# Patient Record
Sex: MALE | Race: BLACK OR AFRICAN AMERICAN | Employment: OTHER | URBAN - METROPOLITAN AREA
[De-identification: names, ages, dates, MRNs, and addresses within clinical notes are randomized per-mention and may not be internally consistent; named-entity substitution may affect disease eponyms.]

---

## 2017-01-03 ENCOUNTER — HOSPITAL ENCOUNTER (EMERGENCY)
Facility: CLINIC | Age: 67
Discharge: HOME | End: 2017-01-04
Attending: EMERGENCY MEDICINE | Admitting: EMERGENCY MEDICINE
Payer: COMMERCIAL

## 2017-01-03 ENCOUNTER — TELEPHONE (OUTPATIENT)
Dept: FAMILY MEDICINE | Facility: CLINIC | Age: 67
End: 2017-01-03

## 2017-01-03 ENCOUNTER — APPOINTMENT (OUTPATIENT)
Dept: CT IMAGING | Facility: CLINIC | Age: 67
End: 2017-01-03
Attending: EMERGENCY MEDICINE
Payer: COMMERCIAL

## 2017-01-03 ENCOUNTER — APPOINTMENT (OUTPATIENT)
Dept: ULTRASOUND IMAGING | Facility: CLINIC | Age: 67
End: 2017-01-03
Attending: EMERGENCY MEDICINE
Payer: COMMERCIAL

## 2017-01-03 DIAGNOSIS — R11.2 NON-INTRACTABLE VOMITING WITH NAUSEA, UNSPECIFIED VOMITING TYPE: ICD-10-CM

## 2017-01-03 DIAGNOSIS — K80.20 CALCULUS OF GALLBLADDER WITHOUT CHOLECYSTITIS WITHOUT OBSTRUCTION: ICD-10-CM

## 2017-01-03 LAB
ALBUMIN SERPL-MCNC: 4.2 G/DL (ref 3.4–5)
ALBUMIN UR-MCNC: 100 MG/DL
ALP SERPL-CCNC: 71 U/L (ref 40–150)
ALT SERPL W P-5'-P-CCNC: 30 U/L (ref 0–70)
ANION GAP SERPL CALCULATED.3IONS-SCNC: 13 MMOL/L (ref 3–14)
APPEARANCE UR: CLEAR
AST SERPL W P-5'-P-CCNC: 22 U/L (ref 0–45)
BASOPHILS # BLD AUTO: 0.1 10E9/L (ref 0–0.2)
BASOPHILS NFR BLD AUTO: 0.7 %
BILIRUB DIRECT SERPL-MCNC: 0.2 MG/DL (ref 0–0.2)
BILIRUB SERPL-MCNC: 0.7 MG/DL (ref 0.2–1.3)
BILIRUB UR QL STRIP: NEGATIVE
BUN SERPL-MCNC: 16 MG/DL (ref 7–30)
CALCIUM SERPL-MCNC: 9.7 MG/DL (ref 8.5–10.1)
CHLORIDE SERPL-SCNC: 101 MMOL/L (ref 94–109)
CO2 SERPL-SCNC: 23 MMOL/L (ref 20–32)
COLOR UR AUTO: YELLOW
CREAT SERPL-MCNC: 0.78 MG/DL (ref 0.66–1.25)
DIFFERENTIAL METHOD BLD: ABNORMAL
EOSINOPHIL # BLD AUTO: 0 10E9/L (ref 0–0.7)
EOSINOPHIL NFR BLD AUTO: 0.3 %
ERYTHROCYTE [DISTWIDTH] IN BLOOD BY AUTOMATED COUNT: 13.4 % (ref 10–15)
GFR SERPL CREATININE-BSD FRML MDRD: ABNORMAL ML/MIN/1.7M2
GLUCOSE BLDC GLUCOMTR-MCNC: 245 MG/DL (ref 70–99)
GLUCOSE SERPL-MCNC: 239 MG/DL (ref 70–99)
GLUCOSE UR STRIP-MCNC: 300 MG/DL
HCT VFR BLD AUTO: 39.9 % (ref 40–53)
HGB BLD-MCNC: 13.8 G/DL (ref 13.3–17.7)
HGB UR QL STRIP: ABNORMAL
HYALINE CASTS #/AREA URNS LPF: 1 /LPF (ref 0–2)
IMM GRANULOCYTES # BLD: 0 10E9/L (ref 0–0.4)
IMM GRANULOCYTES NFR BLD: 0.4 %
KETONES UR STRIP-MCNC: 40 MG/DL
LEUKOCYTE ESTERASE UR QL STRIP: NEGATIVE
LIPASE SERPL-CCNC: 179 U/L (ref 73–393)
LYMPHOCYTES # BLD AUTO: 1.8 10E9/L (ref 0.8–5.3)
LYMPHOCYTES NFR BLD AUTO: 24.8 %
MCH RBC QN AUTO: 30.3 PG (ref 26.5–33)
MCHC RBC AUTO-ENTMCNC: 34.6 G/DL (ref 31.5–36.5)
MCV RBC AUTO: 88 FL (ref 78–100)
MONOCYTES # BLD AUTO: 0.3 10E9/L (ref 0–1.3)
MONOCYTES NFR BLD AUTO: 4.7 %
MUCOUS THREADS #/AREA URNS LPF: PRESENT /LPF
NEUTROPHILS # BLD AUTO: 5 10E9/L (ref 1.6–8.3)
NEUTROPHILS NFR BLD AUTO: 69.1 %
NITRATE UR QL: NEGATIVE
NRBC # BLD AUTO: 0 10*3/UL
NRBC BLD AUTO-RTO: 0 /100
PH UR STRIP: 6.5 PH (ref 5–7)
PLATELET # BLD AUTO: 211 10E9/L (ref 150–450)
POTASSIUM SERPL-SCNC: 4.4 MMOL/L (ref 3.4–5.3)
PROT SERPL-MCNC: 8.3 G/DL (ref 6.8–8.8)
RBC # BLD AUTO: 4.56 10E12/L (ref 4.4–5.9)
RBC #/AREA URNS AUTO: 1 /HPF (ref 0–2)
SODIUM SERPL-SCNC: 137 MMOL/L (ref 133–144)
SP GR UR STRIP: 1.01 (ref 1–1.03)
TROPONIN I SERPL-MCNC: NORMAL UG/L (ref 0–0.04)
URN SPEC COLLECT METH UR: ABNORMAL
UROBILINOGEN UR STRIP-MCNC: NORMAL MG/DL (ref 0–2)
WBC # BLD AUTO: 7.2 10E9/L (ref 4–11)
WBC #/AREA URNS AUTO: 1 /HPF (ref 0–2)

## 2017-01-03 PROCEDURE — 25000128 H RX IP 250 OP 636: Performed by: EMERGENCY MEDICINE

## 2017-01-03 PROCEDURE — 25000125 ZZHC RX 250: Performed by: EMERGENCY MEDICINE

## 2017-01-03 PROCEDURE — 00000146 ZZHCL STATISTIC GLUCOSE BY METER IP

## 2017-01-03 PROCEDURE — 76705 ECHO EXAM OF ABDOMEN: CPT

## 2017-01-03 PROCEDURE — 83690 ASSAY OF LIPASE: CPT | Performed by: EMERGENCY MEDICINE

## 2017-01-03 PROCEDURE — 74177 CT ABD & PELVIS W/CONTRAST: CPT

## 2017-01-03 PROCEDURE — 80048 BASIC METABOLIC PNL TOTAL CA: CPT | Performed by: EMERGENCY MEDICINE

## 2017-01-03 PROCEDURE — 96375 TX/PRO/DX INJ NEW DRUG ADDON: CPT

## 2017-01-03 PROCEDURE — 99285 EMERGENCY DEPT VISIT HI MDM: CPT | Mod: 25

## 2017-01-03 PROCEDURE — 81001 URINALYSIS AUTO W/SCOPE: CPT | Performed by: EMERGENCY MEDICINE

## 2017-01-03 PROCEDURE — 96361 HYDRATE IV INFUSION ADD-ON: CPT

## 2017-01-03 PROCEDURE — 93005 ELECTROCARDIOGRAM TRACING: CPT

## 2017-01-03 PROCEDURE — 80076 HEPATIC FUNCTION PANEL: CPT | Performed by: EMERGENCY MEDICINE

## 2017-01-03 PROCEDURE — 96374 THER/PROPH/DIAG INJ IV PUSH: CPT

## 2017-01-03 PROCEDURE — 85025 COMPLETE CBC W/AUTO DIFF WBC: CPT | Performed by: EMERGENCY MEDICINE

## 2017-01-03 PROCEDURE — 84484 ASSAY OF TROPONIN QUANT: CPT | Performed by: EMERGENCY MEDICINE

## 2017-01-03 PROCEDURE — 25500064 ZZH RX 255 OP 636: Performed by: EMERGENCY MEDICINE

## 2017-01-03 RX ORDER — ONDANSETRON 2 MG/ML
4 INJECTION INTRAMUSCULAR; INTRAVENOUS EVERY 30 MIN PRN
Status: DISCONTINUED | OUTPATIENT
Start: 2017-01-03 | End: 2017-01-04 | Stop reason: HOSPADM

## 2017-01-03 RX ORDER — HYDROMORPHONE HYDROCHLORIDE 1 MG/ML
0.5 INJECTION, SOLUTION INTRAMUSCULAR; INTRAVENOUS; SUBCUTANEOUS ONCE
Status: COMPLETED | OUTPATIENT
Start: 2017-01-03 | End: 2017-01-04

## 2017-01-03 RX ORDER — SODIUM CHLORIDE 9 MG/ML
1000 INJECTION, SOLUTION INTRAVENOUS CONTINUOUS
Status: DISCONTINUED | OUTPATIENT
Start: 2017-01-03 | End: 2017-01-04 | Stop reason: HOSPADM

## 2017-01-03 RX ORDER — IOPAMIDOL 755 MG/ML
500 INJECTION, SOLUTION INTRAVASCULAR ONCE
Status: COMPLETED | OUTPATIENT
Start: 2017-01-03 | End: 2017-01-03

## 2017-01-03 RX ORDER — KETOROLAC TROMETHAMINE 15 MG/ML
15 INJECTION, SOLUTION INTRAMUSCULAR; INTRAVENOUS ONCE
Status: COMPLETED | OUTPATIENT
Start: 2017-01-03 | End: 2017-01-03

## 2017-01-03 RX ORDER — ONDANSETRON 4 MG/1
4 TABLET, ORALLY DISINTEGRATING ORAL ONCE
Status: COMPLETED | OUTPATIENT
Start: 2017-01-03 | End: 2017-01-03

## 2017-01-03 RX ORDER — LIDOCAINE 40 MG/G
CREAM TOPICAL
Status: DISCONTINUED | OUTPATIENT
Start: 2017-01-03 | End: 2017-01-04 | Stop reason: HOSPADM

## 2017-01-03 RX ADMIN — KETOROLAC TROMETHAMINE 15 MG: 15 INJECTION, SOLUTION INTRAMUSCULAR; INTRAVENOUS at 20:26

## 2017-01-03 RX ADMIN — IOPAMIDOL 70 ML: 755 INJECTION, SOLUTION INTRAVENOUS at 22:31

## 2017-01-03 RX ADMIN — ONDANSETRON 4 MG: 4 TABLET, ORALLY DISINTEGRATING ORAL at 17:41

## 2017-01-03 RX ADMIN — SODIUM CHLORIDE 57 ML: 9 INJECTION, SOLUTION INTRAVENOUS at 22:31

## 2017-01-03 RX ADMIN — SODIUM CHLORIDE 1000 ML: 9 INJECTION, SOLUTION INTRAVENOUS at 20:00

## 2017-01-03 RX ADMIN — ONDANSETRON 4 MG: 2 INJECTION INTRAMUSCULAR; INTRAVENOUS at 20:26

## 2017-01-03 ASSESSMENT — ENCOUNTER SYMPTOMS
DIARRHEA: 0
ABDOMINAL PAIN: 1
FEVER: 0
NAUSEA: 1
VOMITING: 1
CHILLS: 0

## 2017-01-03 NOTE — ED AVS SNAPSHOT
[facility]    [address]    [facility] [address]    Phone:  [phone]    Fax:  [phone]                                       Shell Leon   MRN: [id number]    Department:  [facility]   Date of Visit:  1/3/2017           Patient Information     Date Of Birth          1950        Your diagnoses for this visit were:     Non-intractable vomiting with nausea, unspecified vomiting type     Calculus of gallbladder without cholecystitis without obstruction        You were seen by Baljeet Rodriguez MD and Kathy Mark MD.      Follow-up Information     Follow up with Dajuan Rebolledo MD In 2 days.    Specialty:  General Surgery    Contact information:    [facility]  [address]  [facility] [address]  [phone]          Discharge Instructions         Discharge Instructions  Biliary Colic    You have been seen today for biliary colic. Biliary colic is the pain that happens when gallstones block the normal flow of bile from the gallbladder.  It usually is a steady or crampy pain in the upper abdomen, most often under the right side of the rib cage where the gallbladder is. Sometimes you get pain from the gallbladder in your back or shoulder. It is common to have nausea and vomiting with biliary colic.    Bile is a liquid the body makes to help with digesting fat.  It is made by the liver and stored in the gallbladder and released from the gall bladder when you eat fatty foods. Gallstones can form for a variety of reasons. Risk factors for gallstones include being female, having a family history of gallstones, being older, being pregnant or having been pregnant, having diabetes, having rapid weight loss, and others.      Once gallstones form, surgeons usually tell you to have your gallbladder removed. There is medicine that can dissolve gallstones, but it can be unpleasant to take, and  gallstones tend to come back when you quit taking the medicine. Your regular physician can help decide on the right treatment for you, and may refer you to a surgeon to discuss whether surgery is right in your case.     Complications of gallstones include infection, jaundice, inflammation of the pancreas, and rupture of the gallbladder. One of these complications will happen to about one out of every four patients with gallstones over the next 10-20 years if they are not treated.       Return to the Emergency Department if you develop:    Fever greater than 100.5 degrees Fahrenheit, or 38.1 degrees Celsius.    Persistent nausea and vomiting.    Pain that won t go away with the medicines you were given here.    Yellow skin or eye color (jaundice).    Other new concerning symptoms.    What can I do to help myself?    Eat regular meals at least three times a day, to make the gallbladder empty before it gets too full.    Avoid fatty foods.    Drink plenty of clear fluids.    Take over the counter or prescribed pain medications.    See your doctor within 1 week (or as directed by your doctor today) for follow up.            If you were given a prescription for medicine here today, be sure to read all of the information (including the package insert) that comes with your prescription.  This will include important information about the medicine, its side effects, and any warnings that you need to know about.  The pharmacist who fills the prescription can provide more information and answer questions you may have about the medicine.  If you have questions or concerns that the pharmacist cannot address, please call or return to the Emergency Department.   Opioid Medication Information    Pain medications are among the most commonly prescribed medicines, so we are including this information for all our patients. If you did not receive pain medication or get a prescription for pain medicine, you can ignore it.     You may have  been given a prescription for an opioid (narcotic) pain medicine and/or have received a pain medicine while here in the Emergency Department. These medicines can make you drowsy or impaired. You must not drive, operate dangerous equipment, or engage in any other dangerous activities while taking these medications. If you drive while taking these medications, you could be arrested for DUI, or driving under the influence. Do not drink any alcohol while you are taking these medications.     Opioid pain medications can cause addiction. If you have a history of chemical dependency of any type, you are at a higher risk of becoming addicted to pain medications.  Only take these prescribed medications to treat your pain when all other options have been tried. Take it for as short a time and as few doses as possible. Store your pain pills in a secure place, as they are frequently stolen and provide a dangerous opportunity for children or visitors in your house to start abusing these powerful medications. We will not replace any lost or stolen medicine.  As soon as your pain is better, you should flush all your remaining medication.     Many prescription pain medications contain Tylenol  (acetaminophen), including Vicodin , Tylenol #3 , Norco , Lortab , and Percocet .  You should not take any extra pills of Tylenol  if you are using these prescription medications or you can get very sick.  Do not ever take more than 3000 mg of acetaminophen in any 24 hour period.    All opioids tend to cause constipation. Drink plenty of water and eat foods that have a lot of fiber, such as fruits, vegetables, prune juice, apple juice and high fiber cereal.  Take a laxative if you don t move your bowels at least every other day. Miralax , Milk of Magnesia, Colace , or Senna  can be used to keep you regular.      Remember that you can always come back to the Emergency Department if you are not able to see your regular doctor in the amount of  time listed above, if you get any new symptoms, or if there is anything that worries you.        24 Hour Appointment Hotline       To make an appointment at any [facility] clinic, call [phone] ([phone]). If you don't have a family doctor or clinic, we will help you find one. [facility] clinics are conveniently located to serve the needs of you and your family.             Review of your medicines      START taking        Dose / Directions Last dose taken    ondansetron 4 MG ODT tab   Commonly known as:  ZOFRAN ODT   Dose:  4 mg   Quantity:  10 tablet        Take 1 tablet (4 mg) by mouth every 8 hours as needed   Refills:  0          Our records show that you are taking the medicines listed below. If these are incorrect, please call your family doctor or clinic.        Dose / Directions Last dose taken    amLODIPine 5 MG tablet   Commonly known as:  NORVASC   Dose:  5 mg   Quantity:  30 tablet        Take 1 tablet (5 mg) by mouth daily   Refills:  11        aspirin 81 MG chewable tablet   Dose:  81 mg   Quantity:  108 tablet        Take 1 tablet (81 mg) by mouth daily   Refills:  3        atorvastatin 80 MG tablet   Commonly known as:  LIPITOR   Dose:  80 mg   Quantity:  30 tablet        Take 1 tablet (80 mg) by mouth daily   Refills:  3        hydrochlorothiazide 25 MG tablet   Commonly known as:  HYDRODIURIL   Dose:  25 mg   Quantity:  30 tablet        Take 1 tablet (25 mg) by mouth daily   Refills:  11        lisinopril 40 MG tablet   Commonly known as:  PRINIVIL/ZESTRIL   Dose:  40 mg   Quantity:  30 tablet        Take 1 tablet (40 mg) by mouth daily   Refills:  11        metFORMIN 1000 MG tablet   Commonly known as:  GLUCOPHAGE   Dose:  1000 mg   Quantity:  60 tablet        Take 1 tablet (1,000 mg) by mouth 2 times daily (with meals)   Refills:  1        * order for DME   Quantity:  1 each        Equipment being ordered: blood pressure cuff   Refills:  0        * order for DME   Quantity:  1 Device         Equipment being ordered: BP monitor and cuff   Refills:  0        * order for DME   Quantity:  1 Units        Equipment being ordered: compression stocking   Refills:  0        * Notice:  This list has 3 medication(s) that are the same as other medications prescribed for you. Read the directions carefully, and ask your doctor or other care provider to review them with you.            Prescriptions were sent or printed at these locations (1 Prescription)                   Other Prescriptions                Printed at Department/Unit printer (1 of 1)         ondansetron (ZOFRAN ODT) 4 MG ODT tab                Procedures and tests performed during your visit     Abdomen US, limited (RUQ only)    Basic metabolic panel    CBC with platelets differential    CT Abdomen Pelvis w Contrast    EKG 12-lead, tracing only    Glucose by meter    Hepatic panel    Lipase    Troponin I    UA with Microscopic      Orders Needing Specimen Collection     None      Pending Results     Date and Time Order Name Status Description    1/3/2017 2304 Abdomen US, limited (RUQ only) Preliminary     1/3/2017 1956 EKG 12-lead, tracing only Preliminary             Pending Culture Results     No orders found for last 2 day(s).       Test Results from your hospital stay           1/3/2017  5:46 PM - Interface, Flexilab Results      Component Results     Component Value Ref Range & Units Status    Glucose 245 (H) 70 - 99 mg/dL Final    Dr/RN Notified         1/3/2017  8:21 PM - Interface, Flexilab Results      Component Results     Component Value Ref Range & Units Status    WBC 7.2 4.0 - 11.0 10e9/L Final    RBC Count 4.56 4.4 - 5.9 10e12/L Final    Hemoglobin 13.8 13.3 - 17.7 g/dL Final    Hematocrit 39.9 (L) 40.0 - 53.0 % Final    MCV 88 78 - 100 fl Final    MCH 30.3 26.5 - 33.0 pg Final    MCHC 34.6 31.5 - 36.5 g/dL Final    RDW 13.4 10.0 - 15.0 % Final    Platelet Count 211 150 - 450 10e9/L Final    Diff Method Automated Method  Final    %  Neutrophils 69.1 % Final    % Lymphocytes 24.8 % Final    % Monocytes 4.7 % Final    % Eosinophils 0.3 % Final    % Basophils 0.7 % Final    % Immature Granulocytes 0.4 % Final    Nucleated RBCs 0 0 /100 Final    Absolute Neutrophil 5.0 1.6 - 8.3 10e9/L Final    Absolute Lymphocytes 1.8 0.8 - 5.3 10e9/L Final    Absolute Monocytes 0.3 0.0 - 1.3 10e9/L Final    Absolute Eosinophils 0.0 0.0 - 0.7 10e9/L Final    Absolute Basophils 0.1 0.0 - 0.2 10e9/L Final    Abs Immature Granulocytes 0.0 0 - 0.4 10e9/L Final    Absolute Nucleated RBC 0.0  Final         1/3/2017  8:40 PM - Interface, Flexilab Results      Component Results     Component Value Ref Range & Units Status    Sodium 137 133 - 144 mmol/L Final    Potassium 4.4 3.4 - 5.3 mmol/L Final    Chloride 101 94 - 109 mmol/L Final    Carbon Dioxide 23 20 - 32 mmol/L Final    Anion Gap 13 3 - 14 mmol/L Final    Glucose 239 (H) 70 - 99 mg/dL Final    Urea Nitrogen 16 7 - 30 mg/dL Final    Creatinine 0.78 0.66 - 1.25 mg/dL Final    GFR Estimate >90  Non  GFR Calc   >60 mL/min/1.7m2 Final    GFR Estimate If Black >90   GFR Calc   >60 mL/min/1.7m2 Final    Calcium 9.7 8.5 - 10.1 mg/dL Final         1/3/2017  8:40 PM - Interface, Flexilab Results      Component Results     Component Value Ref Range & Units Status    Bilirubin Direct 0.2 0.0 - 0.2 mg/dL Final    Bilirubin Total 0.7 0.2 - 1.3 mg/dL Final    Albumin 4.2 3.4 - 5.0 g/dL Final    Protein Total 8.3 6.8 - 8.8 g/dL Final    Alkaline Phosphatase 71 40 - 150 U/L Final    ALT 30 0 - 70 U/L Final    AST 22 0 - 45 U/L Final         1/3/2017  8:40 PM - Interface, Flexilab Results      Component Results     Component Value Ref Range & Units Status    Lipase 179 73 - 393 U/L Final         1/3/2017  8:40 PM - Interface, Flexilab Results      Component Results     Component Value Ref Range & Units Status    Troponin I ES  0.000 - 0.045 ug/L Final    <0.015  The 99th percentile for upper  reference range is 0.045 ug/L.  Troponin values in   the range of 0.045 - 0.120 ug/L may be associated with risks of adverse   clinical events.           1/3/2017 10:14 PM - Interface, Flexilab Results      Component Results     Component Value Ref Range & Units Status    Color Urine Yellow  Final    Appearance Urine Clear  Final    Glucose Urine 300 (A) NEG mg/dL Final    Bilirubin Urine Negative NEG Final    Ketones Urine 40 (A) NEG mg/dL Final    Specific Gravity Urine 1.013 1.003 - 1.035 Final    Blood Urine Trace (A) NEG Final    pH Urine 6.5 5.0 - 7.0 pH Final    Protein Albumin Urine 100 (A) NEG mg/dL Final    Urobilinogen mg/dL Normal 0.0 - 2.0 mg/dL Final    Nitrite Urine Negative NEG Final    Leukocyte Esterase Urine Negative NEG Final    Source Midstream Urine  Final    WBC Urine 1 0 - 2 /HPF Final    RBC Urine 1 0 - 2 /HPF Final    Mucous Urine Present (A) NEG /LPF Final    Hyaline Casts 1 0 - 2 /LPF Final         1/3/2017 11:06 PM - Interface, Radiant Ib      Narrative     CT ABDOMEN AND PELVIS WITH CONTRAST   1/3/2017 10:41 PM     HISTORY: Epigastric pain. Vomiting.    TECHNIQUE: 70mL Isovue-370 IV were administered. After contrast  administration, volumetric helical sections were acquired from the  lung bases to the ischial tuberosities. Coronal images were also  reconstructed. Radiation dose for this scan was reduced using  automated exposure control, adjustment of the mA and/or kV according  to patient size, or iterative reconstruction technique.    COMPARISON: None.     FINDINGS: No bowel obstruction. Moderate amount of gas and stool  throughout the colon. Unremarkable appendix. Mild bowel wall  thickening in the ascending colon could be within normal limits,  although a mild colitis could also have this appearance. No free fluid  in the pelvis. Moderate prostatic enlargement with mild central  prostatic calcification. Mild atherosclerotic aortoiliac  calcification. The gallbladder wall appears  mildly thickened. The  liver, spleen, adrenal glands, pancreas, and kidneys are unremarkable.  No hydronephrosis. Small hiatal hernia. Coronary artery calcification.  The lung bases are clear.        Impression     IMPRESSION:   1. The gallbladder wall appears mildly thickened. If there is clinical  suspicion for cholecystitis, gallbladder ultrasound would be  recommended for further evaluation.  2. Mild diffuse bowel wall thickening in the ascending colon could be  within normal limits, although a mild colitis could also have this  appearance. Clinical correlation is requested.  3. Small hiatal hernia.    GAIL SHULTZ MD         1/4/2017 12:18 AM - Interface, Radiant Ib      Narrative     US ABDOMEN LIMITED  1/4/2017 12:04 AM      HISTORY: Abdominal pain.     COMPARISON: None.    FINDINGS: The liver is normal in size and texture without focal mass.  There is no intra or extrahepatic biliary dilatation. The common  hepatic duct measures 0.3 cm. There is sludge and a 1.1 cm stone in  the gallbladder. The gallbladder wall is mildly thickened at 0.4 cm.  No sonographic Tabares's sign. The pancreas head and body appear  normal. The tail is obscured by bowel gas. The right kidney measures  11.4 cm and is normal in appearance. The proximal abdominal aorta and  IVC appear normal.         Impression     IMPRESSION:  1. Sludge and stone in the gallbladder.  2. The gallbladder wall is mildly thickened, a nonspecific finding. No  other evidence of cholecystitis.  3. No biliary dilatation.                Clinical Quality Measure: Blood Pressure Screening     Your blood pressure was checked while you were in the emergency department today. The last reading we obtained was  BP: 165/64 mmHg . Please read the guidelines below about what these numbers mean and what you should do about them.  If your systolic blood pressure (the top number) is less than 120 and your diastolic blood pressure (the bottom number) is less than 80,  then your blood pressure is normal. There is nothing more that you need to do about it.  If your systolic blood pressure (the top number) is 120-139 or your diastolic blood pressure (the bottom number) is 80-89, your blood pressure may be higher than it should be. You should have your blood pressure rechecked within a year by a primary care provider.  If your systolic blood pressure (the top number) is 140 or greater or your diastolic blood pressure (the bottom number) is 90 or greater, you may have high blood pressure. High blood pressure is treatable, but if left untreated over time it can put you at risk for heart attack, stroke, or kidney failure. You should have your blood pressure rechecked by a primary care provider within the next 4 weeks.  If your provider in the emergency department today gave you specific instructions to follow-up with your doctor or provider even sooner than that, you should follow that instruction and not wait for up to 4 weeks for your follow-up visit.        Thank you for choosing [facility]       Thank you for choosing [facility] for your care. Our goal is always to provide you with excellent care. Hearing back from our patients is one way we can continue to improve our services. Please take a few minutes to complete the written survey that you may receive in the mail after you visit with us. Thank you!        Care EveryWhere ID     This is your Care EveryWhere ID. This could be used by other organizations to access your [facility] medical records  [id number]        After Visit Summary       This is your record. Keep this with you and show to your community pharmacist(s) and doctor(s) at your next visit.

## 2017-01-03 NOTE — ED AVS SNAPSHOT
[facility] Department    [address]    [facility] [address]    Phone:  [phone]    Fax:  [phone]                                       Shell Leon   MRN: [id number]    Department:  [facility] Emergency Department   Date of Visit:  1/3/2017           After Visit Summary Signature Page     I have received my discharge instructions, and my questions have been answered. I have discussed any challenges I see with this plan with the nurse or doctor.    ..........................................................................................................................................  Patient/Patient Representative Signature      ..........................................................................................................................................  Patient Representative Print Name and Relationship to Patient    ..................................................               ................................................  Date                                            Time    ..........................................................................................................................................  Reviewed by Signature/Title    ...................................................              ..............................................  Date                                                            Time

## 2017-01-04 ENCOUNTER — TELEPHONE (OUTPATIENT)
Dept: CARDIOLOGY | Facility: CLINIC | Age: 67
End: 2017-01-04

## 2017-01-04 VITALS
SYSTOLIC BLOOD PRESSURE: 166 MMHG | HEART RATE: 60 BPM | RESPIRATION RATE: 20 BRPM | DIASTOLIC BLOOD PRESSURE: 66 MMHG | TEMPERATURE: 98.1 F | OXYGEN SATURATION: 97 %

## 2017-01-04 LAB — INTERPRETATION ECG - MUSE: NORMAL

## 2017-01-04 PROCEDURE — 25000125 ZZHC RX 250: Performed by: EMERGENCY MEDICINE

## 2017-01-04 PROCEDURE — 96374 THER/PROPH/DIAG INJ IV PUSH: CPT

## 2017-01-04 RX ORDER — ONDANSETRON 4 MG/1
4 TABLET, ORALLY DISINTEGRATING ORAL EVERY 8 HOURS PRN
Qty: 10 TABLET | Refills: 0 | Status: SHIPPED | OUTPATIENT
Start: 2017-01-04 | End: 2017-01-07

## 2017-01-04 RX ADMIN — HYDROMORPHONE HYDROCHLORIDE 0.5 MG: 1 INJECTION, SOLUTION INTRAMUSCULAR; INTRAVENOUS; SUBCUTANEOUS at 00:02

## 2017-01-04 RX ADMIN — ONDANSETRON 4 MG: 2 INJECTION INTRAMUSCULAR; INTRAVENOUS at 00:02

## 2017-01-04 NOTE — ED NOTES
Shell Leon is a 66 year old male who was seen by Dr. Rodriguez. The laboratory evaluation was unremarkable. CT was done which showed questionable wall thickening of the gall bladder so ultrasound was done. This showed chololithiasis with mild wall thickening but no definitive evidence of cholecystis. I reexamined the patient and he minimal pain in the right upper quadrant. He does have cholelithiasis and likely symptoms are related to his gall bladder and potentially early cholecystis. I did recommend admission to the hospital for cholecystectomy and further evaluation/treatment, however, since the patient felt well, he did not want to be admitted at this time. Since he was improved with no further vomiting, the patient will be discharged with close outpatient follow up with surgery to schedule cholecystectomy. In the meantime, he was told to return if increasing pain, vomiting or fever. Patient does understand that his symptoms may worsen and may have complications including sepsis.     Kathy Mark MD  01/04/17 0498

## 2017-01-04 NOTE — DISCHARGE INSTRUCTIONS
Discharge Instructions  Biliary Colic    You have been seen today for biliary colic. Biliary colic is the pain that happens when gallstones block the normal flow of bile from the gallbladder.  It usually is a steady or crampy pain in the upper abdomen, most often under the right side of the rib cage where the gallbladder is. Sometimes you get pain from the gallbladder in your back or shoulder. It is common to have nausea and vomiting with biliary colic.    Bile is a liquid the body makes to help with digesting fat.  It is made by the liver and stored in the gallbladder and released from the gall bladder when you eat fatty foods. Gallstones can form for a variety of reasons. Risk factors for gallstones include being female, having a family history of gallstones, being older, being pregnant or having been pregnant, having diabetes, having rapid weight loss, and others.      Once gallstones form, surgeons usually tell you to have your gallbladder removed. There is medicine that can dissolve gallstones, but it can be unpleasant to take, and gallstones tend to come back when you quit taking the medicine. Your regular physician can help decide on the right treatment for you, and may refer you to a surgeon to discuss whether surgery is right in your case.     Complications of gallstones include infection, jaundice, inflammation of the pancreas, and rupture of the gallbladder. One of these complications will happen to about one out of every four patients with gallstones over the next 10-20 years if they are not treated.       Return to the Emergency Department if you develop:    Fever greater than 100.5 degrees Fahrenheit, or 38.1 degrees Celsius.    Persistent nausea and vomiting.    Pain that won t go away with the medicines you were given here.    Yellow skin or eye color (jaundice).    Other new concerning symptoms.    What can I do to help myself?    Eat regular meals at least three times a day, to make the  gallbladder empty before it gets too full.    Avoid fatty foods.    Drink plenty of clear fluids.    Take over the counter or prescribed pain medications.    See your doctor within 1 week (or as directed by your doctor today) for follow up.            If you were given a prescription for medicine here today, be sure to read all of the information (including the package insert) that comes with your prescription.  This will include important information about the medicine, its side effects, and any warnings that you need to know about.  The pharmacist who fills the prescription can provide more information and answer questions you may have about the medicine.  If you have questions or concerns that the pharmacist cannot address, please call or return to the Emergency Department.   Opioid Medication Information    Pain medications are among the most commonly prescribed medicines, so we are including this information for all our patients. If you did not receive pain medication or get a prescription for pain medicine, you can ignore it.     You may have been given a prescription for an opioid (narcotic) pain medicine and/or have received a pain medicine while here in the Emergency Department. These medicines can make you drowsy or impaired. You must not drive, operate dangerous equipment, or engage in any other dangerous activities while taking these medications. If you drive while taking these medications, you could be arrested for DUI, or driving under the influence. Do not drink any alcohol while you are taking these medications.     Opioid pain medications can cause addiction. If you have a history of chemical dependency of any type, you are at a higher risk of becoming addicted to pain medications.  Only take these prescribed medications to treat your pain when all other options have been tried. Take it for as short a time and as few doses as possible. Store your pain pills in a secure place, as they are frequently  stolen and provide a dangerous opportunity for children or visitors in your house to start abusing these powerful medications. We will not replace any lost or stolen medicine.  As soon as your pain is better, you should flush all your remaining medication.     Many prescription pain medications contain Tylenol  (acetaminophen), including Vicodin , Tylenol #3 , Norco , Lortab , and Percocet .  You should not take any extra pills of Tylenol  if you are using these prescription medications or you can get very sick.  Do not ever take more than 3000 mg of acetaminophen in any 24 hour period.    All opioids tend to cause constipation. Drink plenty of water and eat foods that have a lot of fiber, such as fruits, vegetables, prune juice, apple juice and high fiber cereal.  Take a laxative if you don t move your bowels at least every other day. Miralax , Milk of Magnesia, Colace , or Senna  can be used to keep you regular.      Remember that you can always come back to the Emergency Department if you are not able to see your regular doctor in the amount of time listed above, if you get any new symptoms, or if there is anything that worries you.

## 2017-01-04 NOTE — TELEPHONE ENCOUNTER
Received a call from patient's son, Merlin. He wanted to give us an update on patient. Patient was seen in the ED on 01-03-17 for nausea and vomiting. Merlin had questions about patient's labs. Glucose was high at 239 and 245. Glucose 300, Ketone 40, Trace blood, and Protein albumin 100 was found in patient's urine. I recommended patient to follow up with his PMD. Patient's BP was also high at 178/66. However, patient did not take his meds for 3 days due to the nausea and vomiting. Admission to the hospital was recommended for cholecystectomy and for further evaluation and treatment. However, patient felt well and declined admission.  Patient is seeing a surgeon next week to schedule his surgery as an outpatient. Merlin wanted to know how urgently the patient should have his surgery. I recommended patient be seen sooner if his vomiting and nausea return or if his abdominal pain worsens or if he has a fever. Complications could include sepsis/infection.  If patient can't be seen sooner, patient should return to the ED. Demond has no other questions.       Earnest ELLISON - [facility]

## 2017-01-04 NOTE — ED PROVIDER NOTES
History     Chief Complaint:  Nausea & Vomiting    HPI   History is provided by patient with supplementation by daughter who expanded questions and interpreted on behalf of patient. They declined a hospital .    Shell Leon is a 66 year old male with a history of diabetes, on Metformin, who presents with nausea and vomiting. The patient states that yesterday he developed nausea and vomiting with associated upper abdominal pain. He has had continued symptoms into today, totaling approximately 8 bouts of vomiting, along with worsening abdominal pain. He has a history of diabetes and has been unable to keep much flood or fluids down, prompting him to come here with his daughter for evaluation. He denies any recent sick contacts. He denies any fevers, diarrhea.     Allergies:  No known drug allergies      Medications:    Lisinopril  Norvasc  Hydrodiuril  Metformin  Lipitor  Aspirin       Past Medical History:    Type 2 diabetes  Hypertension  Internal carotid stenosis    Past Surgical History:    History reviewed. No pertinent surgical history.      Family History:    History reviewed. No pertinent family history.       Social History:  Smoking status: Never smoker  Alcohol use: no   Marital Status:       Review of Systems   Constitutional: Negative for fever and chills.   Gastrointestinal: Positive for nausea, vomiting and abdominal pain. Negative for diarrhea.   All other systems reviewed and are negative.      Physical Exam   First Vitals:  BP: 178/66 mmHg  Pulse: 60  Temp: 98.1  F (36.7  C)  Resp: 20  SpO2: 98 %      Physical Exam   Constitutional: He is oriented to person, place, and time. He appears well-developed.   HENT:   Head: Normocephalic and atraumatic.   Right Ear: External ear normal.   Mouth/Throat: Oropharynx is clear and moist.   Eyes: Conjunctivae and EOM are normal. Pupils are equal, round, and reactive to light.   Neck: Normal range of motion. Neck supple. No JVD present.    Cardiovascular: Normal rate, regular rhythm and normal heart sounds.    Pulmonary/Chest: Effort normal and breath sounds normal.   Abdominal: Soft. Bowel sounds are normal. He exhibits no distension. There is tenderness in the right upper quadrant and epigastric area. There is no rebound.   Musculoskeletal: Normal range of motion.   Lymphadenopathy:     He has no cervical adenopathy.   Neurological: He is alert and oriented to person, place, and time. He displays normal reflexes. No cranial nerve deficit. He exhibits normal muscle tone. Coordination normal.   Skin: Skin is warm and dry.   Psychiatric: He has a normal mood and affect. His behavior is normal. Judgment normal.   Nursing note and vitals reviewed.        Emergency Department Course     ECG (20:24:37):  Rate 62 bpm. IA interval 124. QRS duration 78. QT/QTc 410/416. P-R-T axes 63 31 58. Normal sinus rhythm. Nonspecific T wave abnormality. Abnormal ECG. Interpreted at 2031 by Baljeet Rodriguez MD.     Imaging:  Radiographic findings were communicated with the patient who voiced understanding of the findings.    CT-scan Abdomen/Pelvis w/ contrast:  1. The gallbladder wall appears mildly thickened. If there is clinical  suspicion for cholecystitis, gallbladder ultrasound would be  recommended for further evaluation.  2. Mild diffuse bowel wall thickening in the ascending colon could be  within normal limits, although a mild colitis could also have this  appearance. Clinical correlation is requested.  3. Small hiatal hernia.  Preliminary result per radiology.      US Abdomen limited:  Signed out to Dr. Mark pending results.    Laboratory:  2011 - Troponin: <0.015   CBC: WNL (WBC 7.2, HGB 13.8, )    BMP: Glucose 239 (H), o/w WNL (Creatinine 0.78)   Hepatic Panel: WNL  1742 - Glucose: 245 (H)  Lipase: 179  UA: Glucose 300, Ketone 40, Trace blood, Protein albumin 100, Mucous present, o/w negative    Interventions:  1741 - Zofran-ODT 4 mg PO  2000  - NS 1L IV Bolus   2026 - Zofran 4 mg IV    Emergency Department Course:  Past medical records, nursing notes, and vitals reviewed.  1954: I performed an exam of the patient and obtained history, as documented above.   IV inserted and blood drawn.   2121: I rechecked the patient. Explained findings to patient and family.   The patient was sent for a CT-scan while in the emergency department, findings above.     2307: I rechecked the patient. Explained findings to patient. Repeat exam shows some RUQ tenderness.  The patient was sent for a ultrasound while in the emergency department, findings above.     Pending ultrasound results, the patient will be signed out to my partner Dr. Mark who will assign final disposition based on pending results.     Impression & Plan      Medical Decision Making:  The patient presents with vomiting and upper abdominal pain. The patient is a diabetic, but blood sugar is not high. Lab tests are normal. Due to history of vasculopathy and mid abdominal pain, CT was performed to rule out aneurysm and/or mesenteric infarction; this was negative. There is some concern for cholecystitis by CT and therefore ultrasound is pending. Ultrasound will be signed out to my partner. Dr. Mark, at 11:30 PM for evaluation; if negative the patient will be discharged. If positive, the patient will likely require admission due to his age and known diabetes.     Provisional Diagnosis:  Epigastric pain  Vomiting  Diabetes      Thiernooctavio Garcia  1/3/2017   [facility] EMERGENCY DEPARTMENT    I, Thierno Garcia, am serving as a scribe at 7:53 PM on 1/3/2017 to document services personally performed by Baljeet Rodriguez MD based on my observations and the provider's statements to me.      Baljeet Rodriguez MD  01/08/17 [id number]

## 2017-01-06 ENCOUNTER — HOSPITAL ENCOUNTER (OUTPATIENT)
Facility: CLINIC | Age: 67
Discharge: HOME | End: 2017-01-06
Attending: SURGERY | Admitting: SURGERY
Payer: COMMERCIAL

## 2017-01-06 ENCOUNTER — ANESTHESIA (OUTPATIENT)
Dept: SURGERY | Facility: CLINIC | Age: 67
End: 2017-01-06
Payer: COMMERCIAL

## 2017-01-06 ENCOUNTER — SURGERY (OUTPATIENT)
Age: 67
End: 2017-01-06

## 2017-01-06 ENCOUNTER — ANESTHESIA EVENT (OUTPATIENT)
Dept: SURGERY | Facility: CLINIC | Age: 67
End: 2017-01-06
Payer: COMMERCIAL

## 2017-01-06 ENCOUNTER — OFFICE VISIT (OUTPATIENT)
Dept: SURGERY | Facility: CLINIC | Age: 67
End: 2017-01-06
Payer: COMMERCIAL

## 2017-01-06 ENCOUNTER — APPOINTMENT (OUTPATIENT)
Dept: SURGERY | Facility: PHYSICIAN GROUP | Age: 67
End: 2017-01-06
Payer: COMMERCIAL

## 2017-01-06 VITALS
OXYGEN SATURATION: 98 % | RESPIRATION RATE: 16 BRPM | DIASTOLIC BLOOD PRESSURE: 60 MMHG | WEIGHT: 126.32 LBS | HEIGHT: 65 IN | BODY MASS INDEX: 21.05 KG/M2 | SYSTOLIC BLOOD PRESSURE: 147 MMHG | TEMPERATURE: 98.2 F

## 2017-01-06 VITALS
HEART RATE: 68 BPM | HEIGHT: 66 IN | BODY MASS INDEX: 22.34 KG/M2 | WEIGHT: 139 LBS | DIASTOLIC BLOOD PRESSURE: 58 MMHG | OXYGEN SATURATION: 100 % | SYSTOLIC BLOOD PRESSURE: 138 MMHG

## 2017-01-06 DIAGNOSIS — K81.9 CHOLECYSTITIS: Primary | ICD-10-CM

## 2017-01-06 DIAGNOSIS — R10.11 ABDOMINAL PAIN, RIGHT UPPER QUADRANT: Primary | ICD-10-CM

## 2017-01-06 LAB
GLUCOSE BLDC GLUCOMTR-MCNC: 190 MG/DL (ref 70–99)
GLUCOSE BLDC GLUCOMTR-MCNC: 218 MG/DL (ref 70–99)
GLUCOSE BLDC GLUCOMTR-MCNC: 316 MG/DL (ref 70–99)
GLUCOSE BLDC GLUCOMTR-MCNC: 327 MG/DL (ref 70–99)

## 2017-01-06 PROCEDURE — 88304 TISSUE EXAM BY PATHOLOGIST: CPT | Performed by: SURGERY

## 2017-01-06 PROCEDURE — 25000125 ZZHC RX 250: Performed by: NURSE ANESTHETIST, CERTIFIED REGISTERED

## 2017-01-06 PROCEDURE — 25000132 ZZH RX MED GY IP 250 OP 250 PS 637: Performed by: SURGERY

## 2017-01-06 PROCEDURE — 25000132 ZZH RX MED GY IP 250 OP 250 PS 637: Performed by: ANESTHESIOLOGY

## 2017-01-06 PROCEDURE — 88304 TISSUE EXAM BY PATHOLOGIST: CPT | Mod: 26 | Performed by: SURGERY

## 2017-01-06 PROCEDURE — 71000012 ZZH RECOVERY PHASE 1 LEVEL 1 FIRST HR: Performed by: SURGERY

## 2017-01-06 PROCEDURE — 82962 GLUCOSE BLOOD TEST: CPT

## 2017-01-06 PROCEDURE — 25000125 ZZHC RX 250: Performed by: ANESTHESIOLOGY

## 2017-01-06 PROCEDURE — 27210794 ZZH OR GENERAL SUPPLY STERILE: Performed by: SURGERY

## 2017-01-06 PROCEDURE — 40000306 ZZH STATISTIC PRE PROC ASSESS II: Performed by: SURGERY

## 2017-01-06 PROCEDURE — 71000027 ZZH RECOVERY PHASE 2 EACH 15 MINS: Performed by: SURGERY

## 2017-01-06 PROCEDURE — 25800025 ZZH RX 258: Performed by: ANESTHESIOLOGY

## 2017-01-06 PROCEDURE — 36000060 ZZH SURGERY LEVEL 3 W FLUORO 1ST 30 MIN: Performed by: SURGERY

## 2017-01-06 PROCEDURE — 37000008 ZZH ANESTHESIA TECHNICAL FEE, 1ST 30 MIN: Performed by: SURGERY

## 2017-01-06 PROCEDURE — 25800025 ZZH RX 258: Performed by: SURGERY

## 2017-01-06 PROCEDURE — 25000566 ZZH SEVOFLURANE, EA 15 MIN: Performed by: SURGERY

## 2017-01-06 PROCEDURE — 25000125 ZZHC RX 250: Performed by: SURGERY

## 2017-01-06 PROCEDURE — 99244 OFF/OP CNSLTJ NEW/EST MOD 40: CPT | Mod: 57 | Performed by: SURGERY

## 2017-01-06 PROCEDURE — 36000058 ZZH SURGERY LEVEL 3 EA 15 ADDTL MIN: Performed by: SURGERY

## 2017-01-06 PROCEDURE — 47562 LAPAROSCOPIC CHOLECYSTECTOMY: CPT | Performed by: SURGERY

## 2017-01-06 PROCEDURE — 37000009 ZZH ANESTHESIA TECHNICAL FEE, EACH ADDTL 15 MIN: Performed by: SURGERY

## 2017-01-06 RX ORDER — SODIUM CHLORIDE, SODIUM LACTATE, POTASSIUM CHLORIDE, CALCIUM CHLORIDE 600; 310; 30; 20 MG/100ML; MG/100ML; MG/100ML; MG/100ML
INJECTION, SOLUTION INTRAVENOUS CONTINUOUS
Status: DISCONTINUED | OUTPATIENT
Start: 2017-01-06 | End: 2017-01-06 | Stop reason: HOSPADM

## 2017-01-06 RX ORDER — GLYCOPYRROLATE 0.2 MG/ML
INJECTION, SOLUTION INTRAMUSCULAR; INTRAVENOUS PRN
Status: DISCONTINUED | OUTPATIENT
Start: 2017-01-06 | End: 2017-01-06

## 2017-01-06 RX ORDER — AMOXICILLIN 250 MG
1-2 CAPSULE ORAL 2 TIMES DAILY
Qty: 15 TABLET | Refills: 1 | Status: SHIPPED | OUTPATIENT
Start: 2017-01-06 | End: 2017-08-01

## 2017-01-06 RX ORDER — MEPERIDINE HYDROCHLORIDE 25 MG/ML
12.5 INJECTION INTRAMUSCULAR; INTRAVENOUS; SUBCUTANEOUS
Status: DISCONTINUED | OUTPATIENT
Start: 2017-01-06 | End: 2017-01-07 | Stop reason: HOSPADM

## 2017-01-06 RX ORDER — LIDOCAINE 40 MG/G
CREAM TOPICAL
Status: DISCONTINUED | OUTPATIENT
Start: 2017-01-06 | End: 2017-01-06 | Stop reason: HOSPADM

## 2017-01-06 RX ORDER — SODIUM CHLORIDE, SODIUM LACTATE, POTASSIUM CHLORIDE, CALCIUM CHLORIDE 600; 310; 30; 20 MG/100ML; MG/100ML; MG/100ML; MG/100ML
INJECTION, SOLUTION INTRAVENOUS CONTINUOUS
Status: DISCONTINUED | OUTPATIENT
Start: 2017-01-06 | End: 2017-01-07 | Stop reason: HOSPADM

## 2017-01-06 RX ORDER — HYDROMORPHONE HYDROCHLORIDE 1 MG/ML
.3-.5 INJECTION, SOLUTION INTRAMUSCULAR; INTRAVENOUS; SUBCUTANEOUS EVERY 10 MIN PRN
Status: DISCONTINUED | OUTPATIENT
Start: 2017-01-06 | End: 2017-01-07 | Stop reason: HOSPADM

## 2017-01-06 RX ORDER — NEOSTIGMINE METHYLSULFATE 1 MG/ML
VIAL (ML) INJECTION PRN
Status: DISCONTINUED | OUTPATIENT
Start: 2017-01-06 | End: 2017-01-06

## 2017-01-06 RX ORDER — ONDANSETRON 2 MG/ML
4 INJECTION INTRAMUSCULAR; INTRAVENOUS EVERY 30 MIN PRN
Status: DISCONTINUED | OUTPATIENT
Start: 2017-01-06 | End: 2017-01-07 | Stop reason: HOSPADM

## 2017-01-06 RX ORDER — CEFAZOLIN SODIUM 1 G/3ML
1 INJECTION, POWDER, FOR SOLUTION INTRAMUSCULAR; INTRAVENOUS SEE ADMIN INSTRUCTIONS
Status: DISCONTINUED | OUTPATIENT
Start: 2017-01-06 | End: 2017-01-06 | Stop reason: HOSPADM

## 2017-01-06 RX ORDER — PROPOFOL 10 MG/ML
INJECTION, EMULSION INTRAVENOUS PRN
Status: DISCONTINUED | OUTPATIENT
Start: 2017-01-06 | End: 2017-01-06

## 2017-01-06 RX ORDER — DEXAMETHASONE SODIUM PHOSPHATE 4 MG/ML
INJECTION, SOLUTION INTRA-ARTICULAR; INTRALESIONAL; INTRAMUSCULAR; INTRAVENOUS; SOFT TISSUE PRN
Status: DISCONTINUED | OUTPATIENT
Start: 2017-01-06 | End: 2017-01-06

## 2017-01-06 RX ORDER — OXYCODONE AND ACETAMINOPHEN 5; 325 MG/1; MG/1
1-2 TABLET ORAL EVERY 4 HOURS PRN
Qty: 20 TABLET | Refills: 0 | Status: SHIPPED | OUTPATIENT
Start: 2017-01-06 | End: 2017-08-01

## 2017-01-06 RX ORDER — OXYCODONE AND ACETAMINOPHEN 5; 325 MG/1; MG/1
1-2 TABLET ORAL
Status: COMPLETED | OUTPATIENT
Start: 2017-01-06 | End: 2017-01-06

## 2017-01-06 RX ORDER — FENTANYL CITRATE 50 UG/ML
25-50 INJECTION, SOLUTION INTRAMUSCULAR; INTRAVENOUS
Status: DISCONTINUED | OUTPATIENT
Start: 2017-01-06 | End: 2017-01-06 | Stop reason: HOSPADM

## 2017-01-06 RX ORDER — ONDANSETRON 4 MG/1
4 TABLET, ORALLY DISINTEGRATING ORAL EVERY 30 MIN PRN
Status: DISCONTINUED | OUTPATIENT
Start: 2017-01-06 | End: 2017-01-07 | Stop reason: HOSPADM

## 2017-01-06 RX ORDER — BUPIVACAINE HYDROCHLORIDE AND EPINEPHRINE 5; 5 MG/ML; UG/ML
INJECTION, SOLUTION PERINEURAL PRN
Status: DISCONTINUED | OUTPATIENT
Start: 2017-01-06 | End: 2017-01-06 | Stop reason: HOSPADM

## 2017-01-06 RX ORDER — NALOXONE HYDROCHLORIDE 0.4 MG/ML
.1-.4 INJECTION, SOLUTION INTRAMUSCULAR; INTRAVENOUS; SUBCUTANEOUS
Status: DISCONTINUED | OUTPATIENT
Start: 2017-01-06 | End: 2017-01-07 | Stop reason: HOSPADM

## 2017-01-06 RX ORDER — FENTANYL CITRATE 50 UG/ML
25-50 INJECTION, SOLUTION INTRAMUSCULAR; INTRAVENOUS
Status: DISCONTINUED | OUTPATIENT
Start: 2017-01-06 | End: 2017-01-07 | Stop reason: HOSPADM

## 2017-01-06 RX ORDER — NICOTINE POLACRILEX 4 MG
15-30 LOZENGE BUCCAL
Status: DISCONTINUED | OUTPATIENT
Start: 2017-01-06 | End: 2017-01-07 | Stop reason: HOSPADM

## 2017-01-06 RX ORDER — FENTANYL CITRATE 50 UG/ML
INJECTION, SOLUTION INTRAMUSCULAR; INTRAVENOUS PRN
Status: DISCONTINUED | OUTPATIENT
Start: 2017-01-06 | End: 2017-01-06

## 2017-01-06 RX ORDER — ONDANSETRON 2 MG/ML
INJECTION INTRAMUSCULAR; INTRAVENOUS PRN
Status: DISCONTINUED | OUTPATIENT
Start: 2017-01-06 | End: 2017-01-06

## 2017-01-06 RX ORDER — CEFAZOLIN SODIUM 2 G/100ML
2 INJECTION, SOLUTION INTRAVENOUS
Status: COMPLETED | OUTPATIENT
Start: 2017-01-06 | End: 2017-01-06

## 2017-01-06 RX ORDER — DEXTROSE MONOHYDRATE 25 G/50ML
25-50 INJECTION, SOLUTION INTRAVENOUS
Status: DISCONTINUED | OUTPATIENT
Start: 2017-01-06 | End: 2017-01-07 | Stop reason: HOSPADM

## 2017-01-06 RX ORDER — LABETALOL HYDROCHLORIDE 5 MG/ML
INJECTION, SOLUTION INTRAVENOUS PRN
Status: DISCONTINUED | OUTPATIENT
Start: 2017-01-06 | End: 2017-01-06

## 2017-01-06 RX ADMIN — OXYCODONE HYDROCHLORIDE AND ACETAMINOPHEN 1 TABLET: 5; 325 TABLET ORAL at 19:48

## 2017-01-06 RX ADMIN — Medication 2.5 MG: at 19:00

## 2017-01-06 RX ADMIN — PROPOFOL 200 MG: 10 INJECTION, EMULSION INTRAVENOUS at 17:58

## 2017-01-06 RX ADMIN — FENTANYL CITRATE 50 MCG: 50 INJECTION, SOLUTION INTRAMUSCULAR; INTRAVENOUS at 18:18

## 2017-01-06 RX ADMIN — GLYCOPYRROLATE 0.2 MG: 0.2 INJECTION, SOLUTION INTRAMUSCULAR; INTRAVENOUS at 17:58

## 2017-01-06 RX ADMIN — ONDANSETRON 4 MG: 2 INJECTION INTRAMUSCULAR; INTRAVENOUS at 17:58

## 2017-01-06 RX ADMIN — Medication 5 MG: at 18:22

## 2017-01-06 RX ADMIN — DEXAMETHASONE SODIUM PHOSPHATE 8 MG: 4 INJECTION, SOLUTION INTRAMUSCULAR; INTRAVENOUS at 17:58

## 2017-01-06 RX ADMIN — SODIUM CHLORIDE 1500 ML: 900 IRRIGANT IRRIGATION at 18:55

## 2017-01-06 RX ADMIN — ROCURONIUM BROMIDE 40 MG: 10 INJECTION INTRAVENOUS at 17:58

## 2017-01-06 RX ADMIN — Medication 5 UNITS: at 21:49

## 2017-01-06 RX ADMIN — FENTANYL CITRATE 150 MCG: 50 INJECTION, SOLUTION INTRAMUSCULAR; INTRAVENOUS at 17:58

## 2017-01-06 RX ADMIN — CEFAZOLIN SODIUM 2 G: 2 INJECTION, SOLUTION INTRAVENOUS at 17:51

## 2017-01-06 RX ADMIN — GLYCOPYRROLATE 0.4 MG: 0.2 INJECTION, SOLUTION INTRAMUSCULAR; INTRAVENOUS at 19:00

## 2017-01-06 RX ADMIN — BUPIVACAINE HYDROCHLORIDE AND EPINEPHRINE BITARTRATE 30 ML: 5; .005 INJECTION, SOLUTION EPIDURAL; INTRACAUDAL; PERINEURAL at 18:53

## 2017-01-06 RX ADMIN — SODIUM CHLORIDE, POTASSIUM CHLORIDE, SODIUM LACTATE AND CALCIUM CHLORIDE: 600; 310; 30; 20 INJECTION, SOLUTION INTRAVENOUS at 18:33

## 2017-01-06 RX ADMIN — SODIUM CHLORIDE, POTASSIUM CHLORIDE, SODIUM LACTATE AND CALCIUM CHLORIDE: 600; 310; 30; 20 INJECTION, SOLUTION INTRAVENOUS at 17:51

## 2017-01-06 RX ADMIN — Medication 30 MG: at 17:58

## 2017-01-06 ASSESSMENT — ENCOUNTER SYMPTOMS
WEIGHT LOSS: 1
COUGH: 1
DYSRHYTHMIAS: 0
NAUSEA: 1
DIARRHEA: 1
VOMITING: 1

## 2017-01-06 NOTE — PROGRESS NOTES
[facility]     Outpatient Consultation     Shell Leon MRN# [id number]   YOB: 1950 Age: 66 year old     Primary care provider: Kathi Mondragon    ASSESSMENT:   Shell Leon is an 66 year old year old male who I was asked to see by Dr. Sharma for gallbladder.    Right upper quadrant pain with abnormal gallbladder on imaging. Biliary colic versus resolving cholecystitis.    RECOMMENDATIONS:   We discussed this. The patient and his family had many questions including what alternatives there are to surgery. I discussed that stone removal or lithotripsy or stone dissolution are not felt to be good options. It is possible that an ongoing low fat diet may avoid further problems. However I think this is really the only feasible option versus surgery. I described the surgery in detail including its risks, benefits, and alternatives. I feel that this can be done at the patient's convenience and possibly as soon as this afternoon considering he is still having symptoms if he would like. After some thought they decided to go ahead with this and we will schedule for this afternoon as an add-on procedure.    Michael Caba MD  [phone] (p)    This note was created using voice recognition software. Undetected word substitutions or other errors may have occurred.      HPI:    Shell Leon is a 66 year old male who I was asked to see for gallbladder. The patient states that 5 months or so ago he had an episode of nausea and vomiting but there was no pain. However, several days ago he developed ongoing nausea with vomiting as well as diaphoresis which was associated with right upper quadrant pain. The pain is moderate. The pain was constant. Pushing made it worse. There was radiation into the back. He had a CT scan which showed an abnormal gallbladder followed by an ultrasound which was also abnormal.  He remembers that the ultrasound probe being pushed over the  gallbladder was painful. He states that now he is feeling better but there is still lingering right upper quadrant pain. There is no diarrhea. There are no fevers or chills. His bowels are moving. He is eating and last had cereal this morning. There is never been any jaundice.  He does not know about any pancreatitis. His son interprets for him and they declined an .          PAST MEDICAL HISTORY:     Past Medical History   Diagnosis Date     Diabetes (H)      Hypertension         PAST SURGICAL HISTORY:   History reviewed. No pertinent past surgical history.    SOCIAL HISTORY:     Social History     Social History     Marital Status:      Spouse Name: N/A     Number of Children: N/A     Years of Education: N/A     Social History Main Topics     Smoking status: Never Smoker      Smokeless tobacco: Never Used     Alcohol Use: No     Drug Use: No     Sexual Activity: Not Asked     Other Topics Concern     None     Social History Narrative        FAMILY HISTORY:     Family History   Problem Relation Age of Onset     Family history unknown: Yes       MEDICATIONS:     Current Outpatient Prescriptions   Medication Sig Dispense Refill     lisinopril (PRINIVIL/ZESTRIL) 40 MG tablet Take 1 tablet (40 mg) by mouth daily 30 tablet 11     amLODIPine (NORVASC) 5 MG tablet Take 1 tablet (5 mg) by mouth daily 30 tablet 11     hydrochlorothiazide (HYDRODIURIL) 25 MG tablet Take 1 tablet (25 mg) by mouth daily 30 tablet 11     atorvastatin (LIPITOR) 80 MG tablet Take 1 tablet (80 mg) by mouth daily 30 tablet 3     metFORMIN (GLUCOPHAGE) 1000 MG tablet Take 1 tablet (1,000 mg) by mouth 2 times daily (with meals) 60 tablet 1     order for DME Equipment being ordered: compression stocking 1 Units 0     order for DME Equipment being ordered: BP monitor and cuff 1 Device 0     order for DME Equipment being ordered: blood pressure cuff 1 each 0     aspirin 81 MG chewable tablet Take 1 tablet (81 mg) by mouth daily 108  "tablet 3     ondansetron (ZOFRAN ODT) 4 MG ODT tab Take 1 tablet (4 mg) by mouth every 8 hours as needed 10 tablet 0        ALLERGIES:   No Known Allergies     REVIEW OF SYSTEMS:   10 point ROS neg other than the symptoms noted above in the HPI or here.      PHYSICAL EXAM:   /58 mmHg  Pulse 68  Ht 5' 6\" (1.676 m)  Wt 139 lb (63.05 kg)  BMI 22.45 kg/m2  SpO2 100% Estimated body mass index is 22.45 kg/(m^2) as calculated from the following:    Height as of this encounter: 5' 6\" (1.676 m).    Weight as of this encounter: 139 lb (63.05 kg).   Constitutional: No acute distress  Eyes: Sclerae anicteric, moist conjunctivae; no lid-lag; PERRLA  ENT: Atraumatic; oropharynx clear with moist mucous membranes and no mucosal ulcerations; normal hard and soft palate  Neck: Supple neck without lymphadenopathy, no thyromegaly  Respiratory: Clear to auscultation bilaterally with normal respiratory effort  Cardiovascular: Regular rate and rhythm, no MRGs  GI: Soft, nondistended; no masses or HSM. There is mild to moderate right upper quadrant tenderness.  Lymph/Hematologic: No pretibial edema  Genitourinary: Deferred  Skin: Normal temperature, turgor and texture; no rash, ulcers or subcutaneous nodules  Musculoskeletal: Grossly symmetric and normal muscle bulk for age in all extremities; gait and station normal; no clubbing or cyanosis  Neurologic: CN II-XII grossly intact without deficits; sensation intact to light touch over all extremities  Neuropsychiatric: Appropriate affect, alert and oriented to person, place and time    LABORATORY AND IMAGING:      Results for orders placed or performed during the hospital encounter of 01/03/17   CT Abdomen Pelvis w Contrast    Narrative    CT ABDOMEN AND PELVIS WITH CONTRAST   1/3/2017 10:41 PM     HISTORY: Epigastric pain. Vomiting.    TECHNIQUE: 70mL Isovue-370 IV were administered. After contrast  administration, volumetric helical sections were acquired from the  lung bases to " the ischial tuberosities. Coronal images were also  reconstructed. Radiation dose for this scan was reduced using  automated exposure control, adjustment of the mA and/or kV according  to patient size, or iterative reconstruction technique.    COMPARISON: None.     FINDINGS: No bowel obstruction. Moderate amount of gas and stool  throughout the colon. Unremarkable appendix. Mild bowel wall  thickening in the ascending colon could be within normal limits,  although a mild colitis could also have this appearance. No free fluid  in the pelvis. Moderate prostatic enlargement with mild central  prostatic calcification. Mild atherosclerotic aortoiliac  calcification. The gallbladder wall appears mildly thickened. The  liver, spleen, adrenal glands, pancreas, and kidneys are unremarkable.  No hydronephrosis. Small hiatal hernia. Coronary artery calcification.  The lung bases are clear.      Impression    IMPRESSION:   1. The gallbladder wall appears mildly thickened. If there is clinical  suspicion for cholecystitis, gallbladder ultrasound would be  recommended for further evaluation.  2. Mild diffuse bowel wall thickening in the ascending colon could be  within normal limits, although a mild colitis could also have this  appearance. Clinical correlation is requested.  3. Small hiatal hernia.    GAIL SHULTZ MD   Abdomen US, limited (RUQ only)    Narrative    US ABDOMEN LIMITED  1/4/2017 12:04 AM      HISTORY: Abdominal pain.     COMPARISON: None.    FINDINGS: The liver is normal in size and texture without focal mass.  There is no intra or extrahepatic biliary dilatation. The common  hepatic duct measures 0.3 cm. There is sludge and a 1.1 cm stone in  the gallbladder. The gallbladder wall is mildly thickened at 0.4 cm.  No sonographic Tabares's sign. The pancreas head and body appear  normal. The tail is obscured by bowel gas. The right kidney measures  11.4 cm and is normal in appearance. The proximal abdominal aorta  and  IVC appear normal.       Impression    IMPRESSION:  1. Sludge and stone in the gallbladder.  2. The gallbladder wall is mildly thickened, a nonspecific finding. No  other evidence of cholecystitis.  3. No biliary dilatation.    DEEPTHI CAMEJO MD   Glucose by meter   Result Value Ref Range    Glucose 245 (H) 70 - 99 mg/dL   CBC with platelets differential   Result Value Ref Range    WBC 7.2 4.0 - 11.0 10e9/L    RBC Count 4.56 4.4 - 5.9 10e12/L    Hemoglobin 13.8 13.3 - 17.7 g/dL    Hematocrit 39.9 (L) 40.0 - 53.0 %    MCV 88 78 - 100 fl    MCH 30.3 26.5 - 33.0 pg    MCHC 34.6 31.5 - 36.5 g/dL    RDW 13.4 10.0 - 15.0 %    Platelet Count 211 150 - 450 10e9/L    Diff Method Automated Method     % Neutrophils 69.1 %    % Lymphocytes 24.8 %    % Monocytes 4.7 %    % Eosinophils 0.3 %    % Basophils 0.7 %    % Immature Granulocytes 0.4 %    Nucleated RBCs 0 0 /100    Absolute Neutrophil 5.0 1.6 - 8.3 10e9/L    Absolute Lymphocytes 1.8 0.8 - 5.3 10e9/L    Absolute Monocytes 0.3 0.0 - 1.3 10e9/L    Absolute Eosinophils 0.0 0.0 - 0.7 10e9/L    Absolute Basophils 0.1 0.0 - 0.2 10e9/L    Abs Immature Granulocytes 0.0 0 - 0.4 10e9/L    Absolute Nucleated RBC 0.0    Basic metabolic panel   Result Value Ref Range    Sodium 137 133 - 144 mmol/L    Potassium 4.4 3.4 - 5.3 mmol/L    Chloride 101 94 - 109 mmol/L    Carbon Dioxide 23 20 - 32 mmol/L    Anion Gap 13 3 - 14 mmol/L    Glucose 239 (H) 70 - 99 mg/dL    Urea Nitrogen 16 7 - 30 mg/dL    Creatinine 0.78 0.66 - 1.25 mg/dL    GFR Estimate >90  Non  GFR Calc   >60 mL/min/1.7m2    GFR Estimate If Black >90   GFR Calc   >60 mL/min/1.7m2    Calcium 9.7 8.5 - 10.1 mg/dL   Hepatic panel   Result Value Ref Range    Bilirubin Direct 0.2 0.0 - 0.2 mg/dL    Bilirubin Total 0.7 0.2 - 1.3 mg/dL    Albumin 4.2 3.4 - 5.0 g/dL    Protein Total 8.3 6.8 - 8.8 g/dL    Alkaline Phosphatase 71 40 - 150 U/L    ALT 30 0 - 70 U/L    AST 22 0 - 45 U/L   Lipase    Result Value Ref Range    Lipase 179 73 - 393 U/L   Troponin I   Result Value Ref Range    Troponin I ES  0.000 - 0.045 ug/L     <0.015  The 99th percentile for upper reference range is 0.045 ug/L.  Troponin values in   the range of 0.045 - 0.120 ug/L may be associated with risks of adverse   clinical events.     UA with Microscopic   Result Value Ref Range    Color Urine Yellow     Appearance Urine Clear     Glucose Urine 300 (A) NEG mg/dL    Bilirubin Urine Negative NEG    Ketones Urine 40 (A) NEG mg/dL    Specific Gravity Urine 1.013 1.003 - 1.035    Blood Urine Trace (A) NEG    pH Urine 6.5 5.0 - 7.0 pH    Protein Albumin Urine 100 (A) NEG mg/dL    Urobilinogen mg/dL Normal 0.0 - 2.0 mg/dL    Nitrite Urine Negative NEG    Leukocyte Esterase Urine Negative NEG    Source Midstream Urine     WBC Urine 1 0 - 2 /HPF    RBC Urine 1 0 - 2 /HPF    Mucous Urine Present (A) NEG /LPF    Hyaline Casts 1 0 - 2 /LPF   EKG 12-lead, tracing only   Result Value Ref Range    Interpretation ECG Click View Image link to view waveform and result      I personally viewed and interpreted the CT scan. Slight thickness of the gallbladder wall.  I personally viewed and interpreted the ultrasound. Borderline wall thickness with stones.    30 minutes were spent in this encounter, over 50% in counseling and coordination of care.    Please route or send letter to:  Primary Care Provider (PCP)                      HPI      ROS      Physical Exam

## 2017-01-06 NOTE — Clinical Note
[facility]     Outpatient Consultation    January 6, 2017     Shell Leon MRN# [id number]   YOB: 1950 Age: 66 year old     Primary care provider: Kathi Mondragon    ASSESSMENT:   Shell Leon is an 66 year old year old male who I was asked to see by Dr. Sharma for gallbladder.    Right upper quadrant pain with abnormal gallbladder on imaging. Biliary colic versus resolving cholecystitis.    RECOMMENDATIONS:   We discussed this. The patient and his family had many questions including what alternatives there are to surgery. I discussed that stone removal or lithotripsy or stone dissolution are not felt to be good options. It is possible that an ongoing low fat diet may avoid further problems. However I think this is really the only feasible option versus surgery. I described the surgery in detail including its risks, benefits, and alternatives. I feel that this can be done at the patient's convenience and possibly as soon as this afternoon considering he is still having symptoms if he would like. After some thought they decided to go ahead with this and we will schedule for this afternoon as an add-on procedure.    Michael Caba MD  [phone] (h)    This note was created using voice recognition software. Undetected word substitutions or other errors may have occurred.      HPI:    Shell Leon is a 66 year old male who I was asked to see for gallbladder. The patient states that 5 months or so ago he had an episode of nausea and vomiting but there was no pain. However, several days ago he developed ongoing nausea with vomiting as well as diaphoresis which was associated with right upper quadrant pain. The pain is moderate. The pain was constant. Pushing made it worse. There was radiation into the back. He had a CT scan which showed an abnormal gallbladder followed by an ultrasound which was also abnormal.  He remembers that the ultrasound probe being  "pushed over the gallbladder was painful. He states that now he is feeling better but there is still lingering right upper quadrant pain. There is no diarrhea. There are no fevers or chills. His bowels are moving. He is eating and last had cereal this morning. There is never been any jaundice.  He does not know about any pancreatitis. His son interprets for him and they declined an .    ALLERGIES:   No Known Allergies     REVIEW OF SYSTEMS:   10 point ROS neg other than the symptoms noted above in the HPI or here.      PHYSICAL EXAM:   /58 mmHg  Pulse 68  Ht 5' 6\" (1.676 m)  Wt 139 lb (63.05 kg)  BMI 22.45 kg/m2  SpO2 100% Estimated body mass index is 22.45 kg/(m^2) as calculated from the following:    Height as of this encounter: 5' 6\" (1.676 m).    Weight as of this encounter: 139 lb (63.05 kg).   Constitutional: No acute distress  Eyes: Sclerae anicteric, moist conjunctivae; no lid-lag; PERRLA  ENT: Atraumatic; oropharynx clear with moist mucous membranes and no mucosal ulcerations; normal hard and soft palate  Neck: Supple neck without lymphadenopathy, no thyromegaly  Respiratory: Clear to auscultation bilaterally with normal respiratory effort  Cardiovascular: Regular rate and rhythm, no MRGs  GI: Soft, nondistended; no masses or HSM. There is mild to moderate right upper quadrant tenderness.  Lymph/Hematologic: No pretibial edema  Genitourinary: Deferred  Skin: Normal temperature, turgor and texture; no rash, ulcers or subcutaneous nodules  Musculoskeletal: Grossly symmetric and normal muscle bulk for age in all extremities; gait and station normal; no clubbing or cyanosis  Neurologic: CN II-XII grossly intact without deficits; sensation intact to light touch over all extremities  Neuropsychiatric: Appropriate affect, alert and oriented to person, place and time    I personally viewed and interpreted the CT scan. Slight thickness of the gallbladder wall.  I personally viewed and " interpreted the ultrasound. Borderline wall thickness with stones.    Michael Caba MD

## 2017-01-06 NOTE — PROGRESS NOTES
HPI      ROS (Review of Systems):      Positive for weight loss.   Respiratory: Positive for cough.    Cardiovascular: Positive for hypertension.   GASTROINTESTINAL: Positive for nausea, vomiting and diarrhea.          Physical Exam

## 2017-01-06 NOTE — ANESTHESIA PREPROCEDURE EVALUATION
Anesthesia Evaluation     .        ROS/MED HX    ENT/Pulmonary:      (-) asthma and sleep apnea   Neurologic:      (-) TIA, Other neuro hx and Dementia   Cardiovascular:     (+) Dyslipidemia, hypertension-Peripheral Vascular Disease-- Carotid Stenosis, --. : . CHF . . :. .      (-) CAD, arrhythmias, valvular problems/murmurs and pulmonary hypertension   METS/Exercise Tolerance:     Hematologic:        (-) anemia   Musculoskeletal:   (+) arthritis, , , -       GI/Hepatic:        (-) GERD, hiatal hernia and hepatitis   Renal/Genitourinary:      (-) renal disease   Endo:     (+) type I DM, .      Psychiatric:        (-) psychiatric history   Infectious Disease:  - neg infectious disease ROS       Malignancy:         Other:    - neg other ROS           Physical Exam      Airway   Mallampati: II  TM distance: >3 FB  Neck ROM: full    Dental     Cardiovascular   Rhythm and rate: regular and normal  (-) no murmur  PE comment: Interpretation Summary              [facility]  [facility]chocardi[facility]  [address]  [place] [address]  Phone [phone]  Fax [phone]        Name: NARESH GARCIA  MRN: [id number]  : 1950  Study Date: 10/20/2016 10:34 AM  Age: 66 yrs  Gender: Male  [place] [facility] [place] Study: Essential (primary) hypertension  Ordering Physician: LIA FISH  Referring Physician: Kathi Mondragon  Performed By: Kaity Colindres,  BSA: 1.7 m2  Height: 63 in  Weight: 140 lb  HR: 55  BP: 130/64 mmHg     ______________________________________________________________________________     Procedure  Limited Echo Adult. Complete Echo Adult.     ______________________________________________________________________________     Interpretation Summary     The left ventricle is normal in size.  There is normal left ventricular wall thickness.  Left ventricular systolic function is normal.  The visual  ejection fraction is estimated at 60-65%.  Normal left ventricular wall motion  There is trace tricuspid regurgitation.  Right ventricular systolic pressure is elevated, consistent with mild  pulmonary hypertension.     Compared to the previous study from 10/11/16, the LV wall motion is now seen  well and LV function is normal.  ______________________________________________________________________________    Pulmonary    breath sounds clear to auscultation    Other findings: Lab Test        01/03/17 08/28/16 2011          2137          WBC          7.2          10.3          HGB          13.8         15.3          MCV          88           90            PLT          211          260            Lab Test        01/03/17 11/11/16 08/28/16 2011          1536          2137          NA           137          138          139           POTASSIUM    4.4          4.9          4.3           CHLORIDE     101          105          106           CO2          23           26           27            BUN          16           24           21            CR           0.78         0.89         0.86          ANIONGAP     13           7            6             STEVE          9.7          9.2          9.4           GLC          239*         206*         65*       EKG  - Nonspecific ST changes consistent with prior EKG's                        Anesthesia Plan      History & Physical Review  History and physical reviewed and following examination; no interval change.    ASA Status:  3 .    NPO Status:  > 8 hours    Plan for General and ETT with Propofol induction.   PONV prophylaxis:  Ondansetron (or other 5HT-3) and Dexamethasone or Solumedrol       Postoperative Care  Postoperative pain management:  IV analgesics.      Consents  Anesthetic plan, risks, benefits and alternatives discussed with:  Patient.  Use of blood products discussed: Yes.   Use of blood products discussed with  Patient.  .                          .

## 2017-01-06 NOTE — IP AVS SNAPSHOT
[facility] PreOP/PostOP    [address]    [facility] [address]    Phone:  [phone]    Fax:  [phone]                                       After Visit Summary   1/6/2017    Shell Leon    MRN: [id number]           After Visit Summary Signature Page     I have received my discharge instructions, and my questions have been answered. I have discussed any challenges I see with this plan with the nurse or doctor.    ..........................................................................................................................................  Patient/Patient Representative Signature      ..........................................................................................................................................  Patient Representative Print Name and Relationship to Patient    ..................................................               ................................................  Date                                            Time    ..........................................................................................................................................  Reviewed by Signature/Title    ...................................................              ..............................................  Date                                                            Time

## 2017-01-06 NOTE — IP AVS SNAPSHOT
MRN:[id number]                      After Visit Summary   1/6/2017    Shell Leon    MRN: [id number]           Thank you!     Thank you for choosing [facility] for your care. Our goal is always to provide you with excellent care. Hearing back from our patients is one way we can continue to improve our services. Please take a few minutes to complete the written survey that you may receive in the mail after you visit. If you would like to speak to someone directly about your visit please contact Patient Relations at [phone]. Thank you!          Patient Information     Date Of Birth          1950        About your hospital stay     You were admitted on:  January 6, 2017 You last received care in the:  [facility] PreOP/PostOP    You were discharged on:  January 6, 2017       Who to Call     For medical emergencies, please call [phone].  For non-urgent questions about your medical care, please call your primary care provider or clinic, [phone]  For questions related to your surgery, please call your surgery clinic        Attending Provider     Provider    Michael Caba MD       Primary Care Provider Office Phone # Fax #    Kathi Mondragon PA-C [phone]       [facility] [address] [address] [facility] [address]        Further instructions from your care team       GENERAL ANESTHESIA OR SEDATION ADULT DISCHARGE INSTRUCTIONS   SPECIAL PRECAUTIONS FOR 24 HOURS AFTER SURGERY    IT IS NOT UNUSUAL TO FEEL LIGHT-HEADED OR FAINT, UP TO 24 HOURS AFTER SURGERY OR WHILE TAKING PAIN MEDICATION.  IF YOU HAVE THESE SYMPTOMS; SIT FOR A FEW MINUTES BEFORE STANDING AND HAVE SOMEONE ASSIST YOU WHEN YOU GET UP TO WALK OR USE THE BATHROOM.    YOU SHOULD REST AND RELAX FOR THE NEXT 24 HOURS AND YOU MUST MAKE ARRANGEMENTS TO HAVE SOMEONE STAY WITH YOU FOR AT LEAST 24 HOURS AFTER YOUR DISCHARGE.  AVOID HAZARDOUS AND STRENUOUS ACTIVITIES.  DO  NOT MAKE IMPORTANT DECISIONS FOR 24 HOURS.    DO NOT DRIVE ANY VEHICLE OR OPERATE MECHANICAL EQUIPMENT FOR 24 HOURS FOLLOWING THE END OF YOUR SURGERY.  EVEN THOUGH YOU MAY FEEL NORMAL, YOUR REACTIONS MAY BE AFFECTED BY THE MEDICATION YOU HAVE RECEIVED.    DO NOT DRINK ALCOHOLIC BEVERAGES FOR 24 HOURS FOLLOWING YOUR SURGERY.    DRINK CLEAR LIQUIDS (APPLE JUICE, GINGER ALE, 7-UP, BROTH, ETC.).  PROGRESS TO YOUR REGULAR DIET AS YOU FEEL ABLE.    YOU MAY HAVE A DRY MOUTH, A SORE THROAT, MUSCLES ACHES OR TROUBLE SLEEPING.  THESE SHOULD GO AWAY AFTER 24 HOURS.    CALL YOUR DOCTOR FOR ANY OF THE FOLLOWING:  SIGNS OF INFECTION (FEVER, GROWING TENDERNESS AT THE SURGERY SITE, A LARGE AMOUNT OF DRAINAGE OR BLEEDING, SEVERE PAIN, FOUL-SMELLING DRAINAGE, REDNESS OR SWELLING.    IT HAS BEEN OVER 8 TO 10 HOURS SINCE SURGERY AND YOU ARE STILL NOT ABLE TO URINATE (PASS WATER).       HOME CARE FOLLOWING LAPAROSCOPIC CHOLECYSTECTOMY  TODD Dawkins, DERIC Olea, TODD Keen. ZAIN Dominguez      INCISIONAL CARE:  Replace the bandage over your incisions until all drainage stops, or if more comfortable to have in place.  If present, leave the steri-strips (white paper tapes) in place until they fall off.  If Dermabond (a type of skin glue) is present, leave in place until it wears/flakes off.     BATHING:  Avoid baths for 1 week after surgery.  Showers are okay.  You may wash your hair at any time.  Gently pat your incisions dry after bathing.    ACTIVITY:  Light Activity -- you may immediately be up and about as tolerated.  Driving -- you may drive when comfortable and off narcotic pain medications.  Light Work -- resume when comfortable off pain medications.  (If you can drive, you probably can work.)  Strenuous Work/Activity -- limit lifting to 20 pounds for 1 week.  Progressively increase with time.  Active Sports (running, biking, etc.) -- cautiously resume after 2 weeks.    DISCOMFORT:  Use pain  medications as prescribed by your surgeon.  Take the pain medication with some food, when possible, to minimize side effects.  Intermittent use of ice packs at the incision sites may help during the first 48 hours.  Expect gradual improvement.  You may experience shoulder pain, which is due to the air placed within your abdomen during the procedure.  This is temporary and usually passes within 2 days.    DIET:  Drink plenty of fluids.  While taking pain medications, increase dietary fiber or add a fiber supplementation like Metamucil or Citrucel to help prevent constipation - a possible side effect of pain medications.  If taking Metamucil or Citrucel, take with plenty of fluids as instructed.  It is not uncommon to experience some bowel changes (loose stools or constipation) after surgery.  Your body has to adapt to you no longer having a gall bladder.  To help minimize this side effect, avoid fatty foods for the first week after surgery.  You may then slowly increase the amount of fatty foods in your diet.      NAUSEA:  If nauseated from the anesthetic/pain meds; rest in bed, get up cautiously with assistance, and drink clear liquids (juice, tea, broth).    RETURN APPOINTMENT:  Schedule a follow-up visit 1-3 weeks post-op (you may do this any time after surgery is scheduled).  Office Phone:  [phone]      CONTACT US IF THE FOLLOWING DEVELOPS:  1.  A fever that is above 101    2.  If there is a large amount of drainage, bleeding, or swelling.  3.  Severe pain that is not relieved by your prescription.  4.  Drainage that is thick, cloudy, yellow, green or white.  5.  Any other questions not answered by  Frequently Asked Questions  sheet.       FREQUENTLY ASKED QUESTIONS AFTER SURGERY  TODD Dawkins, DERIC Olea, TODD Keen, ZAIN De La Cruz  &  LILO Dominguez      Q:  How should my incision look?    A:  Normally your incision will appear slightly swollen with light redness directly along the  incision itself as it heals.  It may feel like a bump or ridge as the healing/scarring happens, and over time (3-4 months) this bump or ridge feeling should slowly go away.  In general, clear or pink watery drainage can be normal at first as your incision heals, but should decrease over time.    Q:  How do I know if my incision is infected?  A:  Look at your incision for signs of infection, like redness around the incision spreading to surrounding skin, or drainage of cloudy or foul-smelling drainage.  If you feel warm, check your temperature to see if you are running a fever.    **If any of these things occur, please notify the nurse at our office.  We may need you to come into the office for an incision check.      Q:  How do I take care of my incision?  A:  If you have a dressing in place - Starting the day after surgery, replace the dressing 1-2 times a day until there is no further drainage from the incision.  At that time, a dressing is no longer needed.  Try to minimize tape on the skin if irritation is occurring at the tape sites.  If you have significant irritation from tape on the skin, please call the office to discuss other method of dressing your incision.    Small pieces of tape called  steri-strips  may be present directly overlying your incision; these may be removed 10 days after surgery unless otherwise specified by your surgeon.  If these tapes start to loosen at the ends, you may trim them back until they fall off or are removed.    A:  If you had  Dermabond  tissue glue used as a dressing (this causes your incision to look shiny with a clear covering over it) - This type of dressing wears off with time and does not require more dressings over the top unless it is draining around the glue as it wears off.  Do not apply ointments or lotions over the incisions until the glue has completely worn off.    Q:  There is a piece of tape or a sticky  lead  still on my skin.  Can I remove this?  A:   Sometimes the sticky  leads  used for monitoring during surgery or for evaluation in the emergency department are not all removed while you are in the hospital.  These sometimes have a tab or metal dot on them.  You can easily remove these on your own, like taking off a band-aid.  If there is a gel substance under the  lead , simply wipe/clean it off with a washcloth or paper towel.      Q:  What can I do to minimize constipation (very hard stools, or lack of stools)?  A:  Stay well hydrated.  Increase your dietary fiber intake or take a fiber supplement -with plenty of water.  Walk around frequently.  You may consider an over-the-counter stool-softener.  Your Pharmacist can assist you with choosing one that is stocked at your pharmacy.  Constipation is also one of the most common side effects of pain medication.  If you are using pain medication, be pro-active and try to PREVENT problems with constipation by taking the steps above BEFORE constipation becomes a problem.    Q:  What do I do if I need more pain medications?  A:  Call the office to receive refills.  Be aware that certain pain meds cannot be called into a pharmacy and actually require a paper prescription.  A change may be made in your pain med as you progress thru your recovery period or if you have side effects to certain meds.    --Pain meds are NOT refilled after 5pm on weekdays, and NOT AT ALL on the weekends, so please look ahead to prevent problems.      Q:  Why am I having a hard time sleeping now that I am at home?  A:  Many medications you receive while you are in the hospital can impact your sleep for a number of days after your surgery/hospitalization.  Decreased level of activity and naps during the day may also make sleeping at night difficult.  Try to minimize day-time naps, and get up frequently during the day to walk around your home during your recovery time.  Sleep aides may be of some help, but are not recommended for long-term use.       Q:  I am having some back discomfort.  What should I do?  A:  This may be related to certain positioning that was required for your surgery, extended periods of time in bed, or other changes in your overall activity level.  You may try ice, heat, acetaminophen, or ibuprofen to treat this temporarily.  Note that many pain medications have acetaminophen in them and would state this on the prescription bottle.  Be sure not to exceed the maximum of 4000mg per day of acetaminophen.     **If the pain you are having does not resolve, is severe, or is a flare of back pain you have had on other occasions prior to surgery, please contact your primary physician for further recommendations or for an appointment to be examined at their office.    Q:  Why am I having headaches?  A:  Headaches can be caused by many things:  caffeine withdrawal, use of pain meds, dehydration, high blood pressure, lack of sleep, over-activity/exhaustion, flare-up of usual migraine headaches.  If you feel this is related to muscle tension (a band-like feeling around the head, or a pressure at the low-back of the head) you may try ice or heat to this area.  You may need to drink more fluids (try electrolyte drink like Gatorade), rest, or take your usual migraine medications.   **If your headaches do not resolve, worsen, are accompanied by other symptoms, or if your blood pressure is high, please call your primary physician for recommendation and/or examination.    Q:  I am unable to urinate.  What do I do?  A:  A small percentage of people can have difficulty urinating initially after surgery.  This includes being able to urinate only a very small amount at a time and feeling discomfort or pressure in the very low abdomen.  This is called  urinary retention , and is actually an urgent situation.  Proceed to your nearest Emergency department for evaluation (not an Urgent Care Center).  Sometimes the bladder does not work correctly after certain  "medications you receive during surgery, or related to certain procedures.  You may need to have a catheter placed until your bladder recovers.  When planning to go to an Emergency department, it may help to call the ER to let them know you are coming in for this problem after a surgery.  This may help you get in quicker to be evaluated.  **If you have symptoms of a urinary tract infection, please contact your primary physician for the proper evaluation and treatment.      If you have other questions, please call the office Monday thru Friday between 8am and 5pm to discuss with the nurse or physician assistant.  #[phone]    There is a surgeon ON CALL on weekday evenings and over the weekend in case of urgent need only, and may be contacted at the same number.    If you are having an emergency, call 911 or proceed to your nearest emergency department.      1 tablet of PERCOCET at 7:50pm    Pending Results     Date and Time Order Name Status Description    1/6/2017 1843 Surgical pathology exam In process             Admission Information        Provider Department Dept Phone    1/6/2017 Michael Caba MD  Preop/Postop [phone]      Your Vitals Were     Blood Pressure Temperature Respirations    156/63 mmHg 97.9  F (36.6  C) (Temporal) 16    Height Weight BMI (Body Mass Index)    1.65 m (5' 4.96\") 57.3 kg (126 lb 5.2 oz) 21.05 kg/m2    Pulse Oximetry          97%        Care EveryWhere ID     This is your Care EveryWhere ID. This could be used by other organizations to access your [facility] medical records  [id number]           Review of your medicines      START taking        Dose / Directions    oxyCODONE-acetaminophen 5-325 MG per tablet   Commonly known as:  PERCOCET   Used for:  Cholecystitis        Dose:  1-2 tablet   Take 1-2 tablets by mouth every 4 hours as needed for pain (moderate to severe)   Quantity:  20 tablet   Refills:  0       senna-docusate 8.6-50 MG per tablet   Commonly known " as:  SENOKOT-S;PERICOLACE   Used for:  Cholecystitis        Dose:  1-2 tablet   Take 1-2 tablets by mouth 2 times daily Take while on oral narcotics to prevent or treat constipation.   Quantity:  15 tablet   Refills:  1         CONTINUE these medicines which have NOT CHANGED        Dose / Directions    amLODIPine 5 MG tablet   Commonly known as:  NORVASC   Used for:  Essential hypertension, benign        Dose:  5 mg   Take 1 tablet (5 mg) by mouth daily   Quantity:  30 tablet   Refills:  11       aspirin 81 MG chewable tablet   Used for:  Type 2 diabetes mellitus without complication (H)        Dose:  81 mg   Take 1 tablet (81 mg) by mouth daily   Quantity:  108 tablet   Refills:  3       atorvastatin 80 MG tablet   Commonly known as:  LIPITOR   Used for:  Type 2 diabetes mellitus without complication, unspecified long term insulin use status (H)        Dose:  80 mg   Take 1 tablet (80 mg) by mouth daily   Quantity:  30 tablet   Refills:  3       hydrochlorothiazide 25 MG tablet   Commonly known as:  HYDRODIURIL   Used for:  Benign essential hypertension        Dose:  25 mg   Take 1 tablet (25 mg) by mouth daily   Quantity:  30 tablet   Refills:  11       lisinopril 40 MG tablet   Commonly known as:  PRINIVIL/ZESTRIL   Used for:  Benign essential hypertension        Dose:  40 mg   Take 1 tablet (40 mg) by mouth daily   Quantity:  30 tablet   Refills:  11       metFORMIN 1000 MG tablet   Commonly known as:  GLUCOPHAGE   Used for:  Type 2 diabetes mellitus without complication, without long-term current use of insulin (H)        Dose:  1000 mg   Take 1 tablet (1,000 mg) by mouth 2 times daily (with meals)   Quantity:  60 tablet   Refills:  1       ondansetron 4 MG ODT tab   Commonly known as:  ZOFRAN ODT        Dose:  4 mg   Take 1 tablet (4 mg) by mouth every 8 hours as needed   Quantity:  10 tablet   Refills:  0       * order for DME   Used for:  Benign essential hypertension        Equipment being ordered: blood  pressure cuff   Quantity:  1 each   Refills:  0       * order for DME   Used for:  Essential hypertension with goal blood pressure less than 140/90        Equipment being ordered: BP monitor and cuff   Quantity:  1 Device   Refills:  0       * order for DME   Used for:  Edema, unspecified type        Equipment being ordered: compression stocking   Quantity:  1 Units   Refills:  0       * Notice:  This list has 3 medication(s) that are the same as other medications prescribed for you. Read the directions carefully, and ask your doctor or other care provider to review them with you.         Where to get your medicines      Some of these will need a paper prescription and others can be bought over the counter. Ask your nurse if you have questions.     Bring a paper prescription for each of these medications    - oxyCODONE-acetaminophen 5-325 MG per tablet  - senna-docusate 8.6-50 MG per tablet             Protect others around you: Learn how to safely use, store and throw away your medicines at www.disposemymeds.org.             Medication List: This is a list of all your medications and when to take them. Check marks below indicate your daily home schedule. Keep this list as a reference.      Medications           Morning Afternoon Evening Bedtime As Needed    amLODIPine 5 MG tablet   Commonly known as:  NORVASC   Take 1 tablet (5 mg) by mouth daily                                aspirin 81 MG chewable tablet   Take 1 tablet (81 mg) by mouth daily                                atorvastatin 80 MG tablet   Commonly known as:  LIPITOR   Take 1 tablet (80 mg) by mouth daily                                hydrochlorothiazide 25 MG tablet   Commonly known as:  HYDRODIURIL   Take 1 tablet (25 mg) by mouth daily                                lisinopril 40 MG tablet   Commonly known as:  PRINIVIL/ZESTRIL   Take 1 tablet (40 mg) by mouth daily                                metFORMIN 1000 MG tablet   Commonly known as:   GLUCOPHAGE   Take 1 tablet (1,000 mg) by mouth 2 times daily (with meals)                                ondansetron 4 MG ODT tab   Commonly known as:  ZOFRAN ODT   Take 1 tablet (4 mg) by mouth every 8 hours as needed                                * order for DME   Equipment being ordered: blood pressure cuff                                * order for DME   Equipment being ordered: BP monitor and cuff                                * order for DME   Equipment being ordered: compression stocking                                oxyCODONE-acetaminophen 5-325 MG per tablet   Commonly known as:  PERCOCET   Take 1-2 tablets by mouth every 4 hours as needed for pain (moderate to severe)   Last time this was given:  1 tablet on 1/6/2017  7:48 PM                                senna-docusate 8.6-50 MG per tablet   Commonly known as:  SENOKOT-S;PERICOLACE   Take 1-2 tablets by mouth 2 times daily Take while on oral narcotics to prevent or treat constipation.                                * Notice:  This list has 3 medication(s) that are the same as other medications prescribed for you. Read the directions carefully, and ask your doctor or other care provider to review them with you.

## 2017-01-07 NOTE — ANESTHESIA POSTPROCEDURE EVALUATION
Patient: Shell Leon    LAPAROSCOPIC CHOLECYSTECTOMY (N/A Abdomen)  Additional InformationProcedure(s):  LAPAROSCOPIC CHOLECYSTECTOMY - Wound Class: II-Clean Contaminated    Diagnosis:cholecystitis  Diagnosis Additional Information: Acute on chronic cholecystitis.     Anesthesia Type:  General, ETT    Note:  Anesthesia Post Evaluation    Patient location during evaluation: PACU  Patient participation: Able to fully participate in evaluation  Level of consciousness: awake  Pain management: adequate  Airway patency: patent  Cardiovascular status: acceptable  Respiratory status: acceptable  Hydration status: acceptable  PONV: controlled     Anesthetic complications: None          Last vitals:  Filed Vitals:    01/06/17 1930 01/06/17 1945 01/06/17 2013   BP: 160/62 156/63 163/63   Temp:  97.9  F (36.6  C) 97.7  F (36.5  C)   Resp: 14 16 16   SpO2: 98% 97% 97%       Electronically Signed By: Andrea Downing DO  January 6, 2017  9:49 PM

## 2017-01-07 NOTE — OR NURSING
Pt blood sugar 327 at 2045. [facility] notified and ordered 5 units reg insulin IV.

## 2017-01-07 NOTE — ANESTHESIA CARE TRANSFER NOTE
Patient: Shell Leon    LAPAROSCOPIC CHOLECYSTECTOMY (N/A Abdomen)  Additional InformationProcedure(s):  LAPAROSCOPIC CHOLECYSTECTOMY - Wound Class: II-Clean Contaminated    Diagnosis: cholecystitis  Diagnosis Additional Information: No value filed.    Anesthesia Type:   General, ETT     Note:  Airway :Face Mask  Patient transferred to:PACU  Comments: To PACU, adequate gas exchange, report to RN.      Vitals: (Last set prior to Anesthesia Care Transfer)              Electronically Signed By: DELPHINE Guerrero CRNA  January 6, 2017  7:14 PM

## 2017-01-07 NOTE — DISCHARGE INSTRUCTIONS
GENERAL ANESTHESIA OR SEDATION ADULT DISCHARGE INSTRUCTIONS   SPECIAL PRECAUTIONS FOR 24 HOURS AFTER SURGERY    IT IS NOT UNUSUAL TO FEEL LIGHT-HEADED OR FAINT, UP TO 24 HOURS AFTER SURGERY OR WHILE TAKING PAIN MEDICATION.  IF YOU HAVE THESE SYMPTOMS; SIT FOR A FEW MINUTES BEFORE STANDING AND HAVE SOMEONE ASSIST YOU WHEN YOU GET UP TO WALK OR USE THE BATHROOM.    YOU SHOULD REST AND RELAX FOR THE NEXT 24 HOURS AND YOU MUST MAKE ARRANGEMENTS TO HAVE SOMEONE STAY WITH YOU FOR AT LEAST 24 HOURS AFTER YOUR DISCHARGE.  AVOID HAZARDOUS AND STRENUOUS ACTIVITIES.  DO NOT MAKE IMPORTANT DECISIONS FOR 24 HOURS.    DO NOT DRIVE ANY VEHICLE OR OPERATE MECHANICAL EQUIPMENT FOR 24 HOURS FOLLOWING THE END OF YOUR SURGERY.  EVEN THOUGH YOU MAY FEEL NORMAL, YOUR REACTIONS MAY BE AFFECTED BY THE MEDICATION YOU HAVE RECEIVED.    DO NOT DRINK ALCOHOLIC BEVERAGES FOR 24 HOURS FOLLOWING YOUR SURGERY.    DRINK CLEAR LIQUIDS (APPLE JUICE, GINGER ALE, 7-UP, BROTH, ETC.).  PROGRESS TO YOUR REGULAR DIET AS YOU FEEL ABLE.    YOU MAY HAVE A DRY MOUTH, A SORE THROAT, MUSCLES ACHES OR TROUBLE SLEEPING.  THESE SHOULD GO AWAY AFTER 24 HOURS.    CALL YOUR DOCTOR FOR ANY OF THE FOLLOWING:  SIGNS OF INFECTION (FEVER, GROWING TENDERNESS AT THE SURGERY SITE, A LARGE AMOUNT OF DRAINAGE OR BLEEDING, SEVERE PAIN, FOUL-SMELLING DRAINAGE, REDNESS OR SWELLING.    IT HAS BEEN OVER 8 TO 10 HOURS SINCE SURGERY AND YOU ARE STILL NOT ABLE TO URINATE (PASS WATER).       HOME CARE FOLLOWING LAPAROSCOPIC CHOLECYSTECTOMY  TODD Dawknis, DERIC Olea, TODD Keen. ZAIN Dominguez      INCISIONAL CARE:  Replace the bandage over your incisions until all drainage stops, or if more comfortable to have in place.  If present, leave the steri-strips (white paper tapes) in place until they fall off.  If Dermabond (a type of skin glue) is present, leave in place until it wears/flakes off.     BATHING:  Avoid baths for 1 week after surgery.   Showers are okay.  You may wash your hair at any time.  Gently pat your incisions dry after bathing.    ACTIVITY:  Light Activity -- you may immediately be up and about as tolerated.  Driving -- you may drive when comfortable and off narcotic pain medications.  Light Work -- resume when comfortable off pain medications.  (If you can drive, you probably can work.)  Strenuous Work/Activity -- limit lifting to 20 pounds for 1 week.  Progressively increase with time.  Active Sports (running, biking, etc.) -- cautiously resume after 2 weeks.    DISCOMFORT:  Use pain medications as prescribed by your surgeon.  Take the pain medication with some food, when possible, to minimize side effects.  Intermittent use of ice packs at the incision sites may help during the first 48 hours.  Expect gradual improvement.  You may experience shoulder pain, which is due to the air placed within your abdomen during the procedure.  This is temporary and usually passes within 2 days.    DIET:  Drink plenty of fluids.  While taking pain medications, increase dietary fiber or add a fiber supplementation like Metamucil or Citrucel to help prevent constipation - a possible side effect of pain medications.  If taking Metamucil or Citrucel, take with plenty of fluids as instructed.  It is not uncommon to experience some bowel changes (loose stools or constipation) after surgery.  Your body has to adapt to you no longer having a gall bladder.  To help minimize this side effect, avoid fatty foods for the first week after surgery.  You may then slowly increase the amount of fatty foods in your diet.      NAUSEA:  If nauseated from the anesthetic/pain meds; rest in bed, get up cautiously with assistance, and drink clear liquids (juice, tea, broth).    RETURN APPOINTMENT:  Schedule a follow-up visit 1-3 weeks post-op (you may do this any time after surgery is scheduled).  Office Phone:  [phone]      CONTACT US IF THE FOLLOWING DEVELOPS:  1.  A  fever that is above 101    2.  If there is a large amount of drainage, bleeding, or swelling.  3.  Severe pain that is not relieved by your prescription.  4.  Drainage that is thick, cloudy, yellow, green or white.  5.  Any other questions not answered by  Frequently Asked Questions  sheet.       FREQUENTLY ASKED QUESTIONS AFTER SURGERY  Lorin Caba, TODD Slater, DERIC Olea, LINDA Stone, TODD Parish, ZAIN De La Cruz  &  LILO Dominguez      Q:  How should my incision look?    A:  Normally your incision will appear slightly swollen with light redness directly along the incision itself as it heals.  It may feel like a bump or ridge as the healing/scarring happens, and over time (3-4 months) this bump or ridge feeling should slowly go away.  In general, clear or pink watery drainage can be normal at first as your incision heals, but should decrease over time.    Q:  How do I know if my incision is infected?  A:  Look at your incision for signs of infection, like redness around the incision spreading to surrounding skin, or drainage of cloudy or foul-smelling drainage.  If you feel warm, check your temperature to see if you are running a fever.    **If any of these things occur, please notify the nurse at our office.  We may need you to come into the office for an incision check.      Q:  How do I take care of my incision?  A:  If you have a dressing in place - Starting the day after surgery, replace the dressing 1-2 times a day until there is no further drainage from the incision.  At that time, a dressing is no longer needed.  Try to minimize tape on the skin if irritation is occurring at the tape sites.  If you have significant irritation from tape on the skin, please call the office to discuss other method of dressing your incision.    Small pieces of tape called  steri-strips  may be present directly overlying your incision; these may be removed 10 days after surgery unless otherwise specified by your surgeon.  If these  tapes start to loosen at the ends, you may trim them back until they fall off or are removed.    A:  If you had  Dermabond  tissue glue used as a dressing (this causes your incision to look shiny with a clear covering over it) - This type of dressing wears off with time and does not require more dressings over the top unless it is draining around the glue as it wears off.  Do not apply ointments or lotions over the incisions until the glue has completely worn off.    Q:  There is a piece of tape or a sticky  lead  still on my skin.  Can I remove this?  A:  Sometimes the sticky  leads  used for monitoring during surgery or for evaluation in the emergency department are not all removed while you are in the hospital.  These sometimes have a tab or metal dot on them.  You can easily remove these on your own, like taking off a band-aid.  If there is a gel substance under the  lead , simply wipe/clean it off with a washcloth or paper towel.      Q:  What can I do to minimize constipation (very hard stools, or lack of stools)?  A:  Stay well hydrated.  Increase your dietary fiber intake or take a fiber supplement -with plenty of water.  Walk around frequently.  You may consider an over-the-counter stool-softener.  Your Pharmacist can assist you with choosing one that is stocked at your pharmacy.  Constipation is also one of the most common side effects of pain medication.  If you are using pain medication, be pro-active and try to PREVENT problems with constipation by taking the steps above BEFORE constipation becomes a problem.    Q:  What do I do if I need more pain medications?  A:  Call the office to receive refills.  Be aware that certain pain meds cannot be called into a pharmacy and actually require a paper prescription.  A change may be made in your pain med as you progress thru your recovery period or if you have side effects to certain meds.    --Pain meds are NOT refilled after 5pm on weekdays, and NOT AT ALL on  the weekends, so please look ahead to prevent problems.      Q:  Why am I having a hard time sleeping now that I am at home?  A:  Many medications you receive while you are in the hospital can impact your sleep for a number of days after your surgery/hospitalization.  Decreased level of activity and naps during the day may also make sleeping at night difficult.  Try to minimize day-time naps, and get up frequently during the day to walk around your home during your recovery time.  Sleep aides may be of some help, but are not recommended for long-term use.      Q:  I am having some back discomfort.  What should I do?  A:  This may be related to certain positioning that was required for your surgery, extended periods of time in bed, or other changes in your overall activity level.  You may try ice, heat, acetaminophen, or ibuprofen to treat this temporarily.  Note that many pain medications have acetaminophen in them and would state this on the prescription bottle.  Be sure not to exceed the maximum of 4000mg per day of acetaminophen.     **If the pain you are having does not resolve, is severe, or is a flare of back pain you have had on other occasions prior to surgery, please contact your primary physician for further recommendations or for an appointment to be examined at their office.    Q:  Why am I having headaches?  A:  Headaches can be caused by many things:  caffeine withdrawal, use of pain meds, dehydration, high blood pressure, lack of sleep, over-activity/exhaustion, flare-up of usual migraine headaches.  If you feel this is related to muscle tension (a band-like feeling around the head, or a pressure at the low-back of the head) you may try ice or heat to this area.  You may need to drink more fluids (try electrolyte drink like Gatorade), rest, or take your usual migraine medications.   **If your headaches do not resolve, worsen, are accompanied by other symptoms, or if your blood pressure is high, please  call your primary physician for recommendation and/or examination.    Q:  I am unable to urinate.  What do I do?  A:  A small percentage of people can have difficulty urinating initially after surgery.  This includes being able to urinate only a very small amount at a time and feeling discomfort or pressure in the very low abdomen.  This is called  urinary retention , and is actually an urgent situation.  Proceed to your nearest Emergency department for evaluation (not an Urgent Care Center).  Sometimes the bladder does not work correctly after certain medications you receive during surgery, or related to certain procedures.  You may need to have a catheter placed until your bladder recovers.  When planning to go to an Emergency department, it may help to call the ER to let them know you are coming in for this problem after a surgery.  This may help you get in quicker to be evaluated.  **If you have symptoms of a urinary tract infection, please contact your primary physician for the proper evaluation and treatment.      If you have other questions, please call the office Monday thru Friday between 8am and 5pm to discuss with the nurse or physician assistant.  #[phone]    There is a surgeon ON CALL on weekday evenings and over the weekend in case of urgent need only, and may be contacted at the same number.    If you are having an emergency, call 911 or proceed to your nearest emergency department.      1 tablet of PERCOCET at 7:50pm

## 2017-01-07 NOTE — BRIEF OP NOTE
[facility] Brief Operative Note    Pre-operative diagnosis: cholecystitis   Post-operative diagnosis Acute on chronic cholecystitis     Procedure: Procedure(s):  LAPAROSCOPIC CHOLECYSTECTOMY - Wound Class: II-Clean Contaminated   Surgeon(s): Surgeon(s) and Role:     * Mihcael Caba MD - Primary     * Kalina Pacheco PA-C - Assisting   Estimated blood loss: 20 mL    Specimens:   ID Type Source Tests Collected by Time Destination   A : Gallbladder and Contents Tissue Gallbladder and Contents SURGICAL PATHOLOGY EXAM Michael Caba MD 1/6/2017  6:43 PM       Findings: Very dense scarring around the gallbladder as well as on all peritoneal surfaces. Distended hydropic gallbladder.

## 2017-01-07 NOTE — OP NOTE
-   DATE OF SERVICE: 1/6/2017     SURGEON   MELANY MAHONEY MD     ASSISTANT   Kalina Pacheco PA-C. A physician assistant was necessary to assist with retraction and suturing and thereby reduce operative time and time under anesthesia.    PREOPERATIVE DIAGNOSIS   Cholecystitis.     POSTOPERATIVE DIAGNOSIS   Acute on chronic cholecystitis.     PROCEDURE   Laparoscopic cholecystectomy.     SPECIMEN   Gallbladder.     ESTIMATED BLOOD LOSS   20.     COMPLICATIONS   None.     FINDINGS  Very dense scarring around the gallbladder as well as on all peritoneal surfaces. Distended hydropic gallbladder.    INDICATIONS AND DESCRIPTION OF THE PROCEDURE   This is a 66 year old male with several days of right upper quadrant pain. Evaluation was consistent with an inflamed thickened gallbladder on imaging. The patient was therefore offered a laparoscopic cholecystectomy after a full discussion of risks, benefits, and alternatives. Informed consent was obtained. The patient was taken to the operating room and placed on the operating room table in the supine position. General anesthesia was induced. The patient's abdomen was prepped and draped in the usual sterile fashion. A supraumbilical approximately 2 cm incision was created in the midline sharply after anesthetizing the skin, and was carried down to the fascia bluntly as well as with cautery. The fascia was then grasped with Kocher clamps and elevated. The fascia was divided vertically in the midline. 2 stay sutures of 0 Vicryl were placed at either end across the incision. A finger sweep was then used to confirm entry into the peritoneal cavity. A 12 mm Deanna trocar was then placed into the abdomen and the abdomen was inspected. Evidence of very dense scarring around the gallbladder was found. The gallbladder itself was completely ensheathed by an omental adhesion that was folded up over the gallbladder and the liver that was tediously dissected down with cautery.  Three additional ports were placed--one in the epigastrium and two in the right costal margin, all 5 mm in size. The gallbladder was distended and hydropic as well as thick walled with firm chronic inflammation surrounding it. Next, using the lateral ports, the gallbladder was then grasped at the fundus and elevated, and Elier's pouch was then grasped and retracted laterally to expose the triangle of Calot. During the course of the manipulation through the assistant ports a rent was made in the gallbladder with escape of some bile that was suctioned away. No stones escaped. Using sharp dissection with hook cautery as well as blunt dissection, the cystic artery and cystic duct were skeletonized. This was a difficult dissection owing to dense scarring and fibrosis of all peritoneal surfaces as well as dense scarring between the cystic duct and cystic artery. The cystic duct appeared normal in size. Ultimately, both structures were circumferentially dissected and the gallbladder was elevated off the liver plate for approximately one-third of the distance which confirmed the critical view of safety. The cystic duct was triply clipped on the patient side and divided. It was further controlled with a PDS Endoloop. The cystic artery was also clipped and divided with scissors. Next, hook cautery was used to remove the gallbladder from the liver bed. The gallbladder was then removed from the umbilical port site using an EndoCatch bag. Small oozing points on the gallbladder bed surface were then controlled with cautery. The abdomen was then copiously irrigated and suctioned dry. Hemostasis appeared excellent. The fascia of the port site for the umbilical port was then closed using a figure-of-eight stitch of 0 Vicryl once the abdomen had been desufflated and the ports had been removed under direct vision and the stay sutures were also tied; the wound was then irrigated and suctioned dry. 4-0 Vicryl subcuticular stitches  were used to close the skin. Benzoin, Steri-Strips and sterile dressings were applied. This terminated the procedure. All sponge and instrument counts were correct x2 at the end of the procedure.     MELANY MAHONEY MD

## 2017-01-09 DIAGNOSIS — E11.9 TYPE 2 DIABETES MELLITUS WITHOUT COMPLICATION, WITHOUT LONG-TERM CURRENT USE OF INSULIN (H): Primary | ICD-10-CM

## 2017-01-09 NOTE — TELEPHONE ENCOUNTER
metformin         Last Written Prescription Date: 11/9/16  Last Fill Quantity: 60, # refills: 1  Last Office Visit with [facility] or [facility] prescribing provider:  10/14/16        BP Readings from Last 3 Encounters:   01/06/17 147/60   01/06/17 138/58   01/04/17 166/66     No results found for this basename: microl  No results found for this basename: microalbumin  CREATININE   Date Value Ref Range Status   01/03/2017 0.78 0.66 - 1.25 mg/dL Final   ]  GFR ESTIMATE   Date Value Ref Range Status   01/03/2017 >90  Non  GFR Calc   >60 mL/min/1.7m2 Final   11/11/2016 85 >60 mL/min/1.7m2 Final     Comment:     Non  GFR Calc   08/28/2016 89 >60 mL/min/1.7m2 Final     Comment:     Non  GFR Calc     GFR ESTIMATE IF BLACK   Date Value Ref Range Status   01/03/2017 >90   GFR Calc   >60 mL/min/1.7m2 Final   11/11/2016 >90   GFR Calc   >60 mL/min/1.7m2 Final   08/28/2016 >90   GFR Calc   >60 mL/min/1.7m2 Final     CHOL      158   9/7/2016  HDL       33   9/7/2016  LDL      105   9/7/2016  TRIG       99   9/7/2016  No results found for this basename: cholhdlratio  AST       22   1/3/2017  ALT       30   1/3/2017  A1C      8.6   9/7/2016  POTASSIUM   Date Value Ref Range Status   01/03/2017 4.4 3.4 - 5.3 mmol/L Final   Routing refill request to provider for review/approval because:  Labs not current:  A1C.  Patient is due for lab work and an office visit for recheck with PCP. (per last refill request)  Kaci Wadsworth, RN  Triage Nurse

## 2017-01-10 LAB — COPATH REPORT: NORMAL

## 2017-01-11 ENCOUNTER — OFFICE VISIT (OUTPATIENT)
Dept: FAMILY MEDICINE | Facility: CLINIC | Age: 67
End: 2017-01-11
Payer: COMMERCIAL

## 2017-01-11 ENCOUNTER — DOCUMENTATION ONLY (OUTPATIENT)
Dept: FAMILY MEDICINE | Facility: CLINIC | Age: 67
End: 2017-01-11

## 2017-01-11 VITALS
OXYGEN SATURATION: 100 % | WEIGHT: 130.19 LBS | TEMPERATURE: 98.2 F | HEART RATE: 55 BPM | BODY MASS INDEX: 22.23 KG/M2 | SYSTOLIC BLOOD PRESSURE: 126 MMHG | DIASTOLIC BLOOD PRESSURE: 54 MMHG | RESPIRATION RATE: 18 BRPM | HEIGHT: 64 IN

## 2017-01-11 DIAGNOSIS — R60.9 EDEMA, UNSPECIFIED TYPE: ICD-10-CM

## 2017-01-11 DIAGNOSIS — E11.65 TYPE 2 DIABETES MELLITUS WITH HYPERGLYCEMIA, WITHOUT LONG-TERM CURRENT USE OF INSULIN (H): Primary | ICD-10-CM

## 2017-01-11 DIAGNOSIS — G89.29 CHRONIC NONINTRACTABLE HEADACHE, UNSPECIFIED HEADACHE TYPE: ICD-10-CM

## 2017-01-11 DIAGNOSIS — Z12.11 SPECIAL SCREENING FOR MALIGNANT NEOPLASMS, COLON: ICD-10-CM

## 2017-01-11 DIAGNOSIS — I10 BENIGN ESSENTIAL HYPERTENSION: ICD-10-CM

## 2017-01-11 DIAGNOSIS — R51.9 CHRONIC NONINTRACTABLE HEADACHE, UNSPECIFIED HEADACHE TYPE: ICD-10-CM

## 2017-01-11 LAB — HBA1C MFR BLD: 8.6 % (ref 4.3–6)

## 2017-01-11 PROCEDURE — 36415 COLL VENOUS BLD VENIPUNCTURE: CPT | Performed by: PHYSICIAN ASSISTANT

## 2017-01-11 PROCEDURE — 99214 OFFICE O/P EST MOD 30 MIN: CPT | Performed by: PHYSICIAN ASSISTANT

## 2017-01-11 PROCEDURE — 83036 HEMOGLOBIN GLYCOSYLATED A1C: CPT | Performed by: PHYSICIAN ASSISTANT

## 2017-01-11 PROCEDURE — 99207 C FOOT EXAM  NO CHARGE: CPT | Performed by: PHYSICIAN ASSISTANT

## 2017-01-11 PROCEDURE — 82043 UR ALBUMIN QUANTITATIVE: CPT | Performed by: PHYSICIAN ASSISTANT

## 2017-01-11 NOTE — PROGRESS NOTES
Panel Management Review      Patient has the following on his problem list:     Hypertension   Last three blood pressure readings:  BP Readings from Last 3 Encounters:   01/11/17 126/54   01/06/17 147/60   01/06/17 138/58     Blood pressure: Passed    HTN Guidelines:  Age 18-59 BP range:  Less than 140/90  Age 60-85 with Diabetes:  Less than 140/90  Age 60-85 without Diabetes:  less than 150/90      Composite cancer screening  Chart review shows that this patient is due/due soon for the following Fecal Colorectal (FIT)  Summary:    Patient is due/failing the following:   Diabetic Eye Check Up and FIT    Action needed:   Patient needs office visit for Diabetic Eye Check.    Type of outreach:    Discussed need for diabetic eye check and Colon Cancer Screening. Patient will schedule diabetic eye check and FIT Test was ordered and given to patient for completion.    Questions for provider review:    None                                                                                                                                    Suad Ramesh CMA (AAMA) 1/11/2017 2:45 PM

## 2017-01-11 NOTE — PROGRESS NOTES
SUBJECTIVE:                                                    Shell Leon is a 66 year old male who presents to clinic today for the following health issues:      Diabetes Follow-up      Patient is checking blood sugars: every 3 days usually numbers around 190    Diabetic concerns: None     Symptoms of hypoglycemia (low blood sugar): none     Paresthesias (numbness or burning in feet) or sores: No     Date of last diabetic eye exam: None       Amount of exercise or physical activity: None    Problems taking medications regularly: No    Medication side effects: none    Diet: low salt and carbohydrate counting    Patient is here today for diabetic check.   He has been taking his Metformin twice daily, having no side effects from medication, no low blood sugars  He is checking blood sugar at home, first thing in am, every 3 days or so  Average AM blood sugar is 190, has been higher recently due to recent gallbladder surgery  No checking sugars throughout the day  Of note, needs refills on lancets and test strips- per son buys OTC, not sure what brand he uses  He denies chest pain, shortness of breath, numbness or tingling  He admits to some leg swelling, though this has improved since he has been following with cardiology  Did see diabetes educator, has cut back on carb intake  Has not had eye exam, is doing foot checks at home.    Patient is also concerned about persistent temple pain/headache  Occurs mostly at night, better during the day  Discussed with Cardiology, they recommended Neurology visit  Described as throbbing      Problem list and histories reviewed & adjusted, as indicated.  Additional history: as documented    Patient Active Problem List   Diagnosis     Essential hypertension with goal blood pressure less than 140/90     Internal carotid artery stenosis, bilateral     Benign essential hypertension     Edema, unspecified type     Type 2 diabetes mellitus without complication (H)     Past  Surgical History   Procedure Laterality Date     Laparoscopic cholecystectomy N/A 1/6/2017     Procedure: LAPAROSCOPIC CHOLECYSTECTOMY;  Surgeon: Michael Caba MD;  Location:  OR       Social History   Substance Use Topics     Smoking status: Never Smoker      Smokeless tobacco: Never Used     Alcohol Use: No     Family History   Problem Relation Age of Onset     Family history unknown: Yes         Current Outpatient Prescriptions   Medication Sig Dispense Refill     sitagliptin (JANUVIA) 100 MG tablet Take 1 tablet (100 mg) by mouth daily 90 tablet 3     metFORMIN (GLUCOPHAGE) 1000 MG tablet Take 1 tablet (1,000 mg) by mouth 2 times daily (with meals) 180 tablet 1     oxyCODONE-acetaminophen (PERCOCET) 5-325 MG per tablet Take 1-2 tablets by mouth every 4 hours as needed for pain (moderate to severe) 20 tablet 0     senna-docusate (SENOKOT-S;PERICOLACE) 8.6-50 MG per tablet Take 1-2 tablets by mouth 2 times daily Take while on oral narcotics to prevent or treat constipation. 15 tablet 1     lisinopril (PRINIVIL/ZESTRIL) 40 MG tablet Take 1 tablet (40 mg) by mouth daily 30 tablet 11     amLODIPine (NORVASC) 5 MG tablet Take 1 tablet (5 mg) by mouth daily 30 tablet 11     hydrochlorothiazide (HYDRODIURIL) 25 MG tablet Take 1 tablet (25 mg) by mouth daily 30 tablet 11     atorvastatin (LIPITOR) 80 MG tablet Take 1 tablet (80 mg) by mouth daily 30 tablet 3     aspirin 81 MG chewable tablet Take 1 tablet (81 mg) by mouth daily 108 tablet 3     [DISCONTINUED] metFORMIN (GLUCOPHAGE) 1000 MG tablet Take 1 tablet (1,000 mg) by mouth 2 times daily (with meals) 60 tablet 0     order for DME Equipment being ordered: compression stocking 1 Units 0     order for DME Equipment being ordered: BP monitor and cuff 1 Device 0     order for DME Equipment being ordered: blood pressure cuff 1 each 0     No Known Allergies    ROS:  Constitutional, HEENT, cardiovascular, pulmonary, gi and gu systems are negative, except as  "otherwise noted.    OBJECTIVE:                                                    /54 mmHg  Pulse 55  Temp(Src) 98.2  F (36.8  C) (Tympanic)  Resp 18  Ht 5' 4\" (1.626 m)  Wt 130 lb 3 oz (59.053 kg)  BMI 22.34 kg/m2  SpO2 100%  Body mass index is 22.34 kg/(m^2).  GENERAL: healthy, alert and no distress  NECK: no adenopathy, no asymmetry, masses, or scars and thyroid normal to palpation  RESP: lungs clear to auscultation - no rales, rhonchi or wheezes  CV: regular rate and rhythm, normal S1 S2, no S3 or S4, no murmur, click or rub, no peripheral edema and peripheral pulses strong  MS: no gross musculoskeletal defects noted, no edema  Diabetic foot exam: normal DP and PT pulses, no trophic changes or ulcerative lesions, normal sensory exam and normal monofilament exam    Diagnostic Test Results:  Results for orders placed or performed in visit on 01/11/17 (from the past 24 hour(s))   Hemoglobin A1c   Result Value Ref Range    Hemoglobin A1C 8.6 (H) 4.3 - 6.0 %        ASSESSMENT/PLAN:                                                            1. Type 2 diabetes mellitus with hyperglycemia, without long-term current use of insulin (H)  Chronic issue, uncontrolled. Recent A1C was stable at 8.6 from 9/2016.  Will check urine albumin today.  Foot exam done today, normal.  Will add on Januvia daily, plan for DM recheck in 3 months with A1C recheck.  Will have patient bring in sugar log to that visit. Did ask they bring in glucometer so we can send in correct test strips and lancets.  May consider MTM in future to help bring A1C down.  - Hemoglobin A1c; Future  - Hemoglobin A1c  - Albumin Random Urine Quantitative  - FOOT EXAM  - sitagliptin (JANUVIA) 100 MG tablet; Take 1 tablet (100 mg) by mouth daily  Dispense: 90 tablet; Refill: 3  - metFORMIN (GLUCOPHAGE) 1000 MG tablet; Take 1 tablet (1,000 mg) by mouth 2 times daily (with meals)  Dispense: 180 tablet; Refill: 1    2. Benign essential hypertension  Chronic " issue, stable today, managed by cardiology.    3. Edema, unspecified type  Chronic issue, managed by cardiology, no swelling noted on exam today.    4. Special screening for malignant neoplasms, colon  - Fecal colorectal cancer screen FIT; Future    5. Chronic nonintractable headache, unspecified headache type  Chronic concern, initially thought related to high blood pressure, but not improved with better controlled blood pressure. Will send to Neurology for further evaluation.  - NEUROLOGY ADULT REFERRAL    Risks, benefits and alternatives were discussed with patient. Agreeable to the plan of care.      Kathi Mondragon PA-C  [facility]

## 2017-01-11 NOTE — MR AVS SNAPSHOT
After Visit Summary   1/11/2017    Shell Leon    MRN: [id number]           Patient Information     Date Of Birth          1950        Visit Information        Provider Department      1/11/2017 2:30 PM Kathi Mondragon PA-C [facility]        Today's Diagnoses     Type 2 diabetes mellitus without complication, without long-term current use of insulin (H)    -  1     Benign essential hypertension         Edema, unspecified type         Special screening for malignant neoplasms, colon         Chronic nonintractable headache, unspecified headache type            Follow-ups after your visit        Additional Services     NEUROLOGY ADULT REFERRAL       Your provider has referred you to: [facility]: [facility] [phone]   [email or web address]    Reason for Referral: Consult    Please be aware that coverage of these services is subject to the terms and limitations of your health insurance plan.  Call member services at your health plan with any benefit or coverage questions.      Please bring the following with you to your appointment:    (1) Any X-Rays, CTs or MRIs which have been performed.  Contact the facility where they were done to arrange for  prior to your scheduled appointment.    (2) List of current medications  (3) This referral request   (4) Any documents/labs given to you for this referral                  Future tests that were ordered for you today     Open Future Orders        Priority Expected Expires Ordered    Fecal colorectal cancer screen FIT Routine 2/1/2017 4/5/2017 1/11/2017            Who to contact     If you have questions or need follow up information about today's clinic visit or your schedule please contact [facility] directly at [phone].  Normal or non-critical lab and imaging results will be communicated to you by [facility]  "letter or phone within 4 business days after the clinic has received the results. If you do not hear from us within 7 days, please contact the clinic through [employer] or phone. If you have a critical or abnormal lab result, we will notify you by phone as soon as possible.  Submit refill requests through [employer] or call your pharmacy and they will forward the refill request to us. Please allow 3 business days for your refill to be completed.          Additional Information About Your Visit        Care EveryWhere ID     This is your Care EveryWhere ID. This could be used by other organizations to access your [place] medical records  [id number]        Your Vitals Were     Pulse Temperature Respirations Height BMI (Body Mass Index) Pulse Oximetry    55 98.2  F (36.8  C) (Tympanic) 18 5' 4\" (1.626 m) 22.34 kg/m2 100%       Blood Pressure from Last 3 Encounters:   01/11/17 126/54   01/06/17 147/60   01/06/17 138/58    Weight from Last 3 Encounters:   01/11/17 130 lb 3 oz (59.053 kg)   01/06/17 126 lb 5.2 oz (57.3 kg)   01/06/17 139 lb (63.05 kg)              We Performed the Following     Albumin Random Urine Quantitative     FOOT EXAM     Hemoglobin A1c     NEUROLOGY ADULT REFERRAL          Today's Medication Changes          These changes are accurate as of: 1/11/17  2:59 PM.  If you have any questions, ask your nurse or doctor.               Start taking these medicines.        Dose/Directions    sitagliptin 100 MG tablet   Commonly known as:  JANUVIA   Used for:  Type 2 diabetes mellitus without complication, without long-term current use of insulin (H)   Started by:  Kathi Mondragon PA-C        Dose:  100 mg   Take 1 tablet (100 mg) by mouth daily   Quantity:  90 tablet   Refills:  3            Where to get your medicines      These medications were sent to [id number] - [place] - [address] [place] [address]     Phone:  [phone]    - metFORMIN 1000 MG " tablet  - sitagliptin 100 MG tablet             Primary Care Provider Office Phone # Fax #    Kathi Lin Mondragon PA-C [phone]       [facility] [address]  [facility] [address]        Thank you!     Thank you for choosing [facility]  for your care. Our goal is always to provide you with excellent care. Hearing back from our patients is one way we can continue to improve our services. Please take a few minutes to complete the written survey that you may receive in the mail after your visit with us. Thank you!             Your Updated Medication List - Protect others around you: Learn how to safely use, store and throw away your medicines at www.disposemymeds.org.          This list is accurate as of: 1/11/17  2:59 PM.  Always use your most recent med list.                   Brand Name Dispense Instructions for use    amLODIPine 5 MG tablet    NORVASC    30 tablet    Take 1 tablet (5 mg) by mouth daily       aspirin 81 MG chewable tablet     108 tablet    Take 1 tablet (81 mg) by mouth daily       atorvastatin 80 MG tablet    LIPITOR    30 tablet    Take 1 tablet (80 mg) by mouth daily       hydrochlorothiazide 25 MG tablet    HYDRODIURIL    30 tablet    Take 1 tablet (25 mg) by mouth daily       lisinopril 40 MG tablet    PRINIVIL/ZESTRIL    30 tablet    Take 1 tablet (40 mg) by mouth daily       metFORMIN 1000 MG tablet    GLUCOPHAGE    180 tablet    Take 1 tablet (1,000 mg) by mouth 2 times daily (with meals)       * order for DME     1 each    Equipment being ordered: blood pressure cuff       * order for DME     1 Device    Equipment being ordered: BP monitor and cuff       * order for DME     1 Units    Equipment being ordered: compression stocking       oxyCODONE-acetaminophen 5-325 MG per tablet    PERCOCET    20 tablet    Take 1-2 tablets by mouth every 4 hours as needed for pain (moderate to severe)       senna-docusate 8.6-50 MG per tablet     SENOKOT-S;PERICOLACE    15 tablet    Take 1-2 tablets by mouth 2 times daily Take while on oral narcotics to prevent or treat constipation.       sitagliptin 100 MG tablet    JANUVIA    90 tablet    Take 1 tablet (100 mg) by mouth daily       * Notice:  This list has 3 medication(s) that are the same as other medications prescribed for you. Read the directions carefully, and ask your doctor or other care provider to review them with you.

## 2017-01-12 ENCOUNTER — TELEPHONE (OUTPATIENT)
Dept: FAMILY MEDICINE | Facility: CLINIC | Age: 67
End: 2017-01-12

## 2017-01-12 DIAGNOSIS — E11.65 TYPE 2 DIABETES MELLITUS WITH HYPERGLYCEMIA, WITHOUT LONG-TERM CURRENT USE OF INSULIN (H): Primary | ICD-10-CM

## 2017-01-12 LAB
CREAT UR-MCNC: 205 MG/DL
MICROALBUMIN UR-MCNC: 251 MG/L
MICROALBUMIN/CREAT UR: 122.44 MG/G CR (ref 0–17)

## 2017-01-13 RX ORDER — GLIPIZIDE 5 MG/1
5 TABLET ORAL
Qty: 90 TABLET | Refills: 0 | Status: SHIPPED | OUTPATIENT
Start: 2017-01-13 | End: 2017-04-15

## 2017-01-13 NOTE — TELEPHONE ENCOUNTER
Received prior authorization for Januvia, per provider will change medication. No need to initiate PA. Left message for patient's daughter to return call to clinic to find out which lancets and test strip brands he is using.  Suad Ramesh CMA (AAMA) 1/13/2017 10:08 AM

## 2017-01-13 NOTE — TELEPHONE ENCOUNTER
Please find out what type of test strips and lancets are needed.   Then will sign for rx for both.    Kathi Mondragon PA-C

## 2017-01-24 ENCOUNTER — OFFICE VISIT (OUTPATIENT)
Dept: SURGERY | Facility: CLINIC | Age: 67
End: 2017-01-24
Payer: COMMERCIAL

## 2017-01-24 DIAGNOSIS — Z09 SURGICAL FOLLOWUP VISIT: Primary | ICD-10-CM

## 2017-01-24 PROCEDURE — 99024 POSTOP FOLLOW-UP VISIT: CPT | Performed by: SURGERY

## 2017-01-24 NOTE — Clinical Note
2017      RE:  Shell Leon-:  48    Shell Leon presents in follow-up. He had an urgent laparoscopic cholecystectomy approximately 3 weeks ago for unresolved acute cholecystitis. He tells me the surgery was well tolerated. He needed pain medicine for approximately 3 days. His bowels are moving normally. He is eating normally. His energy level has returned to normal. On exam, wounds are healing well. There is no evidence of infection or hernia. We discussed his lifting restrictions as well as the possible effects of cholecystectomy on bowel consistency with fattier foods however he has not been having trouble so far. He is cleared for unrestricted physical activity. He may follow-up as needed.    Michael Caba MD  [phone] (c)  [phone] (p)     This note was created using voice recognition software. Undetected word substitutions or other errors may have occurred.

## 2017-01-24 NOTE — PROGRESS NOTES
Shell Leon presents in follow-up. He had an urgent laparoscopic cholecystectomy approximately 3 weeks ago for unresolved acute cholecystitis. He tells me the surgery was well tolerated. He needed pain medicine for approximately 3 days. His bowels are moving normally. He is eating normally. His energy level has returned to normal. On exam, wounds are healing well. There is no evidence of infection or hernia. We discussed his lifting restrictions as well as the possible effects of cholecystectomy on bowel consistency with fattier foods however he has not been having trouble so far. He is cleared for unrestricted physical activity. He may follow-up as needed.    Michael Caba MD  [phone] (c)  [phone] (p)     This note was created using voice recognition software. Undetected word substitutions or other errors may have occurred.     HPI      ROS      Physical Exam

## 2017-04-15 DIAGNOSIS — E11.65 TYPE 2 DIABETES MELLITUS WITH HYPERGLYCEMIA, WITHOUT LONG-TERM CURRENT USE OF INSULIN (H): ICD-10-CM

## 2017-04-17 DIAGNOSIS — E11.9 TYPE 2 DIABETES MELLITUS WITHOUT COMPLICATION, UNSPECIFIED LONG TERM INSULIN USE STATUS: ICD-10-CM

## 2017-04-17 RX ORDER — ATORVASTATIN CALCIUM 80 MG/1
80 TABLET, FILM COATED ORAL DAILY
Qty: 90 TABLET | Refills: 1 | Status: SHIPPED | OUTPATIENT
Start: 2017-04-17 | End: 2017-10-06

## 2017-04-17 RX ORDER — GLIPIZIDE 5 MG/1
TABLET ORAL
Qty: 30 TABLET | Refills: 0 | Status: SHIPPED | OUTPATIENT
Start: 2017-04-17 | End: 2017-04-27

## 2017-04-17 NOTE — TELEPHONE ENCOUNTER
Glipizide:  Due for repeat A1C test, lab is ordered.   Please call patient to schedule a lab appointment.  Medication is being filled for 1 time refill only due to:  Future labs ordered A1C..  Kaci Wadsworth, TERRA  Triage Nurse

## 2017-04-17 NOTE — TELEPHONE ENCOUNTER
Refill for atorvastatin sent to [facility] in [place]. Left message for patient requesting that he call back to schedule follow up visit with Dr. Landry as he is due for follow up in 5/2017. Provided number for scheduling departing in message. CAIT Jeffery RN

## 2017-04-26 ENCOUNTER — DOCUMENTATION ONLY (OUTPATIENT)
Dept: LAB | Facility: CLINIC | Age: 67
End: 2017-04-26

## 2017-04-26 DIAGNOSIS — E11.9 TYPE 2 DIABETES MELLITUS WITHOUT COMPLICATION (H): ICD-10-CM

## 2017-04-26 DIAGNOSIS — Z00.00 HEALTH CARE MAINTENANCE: Primary | ICD-10-CM

## 2017-04-26 DIAGNOSIS — E11.65 TYPE 2 DIABETES MELLITUS WITH HYPERGLYCEMIA, WITHOUT LONG-TERM CURRENT USE OF INSULIN (H): ICD-10-CM

## 2017-04-26 DIAGNOSIS — Z00.00 HEALTH CARE MAINTENANCE: ICD-10-CM

## 2017-04-26 LAB — HBA1C MFR BLD: 6 % (ref 4.3–6)

## 2017-04-26 PROCEDURE — 83036 HEMOGLOBIN GLYCOSYLATED A1C: CPT | Performed by: PHYSICIAN ASSISTANT

## 2017-04-26 PROCEDURE — 36415 COLL VENOUS BLD VENIPUNCTURE: CPT | Performed by: PHYSICIAN ASSISTANT

## 2017-04-26 PROCEDURE — 84443 ASSAY THYROID STIM HORMONE: CPT | Performed by: PHYSICIAN ASSISTANT

## 2017-04-26 NOTE — PROGRESS NOTES
Shell had a lab only appointment today, 4/26/17. There were tests in this patient's health maintenance that are due.  I have ordered them as future and pended them.  Please associate a diagnosis and sign these orders if you want them.  We have obtained the sample and are holding it in the lab.  If you do not want these tests then please cancel them.  Thanks, Lorraine

## 2017-04-26 NOTE — PROGRESS NOTES
Pt due for TSH according to health maintenance.  Lab order signed.    Kamini Kay, RN  Triage Nurse

## 2017-04-27 DIAGNOSIS — E11.65 TYPE 2 DIABETES MELLITUS WITH HYPERGLYCEMIA, WITHOUT LONG-TERM CURRENT USE OF INSULIN (H): ICD-10-CM

## 2017-04-27 LAB — TSH SERPL DL<=0.005 MIU/L-ACNC: 1.42 MU/L (ref 0.4–4)

## 2017-04-27 RX ORDER — GLIPIZIDE 5 MG/1
5 TABLET ORAL
Qty: 90 TABLET | Refills: 1 | Status: SHIPPED | OUTPATIENT
Start: 2017-04-27 | End: 2017-10-06

## 2017-07-11 ENCOUNTER — DOCUMENTATION ONLY (OUTPATIENT)
Dept: FAMILY MEDICINE | Facility: CLINIC | Age: 67
End: 2017-07-11

## 2017-07-11 NOTE — LETTER
July 11, 2017      Shell Leon  [address] [place] [address]        Dear Mr. Shell Leon,    Our records show that you are currently due for colon cancer screening either with a colonoscopy or a stool test (FIT Test).   Please call our clinic at [phone] to have these orders placed and we can assist you in scheduling this appointment.    If you have already had this completed please contact our clinic so we can update your chart.     If you have further questions or concerns please feel free to contact us via [employer] or by calling our clinic at [phone].    Sincerely,     Kathi Mondragon PA-C

## 2017-07-11 NOTE — PROGRESS NOTES
Panel Management Review      Patient has the following on his problem list: None      Composite cancer screening  Chart review shows that this patient is due/due soon for the following Colonoscopy  Summary:    Patient is due/failing the following:   COLONOSCOPY    Action needed:   Patient needs office visit for colon.    Type of outreach:    Sent letter.    Questions for provider review:    None                                                                                                                                    [facility]       Chart routed to Care Team .

## 2017-07-18 ENCOUNTER — OFFICE VISIT (OUTPATIENT)
Dept: FAMILY MEDICINE | Facility: CLINIC | Age: 67
End: 2017-07-18
Payer: COMMERCIAL

## 2017-07-18 VITALS
HEART RATE: 51 BPM | DIASTOLIC BLOOD PRESSURE: 60 MMHG | SYSTOLIC BLOOD PRESSURE: 170 MMHG | OXYGEN SATURATION: 100 % | HEIGHT: 64 IN | TEMPERATURE: 96 F | BODY MASS INDEX: 22.31 KG/M2 | WEIGHT: 130.7 LBS | RESPIRATION RATE: 16 BRPM

## 2017-07-18 DIAGNOSIS — I10 ESSENTIAL HYPERTENSION WITH GOAL BLOOD PRESSURE LESS THAN 140/90: Primary | ICD-10-CM

## 2017-07-18 DIAGNOSIS — R05.3 PERSISTENT COUGH: ICD-10-CM

## 2017-07-18 DIAGNOSIS — M79.661 PAIN IN BOTH LOWER LEGS: ICD-10-CM

## 2017-07-18 DIAGNOSIS — M79.662 PAIN IN BOTH LOWER LEGS: ICD-10-CM

## 2017-07-18 PROCEDURE — 99214 OFFICE O/P EST MOD 30 MIN: CPT | Performed by: PHYSICIAN ASSISTANT

## 2017-07-18 RX ORDER — AMLODIPINE BESYLATE 10 MG/1
10 TABLET ORAL DAILY
Qty: 30 TABLET | Refills: 1 | Status: SHIPPED | OUTPATIENT
Start: 2017-07-18 | End: 2017-08-10

## 2017-07-18 RX ORDER — LOSARTAN POTASSIUM 50 MG/1
50 TABLET ORAL DAILY
Qty: 30 TABLET | Refills: 1 | Status: SHIPPED | OUTPATIENT
Start: 2017-07-18 | End: 2017-08-01

## 2017-07-18 NOTE — PROGRESS NOTES
SUBJECTIVE:                                                    Shell Leon is a 67 year old male who presents to clinic today for the following health issues:    Patient is here today for multiple concerns:    Musculoskeletal problem/pain      Duration: 2 months     Description  Location: Bilateral leg pain    Intensity:  moderate    Accompanying signs and symptoms: radiation of pain all the way up to shoulders, and swelling    History  Previous similar problem: no   Previous evaluation:  none    Precipitating or alleviating factors:  Trauma or overuse: no   Aggravating factors include: sitting    Therapies tried and outcome: nothing    Patient is here today complaining of bilateral leg pain, foot pain  He is also having bilateral upper back pain  Ongoing for a few months  Worse with activity  No redness or heat  Notes that his lower ankles sometime swell  Not taking anything for this  Notes pain is similar in the morning as compared to evening  Of note, started working and is walking more and lifting boxes- works at [employer]     RESPIRATORY SYMPTOMS      Duration: 10 months     Description  cough    Severity: moderate    Accompanying signs and symptoms: None    History (predisposing factors):  none    Precipitating or alleviating factors: None    Therapies tried and outcome:  none    Patient is also concerned today about a persistent dry cough, feels a tickle in his throat  No fever, chills, coughing up sputum, runny nose, sore throat  Feels well otherwise  Taking nothing for this    Hypertension Follow-up      Outpatient blood pressures are being checked at home.  Results are 163/57.    Low Salt Diet: no added salt  He is also concerned about elevated BP for the last month at home  His BP was 160/50s at home  No chest pain, shortness of breath, headaches, or vision changes  Taking medications regularly    Problem list and histories reviewed & adjusted, as indicated.  Additional history: as  documented    Patient Active Problem List   Diagnosis     Essential hypertension with goal blood pressure less than 140/90     Internal carotid artery stenosis, bilateral     Benign essential hypertension     Edema, unspecified type     Type 2 diabetes mellitus without complication (H)     Past Surgical History:   Procedure Laterality Date     LAPAROSCOPIC CHOLECYSTECTOMY N/A 1/6/2017    Procedure: LAPAROSCOPIC CHOLECYSTECTOMY;  Surgeon: Michael Caba MD;  Location:  OR       Social History   Substance Use Topics     Smoking status: Never Smoker     Smokeless tobacco: Never Used     Alcohol use No     Family History   Problem Relation Age of Onset     Family history unknown: Yes         Current Outpatient Prescriptions   Medication Sig Dispense Refill     amLODIPine (NORVASC) 10 MG tablet Take 1 tablet (10 mg) by mouth daily 30 tablet 1     losartan (COZAAR) 50 MG tablet Take 1 tablet (50 mg) by mouth daily 30 tablet 1     glipiZIDE (GLUCOTROL) 5 MG tablet Take 1 tablet (5 mg) by mouth every morning (before breakfast) 90 tablet 1     atorvastatin (LIPITOR) 80 MG tablet Take 1 tablet (80 mg) by mouth daily 90 tablet 1     blood glucose monitoring (NO BRAND SPECIFIED) test strip Use to test blood sugars 3 times daily or as directed 100 strip 11     blood glucose (NO BRAND SPECIFIED) lancets standard Use to test blood sugar 3 times daily or as directed. 1 Box 11     metFORMIN (GLUCOPHAGE) 1000 MG tablet Take 1 tablet (1,000 mg) by mouth 2 times daily (with meals) 180 tablet 1     oxyCODONE-acetaminophen (PERCOCET) 5-325 MG per tablet Take 1-2 tablets by mouth every 4 hours as needed for pain (moderate to severe) 20 tablet 0     senna-docusate (SENOKOT-S;PERICOLACE) 8.6-50 MG per tablet Take 1-2 tablets by mouth 2 times daily Take while on oral narcotics to prevent or treat constipation. 15 tablet 1     hydrochlorothiazide (HYDRODIURIL) 25 MG tablet Take 1 tablet (25 mg) by mouth daily 30 tablet 11      "order for DME Equipment being ordered: compression stocking 1 Units 0     order for DME Equipment being ordered: BP monitor and cuff 1 Device 0     order for DME Equipment being ordered: blood pressure cuff 1 each 0     aspirin 81 MG chewable tablet Take 1 tablet (81 mg) by mouth daily 108 tablet 3     [DISCONTINUED] amLODIPine (NORVASC) 5 MG tablet Take 1 tablet (5 mg) by mouth daily 30 tablet 11     No Known Allergies    Reviewed and updated as needed this visit by clinical staff       Reviewed and updated as needed this visit by Provider         ROS:  Constitutional, HEENT, cardiovascular, pulmonary, gi and gu systems are negative, except as otherwise noted.    OBJECTIVE:     /60 (BP Location: Right arm, Patient Position: Chair, Cuff Size: Adult Regular)  Pulse 51  Temp 96  F (35.6  C) (Tympanic)  Resp 16  Ht 5' 4\" (1.626 m)  Wt 130 lb 11.2 oz (59.3 kg)  SpO2 100%  BMI 22.43 kg/m2  Body mass index is 22.43 kg/(m^2).  GENERAL: healthy, alert and no distress  EYES: Eyes grossly normal to inspection, PERRL and conjunctivae and sclerae normal  HENT: ear canals and TM's normal, nose and mouth without ulcers or lesions  NECK: no adenopathy, no asymmetry, masses, or scars and thyroid normal to palpation  RESP: lungs clear to auscultation - no rales, rhonchi or wheezes  CV: regular rate and rhythm, normal S1 S2, no S3 or S4, no murmur, click or rub, no peripheral edema and peripheral pulses strong  MS: no gross musculoskeletal defects noted, no edema  SKIN: no suspicious lesions or rashes    Diagnostic Test Results:  none     ASSESSMENT/PLAN:             1. Essential hypertension with goal blood pressure less than 140/90  Chronic issue, BP uncontrolled today. Will increase Amlodipine to 10mg daily.  Will also have patient stop Lisinopril due to cough, and replace with Losartan 50mg  Recheck in office in 2-4 weeks.   Advised if BP very high at home, or if chest pain, s/sxs of stroke to be seen in clinic.  - " amLODIPine (NORVASC) 10 MG tablet; Take 1 tablet (10 mg) by mouth daily  Dispense: 30 tablet; Refill: 1  - losartan (COZAAR) 50 MG tablet; Take 1 tablet (50 mg) by mouth daily  Dispense: 30 tablet; Refill: 1    2. Persistent cough  Chronic issue, suspect from Lisinopril, will stop to see if cough improves. Will continue to monitor.    3. Pain in both lower legs  New problem, suspect due to increased activity and age. Recommend Tylenol and Ibuprofen as needed for pain. F/U if symptoms worsen or do not improve.      Risks, benefits and alternatives were discussed with patient. Agreeable to the plan of care.      Kathi Mondragon PA-C  [facility]

## 2017-07-18 NOTE — MR AVS SNAPSHOT
"              After Visit Summary   7/18/2017    Shell Leon    MRN: [id number]           Patient Information     Date Of Birth          1950        Visit Information        Provider Department      7/18/2017 11:10 AM Kathi Mondragon PA-C [facility]        Today's Diagnoses     Essential hypertension with goal blood pressure less than 140/90    -  1    Persistent cough        Pain in both lower legs          Care Instructions    AMLODIPINE: you currently have 5mg tablets at home, may take 2 tabs (10mg) until runs out, then start new prescription.    LISINOPRIL: stop medication tomorrow this is likely causing your cough    LOSARTAN: start this prescription tomorrow    RECHECK BLOOD PRESSURE IN OFFICE VISIT IN 2 - 4 WEEKS          Follow-ups after your visit        Who to contact     If you have questions or need follow up information about today's clinic visit or your schedule please contact [facility] directly at [phone].  Normal or non-critical lab and imaging results will be communicated to you by Splurgyhart, letter or phone within 4 business days after the clinic has received the results. If you do not hear from us within 7 days, please contact the clinic through [employer] or phone. If you have a critical or abnormal lab result, we will notify you by phone as soon as possible.  Submit refill requests through Jiujiuweikang or call your pharmacy and they will forward the refill request to us. Please allow 3 business days for your refill to be completed.          Additional Information About Your Visit        Splurgyhart Information     Jiujiuweikang lets you send messages to your doctor, view your test results, renew your prescriptions, schedule appointments and more. To sign up, go to [email or web address] . Click on \"Log in\" on the left side of the screen, which will take you to the Welcome page. Then click on \"Sign up Now\" on the right side of the page.     You will be " "asked to enter the access code listed below, as well as some personal information. Please follow the directions to create your username and password.     Your access code is: [email or web address]  Expires: 10/16/2017 10:36 AM     Your access code will  in 90 days. If you need help or a new code, please call your [place] clinic or [phone].        Care EveryWhere ID     This is your Care EveryWhere ID. This could be used by other organizations to access your [place] medical records  [id number]        Your Vitals Were     Pulse Temperature Respirations Height Pulse Oximetry BMI (Body Mass Index)    51 96  F (35.6  C) (Tympanic) 16 5' 4\" (1.626 m) 100% 22.43 kg/m2       Blood Pressure from Last 3 Encounters:   17 170/60   17 126/54   17 147/60    Weight from Last 3 Encounters:   17 130 lb 11.2 oz (59.3 kg)   17 130 lb 3 oz (59.1 kg)   17 126 lb 5.2 oz (57.3 kg)              Today, you had the following     No orders found for display         Today's Medication Changes          These changes are accurate as of: 17 11:29 AM.  If you have any questions, ask your nurse or doctor.               Start taking these medicines.        Dose/Directions    losartan 50 MG tablet   Commonly known as:  COZAAR   Used for:  Essential hypertension with goal blood pressure less than 140/90   Started by:  Kathi Mondrgaon PA-C        Dose:  50 mg   Take 1 tablet (50 mg) by mouth daily   Quantity:  30 tablet   Refills:  1         These medicines have changed or have updated prescriptions.        Dose/Directions    amLODIPine 10 MG tablet   Commonly known as:  NORVASC   This may have changed:    - medication strength  - how much to take   Used for:  Essential hypertension with goal blood pressure less than 140/90   Changed by:  Kathi Mondragon PA-C        Dose:  10 mg   Take 1 tablet (10 mg) by mouth daily   Quantity:  30 tablet   Refills:  1         Stop taking these " medicines if you haven't already. Please contact your care team if you have questions.     lisinopril 40 MG tablet   Commonly known as:  PRINIVIL/ZESTRIL   Stopped by:  Kathi Mondragon PA-C                Where to get your medicines      These medications were sent to [id number] - [place], [place] - [facility] [address] [place], [facility] [address]     Phone:  [phone]     amLODIPine 10 MG tablet    losartan 50 MG tablet                Primary Care Provider Office Phone # Fax #    Kathi Mondragon PA-C [phone]       [facility] [address]  [facility] [place] [address]        Equal Access to Services     TERRA FROST : Hadii aad ku hadasho Soomaali, waaxda luqadaha, qaybta kaalmada adeegyada, waxay idiin hayaan didi power . So [facility] [phone].    ATENCIÓN: Si habla español, tiene a sullivan disposición servicios gratuitos de asistencia lingüística. Llame al [phone].    We comply with applicable federal civil rights laws and [place] laws. We do not discriminate on the basis of race, color, national origin, age, disability sex, sexual orientation or gender identity.            Thank you!     Thank you for choosing [facility]  for your care. Our goal is always to provide you with excellent care. Hearing back from our patients is one way we can continue to improve our services. Please take a few minutes to complete the written survey that you may receive in the mail after your visit with us. Thank you!             Your Updated Medication List - Protect others around you: Learn how to safely use, store and throw away your medicines at www.disposemymeds.org.          This list is accurate as of: 7/18/17 11:29 AM.  Always use your most recent med list.                   Brand Name Dispense Instructions for use Diagnosis    amLODIPine 10 MG tablet    NORVASC    30 tablet    Take 1 tablet (10 mg) by mouth daily    Essential  hypertension with goal blood pressure less than 140/90       aspirin 81 MG chewable tablet     108 tablet    Take 1 tablet (81 mg) by mouth daily    Type 2 diabetes mellitus without complication (H)       atorvastatin 80 MG tablet    LIPITOR    90 tablet    Take 1 tablet (80 mg) by mouth daily    Type 2 diabetes mellitus without complication, unspecified long term insulin use status (H)       blood glucose lancets standard    no brand specified    1 Box    Use to test blood sugar 3 times daily or as directed.    Type 2 diabetes mellitus with hyperglycemia, without long-term current use of insulin (H)       blood glucose monitoring test strip    no brand specified    100 strip    Use to test blood sugars 3 times daily or as directed    Type 2 diabetes mellitus with hyperglycemia, without long-term current use of insulin (H)       glipiZIDE 5 MG tablet    GLUCOTROL    90 tablet    Take 1 tablet (5 mg) by mouth every morning (before breakfast)    Type 2 diabetes mellitus with hyperglycemia, without long-term current use of insulin (H)       hydrochlorothiazide 25 MG tablet    HYDRODIURIL    30 tablet    Take 1 tablet (25 mg) by mouth daily    Benign essential hypertension       losartan 50 MG tablet    COZAAR    30 tablet    Take 1 tablet (50 mg) by mouth daily    Essential hypertension with goal blood pressure less than 140/90       metFORMIN 1000 MG tablet    GLUCOPHAGE    180 tablet    Take 1 tablet (1,000 mg) by mouth 2 times daily (with meals)    Type 2 diabetes mellitus with hyperglycemia, without long-term current use of insulin (H)       * order for DME     1 each    Equipment being ordered: blood pressure cuff    Benign essential hypertension       * order for DME     1 Device    Equipment being ordered: BP monitor and cuff    Essential hypertension with goal blood pressure less than 140/90       * order for DME     1 Units    Equipment being ordered: compression stocking    Edema, unspecified type        oxyCODONE-acetaminophen 5-325 MG per tablet    PERCOCET    20 tablet    Take 1-2 tablets by mouth every 4 hours as needed for pain (moderate to severe)    Cholecystitis       senna-docusate 8.6-50 MG per tablet    SENOKOT-S;PERICOLACE    15 tablet    Take 1-2 tablets by mouth 2 times daily Take while on oral narcotics to prevent or treat constipation.    Cholecystitis       * Notice:  This list has 3 medication(s) that are the same as other medications prescribed for you. Read the directions carefully, and ask your doctor or other care provider to review them with you.

## 2017-07-18 NOTE — NURSING NOTE
"Chief Complaint   Patient presents with     Musculoskeletal Problem       Initial /60 (BP Location: Right arm, Patient Position: Chair, Cuff Size: Adult Regular)  Pulse 51  Temp 96  F (35.6  C) (Tympanic)  Resp 16  Ht 5' 4\" (1.626 m)  Wt 130 lb 11.2 oz (59.3 kg)  SpO2 100%  BMI 22.43 kg/m2 Estimated body mass index is 22.43 kg/(m^2) as calculated from the following:    Height as of this encounter: 5' 4\" (1.626 m).    Weight as of this encounter: 130 lb 11.2 oz (59.3 kg).  Medication Reconciliation: complete   Jessi Dewitt MA       "

## 2017-07-18 NOTE — PATIENT INSTRUCTIONS
AMLODIPINE: you currently have 5mg tablets at home, may take 2 tabs (10mg) until runs out, then start new prescription.    LISINOPRIL: stop medication tomorrow this is likely causing your cough    LOSARTAN: start this prescription tomorrow    RECHECK BLOOD PRESSURE IN OFFICE VISIT IN 2 - 4 WEEKS

## 2017-08-01 ENCOUNTER — OFFICE VISIT (OUTPATIENT)
Dept: FAMILY MEDICINE | Facility: CLINIC | Age: 67
End: 2017-08-01
Payer: COMMERCIAL

## 2017-08-01 VITALS
RESPIRATION RATE: 16 BRPM | OXYGEN SATURATION: 100 % | DIASTOLIC BLOOD PRESSURE: 50 MMHG | HEART RATE: 57 BPM | HEIGHT: 64 IN | BODY MASS INDEX: 23.03 KG/M2 | WEIGHT: 134.9 LBS | SYSTOLIC BLOOD PRESSURE: 148 MMHG | TEMPERATURE: 97.7 F

## 2017-08-01 DIAGNOSIS — Z12.11 SPECIAL SCREENING FOR MALIGNANT NEOPLASMS, COLON: ICD-10-CM

## 2017-08-01 DIAGNOSIS — I10 BENIGN ESSENTIAL HYPERTENSION: Primary | ICD-10-CM

## 2017-08-01 DIAGNOSIS — R05.3 PERSISTENT COUGH: ICD-10-CM

## 2017-08-01 PROCEDURE — 99213 OFFICE O/P EST LOW 20 MIN: CPT | Performed by: PHYSICIAN ASSISTANT

## 2017-08-01 RX ORDER — AZITHROMYCIN 250 MG/1
TABLET, FILM COATED ORAL
Qty: 6 TABLET | Refills: 0 | Status: SHIPPED | OUTPATIENT
Start: 2017-08-01 | End: 2017-08-10

## 2017-08-01 RX ORDER — LOSARTAN POTASSIUM 100 MG/1
100 TABLET ORAL DAILY
Qty: 30 TABLET | Refills: 1 | Status: SHIPPED | OUTPATIENT
Start: 2017-08-01 | End: 2017-09-08

## 2017-08-01 NOTE — PROGRESS NOTES
SUBJECTIVE:                                                    Shell Leon is a 67 year old male who presents to clinic today for the following health issues:    Hypertension Follow-up      Outpatient blood pressures are being checked at home.  Results are 157/57.    Low Salt Diet: no added salt    Amount of exercise or physical activity: None    Problems taking medications regularly: No    Medication side effects: none  Diet: regular (no restrictions)    Patient is here today to follow up on high blood pressure  Taking Losartan without side effects, BP still high at home  No chest pain, shortness of breath    RESPIRATORY SYMPTOMS      Duration: 10 months     Description  cough    Severity: moderate    Accompanying signs and symptoms: None    History (predisposing factors):  none    Precipitating or alleviating factors: None    Therapies tried and outcome:  none    Patient continues to complain of persistent cough  Is wet  Notes no fever, chills, shortness of breath, chest pain  Some drainage is noted  Tried stopping Lisinopril and no change in cough        Problem list and histories reviewed & adjusted, as indicated.  Additional history: as documented    Patient Active Problem List   Diagnosis     Essential hypertension with goal blood pressure less than 140/90     Internal carotid artery stenosis, bilateral     Benign essential hypertension     Edema, unspecified type     Type 2 diabetes mellitus without complication (H)     Past Surgical History:   Procedure Laterality Date     LAPAROSCOPIC CHOLECYSTECTOMY N/A 1/6/2017    Procedure: LAPAROSCOPIC CHOLECYSTECTOMY;  Surgeon: Michael Caba MD;  Location:  OR       Social History   Substance Use Topics     Smoking status: Never Smoker     Smokeless tobacco: Never Used     Alcohol use No     Family History   Problem Relation Age of Onset     Family history unknown: Yes         Current Outpatient Prescriptions   Medication Sig Dispense Refill      "azithromycin (ZITHROMAX) 250 MG tablet Two tablets first day, then one tablet daily for four days. 6 tablet 0     losartan (COZAAR) 100 MG tablet Take 1 tablet (100 mg) by mouth daily 30 tablet 1     amLODIPine (NORVASC) 10 MG tablet Take 1 tablet (10 mg) by mouth daily 30 tablet 1     glipiZIDE (GLUCOTROL) 5 MG tablet Take 1 tablet (5 mg) by mouth every morning (before breakfast) 90 tablet 1     atorvastatin (LIPITOR) 80 MG tablet Take 1 tablet (80 mg) by mouth daily 90 tablet 1     blood glucose monitoring (NO BRAND SPECIFIED) test strip Use to test blood sugars 3 times daily or as directed 100 strip 11     blood glucose (NO BRAND SPECIFIED) lancets standard Use to test blood sugar 3 times daily or as directed. 1 Box 11     metFORMIN (GLUCOPHAGE) 1000 MG tablet Take 1 tablet (1,000 mg) by mouth 2 times daily (with meals) 180 tablet 1     hydrochlorothiazide (HYDRODIURIL) 25 MG tablet Take 1 tablet (25 mg) by mouth daily 30 tablet 11     order for DME Equipment being ordered: compression stocking 1 Units 0     order for DME Equipment being ordered: BP monitor and cuff 1 Device 0     order for DME Equipment being ordered: blood pressure cuff 1 each 0     aspirin 81 MG chewable tablet Take 1 tablet (81 mg) by mouth daily 108 tablet 3     [DISCONTINUED] losartan (COZAAR) 50 MG tablet Take 1 tablet (50 mg) by mouth daily 30 tablet 1     No Known Allergies      Reviewed and updated as needed this visit by clinical staff     Reviewed and updated as needed this visit by Provider         ROS:  Constitutional, HEENT, cardiovascular, pulmonary, gi and gu systems are negative, except as otherwise noted.      OBJECTIVE:   /50 (BP Location: Right arm, Patient Position: Chair, Cuff Size: Adult Regular)  Pulse 57  Temp 97.7  F (36.5  C) (Tympanic)  Resp 16  Ht 5' 4\" (1.626 m)  Wt 134 lb 14.4 oz (61.2 kg)  SpO2 100%  BMI 23.16 kg/m2  Body mass index is 23.16 kg/(m^2).  GENERAL: healthy, alert and no distress  EYES: " Eyes grossly normal to inspection, PERRL and conjunctivae and sclerae normal  HENT: ear canals and TM's normal, nose and mouth without ulcers or lesions  NECK: no adenopathy, no asymmetry, masses, or scars and thyroid normal to palpation  RESP: lungs clear to auscultation - no rales, rhonchi or wheezes  CV: regular rate and rhythm, normal S1 S2, no S3 or S4, no murmur, click or rub, no peripheral edema and peripheral pulses strong  MS: no gross musculoskeletal defects noted, no edema    Diagnostic Test Results:  none     ASSESSMENT/PLAN:             1. Benign essential hypertension  Chronic issue, BP still high today, will increase Cozaar.  Plan for BP nurse visit check in 1 month, if high needs to see provider for medication adjustment.  - losartan (COZAAR) 100 MG tablet; Take 1 tablet (100 mg) by mouth daily  Dispense: 30 tablet; Refill: 1    2. Persistent cough  Chronic issue, unclear cause, may be due to sinus issue.  Will cover with abx, and if symptoms persist or do not improve, will send onto Pulmonology.  - azithromycin (ZITHROMAX) 250 MG tablet; Two tablets first day, then one tablet daily for four days.  Dispense: 6 tablet; Refill: 0    3. Special screening for malignant neoplasms, colon  - Fecal colorectal cancer screen FIT; Future    Risks, benefits and alternatives were discussed with patient. Agreeable to the plan of care.      Kathi Mondragon PA-C  [facility]

## 2017-08-01 NOTE — NURSING NOTE
"Chief Complaint   Patient presents with     URI     Hypertension       Initial /50 (BP Location: Right arm, Patient Position: Chair, Cuff Size: Adult Regular)  Pulse 57  Temp 97.7  F (36.5  C) (Tympanic)  Resp 16  Ht 5' 4\" (1.626 m)  Wt 134 lb 14.4 oz (61.2 kg)  SpO2 100%  BMI 23.16 kg/m2 Estimated body mass index is 23.16 kg/(m^2) as calculated from the following:    Height as of this encounter: 5' 4\" (1.626 m).    Weight as of this encounter: 134 lb 14.4 oz (61.2 kg).  Medication Reconciliation: complete   Jessi Dewitt MA       "

## 2017-08-01 NOTE — MR AVS SNAPSHOT
"              After Visit Summary   8/1/2017    Shell Leon    MRN: [id number]           Patient Information     Date Of Birth          1950        Visit Information        Provider Department      8/1/2017 3:50 PM Kathi Mondragon PA-C [facility]        Today's Diagnoses     Benign essential hypertension    -  1    Persistent cough        Special screening for malignant neoplasms, colon          Care Instructions    You can have him take 2 50mg Cozaar until completed, then start 100mg daily.          Follow-ups after your visit        Future tests that were ordered for you today     Open Future Orders        Priority Expected Expires Ordered    Fecal colorectal cancer screen FIT Routine 8/22/2017 10/24/2017 8/1/2017            Who to contact     If you have questions or need follow up information about today's clinic visit or your schedule please contact [facility] directly at [phone].  Normal or non-critical lab and imaging results will be communicated to you by MyChart, letter or phone within 4 business days after the clinic has received the results. If you do not hear from us within 7 days, please contact the clinic through My Fashion Databasehart or phone. If you have a critical or abnormal lab result, we will notify you by phone as soon as possible.  Submit refill requests through [employer] or call your pharmacy and they will forward the refill request to us. Please allow 3 business days for your refill to be completed.          Additional Information About Your Visit        MyChart Information     [employer] lets you send messages to your doctor, view your test results, renew your prescriptions, schedule appointments and more. To sign up, go to [email or web address] . Click on \"Log in\" on the left side of the screen, which will take you to the Welcome page. Then click on \"Sign up Now\" on the right side of the page.     You will be asked to enter the access code listed " "below, as well as some personal information. Please follow the directions to create your username and password.     Your access code is: [email or web address]  Expires: 10/16/2017 10:36 AM     Your access code will  in 90 days. If you need help or a new code, please call your [facility] or [phone].        Care EveryWhere ID     This is your Care EveryWhere ID. This could be used by other organizations to access your [facility] medical records  [id number]        Your Vitals Were     Pulse Temperature Respirations Height Pulse Oximetry BMI (Body Mass Index)    57 97.7  F (36.5  C) (Tympanic) 16 5' 4\" (1.626 m) 100% 23.16 kg/m2       Blood Pressure from Last 3 Encounters:   17 148/50   17 170/60   17 126/54    Weight from Last 3 Encounters:   17 134 lb 14.4 oz (61.2 kg)   17 130 lb 11.2 oz (59.3 kg)   17 130 lb 3 oz (59.1 kg)                 Today's Medication Changes          These changes are accurate as of: 17  4:04 PM.  If you have any questions, ask your nurse or doctor.               Start taking these medicines.        Dose/Directions    azithromycin 250 MG tablet   Commonly known as:  ZITHROMAX   Used for:  Persistent cough   Started by:  Kathi Mondragon PA-C        Two tablets first day, then one tablet daily for four days.   Quantity:  6 tablet   Refills:  0         These medicines have changed or have updated prescriptions.        Dose/Directions    losartan 100 MG tablet   Commonly known as:  COZAAR   This may have changed:    - medication strength  - how much to take   Used for:  Benign essential hypertension   Changed by:  Kathi Mondragon PA-C        Dose:  100 mg   Take 1 tablet (100 mg) by mouth daily   Quantity:  30 tablet   Refills:  1            Where to get your medicines      These medications were sent to [id number] [place] [address] [address]     Phone:  [phone]     " azithromycin 250 MG tablet    losartan 100 MG tablet                Primary Care Provider Office Phone # Fax #    Kathi Lin Mondragon PA-C [phone]       [facility] [address]  [facility] [address]        Equal Access to Services     TERRA FROST AH: Hadii aad ku hadasho Soomaali, waaxda luqadaha, qaybta kaalmada adeegyada, waxay idiin hayaan adeeg khtucker lasadia vela. So [facility] [phone].    ATENCIÓN: Si habla español, tiene a sullivan disposición servicios gratuitos de asistencia lingüística. Llame al [phone].    We comply with applicable federal civil rights laws and [place] laws. We do not discriminate on the basis of race, color, national origin, age, disability sex, sexual orientation or gender identity.            Thank you!     Thank you for choosing [facility]  for your care. Our goal is always to provide you with excellent care. Hearing back from our patients is one way we can continue to improve our services. Please take a few minutes to complete the written survey that you may receive in the mail after your visit with us. Thank you!             Your Updated Medication List - Protect others around you: Learn how to safely use, store and throw away your medicines at www.disposemymeds.org.          This list is accurate as of: 8/1/17  4:04 PM.  Always use your most recent med list.                   Brand Name Dispense Instructions for use Diagnosis    amLODIPine 10 MG tablet    NORVASC    30 tablet    Take 1 tablet (10 mg) by mouth daily    Essential hypertension with goal blood pressure less than 140/90       aspirin 81 MG chewable tablet     108 tablet    Take 1 tablet (81 mg) by mouth daily    Type 2 diabetes mellitus without complication (H)       atorvastatin 80 MG tablet    LIPITOR    90 tablet    Take 1 tablet (80 mg) by mouth daily    Type 2 diabetes mellitus without complication, unspecified long term insulin use status (H)        azithromycin 250 MG tablet    ZITHROMAX    6 tablet    Two tablets first day, then one tablet daily for four days.    Persistent cough       blood glucose lancets standard    no brand specified    1 Box    Use to test blood sugar 3 times daily or as directed.    Type 2 diabetes mellitus with hyperglycemia, without long-term current use of insulin (H)       blood glucose monitoring test strip    no brand specified    100 strip    Use to test blood sugars 3 times daily or as directed    Type 2 diabetes mellitus with hyperglycemia, without long-term current use of insulin (H)       glipiZIDE 5 MG tablet    GLUCOTROL    90 tablet    Take 1 tablet (5 mg) by mouth every morning (before breakfast)    Type 2 diabetes mellitus with hyperglycemia, without long-term current use of insulin (H)       hydrochlorothiazide 25 MG tablet    HYDRODIURIL    30 tablet    Take 1 tablet (25 mg) by mouth daily    Benign essential hypertension       losartan 100 MG tablet    COZAAR    30 tablet    Take 1 tablet (100 mg) by mouth daily    Benign essential hypertension       metFORMIN 1000 MG tablet    GLUCOPHAGE    180 tablet    Take 1 tablet (1,000 mg) by mouth 2 times daily (with meals)    Type 2 diabetes mellitus with hyperglycemia, without long-term current use of insulin (H)       * order for DME     1 each    Equipment being ordered: blood pressure cuff    Benign essential hypertension       * order for DME     1 Device    Equipment being ordered: BP monitor and cuff    Essential hypertension with goal blood pressure less than 140/90       * order for DME     1 Units    Equipment being ordered: compression stocking    Edema, unspecified type       * Notice:  This list has 3 medication(s) that are the same as other medications prescribed for you. Read the directions carefully, and ask your doctor or other care provider to review them with you.

## 2017-08-05 DIAGNOSIS — E11.65 TYPE 2 DIABETES MELLITUS WITH HYPERGLYCEMIA, WITHOUT LONG-TERM CURRENT USE OF INSULIN (H): ICD-10-CM

## 2017-08-07 NOTE — TELEPHONE ENCOUNTER
metFORMIN (GLUCOPHAGE) 1000 MG tablet         Last Written Prescription Date: 1/11/17  Last Fill Quantity: 180, # refills: 1  Last Office Visit with G, P or [facility] prescribing provider:  8/1/17        BP Readings from Last 3 Encounters:   08/01/17 148/50   07/18/17 170/60   01/11/17 126/54     Lab Results   Component Value Date    MICROL 251 01/11/2017     Lab Results   Component Value Date    UMALCR 122.44 01/11/2017     Creatinine   Date Value Ref Range Status   01/03/2017 0.78 0.66 - 1.25 mg/dL Final   ]  GFR Estimate   Date Value Ref Range Status   01/03/2017 >90  Non  GFR Calc   >60 mL/min/1.7m2 Final   11/11/2016 85 >60 mL/min/1.7m2 Final     Comment:     Non  GFR Calc   08/28/2016 89 >60 mL/min/1.7m2 Final     Comment:     Non  GFR Calc     GFR Estimate If Black   Date Value Ref Range Status   01/03/2017 >90   GFR Calc   >60 mL/min/1.7m2 Final   11/11/2016 >90   GFR Calc   >60 mL/min/1.7m2 Final   08/28/2016 >90   GFR Calc   >60 mL/min/1.7m2 Final     Lab Results   Component Value Date    CHOL 158 09/07/2016     Lab Results   Component Value Date    HDL 33 09/07/2016     Lab Results   Component Value Date     09/07/2016     Lab Results   Component Value Date    TRIG 99 09/07/2016     No results found for: CHOLHDLRATIO  Lab Results   Component Value Date    AST 22 01/03/2017     Lab Results   Component Value Date    ALT 30 01/03/2017     Lab Results   Component Value Date    A1C 6.0 04/26/2017    A1C 8.6 01/11/2017    A1C 8.6 09/07/2016     Potassium   Date Value Ref Range Status   01/03/2017 4.4 3.4 - 5.3 mmol/L Final

## 2017-08-10 ENCOUNTER — RADIANT APPOINTMENT (OUTPATIENT)
Dept: GENERAL RADIOLOGY | Facility: CLINIC | Age: 67
End: 2017-08-10
Attending: PHYSICIAN ASSISTANT
Payer: COMMERCIAL

## 2017-08-10 ENCOUNTER — OFFICE VISIT (OUTPATIENT)
Dept: FAMILY MEDICINE | Facility: CLINIC | Age: 67
End: 2017-08-10
Payer: COMMERCIAL

## 2017-08-10 VITALS
OXYGEN SATURATION: 95 % | DIASTOLIC BLOOD PRESSURE: 50 MMHG | WEIGHT: 143 LBS | BODY MASS INDEX: 24.41 KG/M2 | SYSTOLIC BLOOD PRESSURE: 136 MMHG | HEIGHT: 64 IN | TEMPERATURE: 97.9 F | HEART RATE: 64 BPM

## 2017-08-10 DIAGNOSIS — R60.9 EDEMA, UNSPECIFIED TYPE: ICD-10-CM

## 2017-08-10 DIAGNOSIS — R19.00 ABDOMINAL SWELLING: ICD-10-CM

## 2017-08-10 DIAGNOSIS — I10 ESSENTIAL HYPERTENSION WITH GOAL BLOOD PRESSURE LESS THAN 140/90: Primary | ICD-10-CM

## 2017-08-10 PROCEDURE — 80053 COMPREHEN METABOLIC PANEL: CPT | Performed by: PHYSICIAN ASSISTANT

## 2017-08-10 PROCEDURE — 71020 XR CHEST 2 VW: CPT

## 2017-08-10 PROCEDURE — 82043 UR ALBUMIN QUANTITATIVE: CPT | Performed by: PHYSICIAN ASSISTANT

## 2017-08-10 PROCEDURE — 36415 COLL VENOUS BLD VENIPUNCTURE: CPT | Performed by: PHYSICIAN ASSISTANT

## 2017-08-10 PROCEDURE — 83880 ASSAY OF NATRIURETIC PEPTIDE: CPT | Performed by: PHYSICIAN ASSISTANT

## 2017-08-10 PROCEDURE — 99214 OFFICE O/P EST MOD 30 MIN: CPT | Performed by: PHYSICIAN ASSISTANT

## 2017-08-10 RX ORDER — FUROSEMIDE 20 MG
20 TABLET ORAL 2 TIMES DAILY
Qty: 10 TABLET | Refills: 0 | Status: SHIPPED | OUTPATIENT
Start: 2017-08-10 | End: 2017-09-05

## 2017-08-10 NOTE — MR AVS SNAPSHOT
After Visit Summary   8/10/2017    Shell Leon    MRN: [id number]           Patient Information     Date Of Birth          1950        Visit Information        Provider Department      8/10/2017 3:20 PM Jose Miguel Marion PA-C [facility]        Today's Diagnoses     Essential hypertension with goal blood pressure less than 140/90    -  1    Edema, unspecified type        Abdominal swelling          Care Instructions    Please move back to only 5mg of amlodipine. You can break the 10mg tablet in half.    We are also starting 5 days of Lasix - it will help him urinate away some of the fluid that is causing swelling    You can call [phone] to schedule your imaging at [facility].     [facility]: [facility] [phone]   [email or web address]          Follow-ups after your visit        Future tests that were ordered for you today     Open Future Orders        Priority Expected Expires Ordered    US ABDOMEN COMPLETE Routine 11/8/2017 2/6/2018 8/10/2017            Who to contact     If you have questions or need follow up information about today's clinic visit or your schedule please contact [facility] directly at [phone].  Normal or non-critical lab and imaging results will be communicated to you by nWayhart, letter or phone within 4 business days after the clinic has received the results. If you do not hear from us within 7 days, please contact the clinic through nWayhart or phone. If you have a critical or abnormal lab result, we will notify you by phone as soon as possible.  Submit refill requests through [employer] or call your pharmacy and they will forward the refill request to us. Please allow 3 business days for your refill to be completed.          Additional Information About Your Visit        nWayharNiwa Information     [employer] lets you send messages to your  "doctor, view your test results, renew your prescriptions, schedule appointments and more. To sign up, go to [email or web address] . Click on \"Log in\" on the left side of the screen, which will take you to the Welcome page. Then click on \"Sign up Now\" on the right side of the page.     You will be asked to enter the access code listed below, as well as some personal information. Please follow the directions to create your username and password.     Your access code is: [email or web address]  Expires: 10/16/2017 10:36 AM     Your access code will  in 90 days. If you need help or a new code, please call your [place] clinic or [phone].        Care EveryWhere ID     This is your Care EveryWhere ID. This could be used by other organizations to access your [place] medical records  [id number]        Your Vitals Were     Pulse Temperature Height Pulse Oximetry BMI (Body Mass Index)       64 97.9  F (36.6  C) (Oral) 5' 4\" (1.626 m) 95% 24.55 kg/m2        Blood Pressure from Last 3 Encounters:   08/10/17 136/50   17 148/50   17 170/60    Weight from Last 3 Encounters:   08/10/17 143 lb (64.9 kg)   17 134 lb 14.4 oz (61.2 kg)   17 130 lb 11.2 oz (59.3 kg)              We Performed the Following     Albumin Random Urine Quantitative     BNP-N terminal pro     Comprehensive metabolic panel          Today's Medication Changes          These changes are accurate as of: 8/10/17  5:17 PM.  If you have any questions, ask your nurse or doctor.               Start taking these medicines.        Dose/Directions    furosemide 20 MG tablet   Commonly known as:  LASIX   Used for:  Edema, unspecified type   Started by:  Jose Miguel Marion PA-C        Dose:  20 mg   Take 1 tablet (20 mg) by mouth 2 times daily   Quantity:  10 tablet   Refills:  0         Stop taking these medicines if you haven't already. Please contact your care team if you have questions.     amLODIPine 10 MG tablet   Commonly known as:  " NORVASC   Stopped by:  Jose Miguel Marion PA-C                Where to get your medicines      These medications were sent to [id number] [place] - [address] [facility] [address]     Phone:  [phone]     furosemide 20 MG tablet                Primary Care Provider Office Phone # Fax #    Kathi Lin Mondragon PA-C [phone] [address]  [facility] [address]        Equal Access to Services     [facility]: Hadii aad ku hadasho Soomaali, waaxda luqadaha, qaybta kaalmada adeegyada, waxay idiin hayaan adeeg kharash la'aan . So [facility] [phone].    ATENCIÓN: Si habla español, tiene a sullivan disposición servicios gratuitos de asistencia lingüística. Llame al [phone].    We comply with applicable federal civil rights laws and [place] laws. We do not discriminate on the basis of race, color, national origin, age, disability sex, sexual orientation or gender identity.            Thank you!     Thank you for choosing [facility]  for your care. Our goal is always to provide you with excellent care. Hearing back from our patients is one way we can continue to improve our services. Please take a few minutes to complete the written survey that you may receive in the mail after your visit with us. Thank you!             Your Updated Medication List - Protect others around you: Learn how to safely use, store and throw away your medicines at www.disposemymeds.org.          This list is accurate as of: 8/10/17  5:17 PM.  Always use your most recent med list.                   Brand Name Dispense Instructions for use Diagnosis    aspirin 81 MG chewable tablet     108 tablet    Take 1 tablet (81 mg) by mouth daily    Type 2 diabetes mellitus without complication (H)       atorvastatin 80 MG tablet    LIPITOR    90 tablet    Take 1 tablet (80 mg) by mouth daily    Type 2 diabetes mellitus without complication, unspecified long term  insulin use status (H)       blood glucose lancets standard    no brand specified    1 Box    Use to test blood sugar 3 times daily or as directed.    Type 2 diabetes mellitus with hyperglycemia, without long-term current use of insulin (H)       blood glucose monitoring test strip    no brand specified    100 strip    Use to test blood sugars 3 times daily or as directed    Type 2 diabetes mellitus with hyperglycemia, without long-term current use of insulin (H)       furosemide 20 MG tablet    LASIX    10 tablet    Take 1 tablet (20 mg) by mouth 2 times daily    Edema, unspecified type       glipiZIDE 5 MG tablet    GLUCOTROL    90 tablet    Take 1 tablet (5 mg) by mouth every morning (before breakfast)    Type 2 diabetes mellitus with hyperglycemia, without long-term current use of insulin (H)       hydrochlorothiazide 25 MG tablet    HYDRODIURIL    30 tablet    Take 1 tablet (25 mg) by mouth daily    Benign essential hypertension       losartan 100 MG tablet    COZAAR    30 tablet    Take 1 tablet (100 mg) by mouth daily    Benign essential hypertension       metFORMIN 1000 MG tablet    GLUCOPHAGE    60 tablet    TAKE ONE TABLET BY MOUTH TWICE DAILY WITH MEALS    Type 2 diabetes mellitus with hyperglycemia, without long-term current use of insulin (H)       * order for DME     1 each    Equipment being ordered: blood pressure cuff    Benign essential hypertension       * order for DME     1 Device    Equipment being ordered: BP monitor and cuff    Essential hypertension with goal blood pressure less than 140/90       * order for DME     1 Units    Equipment being ordered: compression stocking    Edema, unspecified type       * Notice:  This list has 3 medication(s) that are the same as other medications prescribed for you. Read the directions carefully, and ask your doctor or other care provider to review them with you.

## 2017-08-10 NOTE — NURSING NOTE
"Chief Complaint   Patient presents with     Leg Swelling     Abdominal Swelling       Initial /50 (BP Location: Right arm, Cuff Size: Adult Regular)  Pulse 64  Temp 97.9  F (36.6  C) (Oral)  Ht 5' 4\" (1.626 m)  Wt 143 lb (64.9 kg)  SpO2 95%  BMI 24.55 kg/m2 Estimated body mass index is 24.55 kg/(m^2) as calculated from the following:    Height as of this encounter: 5' 4\" (1.626 m).    Weight as of this encounter: 143 lb (64.9 kg).  Medication Reconciliation: complete   Juan M Carrillo CMA      "

## 2017-08-10 NOTE — PROGRESS NOTES
SUBJECTIVE:                                                    Shell Leon is a 67 year old male who presents to clinic today for the following health issues:    Musculoskeletal problem/pain      Duration: 2 days ago    Description  Location: bilateral legs, abdominal and facial    Intensity:  moderate    Accompanying signs and symptoms: hardness    History  Previous similar problem: only leg  Previous evaluation:  none    Precipitating or alleviating factors:  Trauma or overuse: no   Aggravating factors include: none    Therapies tried and outcome: support socks    -Patient presents with daughter who does some of his translation noting that since last changing his BP medications he has noted increased leg swelling  -Additionally he feels like he has noted some abdominal swelling   -this is improved today, but still feels more full; earlier satiety  -denies increased urination  -he denies any shortness of breath or cough; no increase with recumbent positioning  -there is no chest pain  -he has gained weight over the last week and a half  -he started wearing compression socks yesterday, is helping      Problem list and histories reviewed & adjusted, as indicated.  Additional history: as documented    Patient Active Problem List   Diagnosis     Essential hypertension with goal blood pressure less than 140/90     Internal carotid artery stenosis, bilateral     Benign essential hypertension     Edema, unspecified type     Type 2 diabetes mellitus without complication (H)     Past Surgical History:   Procedure Laterality Date     LAPAROSCOPIC CHOLECYSTECTOMY N/A 1/6/2017    Procedure: LAPAROSCOPIC CHOLECYSTECTOMY;  Surgeon: Michael Caba MD;  Location:  OR       Social History   Substance Use Topics     Smoking status: Never Smoker     Smokeless tobacco: Never Used     Alcohol use No     Family History   Problem Relation Age of Onset     Family history unknown: Yes             Reviewed and updated  "as needed this visit by clinical staff     Reviewed and updated as needed this visit by Provider         ROS:  Constitutional, HEENT, cardiovascular, pulmonary, gi and gu systems are negative, except as otherwise noted.      OBJECTIVE:   /50 (BP Location: Right arm, Cuff Size: Adult Regular)  Pulse 64  Temp 97.9  F (36.6  C) (Oral)  Ht 5' 4\" (1.626 m)  Wt 143 lb (64.9 kg)  SpO2 95%  BMI 24.55 kg/m2  Body mass index is 24.55 kg/(m^2).  GENERAL: healthy, alert and no distress  NECK: no evident increased jvd  RESP: good insp/exp, sats 95%, mild rales bilaterally at the low bases; no wheeze or rhonchi  CV: regular rates and rhythm, no murmur, click or rub and no ectopy; there is 1+ pitting edema in the bilateral low extremities (just after removing compression stockings)  ABDOMEN: soft, nontender, bowel sounds normal; no evident fluid wave. There is some tympany to the belly but not grossly distended   SKIN: no suspicious lesions or rashes  PSYCH: mentation appears normal, affect normal/bright    Diagnostic Test Results:  See A/P  Xray: early CHF/atelectisis; waiting on formal readings    ASSESSMENT/PLAN:   1. Essential hypertension with goal blood pressure less than 140/90  2. Edema, unspecified type  3. Abdominal swelling  -Patient with hx of HTN, DM2 presents with increased peripheral edema and weight gain. His xray does show mild pulmonary congestion but he is not decompensated in clinic. He denies shortness of breath, cough. Sats are at 95%. He has had echo within the past year showing excellent LV eval and right elevated RV pressures. I do not think he needs urgent IV diuresis so we will get labs today, start lasix and have him set up with his cardiologist in the next few days. Certainly if his symptoms change he and his daughter will be seen more urgently. Additionally, I am unsure if the recent increase in amlodipine can be blamed on some of the peripheral edema but we will cut it back regardless " today. Abdominal u/s and labs to r/o any etiology for the abdominal distension if this is not related to cardiac complication. Both patient and daughter are in agreement with the plan. He will continue with compression stockings and elevation of legs until he can be seen.  - Comprehensive metabolic panel  - Albumin Random Urine Quantitative  - XR Chest 2 Views; Future  - BNP-N terminal pro  - furosemide (LASIX) 20 MG tablet; Take 1 tablet (20 mg) by mouth 2 times daily  Dispense: 10 tablet; Refill: 0  - US ABDOMEN COMPLETE; Future    Jose Miguel Marion PA-C  [facility]

## 2017-08-10 NOTE — PATIENT INSTRUCTIONS
Please move back to only 5mg of amlodipine. You can break the 10mg tablet in half.    We are also starting 5 days of Lasix - it will help him urinate away some of the fluid that is causing swelling    You can call [phone] to schedule your imaging at [facility].     [facility]: [facility] [phone]   [email or web address]

## 2017-08-11 LAB
ALBUMIN SERPL-MCNC: 3.6 G/DL (ref 3.4–5)
ALP SERPL-CCNC: 88 U/L (ref 40–150)
ALT SERPL W P-5'-P-CCNC: 42 U/L (ref 0–70)
ANION GAP SERPL CALCULATED.3IONS-SCNC: 10 MMOL/L (ref 3–14)
AST SERPL W P-5'-P-CCNC: 40 U/L (ref 0–45)
BILIRUB SERPL-MCNC: 0.3 MG/DL (ref 0.2–1.3)
BUN SERPL-MCNC: 26 MG/DL (ref 7–30)
CALCIUM SERPL-MCNC: 9.1 MG/DL (ref 8.5–10.1)
CHLORIDE SERPL-SCNC: 98 MMOL/L (ref 94–109)
CO2 SERPL-SCNC: 23 MMOL/L (ref 20–32)
CREAT SERPL-MCNC: 0.82 MG/DL (ref 0.66–1.25)
CREAT UR-MCNC: 42 MG/DL
GFR SERPL CREATININE-BSD FRML MDRD: ABNORMAL ML/MIN/1.7M2
GLUCOSE SERPL-MCNC: 133 MG/DL (ref 70–99)
MICROALBUMIN UR-MCNC: 190 MG/L
MICROALBUMIN/CREAT UR: 450.24 MG/G CR (ref 0–17)
NT-PROBNP SERPL-MCNC: 861 PG/ML (ref 0–125)
POTASSIUM SERPL-SCNC: 5 MMOL/L (ref 3.4–5.3)
PROT SERPL-MCNC: 6.9 G/DL (ref 6.8–8.8)
SODIUM SERPL-SCNC: 131 MMOL/L (ref 133–144)

## 2017-08-17 ENCOUNTER — OFFICE VISIT (OUTPATIENT)
Dept: CARDIOLOGY | Facility: CLINIC | Age: 67
End: 2017-08-17
Payer: COMMERCIAL

## 2017-08-17 VITALS
SYSTOLIC BLOOD PRESSURE: 142 MMHG | HEART RATE: 63 BPM | HEIGHT: 64 IN | DIASTOLIC BLOOD PRESSURE: 58 MMHG | WEIGHT: 140 LBS | BODY MASS INDEX: 23.9 KG/M2 | OXYGEN SATURATION: 99 %

## 2017-08-17 DIAGNOSIS — I73.9 PAD (PERIPHERAL ARTERY DISEASE) (H): ICD-10-CM

## 2017-08-17 DIAGNOSIS — I10 BENIGN ESSENTIAL HYPERTENSION: ICD-10-CM

## 2017-08-17 DIAGNOSIS — E11.9 TYPE 2 DIABETES MELLITUS WITHOUT COMPLICATION, UNSPECIFIED LONG TERM INSULIN USE STATUS: Primary | ICD-10-CM

## 2017-08-17 PROCEDURE — 99214 OFFICE O/P EST MOD 30 MIN: CPT | Performed by: INTERNAL MEDICINE

## 2017-08-17 NOTE — LETTER
8/17/2017    Kathi Mondragon PA-C  [address] [place] [address]    RE: Shell Leon       Dear Colleague,    I had the pleasure of seeing Shell Leon in the [facility].    Mr. Leon was initially seen by my colleague, Dr. Vasquez, in 10/2016 for hypertension, headache and peripheral vascular disease.  An echocardiogram showed a normal ejection fraction with no significant valvular stenosis or insufficiency.  There were no regional wall motion abnormalities.  An MRA showed mild atherosclerosis with no significant focal narrowing.  Aortic duplex ultrasound showed atherosclerotic change without significant aneurysm.  Exercise ABIs were markedly abnormal.  Consultation was then arranged with Dr. Landry who recommended continued medical therapy for the patient's asymptomatic peripheral vascular disease.      Recently, the patient's amlodipine was increased from 5 to 10 mg a day.  Shortly thereafter, the patient developed leg and abdominal swelling and weight gain of about 9 pounds.  The patient was placed on furosemide and cardiology consultation was requested.  The amlodipine was cut back to 5 mg a day.  The patient has now been off furosemide for several days.  His weight has been stable and he has no new complaints.  Close questioning reveals no history of chest pain, focal neurologic deficit, worsening claudication or orthopnea.      PAST MEDICAL HISTORY:   1.  Atherosclerotic peripheral vascular disease:   a.  Carotid and aortic ultrasound showing atherosclerotic change without focal narrowing.   b.  Abnormal ABIs with exercise.  Seen in consultation by Dr. Landry and medical therapy recommended given no claudication.   2.  Hypertension.   3.  Diabetes mellitus.      PHYSICAL EXAMINATION:   GENERAL:  Exam today demonstrates a very pleasant, cooperative 67-year-old man.   VITAL SIGNS:  His blood pressure is 140/58, heart rate is 63.  His height is  1.6 meters, his weight 63 kg.  His BMI is 24.0.  His weight is 140 pounds.   EXTREMITIES:  He has no pedal edema.   ABDOMEN:  Negative.      LABORATORY STUDIES:  A recent BMP showed a creatinine of 0.82.     Outpatient Encounter Prescriptions as of 8/17/2017   Medication Sig Dispense Refill     furosemide (LASIX) 20 MG tablet Take 1 tablet (20 mg) by mouth 2 times daily 10 tablet 0     metFORMIN (GLUCOPHAGE) 1000 MG tablet TAKE ONE TABLET BY MOUTH TWICE DAILY WITH MEALS 60 tablet 0     losartan (COZAAR) 100 MG tablet Take 1 tablet (100 mg) by mouth daily 30 tablet 1     glipiZIDE (GLUCOTROL) 5 MG tablet Take 1 tablet (5 mg) by mouth every morning (before breakfast) 90 tablet 1     atorvastatin (LIPITOR) 80 MG tablet Take 1 tablet (80 mg) by mouth daily 90 tablet 1     blood glucose monitoring (NO BRAND SPECIFIED) test strip Use to test blood sugars 3 times daily or as directed 100 strip 11     blood glucose (NO BRAND SPECIFIED) lancets standard Use to test blood sugar 3 times daily or as directed. 1 Box 11     hydrochlorothiazide (HYDRODIURIL) 25 MG tablet Take 1 tablet (25 mg) by mouth daily 30 tablet 11     aspirin 81 MG chewable tablet Take 1 tablet (81 mg) by mouth daily 108 tablet 3     [DISCONTINUED] order for DME Equipment being ordered: compression stocking 1 Units 0     [DISCONTINUED] order for DME Equipment being ordered: BP monitor and cuff 1 Device 0     [DISCONTINUED] order for DME Equipment being ordered: blood pressure cuff 1 each 0     No facility-administered encounter medications on file as of 8/17/2017.       ASSESSMENT:  Mr. Leon had transient leg swelling and abdominal swelling clearly related to amlodipine.  The patient's swelling is resolved entirely after furosemide.  His blood pressure is adequately controlled on the current dose of medications.  Given the fact he had a negative echocardiogram only about 9 months ago,  I do not feel any further cardiac testing is needed.  I suspect the  swelling was entirely related to amlodipine.      RECOMMENDATIONS:   1.  Continue present medical therapy.   2.  The patient will contact Dr. Vasquez' nurse should his weight increase or should he develop swelling or dyspnea.      We have appreciated the opportunity to see your patient, Mr. Leon.     Sincerely,    Osbaldo Lock MD

## 2017-08-17 NOTE — MR AVS SNAPSHOT
"              After Visit Summary   8/17/2017    Shell Leon    MRN: [id number]           Patient Information     Date Of Birth          1950        Visit Information        Provider Department      8/17/2017 4:15 PM Osbaldo Lock MD [facility] [place]        Today's Diagnoses     Type 2 diabetes mellitus without complication, unspecified long term insulin use status (H)    -  1    Benign essential hypertension        PAD (peripheral artery disease) (H)           Follow-ups after your visit        Who to contact     If you have questions or need follow up information about today's clinic visit or your schedule please contact [facility] directly at [phone].  Normal or non-critical lab and imaging results will be communicated to you by MyChart, letter or phone within 4 business days after the clinic has received the results. If you do not hear from us within 7 days, please contact the clinic through MyChart or phone. If you have a critical or abnormal lab result, we will notify you by phone as soon as possible.  Submit refill requests through [employer] or call your pharmacy and they will forward the refill request to us. Please allow 3 business days for your refill to be completed.          Additional Information About Your Visit        MyChart Information     [employer] lets you send messages to your doctor, view your test results, renew your prescriptions, schedule appointments and more. To sign up, go to [email or web address] . Click on \"Log in\" on the left side of the screen, which will take you to the Welcome page. Then click on \"Sign up Now\" on the right side of the page.     You will be asked to enter the access code listed below, as well as some personal information. Please follow the directions to create your username and password.     Your access code is: [email or web address]  Expires: 10/16/2017 10:36 AM     Your " "access code will  in 90 days. If you need help or a new code, please call your [facility] clinic or [phone].        Care EveryWhere ID     This is your Care EveryWhere ID. This could be used by other organizations to access your [facility] medical records  [id number]        Your Vitals Were     Pulse Height Pulse Oximetry BMI (Body Mass Index)          63 1.626 m (5' 4\") 99% 24.03 kg/m2         Blood Pressure from Last 3 Encounters:   17 142/58   08/10/17 136/50   17 148/50    Weight from Last 3 Encounters:   17 63.5 kg (140 lb)   08/10/17 64.9 kg (143 lb)   17 61.2 kg (134 lb 14.4 oz)              Today, you had the following     No orders found for display       Primary Care Provider Office Phone # Fax #    Kathi Mondragon PA-C [phone]       [address] [place] [address] [address]        Equal Access to Services     [facility]: Hadii aad ku hadasho Soomaali, waaxda luqadaha, qaybta kaalmada adeegyada, waxay idiin haybell power . So [facility] [phone].    ATENCIÓN: Si habla español, tiene a sullivan disposición servicios gratuitos de asistencia lingüística. Llame al [phone].    We comply with applicable federal civil rights laws and [place] laws. We do not discriminate on the basis of race, color, national origin, age, disability sex, sexual orientation or gender identity.            Thank you!     Thank you for choosing [facility] [place]  for your care. Our goal is always to provide you with excellent care. Hearing back from our patients is one way we can continue to improve our services. Please take a few minutes to complete the written survey that you may receive in the mail after your visit with us. Thank you!             Your Updated Medication List - Protect others around you: Learn how to safely use, store and throw away your medicines at www.disposemymeds.org.          This list is accurate as of: " 8/17/17  4:47 PM.  Always use your most recent med list.                   Brand Name Dispense Instructions for use Diagnosis    aspirin 81 MG chewable tablet     108 tablet    Take 1 tablet (81 mg) by mouth daily    Type 2 diabetes mellitus without complication (H)       atorvastatin 80 MG tablet    LIPITOR    90 tablet    Take 1 tablet (80 mg) by mouth daily    Type 2 diabetes mellitus without complication, unspecified long term insulin use status (H)       blood glucose lancets standard    no brand specified    1 Box    Use to test blood sugar 3 times daily or as directed.    Type 2 diabetes mellitus with hyperglycemia, without long-term current use of insulin (H)       blood glucose monitoring test strip    no brand specified    100 strip    Use to test blood sugars 3 times daily or as directed    Type 2 diabetes mellitus with hyperglycemia, without long-term current use of insulin (H)       furosemide 20 MG tablet    LASIX    10 tablet    Take 1 tablet (20 mg) by mouth 2 times daily    Edema, unspecified type       glipiZIDE 5 MG tablet    GLUCOTROL    90 tablet    Take 1 tablet (5 mg) by mouth every morning (before breakfast)    Type 2 diabetes mellitus with hyperglycemia, without long-term current use of insulin (H)       hydrochlorothiazide 25 MG tablet    HYDRODIURIL    30 tablet    Take 1 tablet (25 mg) by mouth daily    Benign essential hypertension       losartan 100 MG tablet    COZAAR    30 tablet    Take 1 tablet (100 mg) by mouth daily    Benign essential hypertension       metFORMIN 1000 MG tablet    GLUCOPHAGE    60 tablet    TAKE ONE TABLET BY MOUTH TWICE DAILY WITH MEALS    Type 2 diabetes mellitus with hyperglycemia, without long-term current use of insulin (H)

## 2017-08-17 NOTE — PROGRESS NOTES
HISTORY OF PRESENT ILLNESS:  Developed fluid retention on amlodipine. Dose cut. Furosemide given. Swelling resolved no recurrence on lower dose amlodipine. TTE normal   EF 2016.    Orders this Visit:  No orders of the defined types were placed in this encounter.    No orders of the defined types were placed in this encounter.    Medications Discontinued During This Encounter   Medication Reason     order for DME Medication Reconciliation Clean Up     order for DME Medication Reconciliation Clean Up     order for DME Medication Reconciliation Clean Up       Encounter Diagnoses   Name Primary?     Type 2 diabetes mellitus without complication, unspecified long term insulin use status (H) Yes     Benign essential hypertension      PAD (peripheral artery disease) (H)        CURRENT MEDICATIONS:  Current Outpatient Prescriptions   Medication Sig Dispense Refill     furosemide (LASIX) 20 MG tablet Take 1 tablet (20 mg) by mouth 2 times daily 10 tablet 0     metFORMIN (GLUCOPHAGE) 1000 MG tablet TAKE ONE TABLET BY MOUTH TWICE DAILY WITH MEALS 60 tablet 0     losartan (COZAAR) 100 MG tablet Take 1 tablet (100 mg) by mouth daily 30 tablet 1     glipiZIDE (GLUCOTROL) 5 MG tablet Take 1 tablet (5 mg) by mouth every morning (before breakfast) 90 tablet 1     atorvastatin (LIPITOR) 80 MG tablet Take 1 tablet (80 mg) by mouth daily 90 tablet 1     blood glucose monitoring (NO BRAND SPECIFIED) test strip Use to test blood sugars 3 times daily or as directed 100 strip 11     blood glucose (NO BRAND SPECIFIED) lancets standard Use to test blood sugar 3 times daily or as directed. 1 Box 11     hydrochlorothiazide (HYDRODIURIL) 25 MG tablet Take 1 tablet (25 mg) by mouth daily 30 tablet 11     aspirin 81 MG chewable tablet Take 1 tablet (81 mg) by mouth daily 108 tablet 3       ALLERGIES   No Known Allergies    PAST MEDICAL, SURGICAL, FAMILY, SOCIAL HISTORY:  History was reviewed and updated as needed, see medical record.    Review of  "Systems:  A 12-point review of systems was completed, see medical record for detailed review of systems information.    Physical Exam:  Vitals: /58 (BP Location: Right arm, Patient Position: Sitting, Cuff Size: Adult Regular)  Pulse 63  Ht 1.626 m (5' 4\")  Wt 63.5 kg (140 lb)  SpO2 99%  BMI 24.03 kg/m2    Constitutional:  cooperative, alert and oriented, well developed, well nourished, in no acute distress thin      Skin:  warm and dry to the touch, no apparent skin lesions or masses noted        Head:  normocephalic, no masses or lesions        Eyes:  pupils equal and round, conjunctivae and lids unremarkable, sclera white, no xanthalasma, EOMS intact, no nystagmus        ENT:  no pallor or cyanosis, dentition good        Neck:  carotid pulses are full and equal bilaterally, JVP normal, no carotid bruit, no thyromegaly        Chest:  normal breath sounds, clear to auscultation, normal A-P diameter, normal symmetry, normal respiratory excursion, no use of accessory muscles        Cardiac: regular rhythm, normal S1/S2, no S3 or S4, apical impulse not displaced, no murmurs, gallops or rubs                  Abdomen:  abdomen soft, non-tender, BS normoactive, no mass, no HSM, no bruits        Vascular: pulses full and equal, no bruits auscultated                                      Extremities and Back:  no deformities, clubbing, cyanosis, erythema observed        Neurological:  affect appropriate, oriented to time, person and place        ASSESSMENT: fluid retention due to amlodipine now resolved.       RECOMMENDATIONS: stay on present meds. Notify if recurrent weight gain, swelling dyspnea. Fu prn      Recent Lab Results:  LIPID RESULTS:  Lab Results   Component Value Date    CHOL 158 09/07/2016    HDL 33 (L) 09/07/2016     (H) 09/07/2016    TRIG 99 09/07/2016       LIVER ENZYME RESULTS:  Lab Results   Component Value Date    AST 40 08/10/2017    ALT 42 08/10/2017       CBC RESULTS:  Lab Results "   Component Value Date    WBC 7.2 01/03/2017    RBC 4.56 01/03/2017    HGB 13.8 01/03/2017    HCT 39.9 (L) 01/03/2017    MCV 88 01/03/2017    MCH 30.3 01/03/2017    MCHC 34.6 01/03/2017    RDW 13.4 01/03/2017     01/03/2017       BMP RESULTS:  Lab Results   Component Value Date     (L) 08/10/2017    POTASSIUM 5.0 08/10/2017    CHLORIDE 98 08/10/2017    CO2 23 08/10/2017    ANIONGAP 10 08/10/2017     (H) 08/10/2017    BUN 26 08/10/2017    CR 0.82 08/10/2017    GFRESTIMATED >90  Non  GFR Calc   08/10/2017    GFRESTBLACK >90   GFR Calc   08/10/2017    STEVE 9.1 08/10/2017        A1C RESULTS:  Lab Results   Component Value Date    A1C 6.0 04/26/2017       INR RESULTS:  No results found for: INR    We greatly appreciate the opportunity to be involved in the care of your patient, Shell Leon.    Sincerely,  Osbaldo Lock MD      CC  Kathi Mondragon, PA-C  [address] [address] [id number]

## 2017-08-18 NOTE — PROGRESS NOTES
HISTORY OF PRESENT ILLNESS:  Mr. Leon was initially seen by my colleague, Dr. Vasquez, in 10/2016 for hypertension, headache and peripheral vascular disease.  An echocardiogram showed a normal ejection fraction with no significant valvular stenosis or insufficiency.  There were no regional wall motion abnormalities.  An MRA showed mild atherosclerosis with no significant focal narrowing.  Aortic duplex ultrasound showed atherosclerotic change without significant aneurysm.  Exercise ABIs were markedly abnormal.  Consultation was then arranged with Dr. Landry who recommended continued medical therapy for the patient's asymptomatic peripheral vascular disease.      Recently, the patient's amlodipine was increased from 5 to 10 mg a day.  Shortly thereafter, the patient developed leg and abdominal swelling and weight gain of about 9 pounds.  The patient was placed on furosemide and cardiology consultation was requested.  The amlodipine was cut back to 5 mg a day.  The patient has now been off furosemide for several days.  His weight has been stable and he has no new complaints.  Close questioning reveals no history of chest pain, focal neurologic deficit, worsening claudication or orthopnea.      PAST MEDICAL HISTORY:   1.  Atherosclerotic peripheral vascular disease:   a.  Carotid and aortic ultrasound showing atherosclerotic change without focal narrowing.   b.  Abnormal ABIs with exercise.  Seen in consultation by Dr. Landry and medical therapy recommended given no claudication.   2.  Hypertension.   3.  Diabetes mellitus.      PHYSICAL EXAMINATION:   GENERAL:  Exam today demonstrates a very pleasant, cooperative 67-year-old man.   VITAL SIGNS:  His blood pressure is 140/58, heart rate is 63.  His height is 1.6 meters, his weight 63 kg.  His BMI is 24.0.  His weight is 140 pounds.   EXTREMITIES:  He has no pedal edema.   ABDOMEN:  Negative.      LABORATORY STUDIES:  A recent BMP showed a creatinine of 0.82.       ASSESSMENT:  Mr. Leon had transient leg swelling and abdominal swelling clearly related to amlodipine.  The patient's swelling is resolved entirely after furosemide.  His blood pressure is adequately controlled on the current dose of medications.  Given the fact he had a negative echocardiogram only about 9 months ago,  I do not feel any further cardiac testing is needed.  I suspect the swelling was entirely related to amlodipine.      RECOMMENDATIONS:   1.  Continue present medical therapy.   2.  The patient will contact Dr. Vasquez' nurse should his weight increase or should he develop swelling or dyspnea.      We have appreciated the opportunity to see your patient, Mr. Leon.      cc:   Kathi Mondragon PA-C   [facility]   [address] [place]  [address]         GAIL SEXTON MD             D: 2017 16:54   T: 2017 08:45   MT: chantel      Name:     NARESH LEON   MRN:      [id number]        Account:      [id number]   :      1950           Service Date: 2017      Document: [id number]

## 2017-08-29 NOTE — PROGRESS NOTES
Panel Management Review      Patient has the following on his problem list:     Hypertension   Last three blood pressure readings:  BP Readings from Last 3 Encounters:   08/17/17 142/58   08/10/17 136/50   08/01/17 148/50     Blood pressure: MONITOR    HTN Guidelines:  Age 18-59 BP range:  Less than 140/90  Age 60-85 with Diabetes:  Less than 140/90  Age 60-85 without Diabetes:  less than 150/90        Composite cancer screening  Chart review shows that this patient is due/due soon for the following Colonoscopy  Summary:    Patient is due/failing the following:   BP CHECK    Action needed:   Patient needs nurse only appointment.    Type of outreach:    Phone, spoke to patient.  transferred to scheduling    Questions for provider review:    None                                                                                                                                    Gayatri Whitehead MA

## 2017-08-30 ENCOUNTER — ALLIED HEALTH/NURSE VISIT (OUTPATIENT)
Dept: NURSING | Facility: CLINIC | Age: 67
End: 2017-08-30
Payer: COMMERCIAL

## 2017-08-30 VITALS — SYSTOLIC BLOOD PRESSURE: 148 MMHG | HEART RATE: 53 BPM | DIASTOLIC BLOOD PRESSURE: 47 MMHG | OXYGEN SATURATION: 100 %

## 2017-08-30 DIAGNOSIS — I10 BENIGN ESSENTIAL HYPERTENSION: Primary | ICD-10-CM

## 2017-08-30 PROCEDURE — 99207 ZZC NO CHARGE NURSE ONLY: CPT

## 2017-08-30 NOTE — MR AVS SNAPSHOT
"              After Visit Summary   8/30/2017    Shell Leon    MRN: [id number]           Patient Information     Date Of Birth          1950        Visit Information        Provider Department      8/30/2017 8:30 AM  NURSE [facility]        Today's Diagnoses     Benign essential hypertension    -  1       Follow-ups after your visit        Your next 10 appointments already scheduled     Sep 01, 2017  8:30 AM CDT   Office Visit with Kathi Mondragon PA-C   [facility] ([facility]    [address] [facility] [address] [phone]           Bring a current list of meds and any records pertaining to this visit. For Physicals, please bring immunization records and any forms needing to be filled out. Please arrive 10 minutes early to complete paperwork.              Who to contact     If you have questions or need follow up information about today's clinic visit or your schedule please contact [facility] directly at [phone].  Normal or non-critical lab and imaging results will be communicated to you by Plasticellhart, letter or phone within 4 business days after the clinic has received the results. If you do not hear from us within 7 days, please contact the clinic through appssavvyt or phone. If you have a critical or abnormal lab result, we will notify you by phone as soon as possible.  Submit refill requests through iDoc24 or call your pharmacy and they will forward the refill request to us. Please allow 3 business days for your refill to be completed.          Additional Information About Your Visit        [place] Information     [email or web address] lets you send messages to your doctor, view your test results, renew your prescriptions, schedule appointments and more. To sign up, go to [email or web address] . Click on \"Log in\" on the left side of the screen, which will take you to the Welcome page. Then click on \"Sign up Now\" on " the right side of the page.     You will be asked to enter the access code listed below, as well as some personal information. Please follow the directions to create your username and password.     Your access code is: [email or web address]  Expires: 10/16/2017 10:36 AM     Your access code will  in 90 days. If you need help or a new code, please call your [facility] clinic or [phone].        Care EveryWhere ID     This is your Care EveryWhere ID. This could be used by other organizations to access your [facility] medical records  [id number]        Your Vitals Were     Pulse Pulse Oximetry                53 100%           Blood Pressure from Last 3 Encounters:   17 148/47   17 142/58   08/10/17 136/50    Weight from Last 3 Encounters:   17 140 lb (63.5 kg)   08/10/17 143 lb (64.9 kg)   17 134 lb 14.4 oz (61.2 kg)              Today, you had the following     No orders found for display       Primary Care Provider Office Phone # Fax #    Kathi Mondragon PA-C [phone]       [address] [facility] [address]        Equal Access to Services     TERRA FROST : Hadii aad ku hadasho Soomaali, waaxda luqadaha, qaybta kaalmada adeegyada, waxay idiin haychristellen adeeg khtucker lasadia . So [facility] [phone].    ATENCIÓN: Si habla español, tiene a sullivan disposición servicios gratuitos de asistencia lingüística. Llame al [phone].    We comply with applicable federal civil rights laws and [place] laws. We do not discriminate on the basis of race, color, national origin, age, disability sex, sexual orientation or gender identity.            Thank you!     Thank you for choosing [facility]  for your care. Our goal is always to provide you with excellent care. Hearing back from our patients is one way we can continue to improve our services. Please take a few minutes to complete the written survey that you may receive in the mail after your visit with us. Thank  you!             Your Updated Medication List - Protect others around you: Learn how to safely use, store and throw away your medicines at www.disposemymeds.org.          This list is accurate as of: 8/30/17 11:59 PM.  Always use your most recent med list.                   Brand Name Dispense Instructions for use Diagnosis    aspirin 81 MG chewable tablet     108 tablet    Take 1 tablet (81 mg) by mouth daily    Type 2 diabetes mellitus without complication (H)       atorvastatin 80 MG tablet    LIPITOR    90 tablet    Take 1 tablet (80 mg) by mouth daily    Type 2 diabetes mellitus without complication, unspecified long term insulin use status (H)       blood glucose lancets standard    no brand specified    1 Box    Use to test blood sugar 3 times daily or as directed.    Type 2 diabetes mellitus with hyperglycemia, without long-term current use of insulin (H)       blood glucose monitoring test strip    no brand specified    100 strip    Use to test blood sugars 3 times daily or as directed    Type 2 diabetes mellitus with hyperglycemia, without long-term current use of insulin (H)       furosemide 20 MG tablet    LASIX    10 tablet    Take 1 tablet (20 mg) by mouth 2 times daily    Edema, unspecified type       glipiZIDE 5 MG tablet    GLUCOTROL    90 tablet    Take 1 tablet (5 mg) by mouth every morning (before breakfast)    Type 2 diabetes mellitus with hyperglycemia, without long-term current use of insulin (H)       hydrochlorothiazide 25 MG tablet    HYDRODIURIL    30 tablet    Take 1 tablet (25 mg) by mouth daily    Benign essential hypertension       losartan 100 MG tablet    COZAAR    30 tablet    Take 1 tablet (100 mg) by mouth daily    Benign essential hypertension       metFORMIN 1000 MG tablet    GLUCOPHAGE    60 tablet    TAKE ONE TABLET BY MOUTH TWICE DAILY WITH MEALS    Type 2 diabetes mellitus with hyperglycemia, without long-term current use of insulin (H)

## 2017-09-05 ENCOUNTER — OFFICE VISIT (OUTPATIENT)
Dept: FAMILY MEDICINE | Facility: CLINIC | Age: 67
End: 2017-09-05
Payer: COMMERCIAL

## 2017-09-05 VITALS
HEART RATE: 60 BPM | DIASTOLIC BLOOD PRESSURE: 50 MMHG | BODY MASS INDEX: 22.42 KG/M2 | SYSTOLIC BLOOD PRESSURE: 168 MMHG | TEMPERATURE: 97.8 F | WEIGHT: 130.6 LBS

## 2017-09-05 DIAGNOSIS — I10 BENIGN ESSENTIAL HYPERTENSION: Primary | ICD-10-CM

## 2017-09-05 DIAGNOSIS — Z23 NEED FOR PROPHYLACTIC VACCINATION AND INOCULATION AGAINST INFLUENZA: ICD-10-CM

## 2017-09-05 PROCEDURE — 99213 OFFICE O/P EST LOW 20 MIN: CPT | Performed by: PHYSICIAN ASSISTANT

## 2017-09-05 RX ORDER — AMLODIPINE BESYLATE 5 MG/1
5 TABLET ORAL
Refills: 11 | COMMUNITY
Start: 2017-07-07 | End: 2017-10-04

## 2017-09-05 RX ORDER — AMLODIPINE BESYLATE 2.5 MG/1
2.5 TABLET ORAL
Refills: 3 | COMMUNITY
Start: 2016-11-17 | End: 2017-09-05

## 2017-09-05 RX ORDER — LISINOPRIL 40 MG/1
40 TABLET ORAL
Refills: 11 | COMMUNITY
Start: 2017-06-28 | End: 2017-09-05

## 2017-09-05 RX ORDER — ATORVASTATIN CALCIUM 40 MG/1
80 TABLET, FILM COATED ORAL
Refills: 3 | COMMUNITY
Start: 2016-09-07 | End: 2017-09-08

## 2017-09-05 RX ORDER — AMLODIPINE BESYLATE 10 MG/1
10 TABLET ORAL
Refills: 1 | COMMUNITY
Start: 2017-07-20 | End: 2017-09-05

## 2017-09-05 RX ORDER — SPIRONOLACTONE 25 MG/1
12.5 TABLET ORAL 2 TIMES DAILY
Qty: 30 TABLET | Refills: 0 | Status: SHIPPED | OUTPATIENT
Start: 2017-09-05 | End: 2017-09-05

## 2017-09-05 NOTE — MR AVS SNAPSHOT
After Visit Summary   9/5/2017    Shell Leon    MRN: [id number]           Patient Information     Date Of Birth          1950        Visit Information        Provider Department      9/5/2017 3:10 PM Kathi Mondragon PA-C [facility]        Today's Diagnoses     Benign essential hypertension    -  1    Need for prophylactic vaccination and inoculation against influenza           Follow-ups after your visit        Additional Services     CARDIOLOGY EVAL ADULT REFERRAL       Your provider has referred you to:  [facility]: [facility] [phone]   [email or web address]    Please be aware that coverage of these services is subject to the terms and limitations of your health insurance plan.  Call member services at your health plan with any benefit or coverage questions.      Type of Referral:  Cardiology Follow Up    Timeframe requested:  Less than 1 week    Please bring the following to your appointment:  >>   Any x-rays, CTs or MRIs which have been performed.  Contact the facility where they were done to arrange for  prior to your scheduled appointment.    >>   List of current medications  >>   This referral request   >>   Any documents/labs given to you for this referral                  Follow-up notes from your care team     Return in about 2 weeks (around 9/19/2017).      Who to contact     If you have questions or need follow up information about today's clinic visit or your schedule please contact [facility] directly at [phone].  Normal or non-critical lab and imaging results will be communicated to you by MyChart, letter or phone within 4 business days after the clinic has received the results. If you do not hear from us within 7 days, please contact the clinic through MyChart or phone. If you have a critical or abnormal lab result, we will notify  "you by phone as soon as possible.  Submit refill requests through Axiom or call your pharmacy and they will forward the refill request to us. Please allow 3 business days for your refill to be completed.          Additional Information About Your Visit        Fin QuiverharClinical Insight Information     Axiom lets you send messages to your doctor, view your test results, renew your prescriptions, schedule appointments and more. To sign up, go to [email or web address] . Click on \"Log in\" on the left side of the screen, which will take you to the Welcome page. Then click on \"Sign up Now\" on the right side of the page.     You will be asked to enter the access code listed below, as well as some personal information. Please follow the directions to create your username and password.     Your access code is: [email or web address]  Expires: 10/16/2017 10:36 AM     Your access code will  in 90 days. If you need help or a new code, please call your [facility] clinic or [phone].        Care EveryWhere ID     This is your Care EveryWhere ID. This could be used by other organizations to access your [facility] medical records  [id number]        Your Vitals Were     Pulse Temperature BMI (Body Mass Index)             60 97.8  F (36.6  C) (Oral) 22.42 kg/m2          Blood Pressure from Last 3 Encounters:   17 168/50   17 148/47   17 142/58    Weight from Last 3 Encounters:   17 130 lb 9.6 oz (59.2 kg)   17 140 lb (63.5 kg)   08/10/17 143 lb (64.9 kg)              We Performed the Following     ADMIN INFLUENZA (For MEDICARE Patients ONLY) []     CARDIOLOGY EVAL ADULT REFERRAL     FLU VACCINE, INCREASED ANTIGEN, PRESV FREE, AGE 65+ [18662]          Today's Medication Changes          These changes are accurate as of: 17  3:50 PM.  If you have any questions, ask your nurse or doctor.               These medicines have changed or have updated prescriptions.        Dose/Directions    amLODIPine 5 MG tablet "   Commonly known as:  NORVASC   This may have changed:  Another medication with the same name was removed. Continue taking this medication, and follow the directions you see here.   Changed by:  Kathi Mondragon PA-C        Dose:  5 mg   5 mg   Refills:  11         Stop taking these medicines if you haven't already. Please contact your care team if you have questions.     furosemide 20 MG tablet   Commonly known as:  LASIX   Stopped by:  Kathi Mondragon PA-C           lisinopril 40 MG tablet   Commonly known as:  PRINIVIL/ZESTRIL   Stopped by:  Kathi Mondragon PA-C                    Primary Care Provider Office Phone # Fax #    Kathi Mondragon PA-C [phone] [address] [facility] [address]        Equal Access to Services     [facility] : Hadii lianet shaw hadasho Soomaali, waaxda luqadaha, qaybta kaalmada adeegyada, zachary power . So [facility] [phone].    ATENCIÓN: Si habla español, tiene a sullivan disposición servicios gratuitos de asistencia lingüística. July al [phone].    We comply with applicable federal civil rights laws and [place] laws. We do not discriminate on the basis of race, color, national origin, age, disability sex, sexual orientation or gender identity.            Thank you!     Thank you for choosing [facility]  for your care. Our goal is always to provide you with excellent care. Hearing back from our patients is one way we can continue to improve our services. Please take a few minutes to complete the written survey that you may receive in the mail after your visit with us. Thank you!             Your Updated Medication List - Protect others around you: Learn how to safely use, store and throw away your medicines at www.disposemymeds.org.          This list is accurate as of: 9/5/17  3:50 PM.  Always use your most recent med list.                   Brand Name Dispense Instructions for  use Diagnosis    amLODIPine 5 MG tablet    NORVASC     5 mg        aspirin 81 MG chewable tablet     108 tablet    Take 1 tablet (81 mg) by mouth daily    Type 2 diabetes mellitus without complication (H)       * atorvastatin 40 MG tablet    LIPITOR     40 mg        * atorvastatin 80 MG tablet    LIPITOR    90 tablet    Take 1 tablet (80 mg) by mouth daily    Type 2 diabetes mellitus without complication, unspecified long term insulin use status (H)       blood glucose lancets standard    no brand specified    1 Box    Use to test blood sugar 3 times daily or as directed.    Type 2 diabetes mellitus with hyperglycemia, without long-term current use of insulin (H)       blood glucose monitoring test strip    no brand specified    100 strip    Use to test blood sugars 3 times daily or as directed    Type 2 diabetes mellitus with hyperglycemia, without long-term current use of insulin (H)       glipiZIDE 5 MG tablet    GLUCOTROL    90 tablet    Take 1 tablet (5 mg) by mouth every morning (before breakfast)    Type 2 diabetes mellitus with hyperglycemia, without long-term current use of insulin (H)       hydrochlorothiazide 25 MG tablet    HYDRODIURIL    30 tablet    Take 1 tablet (25 mg) by mouth daily    Benign essential hypertension       losartan 100 MG tablet    COZAAR    30 tablet    Take 1 tablet (100 mg) by mouth daily    Benign essential hypertension       metFORMIN 1000 MG tablet    GLUCOPHAGE    60 tablet    TAKE ONE TABLET BY MOUTH TWICE DAILY WITH MEALS    Type 2 diabetes mellitus with hyperglycemia, without long-term current use of insulin (H)       * Notice:  This list has 2 medication(s) that are the same as other medications prescribed for you. Read the directions carefully, and ask your doctor or other care provider to review them with you.

## 2017-09-05 NOTE — PROGRESS NOTES
SUBJECTIVE:   Shell Leon is a 67 year old male who presents to clinic today for the following health issues:      Hypertension Follow-up      Outpatient blood pressures are being checked at home.  Results are 167/54.    Low Salt Diet: no added salt    Amount of exercise or physical activity: None    Problems taking medications regularly: No    Medication side effects: none  Diet: low salt      Patient is here today to follow up on blood pressure  Taking medication daily  No side effects  Denies chest pain, racing heart, leg swelling, headache  He is trying to follow low salt diet  Check BP at home: 160/50s    Problem list and histories reviewed & adjusted, as indicated.  Additional history: as documented    Patient Active Problem List   Diagnosis     Essential hypertension with goal blood pressure less than 140/90     Internal carotid artery stenosis, bilateral     Benign essential hypertension     Edema, unspecified type     Type 2 diabetes mellitus without complication (H)     Past Surgical History:   Procedure Laterality Date     LAPAROSCOPIC CHOLECYSTECTOMY N/A 1/6/2017    Procedure: LAPAROSCOPIC CHOLECYSTECTOMY;  Surgeon: Michael Caba MD;  Location:  OR       Social History   Substance Use Topics     Smoking status: Never Smoker     Smokeless tobacco: Never Used     Alcohol use No     Family History   Problem Relation Age of Onset     Unknown/Adopted No family hx of          Current Outpatient Prescriptions   Medication Sig Dispense Refill     amLODIPine (NORVASC) 5 MG tablet 5 mg  11     metFORMIN (GLUCOPHAGE) 1000 MG tablet TAKE ONE TABLET BY MOUTH TWICE DAILY WITH MEALS 60 tablet 0     losartan (COZAAR) 100 MG tablet Take 1 tablet (100 mg) by mouth daily 30 tablet 1     glipiZIDE (GLUCOTROL) 5 MG tablet Take 1 tablet (5 mg) by mouth every morning (before breakfast) 90 tablet 1     atorvastatin (LIPITOR) 80 MG tablet Take 1 tablet (80 mg) by mouth daily 90 tablet 1     blood glucose  monitoring (NO BRAND SPECIFIED) test strip Use to test blood sugars 3 times daily or as directed 100 strip 11     blood glucose (NO BRAND SPECIFIED) lancets standard Use to test blood sugar 3 times daily or as directed. 1 Box 11     hydrochlorothiazide (HYDRODIURIL) 25 MG tablet Take 1 tablet (25 mg) by mouth daily 30 tablet 11     aspirin 81 MG chewable tablet Take 1 tablet (81 mg) by mouth daily 108 tablet 3     atorvastatin (LIPITOR) 40 MG tablet 40 mg  3     No Known Allergies      Reviewed and updated as needed this visit by clinical staffTobacco  Allergies  Med Hx  Surg Hx  Fam Hx  Soc Hx      Reviewed and updated as needed this visit by Provider         ROS:  Constitutional, HEENT, cardiovascular, pulmonary, gi and gu systems are negative, except as otherwise noted.      OBJECTIVE:   /50 (BP Location: Right arm, Patient Position: Chair, Cuff Size: Adult Regular)  Pulse 60  Temp 97.8  F (36.6  C) (Oral)  Wt 130 lb 9.6 oz (59.2 kg)  BMI 22.42 kg/m2  Body mass index is 22.42 kg/(m^2).  GENERAL: healthy, alert and no distress  NECK: no adenopathy, no asymmetry, masses, or scars and thyroid normal to palpation  RESP: lungs clear to auscultation - no rales, rhonchi or wheezes  CV: regular rate and rhythm, normal S1 S2, no S3 or S4, no murmur, click or rub, no peripheral edema and peripheral pulses strong  MS: no gross musculoskeletal defects noted, no edema    Diagnostic Test Results:  none     ASSESSMENT/PLAN:             1. Benign essential hypertension  Chronic issue, uncontrolled.  Offered options to check labs (including renin) and start Spironolactone vs P Gen Study for uncontrolled hypertension vs follow up with cardiologist to see about medication adjustment as he is currently on 3 medications without BP control. Patient would like to see cardiologist, referral placed. F/U as needed.  - CARDIOLOGY EVAL ADULT REFERRAL      Risks, benefits and alternatives were discussed with patient. Agreeable  to the plan of care.      Kathi Mondragon PA-C  [facility]

## 2017-09-05 NOTE — NURSING NOTE
"Chief Complaint   Patient presents with     Hypertension       Initial /50 (BP Location: Right arm, Patient Position: Chair, Cuff Size: Adult Regular)  Pulse 60  Temp 97.8  F (36.6  C) (Oral)  Wt 130 lb 9.6 oz (59.2 kg)  BMI 22.42 kg/m2 Estimated body mass index is 22.42 kg/(m^2) as calculated from the following:    Height as of 8/17/17: 5' 4\" (1.626 m).    Weight as of this encounter: 130 lb 9.6 oz (59.2 kg).  Medication Reconciliation: complete   Gayatri Whitehead MA      "

## 2017-09-06 DIAGNOSIS — E11.65 TYPE 2 DIABETES MELLITUS WITH HYPERGLYCEMIA, WITHOUT LONG-TERM CURRENT USE OF INSULIN (H): ICD-10-CM

## 2017-09-06 DIAGNOSIS — E11.9 TYPE 2 DIABETES MELLITUS WITHOUT COMPLICATION (H): ICD-10-CM

## 2017-09-07 RX ORDER — ASPIRIN 81 MG/1
TABLET, CHEWABLE ORAL
Qty: 90 TABLET | Refills: 3 | Status: ON HOLD | OUTPATIENT
Start: 2017-09-07 | End: 2018-05-22

## 2017-09-07 NOTE — TELEPHONE ENCOUNTER
aspirin 81 MG chewable tablet      Last Written Prescription Date: 9/7/16  Last Fill Quantity: 108,  # refills: 3   Last Office Visit with [facility], [facility] or  Health prescribing provider: 9/5/17                                         Next 5 appointments (look out 90 days)     Sep 08, 2017  1:15 PM CDT   Return Visit with Cresencio Landry MD   [facility] ([facility])    [address]  [facility] [address]   [phone]                    metFORMIN (GLUCOPHAGE) 1000 MG         Last Written Prescription Date: 8/7/17  Last Fill Quantity: 60, # refills: 0  Last Office Visit with [facility], [facility] or  Health prescribing provider:  9/5/17   Next 5 appointments (look out 90 days)     Sep 08, 2017  1:15 PM CDT   Return Visit with Cresencio Landry MD   [facility]    [address]  [facility] [address] [phone]                   BP Readings from Last 3 Encounters:   09/05/17 168/50   08/30/17 148/47   08/17/17 142/58     Lab Results   Component Value Date    MICROL 190 08/10/2017     Lab Results   Component Value Date    UMALCR 450.24 08/10/2017     Creatinine   Date Value Ref Range Status   08/10/2017 0.82 0.66 - 1.25 mg/dL Final   ]  GFR Estimate   Date Value Ref Range Status   08/10/2017 >90  Non  GFR Calc   >60 mL/min/1.7m2 Final   01/03/2017 >90  Non  GFR Calc   >60 mL/min/1.7m2 Final   11/11/2016 85 >60 mL/min/1.7m2 Final     Comment:     Non  GFR Calc     GFR Estimate If Black   Date Value Ref Range Status   08/10/2017 >90   GFR Calc   >60 mL/min/1.7m2 Final   01/03/2017 >90   GFR Calc   >60 mL/min/1.7m2 Final   11/11/2016 >90   GFR Calc   >60 mL/min/1.7m2 Final     Lab Results   Component Value Date    CHOL 158 09/07/2016     Lab Results   Component Value Date    HDL 33 09/07/2016      Lab Results   Component Value Date     09/07/2016     Lab Results   Component Value Date    TRIG 99 09/07/2016     No results found for: CHOLHDLRATIO  Lab Results   Component Value Date    AST 40 08/10/2017     Lab Results   Component Value Date    ALT 42 08/10/2017     Lab Results   Component Value Date    A1C 6.0 04/26/2017    A1C 8.6 01/11/2017    A1C 8.6 09/07/2016     Potassium   Date Value Ref Range Status   08/10/2017 5.0 3.4 - 5.3 mmol/L Final

## 2017-09-08 ENCOUNTER — OFFICE VISIT (OUTPATIENT)
Dept: CARDIOLOGY | Facility: CLINIC | Age: 67
End: 2017-09-08
Attending: INTERNAL MEDICINE
Payer: COMMERCIAL

## 2017-09-08 VITALS
SYSTOLIC BLOOD PRESSURE: 160 MMHG | DIASTOLIC BLOOD PRESSURE: 60 MMHG | HEART RATE: 59 BPM | HEIGHT: 64 IN | WEIGHT: 130 LBS | BODY MASS INDEX: 22.2 KG/M2

## 2017-09-08 DIAGNOSIS — I1A.0 RESISTANT HYPERTENSION: Primary | ICD-10-CM

## 2017-09-08 DIAGNOSIS — I73.9 PAD (PERIPHERAL ARTERY DISEASE) (H): ICD-10-CM

## 2017-09-08 DIAGNOSIS — I10 ESSENTIAL HYPERTENSION: ICD-10-CM

## 2017-09-08 PROCEDURE — 99214 OFFICE O/P EST MOD 30 MIN: CPT | Performed by: INTERNAL MEDICINE

## 2017-09-08 RX ORDER — VALSARTAN 160 MG/1
160 TABLET ORAL DAILY
Qty: 30 TABLET | Refills: 0 | Status: SHIPPED | OUTPATIENT
Start: 2017-09-08 | End: 2017-09-25

## 2017-09-08 RX ORDER — VALSARTAN 160 MG/1
320 TABLET ORAL DAILY
Qty: 30 TABLET | Refills: 0 | Status: SHIPPED | OUTPATIENT
Start: 2017-09-08 | End: 2017-09-08

## 2017-09-08 NOTE — PROGRESS NOTES
HPI and Plan:   REASON FOR CONSULTATION:  Peripheral vascular disease.        PRIMARY CARE PROVIDER:  Kathi Mondragon PA-C      HISTORY OF PRESENT ILLNESS:  I had the pleasure of seeing Shell Leon at the [facility] in [place] this morning.  He is a very pleasant 66-year-old gentleman with peripheral vascular disease, hypertension and diabetes who is here for follow up regarding hypertension. He was recently switched off Lisinopril due to cough and since had difficult to control HTN. He is currently on Losartan, Norvasc and Coreg. His BP is running 160s sytolic at home. He denies any neurological symptoms.      He remains stable from PAD standpoint.      IMPRESSION AND PLAN:   1.  Peripheral arterial disease.   2.  Diabetes.   3.  Hypertension.   4.  Hyperlipidemia.        He developed worsening HTN over the past month. We will stop his Losartan and try Valsartan instead. We will start 160 mg daily and up-titrate to 320 mg if BP is not well controlled. He will continue other antihypertensive program. Renal artery ultrasound to rule out significant NOE. Follow up with me 4 weeks.        ALE SPICER MD         Orders Placed This Encounter   Procedures     US Renal Complete w Doppler Complete     Follow-Up with Cardiologist       Orders Placed This Encounter   Medications     DISCONTD: valsartan (DIOVAN) 160 MG tablet     Sig: Take 2 tablets (320 mg) by mouth daily     Dispense:  30 tablet     Refill:  0     valsartan (DIOVAN) 160 MG tablet     Sig: Take 1 tablet (160 mg) by mouth daily     Dispense:  30 tablet     Refill:  0       Medications Discontinued During This Encounter   Medication Reason     atorvastatin (LIPITOR) 40 MG tablet Discontinued by another Health Care Provider     losartan (COZAAR) 100 MG tablet      valsartan (DIOVAN) 160 MG tablet Reorder         Encounter Diagnoses   Name Primary?     PAD (peripheral artery disease) (H)      Essential hypertension      Resistant  hypertension Yes       CURRENT MEDICATIONS:  Current Outpatient Prescriptions   Medication Sig Dispense Refill     valsartan (DIOVAN) 160 MG tablet Take 1 tablet (160 mg) by mouth daily 30 tablet 0     SM CHILDRENS ASPIRIN 81 MG chewable tablet CHEW AND SWALLOW ONE TABLET BY MOUTH ONE TIME DAILY  90 tablet 3     metFORMIN (GLUCOPHAGE) 1000 MG tablet TAKE 1 TABLET BY MOUTH TWICE DAILY WITH MEALS 60 tablet 5     amLODIPine (NORVASC) 5 MG tablet 5 mg  11     glipiZIDE (GLUCOTROL) 5 MG tablet Take 1 tablet (5 mg) by mouth every morning (before breakfast) 90 tablet 1     atorvastatin (LIPITOR) 80 MG tablet Take 1 tablet (80 mg) by mouth daily 90 tablet 1     hydrochlorothiazide (HYDRODIURIL) 25 MG tablet Take 1 tablet (25 mg) by mouth daily 30 tablet 11     [DISCONTINUED] valsartan (DIOVAN) 160 MG tablet Take 2 tablets (320 mg) by mouth daily 30 tablet 0     [DISCONTINUED] atorvastatin (LIPITOR) 40 MG tablet 80 mg   3     blood glucose monitoring (NO BRAND SPECIFIED) test strip Use to test blood sugars 3 times daily or as directed 100 strip 11     blood glucose (NO BRAND SPECIFIED) lancets standard Use to test blood sugar 3 times daily or as directed. 1 Box 11       ALLERGIES   No Known Allergies    PAST MEDICAL HISTORY:  Past Medical History:   Diagnosis Date     Diabetes (H)      Hypertension        PAST SURGICAL HISTORY:  Past Surgical History:   Procedure Laterality Date     LAPAROSCOPIC CHOLECYSTECTOMY N/A 1/6/2017    Procedure: LAPAROSCOPIC CHOLECYSTECTOMY;  Surgeon: Michael Caba MD;  Location:  OR       FAMILY HISTORY:  Family History   Problem Relation Age of Onset     Unknown/Adopted No family hx of        SOCIAL HISTORY:  Social History     Social History     Marital status:      Spouse name: N/A     Number of children: N/A     Years of education: N/A     Social History Main Topics     Smoking status: Never Smoker     Smokeless tobacco: Never Used     Alcohol use No     Drug use: No      "Sexual activity: No     Other Topics Concern     Parent/Sibling W/ Cabg, Mi Or Angioplasty Before 65f 55m? No     Social History Narrative       Review of Systems:  Skin:  not assessed       Eyes:  not assessed glasses    ENT:  not assessed      Respiratory:  Positive for cough     Cardiovascular:    edema;Positive for swelling in abdomin   Gastroenterology: Negative      Genitourinary:  Negative      Musculoskeletal:  Negative      Neurologic:  Negative      Psychiatric:  Negative      Heme/Lymph/Imm:  Negative      Endocrine:  Positive for diabetes      Physical Exam:  Vitals: /60  Pulse 59  Ht 1.626 m (5' 4.02\")  Wt 59 kg (130 lb)  BMI 22.3 kg/m2    Constitutional:  cooperative;in no acute distress        Skin:  warm and dry to the touch        Head:  normocephalic        Eyes:           ENT:  no pallor or cyanosis        Neck:  carotid pulses are full and equal bilaterally;JVP normal right carotid bruit      Chest:  clear to auscultation          Cardiac: regular rhythm;normal S1 and S2;no murmurs, gallops or rubs detected                  Abdomen:  abdomen soft;no masses;non-tender        Vascular:     2+         0   2+         0            Extremities and Back:  no edema              Neurological:  no gross motor deficits              CC  Cresencio Landry MD  [address]  [place] [address]              "

## 2017-09-08 NOTE — MR AVS SNAPSHOT
"              After Visit Summary   9/8/2017    Shell Leon    MRN: [id number]           Patient Information     Date Of Birth          1950        Visit Information        Provider Department      9/8/2017 1:15 PM Cresencio Landry MD [facility] [place]        Today's Diagnoses     Essential hypertension    -  1    PAD (peripheral artery disease) (H)        Resistant hypertension           Follow-ups after your visit        Additional Services     Follow-Up with Cardiologist                 Future tests that were ordered for you today     Open Future Orders        Priority Expected Expires Ordered    Follow-Up with Cardiologist Routine 10/11/2017 9/8/2018 9/8/2017    US Renal Complete w Doppler Complete Routine 9/15/2017 9/8/2018 9/8/2017            Who to contact     If you have questions or need follow up information about today's clinic visit or your schedule please contact [facility] [place] directly at [phone].  Normal or non-critical lab and imaging results will be communicated to you by [employer], letter or phone within 4 business days after the clinic has received the results. If you do not hear from us within 7 days, please contact the clinic through [employer] or phone. If you have a critical or abnormal lab result, we will notify you by phone as soon as possible.  Submit refill requests through [employer] or call your pharmacy and they will forward the refill request to us. Please allow 3 business days for your refill to be completed.          Additional Information About Your Visit        [employer] Information     [employer] lets you send messages to your doctor, view your test results, renew your prescriptions, schedule appointments and more. To sign up, go to [email or web address] . Click on \"Log in\" on the left side of the screen, which will take you to the Welcome page. Then click on \"Sign up Now\" on the right side of the " "page.     You will be asked to enter the access code listed below, as well as some personal information. Please follow the directions to create your username and password.     Your access code is: [email or web address]  Expires: 10/16/2017 10:36 AM     Your access code will  in 90 days. If you need help or a new code, please call your [place] clinic or [phone].        Care EveryWhere ID     This is your Care EveryWhere ID. This could be used by other organizations to access your [place] medical records  [id number]        Your Vitals Were     Pulse Height BMI (Body Mass Index)             59 1.626 m (5' 4.02\") 22.3 kg/m2          Blood Pressure from Last 3 Encounters:   17 160/60   17 168/50   17 148/47    Weight from Last 3 Encounters:   17 59 kg (130 lb)   17 59.2 kg (130 lb 9.6 oz)   17 63.5 kg (140 lb)              We Performed the Following     Follow-Up with Cardiologist          Today's Medication Changes          These changes are accurate as of: 17  1:57 PM.  If you have any questions, ask your nurse or doctor.               Start taking these medicines.        Dose/Directions    valsartan 160 MG tablet   Commonly known as:  DIOVAN   Used for:  Essential hypertension   Started by:  Cresencio Landry MD        Dose:  160 mg   Take 1 tablet (160 mg) by mouth daily   Quantity:  30 tablet   Refills:  0         Stop taking these medicines if you haven't already. Please contact your care team if you have questions.     losartan 100 MG tablet   Commonly known as:  COZAAR   Stopped by:  Cresencio Landry MD                Where to get your medicines      These medications were sent to [id number] [address] [address]     Phone:  [phone]     valsartan 160 MG tablet                Primary Care Provider Office Phone # Fax #    Kathi Mondragon PA-C [phone]       [address]  [facility] [address]        Equal Access to Services     TERRA FROST : Hadii lianet shaw atif Sosaida, waaxda luqadaha, qaybta kaalmazachary faulknerseanmagalie vela. So [facility] [phone].    ATENCIÓN: Si habla español, tiene a sullivan disposición servicios gratuitos de asistencia lingüística. Llame al [phone].    We comply with applicable federal civil rights laws and [place] laws. We do not discriminate on the basis of race, color, national origin, age, disability sex, sexual orientation or gender identity.            Thank you!     Thank you for choosing [facility] [place]  for your care. Our goal is always to provide you with excellent care. Hearing back from our patients is one way we can continue to improve our services. Please take a few minutes to complete the written survey that you may receive in the mail after your visit with us. Thank you!             Your Updated Medication List - Protect others around you: Learn how to safely use, store and throw away your medicines at www.disposemymeds.org.          This list is accurate as of: 9/8/17  1:57 PM.  Always use your most recent med list.                   Brand Name Dispense Instructions for use Diagnosis    amLODIPine 5 MG tablet    NORVASC     5 mg        atorvastatin 80 MG tablet    LIPITOR    90 tablet    Take 1 tablet (80 mg) by mouth daily    Type 2 diabetes mellitus without complication, unspecified long term insulin use status (H)       blood glucose lancets standard    no brand specified    1 Box    Use to test blood sugar 3 times daily or as directed.    Type 2 diabetes mellitus with hyperglycemia, without long-term current use of insulin (H)       blood glucose monitoring test strip    no brand specified    100 strip    Use to test blood sugars 3 times daily or as directed    Type 2 diabetes mellitus with hyperglycemia, without long-term current use of insulin (H)       glipiZIDE 5 MG  tablet    GLUCOTROL    90 tablet    Take 1 tablet (5 mg) by mouth every morning (before breakfast)    Type 2 diabetes mellitus with hyperglycemia, without long-term current use of insulin (H)       hydrochlorothiazide 25 MG tablet    HYDRODIURIL    30 tablet    Take 1 tablet (25 mg) by mouth daily    Benign essential hypertension       metFORMIN 1000 MG tablet    GLUCOPHAGE    60 tablet    TAKE 1 TABLET BY MOUTH TWICE DAILY WITH MEALS    Type 2 diabetes mellitus with hyperglycemia, without long-term current use of insulin (H)        CHILDRENS ASPIRIN 81 MG chewable tablet   Generic drug:  aspirin     90 tablet    CHEW AND SWALLOW ONE TABLET BY MOUTH ONE TIME DAILY    Type 2 diabetes mellitus without complication (H)       valsartan 160 MG tablet    DIOVAN    30 tablet    Take 1 tablet (160 mg) by mouth daily    Essential hypertension

## 2017-09-08 NOTE — LETTER
9/8/2017    Kathi Mondragon PA-C  [address]  [facility] [address]    RE: Shell Leon       Dear Colleague,    I had the pleasure of seeing Shell Leon in the [facility].    HPI and Plan:   REASON FOR CONSULTATION:  Peripheral vascular disease.        PRIMARY CARE PROVIDER:  Kathi Mondragon PA-C      HISTORY OF PRESENT ILLNESS:  I had the pleasure of seeing Shell Leon at the [facility] in [place] this morning.  He is a very pleasant 66-year-old gentleman with peripheral vascular disease, hypertension and diabetes who is here for follow up regarding hypertension. He was recently switched off Lisinopril due to cough and since had difficult to control HTN. He is currently on Losartan, Norvasc and Coreg. His BP is running 160s sytolic at home. He denies any neurological symptoms.      He remains stable from PAD standpoint.      IMPRESSION AND PLAN:   1.  Peripheral arterial disease.   2.  Diabetes.   3.  Hypertension.   4.  Hyperlipidemia.        He developed worsening HTN over the past month. We will stop his Losartan and try Valsartan instead. We will start 160 mg daily and up-titrate to 320 mg if BP is not well controlled. He will continue other antihypertensive program. Renal artery ultrasound to rule out significant NOE. Follow up with me 4 weeks.        ALE SPICER MD         Orders Placed This Encounter   Procedures     US Renal Complete w Doppler Complete     Follow-Up with Cardiologist       Orders Placed This Encounter   Medications     DISCONTD: valsartan (DIOVAN) 160 MG tablet     Sig: Take 2 tablets (320 mg) by mouth daily     Dispense:  30 tablet     Refill:  0     valsartan (DIOVAN) 160 MG tablet     Sig: Take 1 tablet (160 mg) by mouth daily     Dispense:  30 tablet     Refill:  0       Medications Discontinued During This Encounter   Medication Reason     atorvastatin (LIPITOR) 40 MG tablet Discontinued by  another Health Care Provider     losartan (COZAAR) 100 MG tablet      valsartan (DIOVAN) 160 MG tablet Reorder         Encounter Diagnoses   Name Primary?     PAD (peripheral artery disease) (H)      Essential hypertension      Resistant hypertension Yes       CURRENT MEDICATIONS:  Current Outpatient Prescriptions   Medication Sig Dispense Refill     valsartan (DIOVAN) 160 MG tablet Take 1 tablet (160 mg) by mouth daily 30 tablet 0     SM CHILDRENS ASPIRIN 81 MG chewable tablet CHEW AND SWALLOW ONE TABLET BY MOUTH ONE TIME DAILY  90 tablet 3     metFORMIN (GLUCOPHAGE) 1000 MG tablet TAKE 1 TABLET BY MOUTH TWICE DAILY WITH MEALS 60 tablet 5     amLODIPine (NORVASC) 5 MG tablet 5 mg  11     glipiZIDE (GLUCOTROL) 5 MG tablet Take 1 tablet (5 mg) by mouth every morning (before breakfast) 90 tablet 1     atorvastatin (LIPITOR) 80 MG tablet Take 1 tablet (80 mg) by mouth daily 90 tablet 1     hydrochlorothiazide (HYDRODIURIL) 25 MG tablet Take 1 tablet (25 mg) by mouth daily 30 tablet 11     [DISCONTINUED] valsartan (DIOVAN) 160 MG tablet Take 2 tablets (320 mg) by mouth daily 30 tablet 0     [DISCONTINUED] atorvastatin (LIPITOR) 40 MG tablet 80 mg   3     blood glucose monitoring (NO BRAND SPECIFIED) test strip Use to test blood sugars 3 times daily or as directed 100 strip 11     blood glucose (NO BRAND SPECIFIED) lancets standard Use to test blood sugar 3 times daily or as directed. 1 Box 11       ALLERGIES   No Known Allergies    PAST MEDICAL HISTORY:  Past Medical History:   Diagnosis Date     Diabetes (H)      Hypertension        PAST SURGICAL HISTORY:  Past Surgical History:   Procedure Laterality Date     LAPAROSCOPIC CHOLECYSTECTOMY N/A 1/6/2017    Procedure: LAPAROSCOPIC CHOLECYSTECTOMY;  Surgeon: Michael Caba MD;  Location:  OR       FAMILY HISTORY:  Family History   Problem Relation Age of Onset     Unknown/Adopted No family hx of        SOCIAL HISTORY:  Social History     Social History      "Marital status:      Spouse name: N/A     Number of children: N/A     Years of education: N/A     Social History Main Topics     Smoking status: Never Smoker     Smokeless tobacco: Never Used     Alcohol use No     Drug use: No     Sexual activity: No     Other Topics Concern     Parent/Sibling W/ Cabg, Mi Or Angioplasty Before 65f 55m? No     Social History Narrative       Review of Systems:  Skin:  not assessed       Eyes:  not assessed glasses    ENT:  not assessed      Respiratory:  Positive for cough     Cardiovascular:    edema;Positive for swelling in abdomin   Gastroenterology: Negative      Genitourinary:  Negative      Musculoskeletal:  Negative      Neurologic:  Negative      Psychiatric:  Negative      Heme/Lymph/Imm:  Negative      Endocrine:  Positive for diabetes      Physical Exam:  Vitals: /60  Pulse 59  Ht 1.626 m (5' 4.02\")  Wt 59 kg (130 lb)  BMI 22.3 kg/m2    Constitutional:  cooperative;in no acute distress        Skin:  warm and dry to the touch        Head:  normocephalic        Eyes:           ENT:  no pallor or cyanosis        Neck:  carotid pulses are full and equal bilaterally;JVP normal right carotid bruit      Chest:  clear to auscultation          Cardiac: regular rhythm;normal S1 and S2;no murmurs, gallops or rubs detected                  Abdomen:  abdomen soft;no masses;non-tender        Vascular:     2+         0   2+         0            Extremities and Back:  no edema              Neurological:  no gross motor deficits        Thank you for allowing me to participate in the care of your patient.    Sincerely,     Cresencio Landry MD    [facility]  "

## 2017-09-15 ENCOUNTER — HOSPITAL ENCOUNTER (OUTPATIENT)
Dept: ULTRASOUND IMAGING | Facility: CLINIC | Age: 67
Discharge: HOME | End: 2017-09-15
Attending: INTERNAL MEDICINE | Admitting: INTERNAL MEDICINE
Payer: COMMERCIAL

## 2017-09-15 DIAGNOSIS — I1A.0 RESISTANT HYPERTENSION: ICD-10-CM

## 2017-09-15 PROCEDURE — 76770 US EXAM ABDO BACK WALL COMP: CPT | Mod: 26 | Performed by: INTERNAL MEDICINE

## 2017-09-15 PROCEDURE — 93975 VASCULAR STUDY: CPT | Mod: TC

## 2017-09-15 PROCEDURE — 93975 VASCULAR STUDY: CPT | Mod: 26 | Performed by: INTERNAL MEDICINE

## 2017-09-20 ENCOUNTER — TELEPHONE (OUTPATIENT)
Dept: CARDIOLOGY | Facility: CLINIC | Age: 67
End: 2017-09-20

## 2017-09-20 NOTE — TELEPHONE ENCOUNTER
Notes Recorded by Cresencio Landry MD on 9/19/2017 at 8:05 PM  No evidence of renal artery stenosis    Called patient however he did not answer. Per the chart, called his son and he did not answer. However per consent to communicate, left a detailed message with results and team 4 number if he has any questions or concerns.

## 2017-09-25 ENCOUNTER — TELEPHONE (OUTPATIENT)
Dept: CARDIOLOGY | Facility: CLINIC | Age: 67
End: 2017-09-25

## 2017-09-25 DIAGNOSIS — I10 ESSENTIAL HYPERTENSION: ICD-10-CM

## 2017-09-25 RX ORDER — VALSARTAN 160 MG/1
320 TABLET ORAL DAILY
Qty: 1 TABLET | Refills: 0 | COMMUNITY
Start: 2017-09-25 | End: 2017-10-04

## 2017-09-25 NOTE — TELEPHONE ENCOUNTER
Pts daughter called stating that pt has been taking the Valsartan 320 mg daily and his BP have not improved. Between 160-154. Never lower. Pt is scheduled to see Dr. Landry 9/28/17. Pts daughter tried to refill the Valsartan and they received 160 mg tablets with instructions to take 1 tablet daily. Writer recommended that patient take 2 tablets daily to make 320 mg until they see Dr. Landry on 9/28/17. Pts daughter confirmed.      9/8/17 OV with Dr. Landry   He developed worsening HTN over the past month. We will stop his Losartan and try Valsartan instead. We will start 160 mg daily and up-titrate to 320 mg if BP is not well controlled. He will continue other antihypertensive program. Renal artery ultrasound to rule out significant NOE. Follow up with me 4 weeks.

## 2017-09-27 ENCOUNTER — TELEPHONE (OUTPATIENT)
Dept: FAMILY MEDICINE | Facility: CLINIC | Age: 67
End: 2017-09-27

## 2017-09-27 NOTE — TELEPHONE ENCOUNTER
Panel Management Review      Patient has the following on his problem list:     Diabetes      Last A1C  Lab Results   Component Value Date    A1C 6.0 04/26/2017    A1C 8.6 01/11/2017    A1C 8.6 09/07/2016     A1C tested: MONITOR    Last LDL:    Lab Results   Component Value Date    CHOL 158 09/07/2016     Lab Results   Component Value Date    HDL 33 09/07/2016     Lab Results   Component Value Date     09/07/2016     Lab Results   Component Value Date    TRIG 99 09/07/2016     No results found for: CHOLHDLRATIO  Lab Results   Component Value Date    NHDL 125 09/07/2016       Is the patient on a Statin? YES             Is the patient on Aspirin? YES    Medications     HMG CoA Reductase Inhibitors    atorvastatin (LIPITOR) 80 MG tablet    Salicylates    SM CHILDRENS ASPIRIN 81 MG chewable tablet          Last three blood pressure readings:  BP Readings from Last 3 Encounters:   09/08/17 160/60   09/05/17 168/50   08/30/17 148/47       Date of last diabetes office visit: 4/27/17     Tobacco History:     History   Smoking Status     Never Smoker   Smokeless Tobacco     Never Used         Hypertension   Last three blood pressure readings:  BP Readings from Last 3 Encounters:   09/08/17 160/60   09/05/17 168/50   08/30/17 148/47     Blood pressure: MONITOR    HTN Guidelines:  Age 18-59 BP range:  Less than 140/90  Age 60-85 with Diabetes:  Less than 140/90  Age 60-85 without Diabetes:  less than 150/90          Composite cancer screening  Chart review shows that this patient is due/due soon for the following Colonoscopy  Summary:    Patient is due/failing the following:   A1C, BP CHECK and COLONOSCOPY    Action needed:   Patient needs office visit for hypertension and diabetes.    Type of outreach:    Phone, left message for patient to call back.     Questions for provider review:    None                                                                                                                                   Gayatri Whitehead MA     Chart routed to Care Team.

## 2017-09-28 ENCOUNTER — OFFICE VISIT (OUTPATIENT)
Dept: CARDIOLOGY | Facility: CLINIC | Age: 67
End: 2017-09-28
Attending: INTERNAL MEDICINE
Payer: COMMERCIAL

## 2017-09-28 ENCOUNTER — HOSPITAL ENCOUNTER (OUTPATIENT)
Dept: LAB | Facility: CLINIC | Age: 67
Discharge: HOME | End: 2017-09-28
Attending: INTERNAL MEDICINE | Admitting: INTERNAL MEDICINE
Payer: COMMERCIAL

## 2017-09-28 VITALS
DIASTOLIC BLOOD PRESSURE: 52 MMHG | WEIGHT: 139 LBS | HEIGHT: 64 IN | SYSTOLIC BLOOD PRESSURE: 168 MMHG | HEART RATE: 56 BPM | BODY MASS INDEX: 23.73 KG/M2

## 2017-09-28 DIAGNOSIS — I73.9 PAD (PERIPHERAL ARTERY DISEASE) (H): ICD-10-CM

## 2017-09-28 DIAGNOSIS — I1A.0 RESISTANT HYPERTENSION: Primary | ICD-10-CM

## 2017-09-28 PROCEDURE — 83835 ASSAY OF METANEPHRINES: CPT | Performed by: INTERNAL MEDICINE

## 2017-09-28 PROCEDURE — 99213 OFFICE O/P EST LOW 20 MIN: CPT | Performed by: INTERNAL MEDICINE

## 2017-09-28 PROCEDURE — 36415 COLL VENOUS BLD VENIPUNCTURE: CPT | Performed by: INTERNAL MEDICINE

## 2017-09-28 PROCEDURE — 99000 SPECIMEN HANDLING OFFICE-LAB: CPT | Performed by: INTERNAL MEDICINE

## 2017-09-28 PROCEDURE — 84244 ASSAY OF RENIN: CPT | Mod: 90 | Performed by: INTERNAL MEDICINE

## 2017-09-28 PROCEDURE — 82088 ASSAY OF ALDOSTERONE: CPT | Mod: 90 | Performed by: INTERNAL MEDICINE

## 2017-09-28 RX ORDER — SPIRONOLACTONE 25 MG/1
12.5 TABLET ORAL DAILY
Qty: 30 TABLET | Refills: 1 | Status: SHIPPED | OUTPATIENT
Start: 2017-09-28 | End: 2017-10-04

## 2017-09-28 RX ORDER — SPIRONOLACTONE 25 MG/1
25 TABLET ORAL DAILY
Qty: 30 TABLET | Refills: 1 | Status: SHIPPED | OUTPATIENT
Start: 2017-09-28 | End: 2017-09-28

## 2017-09-28 NOTE — PROGRESS NOTES
HPI and Plan:   See dictation    Orders Placed This Encounter   Procedures     Aldosterone     Renin Plasma     Metanephrines Fract Free Plasma ([facility])     Basic metabolic panel     Follow-Up with Cardiologist       Orders Placed This Encounter   Medications     DISCONTD: spironolactone (ALDACTONE) 25 MG tablet     Sig: Take 1 tablet (25 mg) by mouth daily     Dispense:  30 tablet     Refill:  1     spironolactone (ALDACTONE) 25 MG tablet     Sig: Take 0.5 tablets (12.5 mg) by mouth daily     Dispense:  30 tablet     Refill:  1       Medications Discontinued During This Encounter   Medication Reason     spironolactone (ALDACTONE) 25 MG tablet          Encounter Diagnoses   Name Primary?     PAD (peripheral artery disease) (H)      Resistant hypertension Yes       CURRENT MEDICATIONS:  Current Outpatient Prescriptions   Medication Sig Dispense Refill     spironolactone (ALDACTONE) 25 MG tablet Take 0.5 tablets (12.5 mg) by mouth daily 30 tablet 1     valsartan (DIOVAN) 160 MG tablet Take 2 tablets (320 mg) by mouth daily 1 tablet 0     [employer] ASPIRIN 81 MG chewable tablet CHEW AND SWALLOW ONE TABLET BY MOUTH ONE TIME DAILY  90 tablet 3     metFORMIN (GLUCOPHAGE) 1000 MG tablet TAKE 1 TABLET BY MOUTH TWICE DAILY WITH MEALS 60 tablet 5     amLODIPine (NORVASC) 5 MG tablet 5 mg  11     glipiZIDE (GLUCOTROL) 5 MG tablet Take 1 tablet (5 mg) by mouth every morning (before breakfast) 90 tablet 1     atorvastatin (LIPITOR) 80 MG tablet Take 1 tablet (80 mg) by mouth daily 90 tablet 1     blood glucose monitoring (NO BRAND SPECIFIED) test strip Use to test blood sugars 3 times daily or as directed 100 strip 11     blood glucose (NO BRAND SPECIFIED) lancets standard Use to test blood sugar 3 times daily or as directed. 1 Box 11     hydrochlorothiazide (HYDRODIURIL) 25 MG tablet Take 1 tablet (25 mg) by mouth daily 30 tablet 11     [DISCONTINUED] spironolactone (ALDACTONE) 25 MG tablet Take 1 tablet (25 mg) by mouth  "daily 30 tablet 1       ALLERGIES   No Known Allergies    PAST MEDICAL HISTORY:  Past Medical History:   Diagnosis Date     Diabetes (H)      Hypertension        PAST SURGICAL HISTORY:  Past Surgical History:   Procedure Laterality Date     LAPAROSCOPIC CHOLECYSTECTOMY N/A 1/6/2017    Procedure: LAPAROSCOPIC CHOLECYSTECTOMY;  Surgeon: Michael Caba MD;  Location: [facility] OR       FAMILY HISTORY:  Family History   Problem Relation Age of Onset     Unknown/Adopted No family hx of        SOCIAL HISTORY:  Social History     Social History     Marital status:      Spouse name: N/A     Number of children: N/A     Years of education: N/A     Social History Main Topics     Smoking status: Never Smoker     Smokeless tobacco: Never Used     Alcohol use No     Drug use: No     Sexual activity: No     Other Topics Concern     Parent/Sibling W/ Cabg, Mi Or Angioplasty Before 65f 55m? No     Social History Narrative       Review of Systems:  Skin:  Negative       Eyes:  Positive for glasses    ENT:  Negative      Respiratory:  Negative       Cardiovascular:    Positive for;edema swelling in both legs   Gastroenterology: Negative      Genitourinary:  Negative      Musculoskeletal:  Negative      Neurologic:  Negative      Psychiatric:  Negative      Heme/Lymph/Imm:  Negative      Endocrine:  Positive for diabetes      Physical Exam:  Vitals: /52 (BP Location: Right arm, Patient Position: Sitting, Cuff Size: Adult Regular)  Pulse 56  Ht 1.626 m (5' 4\")  Wt 63 kg (139 lb)  BMI 23.86 kg/m2    Constitutional:  cooperative;in no acute distress        Skin:  warm and dry to the touch        Head:  normocephalic        Eyes:           ENT:  no pallor or cyanosis        Neck:  carotid pulses are full and equal bilaterally;JVP normal right carotid bruit      Chest:  clear to auscultation          Cardiac: regular rhythm;normal S1 and S2;no murmurs, gallops or rubs detected                  Abdomen:  abdomen soft;no " masses;non-tender        Vascular:     2+         0   2+         0            Extremities and Back:  no edema              Neurological:  no gross motor deficits              CC  Cresencio Landry MD  [address]  [place] [id number]

## 2017-09-28 NOTE — MR AVS SNAPSHOT
After Visit Summary   9/28/2017    Shell Leon    MRN: [id number]           Patient Information     Date Of Birth          1950        Visit Information        Provider Department      9/28/2017 9:30 AM Cresencio Landry MD [facility] [place]        Today's Diagnoses     PAD (peripheral artery disease) (H)        Resistant hypertension           Follow-ups after your visit        Additional Services     Follow-Up with Cardiologist                 Your next 10 appointments already scheduled     Oct 04, 2017  8:00 AM CDT   Return Visit with Cresencio Landry MD   [facility] ([facility])    [address]  [facility] [id number] [phone]              Future tests that were ordered for you today     Open Future Orders        Priority Expected Expires Ordered    Follow-Up with Cardiologist Routine 10/4/2017 9/28/2018 9/28/2017    Basic metabolic panel Routine 10/2/2017 9/28/2018 9/28/2017            Who to contact     If you have questions or need follow up information about today's clinic visit or your schedule please contact [facility] directly at [phone].  Normal or non-critical lab and imaging results will be communicated to you by MyChart, letter or phone within 4 business days after the clinic has received the results. If you do not hear from us within 7 days, please contact the clinic through [employer] or phone. If you have a critical or abnormal lab result, we will notify you by phone as soon as possible.  Submit refill requests through Taplet or call your pharmacy and they will forward the refill request to us. Please allow 3 business days for your refill to be completed.          Additional Information About Your Visit        MyChart Information     Taplet lets you send messages to your doctor, view your test results, renew your  "prescriptions, schedule appointments and more. To sign up, go to [email or web address] . Click on \"Log in\" on the left side of the screen, which will take you to the Welcome page. Then click on \"Sign up Now\" on the right side of the page.     You will be asked to enter the access code listed below, as well as some personal information. Please follow the directions to create your username and password.     Your access code is: [email or web address]  Expires: 10/16/2017 10:36 AM     Your access code will  in 90 days. If you need help or a new code, please call your [place] clinic or [phone].        Care EveryWhere ID     This is your Care EveryWhere ID. This could be used by other organizations to access your [facility] medical records  [id number]        Your Vitals Were     Pulse Height BMI (Body Mass Index)             56 1.626 m (5' 4\") 23.86 kg/m2          Blood Pressure from Last 3 Encounters:   17 168/52   17 160/60   17 168/50    Weight from Last 3 Encounters:   17 63 kg (139 lb)   17 59 kg (130 lb)   17 59.2 kg (130 lb 9.6 oz)              We Performed the Following     Aldosterone     Follow-Up with Cardiologist     Metanephrines Fract Free Plasma ([facility])     Renin Plasma          Today's Medication Changes          These changes are accurate as of: 17 10:21 AM.  If you have any questions, ask your nurse or doctor.               Start taking these medicines.        Dose/Directions    spironolactone 25 MG tablet   Commonly known as:  ALDACTONE   Used for:  Resistant hypertension   Started by:  Cresencio Landry MD        Dose:  12.5 mg   Take 0.5 tablets (12.5 mg) by mouth daily   Quantity:  30 tablet   Refills:  1            Where to get your medicines      These medications were sent to [id number] [address] [address]     Phone:  [phone]     spironolactone 25 MG tablet                Primary " Care Provider Office Phone # Fax #    Kathi Mondragon PA-C [phone]       [address] [address] [facility] [address]        Equal Access to Services     TERRA FROST : Hadii aad ku hadrubéno Soomaali, waaxda luqadaha, qaybta kaalmada adeegyada, zachary shawn didi andujar laWallacebell vela. So [facility] [phone].    ATENCIÓN: Si habla español, tiene a sullivan disposición servicios gratuitos de asistencia lingüística. Llame al [phone].    We comply with applicable federal civil rights laws and [place] laws. We do not discriminate on the basis of race, color, national origin, age, disability sex, sexual orientation or gender identity.            Thank you!     Thank you for choosing [facility] [place]  for your care. Our goal is always to provide you with excellent care. Hearing back from our patients is one way we can continue to improve our services. Please take a few minutes to complete the written survey that you may receive in the mail after your visit with us. Thank you!             Your Updated Medication List - Protect others around you: Learn how to safely use, store and throw away your medicines at www.disposemymeds.org.          This list is accurate as of: 9/28/17 10:21 AM.  Always use your most recent med list.                   Brand Name Dispense Instructions for use Diagnosis    amLODIPine 5 MG tablet    NORVASC     5 mg        atorvastatin 80 MG tablet    LIPITOR    90 tablet    Take 1 tablet (80 mg) by mouth daily    Type 2 diabetes mellitus without complication, unspecified long term insulin use status (H)       blood glucose lancets standard    no brand specified    1 Box    Use to test blood sugar 3 times daily or as directed.    Type 2 diabetes mellitus with hyperglycemia, without long-term current use of insulin (H)       blood glucose monitoring test strip    no brand specified    100 strip    Use to test blood sugars 3 times daily or as  directed    Type 2 diabetes mellitus with hyperglycemia, without long-term current use of insulin (H)       glipiZIDE 5 MG tablet    GLUCOTROL    90 tablet    Take 1 tablet (5 mg) by mouth every morning (before breakfast)    Type 2 diabetes mellitus with hyperglycemia, without long-term current use of insulin (H)       hydrochlorothiazide 25 MG tablet    HYDRODIURIL    30 tablet    Take 1 tablet (25 mg) by mouth daily    Benign essential hypertension       metFORMIN 1000 MG tablet    GLUCOPHAGE    60 tablet    TAKE 1 TABLET BY MOUTH TWICE DAILY WITH MEALS    Type 2 diabetes mellitus with hyperglycemia, without long-term current use of insulin (H)       [facility]   Generic drug:  aspirin     90 tablet    CHEW AND SWALLOW ONE TABLET BY MOUTH ONE TIME DAILY    Type 2 diabetes mellitus without complication (H)       spironolactone 25 MG tablet    ALDACTONE    30 tablet    Take 0.5 tablets (12.5 mg) by mouth daily    Resistant hypertension       valsartan 160 MG tablet    DIOVAN    1 tablet    Take 2 tablets (320 mg) by mouth daily    Essential hypertension

## 2017-09-28 NOTE — LETTER
9/28/2017    Kathi Mondragon PA-C  [address]  [facility] [address]    RE: Shell Leon       Dear Colleague,    I had the pleasure of seeing Shell Leon in the [facility].    REASON FOR VISIT:  Resistant hypertension.      I had the pleasure of seeing Shell Leon at the [facility] in [place] this morning.  He is a very pleasant 67-year-old gentleman with peripheral arterial disease, hypertension and diabetes who is here for followup regarding persistent hypertension.  He is on multiple antihypertensives and despite this, his blood pressure has been difficult to control lately.      I saw him earlier this month and recommended a renal artery ultrasound to rule out significant renovascular hypertension.  The ultrasound demonstrated patent renal arteries bilaterally.  He is here to discuss additional investigations given the resistant hypertension.     Outpatient Encounter Prescriptions as of 9/28/2017   Medication Sig Dispense Refill     [DISCONTINUED] spironolactone (ALDACTONE) 25 MG tablet Take 0.5 tablets (12.5 mg) by mouth daily 30 tablet 1     [DISCONTINUED] valsartan (DIOVAN) 160 MG tablet Take 2 tablets (320 mg) by mouth daily 1 tablet 0     [employer]ILDR[employer] ASPIRIN 81 MG chewable tablet CHEW AND SWALLOW ONE TABLET BY MOUTH ONE TIME DAILY  90 tablet 3     metFORMIN (GLUCOPHAGE) 1000 MG tablet TAKE 1 TABLET BY MOUTH TWICE DAILY WITH MEALS 60 tablet 5     [DISCONTINUED] amLODIPine (NORVASC) 5 MG tablet 5 mg  11     [DISCONTINUED] glipiZIDE (GLUCOTROL) 5 MG tablet Take 1 tablet (5 mg) by mouth every morning (before breakfast) 90 tablet 1     [DISCONTINUED] atorvastatin (LIPITOR) 80 MG tablet Take 1 tablet (80 mg) by mouth daily 90 tablet 1     blood glucose monitoring (NO BRAND SPECIFIED) test strip Use to test blood sugars 3 times daily or as directed 100 strip 11     blood glucose (NO BRAND SPECIFIED) lancets standard  Use to test blood sugar 3 times daily or as directed. 1 Box 11     hydrochlorothiazide (HYDRODIURIL) 25 MG tablet Take 1 tablet (25 mg) by mouth daily 30 tablet 11     [DISCONTINUED] spironolactone (ALDACTONE) 25 MG tablet Take 1 tablet (25 mg) by mouth daily 30 tablet 1     No facility-administered encounter medications on file as of 9/28/2017.       IMPRESSION, REPORT AND PLAN:   1.  Persistent hypertension.   2.  Peripheral arterial disease.   3.  Diabetes.   4.  Hyperlipidemia.        The patient's blood pressure still remains high despite multiple antihypertensives.  Recent renal artery ultrasound was reviewed.  Fortunately, he does not have significant renal artery stenosis.  In reviewing his laboratory studies, the patient's sodium is on the lower side and his potassium is on the higher side.  This may indicate underlying primary hyperaldosteronism.  Of note, the patient had recent CT of the abdomen which showed normal-sized adrenal glands.      I would recommend that we perform laboratory studies to assess possible primary hyperaldosteronism.  We will obtain aldosterone and renin levels.  We will also obtain serum metanephrine levels, although my suspicion for pheochromocytoma is low.       Would like to refer him to Endocrinology if the aforementioned studies become abnormal.  Additionally, I will add spironolactone to his medical regimen. I have instructed him to not start this medication until we have the laboratory studies.      I will see him next week with repeat labs to make sure his potassium does not increase with the Aldactone. I appreciate the opportunity to participate in this patient's care.     Sincerely,    Cresencio Landry MD     [facility]

## 2017-09-30 LAB — ALDOST SERPL-MCNC: 7.3 NG/DL

## 2017-10-02 DIAGNOSIS — I1A.0 RESISTANT HYPERTENSION: ICD-10-CM

## 2017-10-02 LAB
ANION GAP SERPL CALCULATED.3IONS-SCNC: 5 MMOL/L (ref 3–14)
ANNOTATION COMMENT IMP: ABNORMAL
BUN SERPL-MCNC: 24 MG/DL (ref 7–30)
CALCIUM SERPL-MCNC: 9.4 MG/DL (ref 8.5–10.1)
CHLORIDE SERPL-SCNC: 107 MMOL/L (ref 94–109)
CO2 SERPL-SCNC: 25 MMOL/L (ref 20–32)
CREAT SERPL-MCNC: 0.86 MG/DL (ref 0.66–1.25)
GFR SERPL CREATININE-BSD FRML MDRD: 89 ML/MIN/1.7M2
GLUCOSE SERPL-MCNC: 97 MG/DL (ref 70–99)
METANEPHS SERPL-SCNC: 0.11 NMOL/L (ref 0–0.49)
NORMETANEPHRINE SERPL-SCNC: 1.22 NMOL/L (ref 0–0.89)
POTASSIUM SERPL-SCNC: 5.1 MMOL/L (ref 3.4–5.3)
RENIN PLAS-CCNC: 1.1 NG/ML/H
SODIUM SERPL-SCNC: 137 MMOL/L (ref 133–144)

## 2017-10-02 PROCEDURE — 80048 BASIC METABOLIC PNL TOTAL CA: CPT | Performed by: INTERNAL MEDICINE

## 2017-10-02 PROCEDURE — 36415 COLL VENOUS BLD VENIPUNCTURE: CPT | Performed by: INTERNAL MEDICINE

## 2017-10-04 ENCOUNTER — DOCUMENTATION ONLY (OUTPATIENT)
Dept: CARDIOLOGY | Facility: CLINIC | Age: 67
End: 2017-10-04

## 2017-10-04 ENCOUNTER — OFFICE VISIT (OUTPATIENT)
Dept: CARDIOLOGY | Facility: CLINIC | Age: 67
End: 2017-10-04
Payer: COMMERCIAL

## 2017-10-04 VITALS
BODY MASS INDEX: 23.73 KG/M2 | HEART RATE: 60 BPM | SYSTOLIC BLOOD PRESSURE: 168 MMHG | WEIGHT: 139 LBS | DIASTOLIC BLOOD PRESSURE: 60 MMHG | HEIGHT: 64 IN

## 2017-10-04 DIAGNOSIS — I73.9 PAD (PERIPHERAL ARTERY DISEASE) (H): ICD-10-CM

## 2017-10-04 DIAGNOSIS — I1A.0 RESISTANT HYPERTENSION: Primary | ICD-10-CM

## 2017-10-04 DIAGNOSIS — I10 ESSENTIAL HYPERTENSION: ICD-10-CM

## 2017-10-04 PROCEDURE — 99214 OFFICE O/P EST MOD 30 MIN: CPT | Performed by: INTERNAL MEDICINE

## 2017-10-04 RX ORDER — VALSARTAN 320 MG/1
320 TABLET ORAL DAILY
Qty: 18 TABLET | Refills: 3 | Status: SHIPPED | OUTPATIENT
Start: 2017-10-04 | End: 2018-03-13

## 2017-10-04 RX ORDER — HYDRALAZINE HYDROCHLORIDE 50 MG/1
50 TABLET, FILM COATED ORAL 3 TIMES DAILY
Qty: 180 TABLET | Refills: 3 | Status: SHIPPED | OUTPATIENT
Start: 2017-10-04 | End: 2018-07-24

## 2017-10-04 NOTE — PROGRESS NOTES
10/4@2:06 Pt's son called me asking me to cancel pt's 10/6 consult with Dr Crawford/Endocrinologist.  Pt stated he will not cxl his trip on 10/10 to [facility] no matter what! I called Dr Landry. He stated pt should be seen, but if he absolutely will not cxl his trip, there is no reason to keep the appt. Pt's son wants his father to cxl the trip, but father will not. I told Merlin (pts son) I would call Dr Crawford to see what they thought. I spoke with Heaven, who I scheduled the pt with.  She explain that some of the tests they would do take anywhere from 7-10 days to get back. I told her how grateful pt's son & I was for all her help & Dr Crawford willingness to work pt in.  I called Merlin back to explain what Heaven said.  He said he will call back after when his father returns in March. He understands his father is leaving without the consultation that Dr Landry feels he should have.  nkf

## 2017-10-04 NOTE — PROGRESS NOTES
HPI and Plan:   REASON FOR VISIT:  Resistant hypertension.      HISTORY OF PRESENT ILLNESS:  I had the pleasure of seeing Shell Leon at the [facility] in [place] this morning.  He is a very pleasant 67-year-old gentleman with peripheral arterial disease, hypertension and diabetes who is here for followup regarding persistent hypertension.  He is on multiple antihypertensives and despite this, his blood pressure has been difficult to control lately.      I saw him earlier this month and recommended a renal artery ultrasound to rule out significant renovascular hypertension.  The ultrasound demonstrated patent renal arteries bilaterally.  We subsequently performed labs to assess possible primary hypoaldo and pheochromocytoma. He is here to discuss results     IMPRESSION, REPORT AND PLAN:   1.  Persistent hypertension.   2.  Peripheral arterial disease.   3.  Diabetes.   4.  Hyperlipidemia.        Labs were reviewed. Serum aldosterone and renin are normal. His serum Normetenephrine is elevated concerning for possible pheochromocytoma. We will refer him to Endocrinology.     His BP remains elevated. He developed significant edema on the norvasc. We will stop the norvasc and start Hydralazine 50 mg TID. Continue Valsartan and HCTZ. Will consider uptitrating the Hydralazine if BP is still elevated.      Follow up in 6 months when he returns from \[facility]\"".         ALE SPICER MD           Orders Placed This Encounter   Procedures     ENDOCRINOLOGY ADULT REFERRAL       Orders Placed This Encounter   Medications     hydrALAZINE (APRESOLINE) 50 MG tablet     Sig: Take 1 tablet (50 mg) by mouth 3 times daily     Dispense:  180 tablet     Refill:  3     valsartan (DIOVAN) 320 MG tablet     Sig: Take 1 tablet (320 mg) by mouth daily     Dispense:  18 tablet     Refill:  3       Medications Discontinued During This Encounter   Medication Reason     spironolactone (ALDACTONE) 25 MG tablet       amLODIPine (NORVASC) 5 MG tablet      valsartan (DIOVAN) 160 MG tablet          Encounter Diagnoses   Name Primary?     Resistant hypertension Yes     Essential hypertension      PAD (peripheral artery disease) (H)        CURRENT MEDICATIONS:  Current Outpatient Prescriptions   Medication Sig Dispense Refill     hydrALAZINE (APRESOLINE) 50 MG tablet Take 1 tablet (50 mg) by mouth 3 times daily 180 tablet 3     valsartan (DIOVAN) 320 MG tablet Take 1 tablet (320 mg) by mouth daily 18 tablet 3     [employer] ASPIRIN 81 MG chewable tablet CHEW AND SWALLOW ONE TABLET BY MOUTH ONE TIME DAILY  90 tablet 3     metFORMIN (GLUCOPHAGE) 1000 MG tablet TAKE 1 TABLET BY MOUTH TWICE DAILY WITH MEALS 60 tablet 5     glipiZIDE (GLUCOTROL) 5 MG tablet Take 1 tablet (5 mg) by mouth every morning (before breakfast) 90 tablet 1     atorvastatin (LIPITOR) 80 MG tablet Take 1 tablet (80 mg) by mouth daily 90 tablet 1     blood glucose monitoring (NO BRAND SPECIFIED) test strip Use to test blood sugars 3 times daily or as directed 100 strip 11     blood glucose (NO BRAND SPECIFIED) lancets standard Use to test blood sugar 3 times daily or as directed. 1 Box 11     hydrochlorothiazide (HYDRODIURIL) 25 MG tablet Take 1 tablet (25 mg) by mouth daily 30 tablet 11     [DISCONTINUED] valsartan (DIOVAN) 160 MG tablet Take 2 tablets (320 mg) by mouth daily 1 tablet 0       ALLERGIES   No Known Allergies    PAST MEDICAL HISTORY:  Past Medical History:   Diagnosis Date     Diabetes (H)      Hypertension        PAST SURGICAL HISTORY:  Past Surgical History:   Procedure Laterality Date     LAPAROSCOPIC CHOLECYSTECTOMY N/A 1/6/2017    Procedure: LAPAROSCOPIC CHOLECYSTECTOMY;  Surgeon: Michael Caba MD;  Location:  OR       FAMILY HISTORY:  Family History   Problem Relation Age of Onset     Unknown/Adopted No family hx of        SOCIAL HISTORY:  Social History     Social History     Marital status:      Spouse name: N/A     Number  "of children: N/A     Years of education: N/A     Social History Main Topics     Smoking status: Never Smoker     Smokeless tobacco: Never Used     Alcohol use No     Drug use: No     Sexual activity: No     Other Topics Concern     Parent/Sibling W/ Cabg, Mi Or Angioplasty Before 65f 55m? No     Social History Narrative       Review of Systems:  Skin:  Negative       Eyes:  Positive for glasses    ENT:  Positive for sinus trouble;postnasal drainage runny nose   Respiratory:  Positive for cough occas cough    Cardiovascular:    Positive for;edema swelling in both legs   Gastroenterology: Negative      Genitourinary:  Positive for nocturia    Musculoskeletal:  Negative      Neurologic:  Positive for headaches sinus headaches   Psychiatric:  Positive for sleep disturbances wakes up a lot   Heme/Lymph/Imm:  Negative      Endocrine:  Positive for diabetes      Physical Exam:  Vitals: /60 (BP Location: Right arm, Patient Position: Sitting, Cuff Size: Adult Regular)  Pulse 60  Ht 1.626 m (5' 4\")  Wt 63 kg (139 lb)  BMI 23.86 kg/m2    Constitutional:  cooperative;in no acute distress        Skin:  warm and dry to the touch        Head:  normocephalic        Eyes:           ENT:  no pallor or cyanosis        Neck:  carotid pulses are full and equal bilaterally;JVP normal right carotid bruit      Chest:  clear to auscultation          Cardiac: regular rhythm;normal S1 and S2;no murmurs, gallops or rubs detected                  Abdomen:  abdomen soft;no masses;non-tender        Vascular:     2+         0   2+         0            Extremities and Back:  no edema              Neurological:  no gross motor deficits                Cresencio Landry MD  [address]  [place] [id number]              "

## 2017-10-04 NOTE — LETTER
10/4/2017    Kathi Mondragon PA-C  [address]  [facility] [address]    RE: Shell Leon       Dear Colleague,    I had the pleasure of seeing Shell Leon in the [facility].    HPI and Plan:   REASON FOR VISIT:  Resistant hypertension.      HISTORY OF PRESENT ILLNESS:  I had the pleasure of seeing Shell Leon at the [facility] in [place] this morning.  He is a very pleasant 67-year-old gentleman with peripheral arterial disease, hypertension and diabetes who is here for followup regarding persistent hypertension.  He is on multiple antihypertensives and despite this, his blood pressure has been difficult to control lately.      I saw him earlier this month and recommended a renal artery ultrasound to rule out significant renovascular hypertension.  The ultrasound demonstrated patent renal arteries bilaterally.  We subsequently performed labs to assess possible primary hypoaldo and pheochromocytoma. He is here to discuss results     IMPRESSION, REPORT AND PLAN:   1.  Persistent hypertension.   2.  Peripheral arterial disease.   3.  Diabetes.   4.  Hyperlipidemia.        Labs were reviewed. Serum aldosterone and renin are normal. His serum Normetenephrine is elevated concerning for possible pheochromocytoma. We will refer him to Endocrinology.     His BP remains elevated. He developed significant edema on the norvasc. We will stop the norvasc and start Hydralazine 50 mg TID. Continue Valsartan and HCTZ. Will consider uptitrating the Hydralazine if BP is still elevated.      Follow up in 6 months when he returns from [facility].         LAE SPICER MD           Orders Placed This Encounter   Procedures     ENDOCRINOLOGY ADULT REFERRAL       Orders Placed This Encounter   Medications     hydrALAZINE (APRESOLINE) 50 MG tablet     Sig: Take 1 tablet (50 mg) by mouth 3 times daily     Dispense:  180 tablet     Refill:  3      valsartan (DIOVAN) 320 MG tablet     Sig: Take 1 tablet (320 mg) by mouth daily     Dispense:  18 tablet     Refill:  3       Medications Discontinued During This Encounter   Medication Reason     spironolactone (ALDACTONE) 25 MG tablet      amLODIPine (NORVASC) 5 MG tablet      valsartan (DIOVAN) 160 MG tablet          Encounter Diagnoses   Name Primary?     Resistant hypertension Yes     Essential hypertension      PAD (peripheral artery disease) (H)        CURRENT MEDICATIONS:  Current Outpatient Prescriptions   Medication Sig Dispense Refill     hydrALAZINE (APRESOLINE) 50 MG tablet Take 1 tablet (50 mg) by mouth 3 times daily 180 tablet 3     valsartan (DIOVAN) 320 MG tablet Take 1 tablet (320 mg) by mouth daily 18 tablet 3     SM CHILDRENS ASPIRIN 81 MG chewable tablet CHEW AND SWALLOW ONE TABLET BY MOUTH ONE TIME DAILY  90 tablet 3     metFORMIN (GLUCOPHAGE) 1000 MG tablet TAKE 1 TABLET BY MOUTH TWICE DAILY WITH MEALS 60 tablet 5     glipiZIDE (GLUCOTROL) 5 MG tablet Take 1 tablet (5 mg) by mouth every morning (before breakfast) 90 tablet 1     atorvastatin (LIPITOR) 80 MG tablet Take 1 tablet (80 mg) by mouth daily 90 tablet 1     blood glucose monitoring (NO BRAND SPECIFIED) test strip Use to test blood sugars 3 times daily or as directed 100 strip 11     blood glucose (NO BRAND SPECIFIED) lancets standard Use to test blood sugar 3 times daily or as directed. 1 Box 11     hydrochlorothiazide (HYDRODIURIL) 25 MG tablet Take 1 tablet (25 mg) by mouth daily 30 tablet 11     [DISCONTINUED] valsartan (DIOVAN) 160 MG tablet Take 2 tablets (320 mg) by mouth daily 1 tablet 0       ALLERGIES   No Known Allergies    PAST MEDICAL HISTORY:  Past Medical History:   Diagnosis Date     Diabetes (H)      Hypertension        PAST SURGICAL HISTORY:  Past Surgical History:   Procedure Laterality Date     LAPAROSCOPIC CHOLECYSTECTOMY N/A 1/6/2017    Procedure: LAPAROSCOPIC CHOLECYSTECTOMY;  Surgeon: Michael Caba,  "MD;  Location:  OR       FAMILY HISTORY:  Family History   Problem Relation Age of Onset     Unknown/Adopted No family hx of        SOCIAL HISTORY:  Social History     Social History     Marital status:      Spouse name: N/A     Number of children: N/A     Years of education: N/A     Social History Main Topics     Smoking status: Never Smoker     Smokeless tobacco: Never Used     Alcohol use No     Drug use: No     Sexual activity: No     Other Topics Concern     Parent/Sibling W/ Cabg, Mi Or Angioplasty Before 65f 55m? No     Social History Narrative       Review of Systems:  Skin:  Negative       Eyes:  Positive for glasses    ENT:  Positive for sinus trouble;postnasal drainage runny nose   Respiratory:  Positive for cough occas cough    Cardiovascular:    Positive for;edema swelling in both legs   Gastroenterology: Negative      Genitourinary:  Positive for nocturia    Musculoskeletal:  Negative      Neurologic:  Positive for headaches sinus headaches   Psychiatric:  Positive for sleep disturbances wakes up a lot   Heme/Lymph/Imm:  Negative      Endocrine:  Positive for diabetes      Physical Exam:  Vitals: /60 (BP Location: Right arm, Patient Position: Sitting, Cuff Size: Adult Regular)  Pulse 60  Ht 1.626 m (5' 4\")  Wt 63 kg (139 lb)  BMI 23.86 kg/m2    Constitutional:  cooperative;in no acute distress        Skin:  warm and dry to the touch        Head:  normocephalic        Eyes:           ENT:  no pallor or cyanosis        Neck:  carotid pulses are full and equal bilaterally;JVP normal right carotid bruit      Chest:  clear to auscultation          Cardiac: regular rhythm;normal S1 and S2;no murmurs, gallops or rubs detected                  Abdomen:  abdomen soft;no masses;non-tender        Vascular:     2+         0   2+         0            Extremities and Back:  no edema              Neurological:  no gross motor deficits        Thank you for allowing me to participate in the care of " your patient.    Sincerely,     Cresencio Landry MD    [facility]

## 2017-10-04 NOTE — PROGRESS NOTES
10/4 I scheduled pt to see Dr Crawford at his [place] location with [facility] at his office. Dr Crawford worked pt in. [place] country [place]. I faxed Dr Landry's order, Pt's face sheet & recent labs. Pt has an appt on 10/6 at 10:45. Pt will arrive at 10am & fasting 6-8hrs. I gave pt's son directions, Dr Crawford ph: [phone]  Fax: [phone] [facility]

## 2017-10-06 ENCOUNTER — TELEPHONE (OUTPATIENT)
Dept: FAMILY MEDICINE | Facility: CLINIC | Age: 67
End: 2017-10-06

## 2017-10-06 DIAGNOSIS — E11.9 TYPE 2 DIABETES MELLITUS WITHOUT COMPLICATION, UNSPECIFIED LONG TERM INSULIN USE STATUS: ICD-10-CM

## 2017-10-06 DIAGNOSIS — E11.65 TYPE 2 DIABETES MELLITUS WITH HYPERGLYCEMIA, WITHOUT LONG-TERM CURRENT USE OF INSULIN (H): ICD-10-CM

## 2017-10-06 RX ORDER — GLIPIZIDE 5 MG/1
5 TABLET ORAL
Qty: 90 TABLET | Refills: 1 | Status: SHIPPED | OUTPATIENT
Start: 2017-10-06 | End: 2018-04-16

## 2017-10-06 RX ORDER — ATORVASTATIN CALCIUM 80 MG/1
80 TABLET, FILM COATED ORAL DAILY
Qty: 90 TABLET | Refills: 3 | Status: SHIPPED | OUTPATIENT
Start: 2017-10-06 | End: 2018-10-12

## 2017-10-06 NOTE — TELEPHONE ENCOUNTER
Patients daughter said patient is leaving for 5 months and would like to get his meds for 5 months. Please call with any questions.

## 2017-10-06 NOTE — TELEPHONE ENCOUNTER
Per chart, sent in refill of only medication that did not have enough medication for 5 months, otherwise should have enough at pharmacy.    Kathi Mondragon PA-C

## 2017-11-27 ENCOUNTER — TELEPHONE (OUTPATIENT)
Dept: FAMILY MEDICINE | Facility: CLINIC | Age: 67
End: 2017-11-27

## 2017-11-27 NOTE — TELEPHONE ENCOUNTER
Panel Management Review      Patient has the following on his problem list:     Hypertension   Last three blood pressure readings:  BP Readings from Last 3 Encounters:   10/04/17 168/60   09/28/17 168/52   09/08/17 160/60     Blood pressure: MONITOR    HTN Guidelines:  Age 18-59 BP range:  Less than 140/90  Age 60-85 with Diabetes:  Less than 140/90  Age 60-85 without Diabetes:  less than 150/90        Composite cancer screening  Chart review shows that this patient is due/due soon for the following Colonoscopy  Summary:    Patient is due/failing the following:   BP CHECK and COLONOSCOPY    Action needed:   Patient needs office visit for px and update all HM.    Type of outreach:    Called and talked to pt's son, pt is currently in [place] for the next 4 months    Questions for provider review:    None                                                                                                                                Gayatri Whitehead CMA (AAMA)     Chart routed to none.

## 2017-12-21 DIAGNOSIS — I10 BENIGN ESSENTIAL HYPERTENSION: ICD-10-CM

## 2017-12-21 RX ORDER — HYDROCHLOROTHIAZIDE 25 MG/1
25 TABLET ORAL DAILY
Qty: 90 TABLET | Refills: 1 | Status: SHIPPED | OUTPATIENT
Start: 2017-12-21 | End: 2018-05-30

## 2017-12-21 NOTE — TELEPHONE ENCOUNTER
Received refill request for:  HCTZ  Last OV was: 10/4/2017 with Dr. Landry  Labs/EKG: last BMP 10/2/2017  F/U scheduled: orders in Epic for 4/2018  New script sent to: [facility]

## 2018-03-13 DIAGNOSIS — I1A.0 RESISTANT HYPERTENSION: ICD-10-CM

## 2018-03-13 RX ORDER — VALSARTAN 320 MG/1
320 TABLET ORAL DAILY
Qty: 90 TABLET | Refills: 1 | Status: SHIPPED | OUTPATIENT
Start: 2018-03-13 | End: 2018-10-12

## 2018-04-11 ENCOUNTER — TELEPHONE (OUTPATIENT)
Dept: FAMILY MEDICINE | Facility: CLINIC | Age: 68
End: 2018-04-11

## 2018-04-11 NOTE — TELEPHONE ENCOUNTER
Panel Management Review      Patient has the following on his problem list:     Diabetes    ASA: Not Required     Last A1C  Lab Results   Component Value Date    A1C 6.0 04/26/2017    A1C 8.6 01/11/2017    A1C 8.6 09/07/2016     A1C tested: MONITOR    Last LDL:    Lab Results   Component Value Date    CHOL 158 09/07/2016     Lab Results   Component Value Date    HDL 33 09/07/2016     Lab Results   Component Value Date     09/07/2016     Lab Results   Component Value Date    TRIG 99 09/07/2016     No results found for: CHOLHDLRATIO  Lab Results   Component Value Date    NHDL 125 09/07/2016       Is the patient on a Statin? YES             Is the patient on Aspirin? YES    Medications     HMG CoA Reductase Inhibitors    atorvastatin (LIPITOR) 80 MG tablet    Salicylates    SM CHILDRENS ASPIRIN 81 MG chewable tablet          Last three blood pressure readings:  BP Readings from Last 3 Encounters:   10/04/17 168/60   09/28/17 168/52   09/08/17 160/60       Date of last diabetes office visit: 8/17/17     Tobacco History:     History   Smoking Status     Never Smoker   Smokeless Tobacco     Never Used           Composite cancer screening  Chart review shows that this patient is due/due soon for the following Colonoscopy  Summary:    Patient is due/failing the following:   Immunizations, A1C and COLONOSCOPY    Action needed:   Patient needs office visit for diabetes, colonoscopy.    Type of outreach:    Phone, left message for patient to call back.     Questions for provider review:    None                                                                                                                                 Gayatri Whitehead CMA (AAMA)     Chart routed to Care Team.

## 2018-04-13 DIAGNOSIS — E11.65 TYPE 2 DIABETES MELLITUS WITH HYPERGLYCEMIA, WITHOUT LONG-TERM CURRENT USE OF INSULIN (H): ICD-10-CM

## 2018-04-13 NOTE — TELEPHONE ENCOUNTER
Consent to communicate with daughter is on file.    Called and talked to daughter, apt scheduled for 4/16/18.  Gayatri Whitehead CMA ([facility])

## 2018-04-13 NOTE — TELEPHONE ENCOUNTER
Patient daughter called there is no consent on file to discuss info with daughter, I told her that MA would call back

## 2018-04-14 NOTE — TELEPHONE ENCOUNTER
"Requested Prescriptions   Pending Prescriptions Disp Refills     metFORMIN (GLUCOPHAGE) 1000 MG tablet [Pharmacy Med Name: MetFORMIN HCl Oral Tablet 1000 MG]  Last Written Prescription Date:  9/7/2017  Last Fill Quantity: 90 tablet,  # refills: 5   Last office visit: 9/5/2017 with prescribing provider:  Kathi Mondragon   Future Office Visit:   Next 5 appointments (look out 90 days)     Apr 16, 2018  1:50 PM CDT   [facility] Kathi Mondragon PA-C   [facility]    [address] [address] [phone]                  60 tablet 4     Sig: TAKE ONE TABLET BY MOUTH TWICE DAILY WITH MEALS    Biguanide Agents Failed    4/13/2018  4:06 PM       Failed - Blood pressure less than 140/90 in past 6 months    BP Readings from Last 3 Encounters:   10/04/17 168/60   09/28/17 168/52   09/08/17 160/60                Failed - Patient has documented LDL within the past 12 mos.    Recent Labs   Lab Test  09/07/16   1003   LDL  105*            Failed - Patient has documented A1c within the specified period of time.    Recent Labs   Lab Test  04/26/17   1450   A1C  6.0            Failed - Recent (6 mo) or future (30 days) visit within the authorizing provider's specialty    Patient had office visit in the last 6 months or has a visit in the next 30 days with authorizing provider or within the authorizing provider's specialty.  See \"Patient Info\" tab in inbasket, or \"Choose Columns\" in Meds & Orders section of the refill encounter.           Passed - Patient has had a Microalbumin in the past 12 mos.    Recent Labs   Lab Test  08/10/17   1636   MICROL  190   UMALCR  450.24*            Passed - Patient is age 10 or older       Passed - Patient's CR is NOT>1.4 OR Patient's EGFR is NOT<45 within past 12 mos.    Recent Labs   Lab Test  10/02/17   0828   GFRESTIMATED  89   GFRESTBLACK  >90       Recent Labs   Lab Test  10/02/17   0828   CR  0.86            " Passed - Patient does NOT have a diagnosis of CHF.

## 2018-04-16 ENCOUNTER — OFFICE VISIT (OUTPATIENT)
Dept: FAMILY MEDICINE | Facility: CLINIC | Age: 68
End: 2018-04-16
Payer: COMMERCIAL

## 2018-04-16 VITALS
DIASTOLIC BLOOD PRESSURE: 50 MMHG | SYSTOLIC BLOOD PRESSURE: 180 MMHG | BODY MASS INDEX: 23.1 KG/M2 | OXYGEN SATURATION: 100 % | HEIGHT: 64 IN | WEIGHT: 135.3 LBS | TEMPERATURE: 98 F | RESPIRATION RATE: 16 BRPM | HEART RATE: 59 BPM

## 2018-04-16 DIAGNOSIS — Z11.59 NEED FOR HEPATITIS C SCREENING TEST: ICD-10-CM

## 2018-04-16 DIAGNOSIS — I1A.0 RESISTANT HYPERTENSION: ICD-10-CM

## 2018-04-16 DIAGNOSIS — E11.65 TYPE 2 DIABETES MELLITUS WITH HYPERGLYCEMIA, WITHOUT LONG-TERM CURRENT USE OF INSULIN (H): Primary | ICD-10-CM

## 2018-04-16 DIAGNOSIS — Z13.89 SCREENING FOR DIABETIC PERIPHERAL NEUROPATHY: ICD-10-CM

## 2018-04-16 DIAGNOSIS — Z23 ENCOUNTER FOR IMMUNIZATION: ICD-10-CM

## 2018-04-16 DIAGNOSIS — Z12.11 SCREEN FOR COLON CANCER: ICD-10-CM

## 2018-04-16 LAB — HBA1C MFR BLD: 7.2 % (ref 0–5.6)

## 2018-04-16 PROCEDURE — 90471 IMMUNIZATION ADMIN: CPT | Performed by: PHYSICIAN ASSISTANT

## 2018-04-16 PROCEDURE — 86803 HEPATITIS C AB TEST: CPT | Performed by: PHYSICIAN ASSISTANT

## 2018-04-16 PROCEDURE — 36415 COLL VENOUS BLD VENIPUNCTURE: CPT | Performed by: PHYSICIAN ASSISTANT

## 2018-04-16 PROCEDURE — G0009 ADMIN PNEUMOCOCCAL VACCINE: HCPCS | Mod: 59 | Performed by: PHYSICIAN ASSISTANT

## 2018-04-16 PROCEDURE — 90732 PPSV23 VACC 2 YRS+ SUBQ/IM: CPT | Performed by: PHYSICIAN ASSISTANT

## 2018-04-16 PROCEDURE — 99207 C FOOT EXAM  NO CHARGE: CPT | Performed by: PHYSICIAN ASSISTANT

## 2018-04-16 PROCEDURE — 90714 TD VACC NO PRESV 7 YRS+ IM: CPT | Performed by: PHYSICIAN ASSISTANT

## 2018-04-16 PROCEDURE — 83036 HEMOGLOBIN GLYCOSYLATED A1C: CPT | Performed by: PHYSICIAN ASSISTANT

## 2018-04-16 PROCEDURE — 99214 OFFICE O/P EST MOD 30 MIN: CPT | Mod: 25 | Performed by: PHYSICIAN ASSISTANT

## 2018-04-16 PROCEDURE — 80061 LIPID PANEL: CPT | Performed by: PHYSICIAN ASSISTANT

## 2018-04-16 RX ORDER — SPIRONOLACTONE 25 MG/1
25 TABLET ORAL
Refills: 1 | COMMUNITY
Start: 2018-04-13 | End: 2018-05-18

## 2018-04-16 RX ORDER — GLIPIZIDE 5 MG/1
5 TABLET ORAL
Qty: 90 TABLET | Refills: 1 | Status: SHIPPED | OUTPATIENT
Start: 2018-04-16 | End: 2018-10-12

## 2018-04-16 RX ORDER — LOSARTAN POTASSIUM 100 MG/1
100 TABLET ORAL
Refills: 1 | COMMUNITY
Start: 2017-09-12 | End: 2018-05-18

## 2018-04-16 RX ORDER — FUROSEMIDE 20 MG
20 TABLET ORAL
Refills: 0 | COMMUNITY
Start: 2017-08-10 | End: 2018-05-18

## 2018-04-16 RX ORDER — VALSARTAN 160 MG/1
160 TABLET ORAL
Refills: 0 | COMMUNITY
Start: 2017-09-25 | End: 2018-04-16

## 2018-04-16 RX ORDER — AMLODIPINE BESYLATE 10 MG/1
10 TABLET ORAL
Refills: 1 | COMMUNITY
Start: 2017-09-12 | End: 2018-04-16

## 2018-04-16 NOTE — PROGRESS NOTES
SUBJECTIVE:   Shell Leon is a 68 year old male who presents to clinic today for the following health issues:      Diabetes Follow-up    Patient is checking blood sugars: once daily.  Results are as follows:         am - around 100    Diabetic concerns: None     Symptoms of hypoglycemia (low blood sugar): none     Paresthesias (numbness or burning in feet) or sores: No     Date of last diabetic eye exam: none on file    BP Readings from Last 2 Encounters:   04/16/18 180/50   10/04/17 168/60     Hemoglobin A1C (%)   Date Value   04/26/2017 6.0   01/11/2017 8.6 (H)     LDL Cholesterol Calculated (mg/dL)   Date Value   09/07/2016 105 (H)       Amount of exercise or physical activity: 4-5 days/week for an average of 15-30 minutes    Problems taking medications regularly: No    Medication side effects: none    Diet: low salt and low sugar    Patient is here today to follow up on blood pressure and glucose readings  He states he has been trying to follow a better diet, especially while over in [facility]  Has been walking a lot more  No chest pain, shakiness, dizziness, some leg puffiness  Has been taking medications without side effects  Has not been able to check BP regularly, BP cuff at home broke  Has yet to see Dr. Landry since coming back from [facility]    Problem list and histories reviewed & adjusted, as indicated.  Additional history: as documented    Patient Active Problem List   Diagnosis     Essential hypertension with goal blood pressure less than 140/90     Internal carotid artery stenosis, bilateral     Benign essential hypertension     Edema, unspecified type     Type 2 diabetes mellitus without complication (H)     Past Surgical History:   Procedure Laterality Date     LAPAROSCOPIC CHOLECYSTECTOMY N/A 1/6/2017    Procedure: LAPAROSCOPIC CHOLECYSTECTOMY;  Surgeon: Michael Caba MD;  Location:  OR       Social History   Substance Use Topics     Smoking status: Never Smoker     Smokeless  tobacco: Never Used     Alcohol use No     Family History   Problem Relation Age of Onset     Unknown/Adopted No family hx of          Current Outpatient Prescriptions   Medication Sig Dispense Refill     spironolactone (ALDACTONE) 25 MG tablet 25 mg  1     furosemide (LASIX) 20 MG tablet 20 mg  0     losartan (COZAAR) 100 MG tablet 100 mg  1     amLODIPine (NORVASC) 10 MG tablet 10 mg  1     valsartan (DIOVAN) 160 MG tablet 160 mg  0     order for DME Equipment being ordered: blood pressure cuff 1 Units 0     metFORMIN (GLUCOPHAGE) 1000 MG tablet TAKE 1 TABLET BY MOUTH TWICE DAILY WITH MEALS 60 tablet 5     glipiZIDE (GLUCOTROL) 5 MG tablet Take 1 tablet (5 mg) by mouth every morning (before breakfast) 90 tablet 1     valsartan (DIOVAN) 320 MG tablet Take 1 tablet (320 mg) by mouth daily 90 tablet 1     hydrochlorothiazide (HYDRODIURIL) 25 MG tablet Take 1 tablet (25 mg) by mouth daily 90 tablet 1     atorvastatin (LIPITOR) 80 MG tablet Take 1 tablet (80 mg) by mouth daily 90 tablet 3     hydrALAZINE (APRESOLINE) 50 MG tablet Take 1 tablet (50 mg) by mouth 3 times daily 180 tablet 3      CHILDRENS ASPIRIN 81 MG chewable tablet CHEW AND SWALLOW ONE TABLET BY MOUTH ONE TIME DAILY  90 tablet 3     blood glucose monitoring (NO BRAND SPECIFIED) test strip Use to test blood sugars 3 times daily or as directed 100 strip 11     blood glucose (NO BRAND SPECIFIED) lancets standard Use to test blood sugar 3 times daily or as directed. 1 Box 11     [DISCONTINUED] glipiZIDE (GLUCOTROL) 5 MG tablet Take 1 tablet (5 mg) by mouth every morning (before breakfast) 90 tablet 1     [DISCONTINUED] metFORMIN (GLUCOPHAGE) 1000 MG tablet TAKE 1 TABLET BY MOUTH TWICE DAILY WITH MEALS 60 tablet 5     No Known Allergies    Reviewed and updated as needed this visit by clinical staff  Tobacco  Allergies  Meds  Med Hx  Surg Hx  Fam Hx  Soc Hx      Reviewed and updated as needed this visit by Provider         ROS:  Constitutional, HEENT,  "cardiovascular, pulmonary, gi and gu systems are negative, except as otherwise noted.    OBJECTIVE:     /50 (BP Location: Left arm, Patient Position: Chair, Cuff Size: Adult Regular)  Pulse 59  Temp 98  F (36.7  C) (Oral)  Resp 16  Ht 5' 4\" (1.626 m)  Wt 135 lb 4.8 oz (61.4 kg)  SpO2 100%  BMI 23.22 kg/m2  Body mass index is 23.22 kg/(m^2).  GENERAL: healthy, alert and no distress  NECK: no adenopathy, no asymmetry, masses, or scars and thyroid normal to palpation  RESP: lungs clear to auscultation - no rales, rhonchi or wheezes  CV: regular rate and rhythm, normal S1 S2, no S3 or S4, no murmur, click or rub, no peripheral edema and peripheral pulses strong  MS: no gross musculoskeletal defects noted, no edema  SKIN: no suspicious lesions or rashes  Diabetic foot exam: normal DP and PT pulses, no trophic changes or ulcerative lesions, normal sensory exam and normal monofilament exam    Diagnostic Test Results:  Results for orders placed or performed in visit on 04/16/18 (from the past 24 hour(s))   HEMOGLOBIN A1C   Result Value Ref Range    Hemoglobin A1C 7.2 (H) 0 - 5.6 %       ASSESSMENT/PLAN:     1. 7. Type 2 diabetes mellitus with hyperglycemia, without long-term current use of insulin (H)  Chronic issue, A1C updated today.  Meds refilled. Vaccines updated. Eye exam up to date. Foot screening performed.  Recheck in 3 months.  - HEMOGLOBIN A1C  - metFORMIN (GLUCOPHAGE) 1000 MG tablet; TAKE 1 TABLET BY MOUTH TWICE DAILY WITH MEALS  Dispense: 60 tablet; Refill: 5  - glipiZIDE (GLUCOTROL) 5 MG tablet; Take 1 tablet (5 mg) by mouth every morning (before breakfast)  Dispense: 90 tablet; Refill: 1    2. Screen for colon cancer  - Fecal colorectal cancer screen FIT; Future    3. Need for hepatitis C screening test  - Hepatitis C Screen Reflex to HCV RNA Quant and Genotype    4. Screening for diabetic peripheral neuropathy  - FOOT EXAM  NO CHARGE [11858.114]    5. Encounter for immunization  - Pneumococcal " vaccine 23 valent PPSV23  (Pneumovax) [89236]  - ADMIN MEDICARE: Pneumococcal Vaccine ()  -      ADMIN VACCINE, FIRST  - TD (ADULT, 7+) PRESERVE FREE  - SCREENING QUESTIONS FOR ADULT IMMUNIZATIONS    6. Resistant hypertension  Chronic issue, BP uncontrolled today.  Needs to follow up with Dr. Landry for resistant hypertension, did order BP cuff for him to monitor at home.  - order for DME; Equipment being ordered: blood pressure cuff  Dispense: 1 Units; Refill: 0      Recheck in office in 3 months.      Kathi Mondragon PA-C  [facility]

## 2018-04-16 NOTE — LETTER
"[facility]  [address] [address] [phone]          April 20, 2018    Shell Leon                                                                                                                     [address]  [facility] [address]            Dear Shell,    Total cholesterol is great at 95, please continue exercise and watch diet.  Triglycerides are normal at 69, this is simple sugar and fat in blood. HDL which is the \"good\" cholesterol (heart protective)  is low at 26. Increase this with more exercise.  The LDL or \"bad\" cholesterol is at 55.  You tested negative for hepatitis C.      Sincerely,         Kathi Mondragon PA-C    "

## 2018-04-16 NOTE — MR AVS SNAPSHOT
After Visit Summary   4/16/2018    Shell Leon    MRN: [id number]           Patient Information     Date Of Birth          1950        Visit Information        Provider Department      4/16/2018 1:50 PM Kathi Mondragon PA-C [facility]        Today's Diagnoses     Type 2 diabetes mellitus with hyperglycemia, without long-term current use of insulin (H)    -  1    Screen for colon cancer        Need for hepatitis C screening test        Screening for diabetic peripheral neuropathy        Encounter for immunization        Resistant hypertension           Follow-ups after your visit        Follow-up notes from your care team     Return in about 3 months (around 7/16/2018) for Diabetic check.      Your next 10 appointments already scheduled     May 16, 2018  2:15 PM CDT   New Visit with Julio C Cárdenas MD   [facility] ([facility])    [address] [place] [address] [address]  [facility] [address]   [phone]            Jul 16, 2018  3:10 PM CDT   Office Visit with Kathi Mondragon PA-C   [facility] [place] ([facility])    [address] [facility] [address] [phone]           Bring a current list of meds and any records pertaining to this visit. For Physicals, please bring immunization records and any forms needing to be filled out. Please arrive 10 minutes early to complete paperwork.              Future tests that were ordered for you today     Open Future Orders        Priority Expected Expires Ordered    Lipid panel reflex to direct LDL Non-fasting Routine 4/16/2018 4/30/2018 4/16/2018    Fecal colorectal cancer screen FIT Routine 5/7/2018 7/9/2018 4/16/2018            Who to contact     If you have questions or need follow up information about today's clinic visit or your schedule please contact [facility] directly at [phone].  Normal or  "non-critical lab and imaging results will be communicated to you by MyChart, letter or phone within 4 business days after the clinic has received the results. If you do not hear from us within 7 days, please contact the clinic through [place] or phone. If you have a critical or abnormal lab result, we will notify you by phone as soon as possible.  Submit refill requests through [employer] or call your pharmacy and they will forward the refill request to us. Please allow 3 business days for your refill to be completed.          Additional Information About Your Visit        Proenza SchouerharMdundo Information     FilmMe lets you send messages to your doctor, view your test results, renew your prescriptions, schedule appointments and more. To sign up, go to [email or web address] . Click on \"Log in\" on the left side of the screen, which will take you to the Welcome page. Then click on \"Sign up Now\" on the right side of the page.     You will be asked to enter the access code listed below, as well as some personal information. Please follow the directions to create your username and password.     Your access code is: [email or web address]  Expires: 7/15/2018  2:33 PM     Your access code will  in 90 days. If you need help or a new code, please call your [place] clinic or [phone].        Care EveryWhere ID     This is your Care EveryWhere ID. This could be used by other organizations to access your [place] medical records  [id number]        Your Vitals Were     Pulse Temperature Respirations Height Pulse Oximetry BMI (Body Mass Index)    59 98  F (36.7  C) (Oral) 16 5' 4\" (1.626 m) 100% 23.22 kg/m2       Blood Pressure from Last 3 Encounters:   18 180/50   10/04/17 168/60   17 168/52    Weight from Last 3 Encounters:   18 135 lb 4.8 oz (61.4 kg)   10/04/17 139 lb (63 kg)   17 139 lb (63 kg)              We Performed the Following          ADMIN VACCINE, FIRST     ADMIN MEDICARE: Pneumococcal Vaccine () "     FOOT EXAM  NO CHARGE [35658.114]     HEMOGLOBIN A1C     Hepatitis C Screen Reflex to HCV RNA Quant and Genotype     Pneumococcal vaccine 23 valent PPSV23  (Pneumovax) [53072]     SCREENING QUESTIONS FOR ADULT IMMUNIZATIONS     TD (ADULT, 7+) PRESERVE FREE          Today's Medication Changes          These changes are accurate as of 4/16/18  2:33 PM.  If you have any questions, ask your nurse or doctor.               Start taking these medicines.        Dose/Directions    order for DME   Used for:  Resistant hypertension   Started by:  Kathi Mondragon PA-C        Equipment being ordered: blood pressure cuff   Quantity:  1 Units   Refills:  0         These medicines have changed or have updated prescriptions.        Dose/Directions    metFORMIN 1000 MG tablet   Commonly known as:  GLUCOPHAGE   This may have changed:  See the new instructions.   Used for:  Type 2 diabetes mellitus with hyperglycemia, without long-term current use of insulin (H)   Changed by:  Kathi Mondragon PA-C        TAKE 1 TABLET BY MOUTH TWICE DAILY WITH MEALS   Quantity:  60 tablet   Refills:  5       valsartan 320 MG tablet   Commonly known as:  DIOVAN   This may have changed:  Another medication with the same name was removed. Continue taking this medication, and follow the directions you see here.   Used for:  Resistant hypertension   Changed by:  Kathi Mondragon PA-C        Dose:  320 mg   Take 1 tablet (320 mg) by mouth daily   Quantity:  90 tablet   Refills:  1         Stop taking these medicines if you haven't already. Please contact your care team if you have questions.     amLODIPine 10 MG tablet   Commonly known as:  NORVASC   Stopped by:  Kathi Mondragon PA-C                Where to get your medicines      These medications were sent to [facility][id number] [place] [id number] [address] [address]     Phone:  [phone]     glipiZIDE 5 MG tablet     metFORMIN 1000 MG tablet         Some of these will need a paper prescription and others can be bought over the counter.  Ask your nurse if you have questions.     Bring a paper prescription for each of these medications     order for DME                Primary Care Provider Office Phone # Fax #    Kathi Mondragon PA-C [phone] [address]  [place] [address]        Equal Access to Services     TERRA FROST : Hadii aad ku hadasho Soomaali, waaxda luqadaha, qaybta kaalmada adeegyada, waxay idiin hayaan adeeg kharash la'aan ah. So [facility] [phone].    ATENCIÓN: Si habla espdavid, tiene a sullivan disposición servicios gratuitos de asistencia lingüística. Llame al [phone].    We comply with applicable federal civil rights laws and [place] laws. We do not discriminate on the basis of race, color, national origin, age, disability, sex, sexual orientation, or gender identity.            Thank you!     Thank you for choosing [facility]  for your care. Our goal is always to provide you with excellent care. Hearing back from our patients is one way we can continue to improve our services. Please take a few minutes to complete the written survey that you may receive in the mail after your visit with us. Thank you!             Your Updated Medication List - Protect others around you: Learn how to safely use, store and throw away your medicines at www.disposemymeds.org.          This list is accurate as of 4/16/18  2:33 PM.  Always use your most recent med list.                   Brand Name Dispense Instructions for use Diagnosis    atorvastatin 80 MG tablet    LIPITOR    90 tablet    Take 1 tablet (80 mg) by mouth daily    Type 2 diabetes mellitus without complication, unspecified long term insulin use status (H)       blood glucose lancets standard    no brand specified    1 Box    Use to test blood sugar 3 times daily or as directed.    Type 2 diabetes mellitus with  hyperglycemia, without long-term current use of insulin (H)       blood glucose monitoring test strip    no brand specified    100 strip    Use to test blood sugars 3 times daily or as directed    Type 2 diabetes mellitus with hyperglycemia, without long-term current use of insulin (H)       furosemide 20 MG tablet    LASIX     20 mg        glipiZIDE 5 MG tablet    GLUCOTROL    90 tablet    Take 1 tablet (5 mg) by mouth every morning (before breakfast)    Type 2 diabetes mellitus with hyperglycemia, without long-term current use of insulin (H)       hydrALAZINE 50 MG tablet    APRESOLINE    180 tablet    Take 1 tablet (50 mg) by mouth 3 times daily    Resistant hypertension       hydrochlorothiazide 25 MG tablet    HYDRODIURIL    90 tablet    Take 1 tablet (25 mg) by mouth daily    Benign essential hypertension       losartan 100 MG tablet    COZAAR     100 mg        metFORMIN 1000 MG tablet    GLUCOPHAGE    60 tablet    TAKE 1 TABLET BY MOUTH TWICE DAILY WITH MEALS    Type 2 diabetes mellitus with hyperglycemia, without long-term current use of insulin (H)       order for DME     1 Units    Equipment being ordered: blood pressure cuff    Resistant hypertension       [employer] ASPIRIN 81 MG chewable tablet   Generic drug:  aspirin     90 tablet    CHEW AND SWALLOW ONE TABLET BY MOUTH ONE TIME DAILY    Type 2 diabetes mellitus without complication (H)       spironolactone 25 MG tablet    ALDACTONE     25 mg        valsartan 320 MG tablet    DIOVAN    90 tablet    Take 1 tablet (320 mg) by mouth daily    Resistant hypertension

## 2018-04-17 LAB
CHOLEST SERPL-MCNC: 95 MG/DL
HCV AB SERPL QL IA: NONREACTIVE
HDLC SERPL-MCNC: 26 MG/DL
LDLC SERPL CALC-MCNC: 55 MG/DL
NONHDLC SERPL-MCNC: 69 MG/DL
TRIGL SERPL-MCNC: 69 MG/DL

## 2018-05-15 ENCOUNTER — TELEPHONE (OUTPATIENT)
Dept: CARDIOLOGY | Facility: CLINIC | Age: 68
End: 2018-05-15

## 2018-05-20 ENCOUNTER — HOSPITAL ENCOUNTER (INPATIENT)
Facility: CLINIC | Age: 68
LOS: 2 days | Discharge: HOME | DRG: 378 | End: 2018-05-22
Attending: EMERGENCY MEDICINE | Admitting: INTERNAL MEDICINE
Payer: COMMERCIAL

## 2018-05-20 ENCOUNTER — APPOINTMENT (OUTPATIENT)
Dept: ULTRASOUND IMAGING | Facility: CLINIC | Age: 68
DRG: 378 | End: 2018-05-20
Attending: EMERGENCY MEDICINE
Payer: COMMERCIAL

## 2018-05-20 ENCOUNTER — APPOINTMENT (OUTPATIENT)
Dept: MRI IMAGING | Facility: CLINIC | Age: 68
DRG: 378 | End: 2018-05-20
Attending: EMERGENCY MEDICINE
Payer: COMMERCIAL

## 2018-05-20 ENCOUNTER — APPOINTMENT (OUTPATIENT)
Dept: GENERAL RADIOLOGY | Facility: CLINIC | Age: 68
DRG: 378 | End: 2018-05-20
Attending: EMERGENCY MEDICINE
Payer: COMMERCIAL

## 2018-05-20 DIAGNOSIS — D64.9 ANEMIA: ICD-10-CM

## 2018-05-20 DIAGNOSIS — K92.2 GASTROINTESTINAL HEMORRHAGE, UNSPECIFIED GASTROINTESTINAL HEMORRHAGE TYPE: Primary | ICD-10-CM

## 2018-05-20 LAB
ABO + RH BLD: NORMAL
ABO + RH BLD: NORMAL
ALBUMIN SERPL-MCNC: 3.5 G/DL (ref 3.4–5)
ALBUMIN UR-MCNC: 30 MG/DL
ALP SERPL-CCNC: 98 U/L (ref 40–150)
ALT SERPL W P-5'-P-CCNC: 38 U/L (ref 0–70)
ANION GAP SERPL CALCULATED.3IONS-SCNC: 7 MMOL/L (ref 3–14)
APPEARANCE UR: CLEAR
AST SERPL W P-5'-P-CCNC: 40 U/L (ref 0–45)
BASOPHILS # BLD AUTO: 0.1 10E9/L (ref 0–0.2)
BASOPHILS NFR BLD AUTO: 0.6 %
BILIRUB DIRECT SERPL-MCNC: <0.1 MG/DL (ref 0–0.2)
BILIRUB SERPL-MCNC: 0.3 MG/DL (ref 0.2–1.3)
BILIRUB UR QL STRIP: NEGATIVE
BLD GP AB SCN SERPL QL: NORMAL
BLD PROD TYP BPU: NORMAL
BLD PROD TYP BPU: NORMAL
BLD UNIT ID BPU: 0
BLOOD BANK CMNT PATIENT-IMP: NORMAL
BLOOD PRODUCT CODE: NORMAL
BPU ID: NORMAL
BUN SERPL-MCNC: 37 MG/DL (ref 7–30)
CALCIUM SERPL-MCNC: 8.5 MG/DL (ref 8.5–10.1)
CHLORIDE SERPL-SCNC: 103 MMOL/L (ref 94–109)
CO2 SERPL-SCNC: 20 MMOL/L (ref 20–32)
COLOR UR AUTO: ABNORMAL
CREAT SERPL-MCNC: 1.14 MG/DL (ref 0.66–1.25)
DIFFERENTIAL METHOD BLD: ABNORMAL
EOSINOPHIL # BLD AUTO: 0.9 10E9/L (ref 0–0.7)
EOSINOPHIL NFR BLD AUTO: 10.7 %
ERYTHROCYTE [DISTWIDTH] IN BLOOD BY AUTOMATED COUNT: 16.7 % (ref 10–15)
FERRITIN SERPL-MCNC: 9 NG/ML (ref 26–388)
GFR SERPL CREATININE-BSD FRML MDRD: 64 ML/MIN/1.7M2
GLUCOSE BLDC GLUCOMTR-MCNC: 132 MG/DL (ref 70–99)
GLUCOSE SERPL-MCNC: 105 MG/DL (ref 70–99)
GLUCOSE UR STRIP-MCNC: NEGATIVE MG/DL
HBA1C MFR BLD: 6 % (ref 0–5.6)
HCT VFR BLD AUTO: 21.7 % (ref 40–53)
HGB BLD-MCNC: 6.4 G/DL (ref 13.3–17.7)
HGB UR QL STRIP: NEGATIVE
IMM GRANULOCYTES # BLD: 0 10E9/L (ref 0–0.4)
IMM GRANULOCYTES NFR BLD: 0.3 %
IRON SATN MFR SERPL: 3 % (ref 15–46)
IRON SERPL-MCNC: 10 UG/DL (ref 35–180)
KETONES UR STRIP-MCNC: NEGATIVE MG/DL
LDH SERPL L TO P-CCNC: 316 U/L (ref 85–227)
LEUKOCYTE ESTERASE UR QL STRIP: NEGATIVE
LYMPHOCYTES # BLD AUTO: 1.8 10E9/L (ref 0.8–5.3)
LYMPHOCYTES NFR BLD AUTO: 20.9 %
MCH RBC QN AUTO: 22.5 PG (ref 26.5–33)
MCHC RBC AUTO-ENTMCNC: 29.5 G/DL (ref 31.5–36.5)
MCV RBC AUTO: 76 FL (ref 78–100)
MONOCYTES # BLD AUTO: 0.6 10E9/L (ref 0–1.3)
MONOCYTES NFR BLD AUTO: 6.8 %
MUCOUS THREADS #/AREA URNS LPF: PRESENT /LPF
NEUTROPHILS # BLD AUTO: 5.3 10E9/L (ref 1.6–8.3)
NEUTROPHILS NFR BLD AUTO: 60.7 %
NITRATE UR QL: NEGATIVE
NRBC # BLD AUTO: 0 10*3/UL
NRBC BLD AUTO-RTO: 0 /100
NT-PROBNP SERPL-MCNC: 981 PG/ML (ref 0–900)
NUM BPU REQUESTED: 2
PH UR STRIP: 5 PH (ref 5–7)
PLATELET # BLD AUTO: 273 10E9/L (ref 150–450)
POTASSIUM SERPL-SCNC: 5.2 MMOL/L (ref 3.4–5.3)
PROT SERPL-MCNC: 7.1 G/DL (ref 6.8–8.8)
RBC # BLD AUTO: 2.84 10E12/L (ref 4.4–5.9)
RBC #/AREA URNS AUTO: 1 /HPF (ref 0–2)
RETICS # AUTO: 44.3 10E9/L (ref 25–95)
RETICS/RBC NFR AUTO: 1.7 % (ref 0.5–2)
SODIUM SERPL-SCNC: 130 MMOL/L (ref 133–144)
SOURCE: ABNORMAL
SP GR UR STRIP: 1 (ref 1–1.03)
SPECIMEN EXP DATE BLD: NORMAL
SQUAMOUS #/AREA URNS AUTO: <1 /HPF (ref 0–1)
TIBC SERPL-MCNC: 354 UG/DL (ref 240–430)
TRANSFUSION STATUS PATIENT QL: NORMAL
TRANSFUSION STATUS PATIENT QL: NORMAL
TROPONIN I SERPL-MCNC: 0.03 UG/L (ref 0–0.04)
UROBILINOGEN UR STRIP-MCNC: 0 MG/DL (ref 0–2)
WBC # BLD AUTO: 8.7 10E9/L (ref 4–11)
WBC #/AREA URNS AUTO: 1 /HPF (ref 0–5)

## 2018-05-20 PROCEDURE — 40000847 ZZHCL STATISTIC MORPHOLOGY W/INTERP HISTOLOGY TC 85060: Performed by: INTERNAL MEDICINE

## 2018-05-20 PROCEDURE — P9016 RBC LEUKOCYTES REDUCED: HCPCS | Performed by: EMERGENCY MEDICINE

## 2018-05-20 PROCEDURE — 25000132 ZZH RX MED GY IP 250 OP 250 PS 637: Performed by: INTERNAL MEDICINE

## 2018-05-20 PROCEDURE — 86900 BLOOD TYPING SEROLOGIC ABO: CPT | Performed by: EMERGENCY MEDICINE

## 2018-05-20 PROCEDURE — 82728 ASSAY OF FERRITIN: CPT | Performed by: INTERNAL MEDICINE

## 2018-05-20 PROCEDURE — 36430 TRANSFUSION BLD/BLD COMPNT: CPT

## 2018-05-20 PROCEDURE — 12000000 ZZH R&B MED SURG/OB

## 2018-05-20 PROCEDURE — 86850 RBC ANTIBODY SCREEN: CPT | Performed by: EMERGENCY MEDICINE

## 2018-05-20 PROCEDURE — 70553 MRI BRAIN STEM W/O & W/DYE: CPT

## 2018-05-20 PROCEDURE — 86901 BLOOD TYPING SEROLOGIC RH(D): CPT | Performed by: EMERGENCY MEDICINE

## 2018-05-20 PROCEDURE — 81001 URINALYSIS AUTO W/SCOPE: CPT | Performed by: EMERGENCY MEDICINE

## 2018-05-20 PROCEDURE — 82746 ASSAY OF FOLIC ACID SERUM: CPT | Performed by: INTERNAL MEDICINE

## 2018-05-20 PROCEDURE — 82607 VITAMIN B-12: CPT | Performed by: INTERNAL MEDICINE

## 2018-05-20 PROCEDURE — 83010 ASSAY OF HAPTOGLOBIN QUANT: CPT | Performed by: EMERGENCY MEDICINE

## 2018-05-20 PROCEDURE — 80076 HEPATIC FUNCTION PANEL: CPT | Performed by: EMERGENCY MEDICINE

## 2018-05-20 PROCEDURE — 25000128 H RX IP 250 OP 636: Performed by: EMERGENCY MEDICINE

## 2018-05-20 PROCEDURE — 00000146 ZZHCL STATISTIC GLUCOSE BY METER IP

## 2018-05-20 PROCEDURE — 99223 1ST HOSP IP/OBS HIGH 75: CPT | Mod: AI | Performed by: INTERNAL MEDICINE

## 2018-05-20 PROCEDURE — 99285 EMERGENCY DEPT VISIT HI MDM: CPT | Mod: 25

## 2018-05-20 PROCEDURE — 80048 BASIC METABOLIC PNL TOTAL CA: CPT | Performed by: EMERGENCY MEDICINE

## 2018-05-20 PROCEDURE — 93971 EXTREMITY STUDY: CPT | Mod: LT

## 2018-05-20 PROCEDURE — 83880 ASSAY OF NATRIURETIC PEPTIDE: CPT | Performed by: EMERGENCY MEDICINE

## 2018-05-20 PROCEDURE — 86923 COMPATIBILITY TEST ELECTRIC: CPT | Performed by: EMERGENCY MEDICINE

## 2018-05-20 PROCEDURE — 83036 HEMOGLOBIN GLYCOSYLATED A1C: CPT | Performed by: EMERGENCY MEDICINE

## 2018-05-20 PROCEDURE — 36415 COLL VENOUS BLD VENIPUNCTURE: CPT | Performed by: INTERNAL MEDICINE

## 2018-05-20 PROCEDURE — 83615 LACTATE (LD) (LDH) ENZYME: CPT | Performed by: INTERNAL MEDICINE

## 2018-05-20 PROCEDURE — 83550 IRON BINDING TEST: CPT | Performed by: INTERNAL MEDICINE

## 2018-05-20 PROCEDURE — 85025 COMPLETE CBC W/AUTO DIFF WBC: CPT | Performed by: EMERGENCY MEDICINE

## 2018-05-20 PROCEDURE — 83615 LACTATE (LD) (LDH) ENZYME: CPT | Performed by: EMERGENCY MEDICINE

## 2018-05-20 PROCEDURE — 71046 X-RAY EXAM CHEST 2 VIEWS: CPT

## 2018-05-20 PROCEDURE — 85060 BLOOD SMEAR INTERPRETATION: CPT | Performed by: INTERNAL MEDICINE

## 2018-05-20 PROCEDURE — 83540 ASSAY OF IRON: CPT | Performed by: INTERNAL MEDICINE

## 2018-05-20 PROCEDURE — 93005 ELECTROCARDIOGRAM TRACING: CPT

## 2018-05-20 PROCEDURE — 84484 ASSAY OF TROPONIN QUANT: CPT | Performed by: EMERGENCY MEDICINE

## 2018-05-20 PROCEDURE — A9585 GADOBUTROL INJECTION: HCPCS | Performed by: EMERGENCY MEDICINE

## 2018-05-20 PROCEDURE — 85045 AUTOMATED RETICULOCYTE COUNT: CPT | Performed by: INTERNAL MEDICINE

## 2018-05-20 RX ORDER — ONDANSETRON 2 MG/ML
4 INJECTION INTRAMUSCULAR; INTRAVENOUS EVERY 6 HOURS PRN
Status: DISCONTINUED | OUTPATIENT
Start: 2018-05-20 | End: 2018-05-21

## 2018-05-20 RX ORDER — HYDRALAZINE HYDROCHLORIDE 50 MG/1
50 TABLET, FILM COATED ORAL 3 TIMES DAILY
Status: DISCONTINUED | OUTPATIENT
Start: 2018-05-20 | End: 2018-05-21

## 2018-05-20 RX ORDER — AMOXICILLIN 250 MG
2 CAPSULE ORAL 2 TIMES DAILY PRN
Status: DISCONTINUED | OUTPATIENT
Start: 2018-05-20 | End: 2018-05-21

## 2018-05-20 RX ORDER — POLYETHYLENE GLYCOL 3350 17 G/17G
17 POWDER, FOR SOLUTION ORAL DAILY PRN
Status: DISCONTINUED | OUTPATIENT
Start: 2018-05-20 | End: 2018-05-21

## 2018-05-20 RX ORDER — PROCHLORPERAZINE MALEATE 5 MG
5 TABLET ORAL EVERY 6 HOURS PRN
Status: DISCONTINUED | OUTPATIENT
Start: 2018-05-20 | End: 2018-05-21

## 2018-05-20 RX ORDER — HYDROMORPHONE HYDROCHLORIDE 1 MG/ML
0.2 INJECTION, SOLUTION INTRAMUSCULAR; INTRAVENOUS; SUBCUTANEOUS
Status: DISCONTINUED | OUTPATIENT
Start: 2018-05-20 | End: 2018-05-21

## 2018-05-20 RX ORDER — LIDOCAINE 40 MG/G
CREAM TOPICAL
Status: DISCONTINUED | OUTPATIENT
Start: 2018-05-20 | End: 2018-05-21

## 2018-05-20 RX ORDER — NICOTINE POLACRILEX 4 MG
15-30 LOZENGE BUCCAL
Status: DISCONTINUED | OUTPATIENT
Start: 2018-05-20 | End: 2018-05-21

## 2018-05-20 RX ORDER — AMOXICILLIN 250 MG
1 CAPSULE ORAL 2 TIMES DAILY PRN
Status: DISCONTINUED | OUTPATIENT
Start: 2018-05-20 | End: 2018-05-21

## 2018-05-20 RX ORDER — SODIUM CHLORIDE 9 MG/ML
INJECTION, SOLUTION INTRAVENOUS CONTINUOUS
Status: DISCONTINUED | OUTPATIENT
Start: 2018-05-20 | End: 2018-05-21

## 2018-05-20 RX ORDER — ACETAMINOPHEN 650 MG/1
650 SUPPOSITORY RECTAL EVERY 4 HOURS PRN
Status: DISCONTINUED | OUTPATIENT
Start: 2018-05-20 | End: 2018-05-21

## 2018-05-20 RX ORDER — ONDANSETRON 4 MG/1
4 TABLET, ORALLY DISINTEGRATING ORAL EVERY 6 HOURS PRN
Status: DISCONTINUED | OUTPATIENT
Start: 2018-05-20 | End: 2018-05-21

## 2018-05-20 RX ORDER — BISACODYL 10 MG
10 SUPPOSITORY, RECTAL RECTAL DAILY PRN
Status: DISCONTINUED | OUTPATIENT
Start: 2018-05-20 | End: 2018-05-21

## 2018-05-20 RX ORDER — PROCHLORPERAZINE 25 MG
12.5 SUPPOSITORY, RECTAL RECTAL EVERY 12 HOURS PRN
Status: DISCONTINUED | OUTPATIENT
Start: 2018-05-20 | End: 2018-05-21

## 2018-05-20 RX ORDER — NITROGLYCERIN 0.4 MG/1
0.4 TABLET SUBLINGUAL EVERY 5 MIN PRN
Status: DISCONTINUED | OUTPATIENT
Start: 2018-05-20 | End: 2018-05-21

## 2018-05-20 RX ORDER — VALSARTAN 80 MG/1
320 TABLET ORAL DAILY
Status: DISCONTINUED | OUTPATIENT
Start: 2018-05-21 | End: 2018-05-21

## 2018-05-20 RX ORDER — GADOBUTROL 604.72 MG/ML
7.5 INJECTION INTRAVENOUS ONCE
Status: COMPLETED | OUTPATIENT
Start: 2018-05-20 | End: 2018-05-20

## 2018-05-20 RX ORDER — OXYCODONE HYDROCHLORIDE 5 MG/1
5 TABLET ORAL EVERY 4 HOURS PRN
Status: DISCONTINUED | OUTPATIENT
Start: 2018-05-20 | End: 2018-05-21

## 2018-05-20 RX ORDER — ATORVASTATIN CALCIUM 40 MG/1
80 TABLET, FILM COATED ORAL DAILY
Status: DISCONTINUED | OUTPATIENT
Start: 2018-05-20 | End: 2018-05-21

## 2018-05-20 RX ORDER — NALOXONE HYDROCHLORIDE 0.4 MG/ML
.1-.4 INJECTION, SOLUTION INTRAMUSCULAR; INTRAVENOUS; SUBCUTANEOUS
Status: DISCONTINUED | OUTPATIENT
Start: 2018-05-20 | End: 2018-05-21

## 2018-05-20 RX ORDER — ACETAMINOPHEN 325 MG/1
650 TABLET ORAL EVERY 4 HOURS PRN
Status: DISCONTINUED | OUTPATIENT
Start: 2018-05-20 | End: 2018-05-21

## 2018-05-20 RX ORDER — DEXTROSE MONOHYDRATE 25 G/50ML
25-50 INJECTION, SOLUTION INTRAVENOUS
Status: DISCONTINUED | OUTPATIENT
Start: 2018-05-20 | End: 2018-05-21

## 2018-05-20 RX ADMIN — HYDRALAZINE HYDROCHLORIDE 50 MG: 50 TABLET ORAL at 21:52

## 2018-05-20 RX ADMIN — ATORVASTATIN CALCIUM 80 MG: 40 TABLET, FILM COATED ORAL at 21:22

## 2018-05-20 RX ADMIN — GADOBUTROL 6 ML: 604.72 INJECTION INTRAVENOUS at 18:52

## 2018-05-20 ASSESSMENT — ENCOUNTER SYMPTOMS
SPEECH DIFFICULTY: 0
HEADACHES: 1
WEAKNESS: 0
NUMBNESS: 0
SHORTNESS OF BREATH: 0
DIZZINESS: 1

## 2018-05-20 NOTE — IP AVS SNAPSHOT
MRN:[id number]                      After Visit Summary   5/20/2018    Shell Leon    MRN: [id number]           Thank you!     Thank you for choosing [facility] for your care. Our goal is always to provide you with excellent care. Hearing back from our patients is one way we can continue to improve our services. Please take a few minutes to complete the written survey that you may receive in the mail after you visit. If you would like to speak to someone directly about your visit please contact Patient Relations at [phone]. Thank you!          Patient Information     Date Of Birth          1950        Designated Caregiver       Most Recent Value    Caregiver    Will someone help with your care after discharge? yes    Name of designated caregiver Isabel    Phone number of caregiver [phone]    Caregiver address Same as patient      About your hospital stay     You were admitted on:  May 20, 2018 You last received care in the:  [place] [address] [facility]    You were discharged on:  May 22, 2018        Reason for your hospital stay       Gastrointestinal bleeding                  Who to Call     For medical emergencies, please call 911.  For non-urgent questions about your medical care, please call your primary care provider or clinic, [phone]  For questions related to your surgery, please call your surgery clinic        Attending Provider     Provider Specialty    Jude Betancourt MD Emergency Medicine    Alex Vick,  Internal Medicine       Primary Care Provider Office Phone # Fax #    Kathi Mondragon PA-C [phone]      After Care Instructions     Diet       Follow this diet upon discharge: Regular                  Follow-up Appointments     Follow-up and recommended labs and tests        Follow-up with Dr. Rabago at [facility] for pill endoscopy.  Follow-up with primary care provider in 3-4 days and  "check basic metabolic panel and hemoglobin.  Stop aspirin for now, continue omeprazole for nonbleeding gastric ulcer and discuss in the future with primary care provider.                  Your next 10 appointments already scheduled     May 30, 2018  8:30 AM CDT   Return Visit with Cresencio Landry MD   [facility] ([facility])    [address] [facility] [address]  [facility] [id number] [phone]            Jul 16, 2018  3:10 PM CDT   Office Visit with Kathi Mondragon PA-C   [facility]    [address] [facility] [address] [phone]           Bring a current list of meds and any records pertaining to this visit. For Physicals, please bring immunization records and any forms needing to be filled out. Please arrive 10 minutes early to complete paperwork.              Pending Results     Date and Time Order Name Status Description    5/22/2018 1208 Surgical pathology exam In process     5/21/2018 1355 Surgical pathology exam In process             Statement of Approval     Ordered          05/22/18 1511  I have reviewed and agree with all the recommendations and orders detailed in this document.  EFFECTIVE NOW     Approved and electronically signed by:  Jose Miguel Hernández MD             Admission Information     Date & Time Provider Department Dept. Phone    5/20/2018 Alex Vick, DO [place] [address] [facility] [phone]      Your Vitals Were     Blood Pressure Pulse Temperature Respirations Weight Pulse Oximetry    146/39 50 96.8  F (36  C) (Oral) 16 62.1 kg (136 lb 14.4 oz) 100%    BMI (Body Mass Index)                   23.5 kg/m2           Buck Information     Buck lets you send messages to your doctor, view your test results, renew your prescriptions, schedule appointments and more. To sign up, go to [email or web address] . Click on \"Log in\" on the left side of " "the screen, which will take you to the Welcome page. Then click on \"Sign up Now\" on the right side of the page.     You will be asked to enter the access code listed below, as well as some personal information. Please follow the directions to create your username and password.     Your access code is: [email or web address]  Expires: 7/15/2018  2:33 PM     Your access code will  in 90 days. If you need help or a new code, please call your [place] clinic or [phone].        Care EveryWhere ID     This is your Care EveryWhere ID. This could be used by other organizations to access your [place] medical records  [id number]        Equal Access to Services     TERRA FROST : Barbie Dillon, yane dennis, darrel king, zachary vela. So [facility] [phone].    ATENCIÓN: Si habla español, tiene a sullivan disposición servicios gratuitos de asistencia lingüística. Llame al [phone].    We comply with applicable federal civil rights laws and [place] laws. We do not discriminate on the basis of race, color, national origin, age, disability, sex, sexual orientation, or gender identity.               Review of your medicines      START taking        Dose / Directions    omeprazole 20 MG CR capsule   Commonly known as:  priLOSEC   Used for:  Gastrointestinal hemorrhage, unspecified gastrointestinal hemorrhage type        Dose:  20 mg   Take 1 capsule (20 mg) by mouth every morning (before breakfast)   Quantity:  30 capsule   Refills:  0         CONTINUE these medicines which have NOT CHANGED        Dose / Directions    atorvastatin 80 MG tablet   Commonly known as:  LIPITOR   Used for:  Type 2 diabetes mellitus without complication, unspecified long term insulin use status (H)        Dose:  80 mg   Take 1 tablet (80 mg) by mouth daily   Quantity:  90 tablet   Refills:  3       blood glucose lancets standard   Commonly known as:  no brand specified   Used for:  Type 2 " diabetes mellitus with hyperglycemia, without long-term current use of insulin (H)        Use to test blood sugar 3 times daily or as directed.   Quantity:  1 Box   Refills:  11       blood glucose monitoring test strip   Commonly known as:  no brand specified   Used for:  Type 2 diabetes mellitus with hyperglycemia, without long-term current use of insulin (H)        Use to test blood sugars 3 times daily or as directed   Quantity:  100 strip   Refills:  11       glipiZIDE 5 MG tablet   Commonly known as:  GLUCOTROL   Used for:  Type 2 diabetes mellitus with hyperglycemia, without long-term current use of insulin (H)        Dose:  5 mg   Take 1 tablet (5 mg) by mouth every morning (before breakfast)   Quantity:  90 tablet   Refills:  1       hydrALAZINE 50 MG tablet   Commonly known as:  APRESOLINE   Used for:  Resistant hypertension        Dose:  50 mg   Take 1 tablet (50 mg) by mouth 3 times daily   Quantity:  180 tablet   Refills:  3       hydrochlorothiazide 25 MG tablet   Commonly known as:  HYDRODIURIL   Used for:  Benign essential hypertension        Dose:  25 mg   Take 1 tablet (25 mg) by mouth daily   Quantity:  90 tablet   Refills:  1       metFORMIN 1000 MG tablet   Commonly known as:  GLUCOPHAGE   Used for:  Type 2 diabetes mellitus with hyperglycemia, without long-term current use of insulin (H)        TAKE 1 TABLET BY MOUTH TWICE DAILY WITH MEALS   Quantity:  60 tablet   Refills:  5       order for DME   Used for:  Resistant hypertension        Equipment being ordered: blood pressure cuff   Quantity:  1 Units   Refills:  0       valsartan 320 MG tablet   Commonly known as:  DIOVAN   Used for:  Resistant hypertension        Dose:  320 mg   Take 1 tablet (320 mg) by mouth daily   Quantity:  90 tablet   Refills:  1         STOP taking     [employer] ASPIRIN 81 MG chewable tablet   Generic drug:  aspirin                Where to get your medicines      These medications were sent to [id number] -  [place] [address] [facility] [address]     Phone:  [phone]     omeprazole 20 MG CR capsule                Protect others around you: Learn how to safely use, store and throw away your medicines at www.disposemymeds.org.             Medication List: This is a list of all your medications and when to take them. Check marks below indicate your daily home schedule. Keep this list as a reference.      Medications           Morning Afternoon Evening Bedtime As Needed    atorvastatin 80 MG tablet   Commonly known as:  LIPITOR   Take 1 tablet (80 mg) by mouth daily   Last time this was given:  80 mg on 5/21/2018  5:02 PM                                blood glucose lancets standard   Commonly known as:  no brand specified   Use to test blood sugar 3 times daily or as directed.                                blood glucose monitoring test strip   Commonly known as:  no brand specified   Use to test blood sugars 3 times daily or as directed                                glipiZIDE 5 MG tablet   Commonly known as:  GLUCOTROL   Take 1 tablet (5 mg) by mouth every morning (before breakfast)                                hydrALAZINE 50 MG tablet   Commonly known as:  APRESOLINE   Take 1 tablet (50 mg) by mouth 3 times daily   Last time this was given:  50 mg on 5/22/2018  8:47 AM                                hydrochlorothiazide 25 MG tablet   Commonly known as:  HYDRODIURIL   Take 1 tablet (25 mg) by mouth daily                                metFORMIN 1000 MG tablet   Commonly known as:  GLUCOPHAGE   TAKE 1 TABLET BY MOUTH TWICE DAILY WITH MEALS                                omeprazole 20 MG CR capsule   Commonly known as:  priLOSEC   Take 1 capsule (20 mg) by mouth every morning (before breakfast)                                order for DME   Equipment being ordered: blood pressure cuff                                valsartan 320 MG tablet   Commonly known as:  DIOVAN   Take 1  tablet (320 mg) by mouth daily

## 2018-05-20 NOTE — ED NOTES
[facility]  ED Nurse Handoff Report    Shell Leon is a 68 year old male   ED Chief complaint: Dizziness and Headache  . ED Diagnosis:   Final diagnoses:   Anemia     Allergies: No Known Allergies    Code Status: Full Code  Activity level - Baseline/Home:  Independent. Activity Level - Current:   Independent. Lift room needed: No. Bariatric: No   Needed: No   Isolation: No. Infection: Not Applicable.     Vital Signs:   Vitals:    05/20/18 1704   BP: 164/52   Pulse: 66   Resp: 20   Temp: 97  F (36.1  C)   TempSrc: Temporal   SpO2: 100%       Cardiac Rhythm:  ,      Pain level: 0-10 Pain Scale: 8  Patient confused: No. Patient Falls Risk: Yes.   Elimination Status: Has voided   Patient Report - Initial Complaint: Dizziness, headache.     Focused Assessment: Cognitive/Perceptual/Neuro - Cognitive/Neuro/Behavioral WDL:  WDL except Best Language: 0 - No aphasia LUE Sensation: no tingling; no numbness RUE Sensation: no tingling; no numbness LLE Sensation: no tingling; no numbness RLE Sensation: no tingling; no numbness Neurological Comment: Pt complains of headache that has been present for the last week, but has gradually worsened over the past three days. Pt also notes moderate dizzines, but is ambulatory.  Pupils (CN II) - Pupil PERRLA: yes  Motor Strength - Left Upper: 5 - active movement against gravity and full resistance Right Upper: 5 - active movement against gravity and full resistance Left Lower: 5 - active movement against gravity and full resistance Right Lower: 5 - active movement against gravity and full resistance  Emili Coma Scale - Best Eye Response: 4-->(E4) spontaneous Best Motor Response: 6-->(M6) obeys commands Best Verbal Response: 5-->(V5) oriented [place] Coma Scale Score: 15    Abnormal Results:   Labs Ordered and Resulted from Time of ED Arrival Up to the Time of Departure from the ED   CBC WITH PLATELETS DIFFERENTIAL - Abnormal; Notable for the following:         Result Value    RBC Count 2.84 (*)     Hemoglobin 6.4 (*)     Hematocrit 21.7 (*)     MCV 76 (*)     MCH 22.5 (*)     MCHC 29.5 (*)     RDW 16.7 (*)     Absolute Eosinophils 0.9 (*)     All other components within normal limits   BASIC METABOLIC PANEL - Abnormal; Notable for the following:     Sodium 130 (*)     Glucose 105 (*)     Urea Nitrogen 37 (*)     All other components within normal limits   NT PROBNP INPATIENT - Abnormal; Notable for the following:     N-Terminal Pro BNP Inpatient 981 (*)     All other components within normal limits   ROUTINE UA WITH MICROSCOPIC - Abnormal; Notable for the following:     Protein Albumin Urine 30 (*)     Mucous Urine Present (*)     All other components within normal limits   TROPONIN I   RED BLOOD CELL PREPARE ORDER UNIT   ABO/RH TYPE AND SCREEN     XR Chest 2 Views   Preliminary Result   IMPRESSION: Current exam shows shallow inspiration with some minimal   thickening or fluid in the minor fissure of the right lung. There is   also some parenchymal changes in the lung bases which appear to be due   to scarring or atelectasis as there is no definite abnormalities on   the lateral. Heart size is normal. Pulmonary vasculature is minimally   prominent. No pleural effusions.      US Lower Extremity Venous Duplex Left   Preliminary Result   IMPRESSION: No evidence of deep venous thrombosis.      MR Brain w/o & w Contrast    (Results Pending)     .     Family Comments: family at bedside  OBS brochure/video discussed/provided to patient:  No  ED Medications:   Medications   gadobutrol (GADAVIST) injection 7.5 mL (not administered)     Drips infusing:  No  For the majority of the shift, the patient's color was green. No interventions needed.     Severe Sepsis OR Septic Shock Diagnosis Present: No      ED Nurse Name/Phone Number: Jude Aguirre,   6:17 PM    RECEIVING UNIT ED HANDOFF REVIEW    Above ED Nurse Handoff Report was reviewed: Yes  Reviewed by: Sara Jimenes on  May 20, 2018 at 6:50 PM

## 2018-05-20 NOTE — ED TRIAGE NOTES
Arrives with about one week of headache and dizziness, denies chest pain, parasthesias, or other complaints, alert and oriented, ABCs intact.

## 2018-05-20 NOTE — IP AVS SNAPSHOT
[place] [address] [facility]    [address]    [facility] [address]    Phone:  [phone]    Fax:  [phone]                                       After Visit Summary   5/20/2018    Shell Leon    MRN: [id number]           After Visit Summary Signature Page     I have received my discharge instructions, and my questions have been answered. I have discussed any challenges I see with this plan with the nurse or doctor.    ..........................................................................................................................................  Patient/Patient Representative Signature      ..........................................................................................................................................  Patient Representative Print Name and Relationship to Patient    ..................................................               ................................................  Date                                            Time    ..........................................................................................................................................  Reviewed by Signature/Title    ...................................................              ..............................................  Date                                                            Time

## 2018-05-20 NOTE — ED PROVIDER NOTES
History     Chief Complaint:  Dizziness and Headache    HPI   Shell Leon is a 68 year old male with a history of hypertension and diabetes who presents with dizziness and a headache. The patient reports he has had a headache that is accompanied by a sensation of room-spinning dizziness for the past week. He notes he symptoms have gradually been worsening, prompting him to come to the ED for evaluation. He also notes he has some leg swelling, worse in his left leg than in his right. He denies chest pain, shortness of breath, ear pain, tinnitus, numbness, weakness, speech difficulty, or gait problems.     Allergies:  No known drug allergies     Medications:    Lipitor  Glipizide  Hydralazine  Hydrochlorothiazide  Metformin  Aspirin  Diovan    Past Medical History:    Diabetes  Hypertension  Internal carotid stenosis  Leg edema    Past Surgical History:    Cholecystectomy    Family History:    History reviewed. No pertinent family history.      Social History:  Smoking status: Never smoker  Alcohol use: No   Marital Status:   [2]     Review of Systems   HENT: Negative for ear pain and tinnitus.    Respiratory: Negative for shortness of breath.    Cardiovascular: Negative for chest pain.   Musculoskeletal: Negative for gait problem.   Neurological: Positive for dizziness and headaches. Negative for speech difficulty, weakness and numbness.   All other systems reviewed and are negative.    Physical Exam     Patient Vitals for the past 24 hrs:   BP Temp Temp src Pulse Resp SpO2   05/20/18 1704 164/52 97  F (36.1  C) Temporal 66 20 100 %      Physical Exam  Constitutional: Patient is well appearing. No distress.  Head: Atraumatic.  Mouth/Throat: Oropharynx is clear and moist. No oropharyngeal exudate.  Eyes: Conjunctivae and EOM are normal. No scleral icterus.  Neck: Normal range of motion. Neck supple.   Cardiovascular: Normal rate, regular rhythm, normal heart sounds and intact distal pulses.    Pulmonary/Chest: Breath sounds normal. No respiratory distress.  Abdominal: Soft. Bowel sounds are normal. No distension. No tenderness. No rebound or guarding.   Musculoskeletal: Normal range of motion. No tenderness. Lower extremity edema, left greater than right.  Neurological: Alert and orientated to person, place, and time. No observable focal neuro deficit.  Neuro: Alert, oriented x3, PERRL, EOMI, CN 2-7 and 9-12 intact, 5/5 grasp BUE, 5/5 elbow flexion and extension BUE, 5/5 shoulder abduction BUE, 5/5 hip flexion, knee flexion, knee extension, plantar and dorsiflexion BLE, no pronator drift, normal gait, negative romberg, no dysdiadochokinesia, normal finger-nose-finger testing     Skin: Warm and dry. No rash noted. Not diaphoretic.     Emergency Department Course   ECG (17:28:44):  Rate 58 bpm. OR interval 128. QRS duration 78. QT/QTc 406/398. P-R-T axes 63 19 24. Sinus bradycardia. Otherwise normal ECG. Interpreted at 1730 by Jude Betancourt MD.     Imaging:  Radiographic findings were communicated with the patient who voiced understanding of the findings.    XR Chest 2 Views:  Current exam shows shallow inspiration with some minimal  thickening or fluid in the minor fissure of the right lung. There is  also some parenchymal changes in the lung bases which appear to be due  to scarring or atelectasis as there is no definite abnormalities on  the lateral. Heart size is normal. Pulmonary vasculature is minimally  prominent. No pleural effusions.  As read by Radiology.      MR Brain w/o & w Contrast:  1. Focal nonenhancing signal abnormalities in bilateral thalamic  nuclei and bilateral basal ganglia regions with some scattered  nonspecific white matter changes. These are nonspecific but probably  related to chronic small vessel ischemic disease.  2. No evidence for intracranial hemorrhage, acute infarct, or any  focal mass lesions.  As read by Radiology.    US Lower Extremity Venous Duplex, Left:  No  evidence of deep venous thrombosis.  As read by Radiology.     Laboratory:  CBC: HGB 6.4 (LL), o/w WNL (WBC 8.7, )   BMP:  (L), Glucose 105 (H), BUN 37 (H), o/w WNL (Creatinine 1.14)  UA: Protein albumin 30, Mucous present, o/w negative  BNP: 981 (H)  Troponin: 0.025  ABO/Rh type and screen: O positive    Interventions:  2026: 1 unit PRBCs    Emergency Department Course:  Past medical records, nursing notes, and vitals reviewed.  1712: I performed an exam of the patient and obtained history, as documented above.  IV inserted and blood drawn.  The patient was sent for a chest x-ray, brain MRI, and left lower extremity US while in the emergency department, findings above.   EKG obtained, results above.     1807: I spoke to Dr. Vick of the hospitalist service who accepts the patient for admission.      1813: I rechecked the patient. Explained findings to the patient.    Findings and plan explained to the patient who consents to admission.     Discussed the patient with Dr. Vick, who will admit the patient to a medical bed for further monitoring, evaluation, and treatment.      Impression & Plan    Medical Decision Making:  Dizziness type feeling found to be anemic without accountable losses from any source.  Appear slight elev bnp rather extensive workup otherwise normal.  Admit for further workup and mgmt.      Diagnosis:    ICD-10-CM   1. Anemia D64.9     Disposition:  Admitted to medicine in the care of Dr. Nicanor Campa  5/20/2018   [facility] EMERGENCY DEPARTMENT    EDMOND, Mitra Campa, am serving as a scribe at 5:12 PM on 5/20/2018 to document services personally performed by Jude Betancourt MD based on my observations and the provider's statements to me.       Jude Betancourt MD  05/20/18 4194

## 2018-05-21 LAB
ANION GAP SERPL CALCULATED.3IONS-SCNC: 5 MMOL/L (ref 3–14)
BASOPHILS # BLD AUTO: 0.1 10E9/L (ref 0–0.2)
BASOPHILS NFR BLD AUTO: 1.4 %
BLD PROD TYP BPU: NORMAL
BLD UNIT ID BPU: 0
BLOOD PRODUCT CODE: NORMAL
BPU ID: NORMAL
BUN SERPL-MCNC: 33 MG/DL (ref 7–30)
CALCIUM SERPL-MCNC: 8.4 MG/DL (ref 8.5–10.1)
CHLORIDE SERPL-SCNC: 110 MMOL/L (ref 94–109)
CO2 SERPL-SCNC: 21 MMOL/L (ref 20–32)
COPATH REPORT: NORMAL
CREAT SERPL-MCNC: 1 MG/DL (ref 0.66–1.25)
DIFFERENTIAL METHOD BLD: ABNORMAL
EOSINOPHIL # BLD AUTO: 1 10E9/L (ref 0–0.7)
EOSINOPHIL NFR BLD AUTO: 15.7 %
ERYTHROCYTE [DISTWIDTH] IN BLOOD BY AUTOMATED COUNT: 18 % (ref 10–15)
FOLATE SERPL-MCNC: 12.8 NG/ML
GFR SERPL CREATININE-BSD FRML MDRD: 74 ML/MIN/1.7M2
GLUCOSE BLDC GLUCOMTR-MCNC: 111 MG/DL (ref 70–99)
GLUCOSE BLDC GLUCOMTR-MCNC: 114 MG/DL (ref 70–99)
GLUCOSE BLDC GLUCOMTR-MCNC: 75 MG/DL (ref 70–99)
GLUCOSE SERPL-MCNC: 86 MG/DL (ref 70–99)
HAPTOGLOB SERPL-MCNC: 235 MG/DL (ref 35–175)
HCT VFR BLD AUTO: 26.9 % (ref 40–53)
HEMOCCULT STL QL: NEGATIVE
HGB BLD-MCNC: 8.3 G/DL (ref 13.3–17.7)
IMM GRANULOCYTES # BLD: 0 10E9/L (ref 0–0.4)
IMM GRANULOCYTES NFR BLD: 0.3 %
INTERPRETATION ECG - MUSE: NORMAL
LYMPHOCYTES # BLD AUTO: 1.8 10E9/L (ref 0.8–5.3)
LYMPHOCYTES NFR BLD AUTO: 27.5 %
MCH RBC QN AUTO: 24.4 PG (ref 26.5–33)
MCHC RBC AUTO-ENTMCNC: 30.9 G/DL (ref 31.5–36.5)
MCV RBC AUTO: 79 FL (ref 78–100)
MONOCYTES # BLD AUTO: 0.7 10E9/L (ref 0–1.3)
MONOCYTES NFR BLD AUTO: 10.9 %
NEUTROPHILS # BLD AUTO: 2.9 10E9/L (ref 1.6–8.3)
NEUTROPHILS NFR BLD AUTO: 44.2 %
NRBC # BLD AUTO: 0 10*3/UL
NRBC BLD AUTO-RTO: 0 /100
PLATELET # BLD AUTO: 228 10E9/L (ref 150–450)
POTASSIUM SERPL-SCNC: 5.2 MMOL/L (ref 3.4–5.3)
POTASSIUM SERPL-SCNC: 5.4 MMOL/L (ref 3.4–5.3)
POTASSIUM SERPL-SCNC: 5.4 MMOL/L (ref 3.4–5.3)
POTASSIUM SERPL-SCNC: 5.7 MMOL/L (ref 3.4–5.3)
RBC # BLD AUTO: 3.4 10E12/L (ref 4.4–5.9)
SODIUM SERPL-SCNC: 136 MMOL/L (ref 133–144)
TRANSFUSION STATUS PATIENT QL: NORMAL
TRANSFUSION STATUS PATIENT QL: NORMAL
UPPER GI ENDOSCOPY: NORMAL
VIT B12 SERPL-MCNC: 252 PG/ML (ref 193–986)
WBC # BLD AUTO: 6.4 10E9/L (ref 4–11)

## 2018-05-21 PROCEDURE — 88341 IMHCHEM/IMCYTCHM EA ADD ANTB: CPT | Performed by: INTERNAL MEDICINE

## 2018-05-21 PROCEDURE — 88341 IMHCHEM/IMCYTCHM EA ADD ANTB: CPT | Mod: 26 | Performed by: INTERNAL MEDICINE

## 2018-05-21 PROCEDURE — 99232 SBSQ HOSP IP/OBS MODERATE 35: CPT | Performed by: INTERNAL MEDICINE

## 2018-05-21 PROCEDURE — 25000128 H RX IP 250 OP 636: Performed by: INTERNAL MEDICINE

## 2018-05-21 PROCEDURE — 88305 TISSUE EXAM BY PATHOLOGIST: CPT | Performed by: INTERNAL MEDICINE

## 2018-05-21 PROCEDURE — 36415 COLL VENOUS BLD VENIPUNCTURE: CPT | Performed by: INTERNAL MEDICINE

## 2018-05-21 PROCEDURE — 40000104 ZZH STATISTIC MODERATE SEDATION < 10 MIN: Performed by: INTERNAL MEDICINE

## 2018-05-21 PROCEDURE — 0DB68ZX EXCISION OF STOMACH, VIA NATURAL OR ARTIFICIAL OPENING ENDOSCOPIC, DIAGNOSTIC: ICD-10-PCS | Performed by: INTERNAL MEDICINE

## 2018-05-21 PROCEDURE — 12000007 ZZH R&B INTERMEDIATE

## 2018-05-21 PROCEDURE — 88342 IMHCHEM/IMCYTCHM 1ST ANTB: CPT | Performed by: INTERNAL MEDICINE

## 2018-05-21 PROCEDURE — 00000146 ZZHCL STATISTIC GLUCOSE BY METER IP

## 2018-05-21 PROCEDURE — 00000159 ZZHCL STATISTIC H-SEND OUTS PREP: Performed by: INTERNAL MEDICINE

## 2018-05-21 PROCEDURE — 25000125 ZZHC RX 250: Performed by: INTERNAL MEDICINE

## 2018-05-21 PROCEDURE — 88342 IMHCHEM/IMCYTCHM 1ST ANTB: CPT | Mod: 26 | Performed by: INTERNAL MEDICINE

## 2018-05-21 PROCEDURE — 99207 ZZC CDG-MDM COMPONENT: MEETS LOW - DOWN CODED: CPT | Performed by: INTERNAL MEDICINE

## 2018-05-21 PROCEDURE — 25000132 ZZH RX MED GY IP 250 OP 250 PS 637: Performed by: INTERNAL MEDICINE

## 2018-05-21 PROCEDURE — P9016 RBC LEUKOCYTES REDUCED: HCPCS | Performed by: EMERGENCY MEDICINE

## 2018-05-21 PROCEDURE — 84132 ASSAY OF SERUM POTASSIUM: CPT | Performed by: INTERNAL MEDICINE

## 2018-05-21 PROCEDURE — 80048 BASIC METABOLIC PNL TOTAL CA: CPT | Performed by: INTERNAL MEDICINE

## 2018-05-21 PROCEDURE — 81261 IGH GENE REARRANGE AMP METH: CPT | Performed by: EMERGENCY MEDICINE

## 2018-05-21 PROCEDURE — 85025 COMPLETE CBC W/AUTO DIFF WBC: CPT | Performed by: INTERNAL MEDICINE

## 2018-05-21 PROCEDURE — 82272 OCCULT BLD FECES 1-3 TESTS: CPT | Performed by: INTERNAL MEDICINE

## 2018-05-21 PROCEDURE — 88305 TISSUE EXAM BY PATHOLOGIST: CPT | Mod: 26 | Performed by: INTERNAL MEDICINE

## 2018-05-21 PROCEDURE — 43239 EGD BIOPSY SINGLE/MULTIPLE: CPT | Performed by: INTERNAL MEDICINE

## 2018-05-21 RX ORDER — ATORVASTATIN CALCIUM 40 MG/1
80 TABLET, FILM COATED ORAL DAILY
Status: DISCONTINUED | OUTPATIENT
Start: 2018-05-21 | End: 2018-05-22 | Stop reason: HOSPADM

## 2018-05-21 RX ORDER — LIDOCAINE 40 MG/G
CREAM TOPICAL
Status: DISCONTINUED | OUTPATIENT
Start: 2018-05-21 | End: 2018-05-21 | Stop reason: HOSPADM

## 2018-05-21 RX ORDER — MAGNESIUM CARB/ALUMINUM HYDROX 105-160MG
296 TABLET,CHEWABLE ORAL ONCE
Status: COMPLETED | OUTPATIENT
Start: 2018-05-22 | End: 2018-05-22

## 2018-05-21 RX ORDER — HYDRALAZINE HYDROCHLORIDE 50 MG/1
50 TABLET, FILM COATED ORAL 3 TIMES DAILY
Status: DISCONTINUED | OUTPATIENT
Start: 2018-05-21 | End: 2018-05-22 | Stop reason: HOSPADM

## 2018-05-21 RX ORDER — LIDOCAINE 40 MG/G
CREAM TOPICAL
Status: DISCONTINUED | OUTPATIENT
Start: 2018-05-21 | End: 2018-05-22 | Stop reason: HOSPADM

## 2018-05-21 RX ORDER — VALSARTAN 80 MG/1
320 TABLET ORAL DAILY
Status: DISCONTINUED | OUTPATIENT
Start: 2018-05-21 | End: 2018-05-21

## 2018-05-21 RX ORDER — LIDOCAINE 40 MG/G
CREAM TOPICAL
Status: DISCONTINUED | OUTPATIENT
Start: 2018-05-21 | End: 2018-05-21

## 2018-05-21 RX ORDER — NALOXONE HYDROCHLORIDE 0.4 MG/ML
.1-.4 INJECTION, SOLUTION INTRAMUSCULAR; INTRAVENOUS; SUBCUTANEOUS
Status: DISCONTINUED | OUTPATIENT
Start: 2018-05-21 | End: 2018-05-22 | Stop reason: HOSPADM

## 2018-05-21 RX ORDER — FLUMAZENIL 0.1 MG/ML
0.2 INJECTION, SOLUTION INTRAVENOUS
Status: ACTIVE | OUTPATIENT
Start: 2018-05-21 | End: 2018-05-22

## 2018-05-21 RX ORDER — SODIUM CHLORIDE 9 MG/ML
INJECTION, SOLUTION INTRAVENOUS CONTINUOUS
Status: DISCONTINUED | OUTPATIENT
Start: 2018-05-21 | End: 2018-05-22 | Stop reason: HOSPADM

## 2018-05-21 RX ORDER — FENTANYL CITRATE 50 UG/ML
INJECTION, SOLUTION INTRAMUSCULAR; INTRAVENOUS PRN
Status: DISCONTINUED | OUTPATIENT
Start: 2018-05-21 | End: 2018-05-21 | Stop reason: HOSPADM

## 2018-05-21 RX ADMIN — SODIUM CHLORIDE: 9 INJECTION, SOLUTION INTRAVENOUS at 18:57

## 2018-05-21 RX ADMIN — HYDRALAZINE HYDROCHLORIDE 50 MG: 50 TABLET ORAL at 22:35

## 2018-05-21 RX ADMIN — POLYETHYLENE GLYCOL-3350 AND ELECTROLYTES 4000 ML: 236; 6.74; 5.86; 2.97; 22.74 POWDER, FOR SOLUTION ORAL at 16:54

## 2018-05-21 RX ADMIN — ATORVASTATIN CALCIUM 80 MG: 40 TABLET, FILM COATED ORAL at 17:02

## 2018-05-21 RX ADMIN — SODIUM CHLORIDE: 9 INJECTION, SOLUTION INTRAVENOUS at 05:03

## 2018-05-21 ASSESSMENT — ACTIVITIES OF DAILY LIVING (ADL)
ADLS_ACUITY_SCORE: 11

## 2018-05-21 NOTE — PLAN OF CARE
Problem: Patient Care Overview  Goal: Plan of Care/Patient Progress Review  Outcome: No Change  Patient hospitalized for anemia. Hgb 6.4. Plan to transfuse 2 units PRBC's. Started first unit at 2115. No transfusion reaction noted. GI consult in am. NPO at midnight. . BP's 150's/40's. Afebrile. All other VSS. SBA. Patient speaks some english. Family interpreting. Declination form signed and in chart. Daughter plans to return between 6-8am to help interpret.

## 2018-05-21 NOTE — PLAN OF CARE
Problem: Patient Care Overview  Goal: Plan of Care/Patient Progress Review  Outcome: No Change  BP (!) 154/39 (BP Location: Right arm)  Pulse 50  Temp 97.5  F (36.4  C) (Oral)  Resp 16  Wt 62.1 kg (136 lb 14.4 oz)  SpO2 94%  BMI 23.5 kg/m2  A&Ox4, up assist x1, clear liquid diet, tele- SB, lungs clear, BS hypoactive, 1 BM this shift, voiding without difficulty, denies pain, denies nausea.   Plan: colonoscopy tomorrow 1130

## 2018-05-21 NOTE — PROVIDER NOTIFICATION
1655: Paged MD about vitals of BP: 150/43 and P: 52. Asked which BP medications would like me to give vs which ones to hold.     1700: MD Otoniel gave telephone orders to hold hydralazine and discontinue diovan.    2214: Paged admitting provider about pt's BP is 166/42 and HR 50-59. Should I give scheduled hydralazine?     2226: MD Myles called to say it was ok to given hydralazine. Will give medication per MAR.

## 2018-05-21 NOTE — PLAN OF CARE
Problem: Patient Care Overview  Goal: Plan of Care/Patient Progress Review  Ambulatory Status:  Pt up A1  VS:  vss  Pain:  Having a headache but declining medication  Resp: LS diminished   GI:  Denies nausea.  NPO since midnight for GI consult.  BS active.  Passing flatus.  Last BM 5/19.  Tx:  2 units of blood  Labs:  Hemoglobin 6.4  Consults:  GI  Disposition:  tbd  Tele:  [facility] [place] HR 58  Declining  services, family interpreting.

## 2018-05-21 NOTE — PHARMACY-ADMISSION MEDICATION HISTORY
Admission medication history interview status for this patient is complete. See [facility] admission navigator for allergy information, prior to admission medications and immunization status.     Medication history interview source(s):Patient and Family  Medication history resources (including written lists, pill bottles, clinic record):Epic    Changes made to PTA medication list:  Added: none  Deleted: none  Changed: none    Medication reconciliation/reorder completed by provider prior to medication history? No    Do you take OTC medications (eg tylenol, ibuprofen, fish oil, eye/ear drops, etc)? Y(Y/N)    For patients on insulin therapy: N (Y/N)  Lantus/levemir/NPH/Mix 70/30 dose:   (Y/N) (see Med list for doses)   Sliding scale Novolog Y/N  If Yes, do you have a baseline novolog pre-meal dose:  units with meals  Patients eat three meals a day:   Y/N    How many episodes of hypoglycemia do you have per week: _______  How many missed doses do you have per week: ______  How many times do you check your blood glucose per day: _______   Any Barriers to therapy - Be specific :  cost of medications, comfortable with giving injections (if applicable), comfortable and confident with current diabetes regimen: Y/N ______________      Prior to Admission medications    Medication Sig Last Dose Taking? Auth Provider   atorvastatin (LIPITOR) 80 MG tablet Take 1 tablet (80 mg) by mouth daily 5/19/2018 at lunch Yes Cresencio Landry MD   glipiZIDE (GLUCOTROL) 5 MG tablet Take 1 tablet (5 mg) by mouth every morning (before breakfast) 5/20/2018 at am Yes Kathi Mondragon PA-C   hydrALAZINE (APRESOLINE) 50 MG tablet Take 1 tablet (50 mg) by mouth 3 times daily 5/20/2018 at am Yes Cresencio Landry MD   hydrochlorothiazide (HYDRODIURIL) 25 MG tablet Take 1 tablet (25 mg) by mouth daily 5/19/2018 at dinner Yes Cresencio Landry MD   metFORMIN (GLUCOPHAGE) 1000 MG tablet TAKE 1 TABLET BY MOUTH TWICE DAILY WITH MEALS 5/20/2018 at am  Yes Kathi Mondragon PA-C   SM CHILDRENS ASPIRIN 81 MG chewable tablet CHEW AND SWALLOW ONE TABLET BY MOUTH ONE TIME DAILY  5/19/2018 at lunch Yes Kathi Mondragon PA-C   valsartan (DIOVAN) 320 MG tablet Take 1 tablet (320 mg) by mouth daily 5/20/2018 at am Yes Cresencio Landry MD   blood glucose (NO BRAND SPECIFIED) lancets standard Use to test blood sugar 3 times daily or as directed.   Kathi Mondragon PA-C   blood glucose monitoring (NO BRAND SPECIFIED) test strip Use to test blood sugars 3 times daily or as directed   Kathi Mondragon PA-C   order for DME Equipment being ordered: blood pressure cuff   Kathi Mondragon PA-C

## 2018-05-21 NOTE — OR NURSING
1400-Post EGD report called to 5th floor RN. Patient resting in Endo Recovery area. Dr. Rabago spoke with pt. And daughter-in-law in recovery area. Patient denies any pain at this time and knows he is scheduled for a colonoscopy exam tomorrow./WW

## 2018-05-21 NOTE — H&P
[facility]  Hospitalist H&P    Name: Shell Leon      MRN: [id number]  YOB: 1950    Age: 68 year old  Date of admission: 5/20/2018  Primary care provider: Kathi Mondragon            Assessment and Plan:   Shell Leon is a 68 year old male with a history of hypertension, hyperlipidemia, and diabetes mellitus type 2 who presents with anemia.    1.  Acute anemia.  Unclear etiology.  Is in the process of receiving 2 units of packed red blood cells started in the emergency room.  Recheck CBC in the morning.  Check reticulocyte count, peripheral blood smear, LDH, haptoglobin, and iron studies.  N.p.o. after midnight.  Gastroenterology consult.    2.  Diabetes mellitus.  Hold glipizide and metformin as he is going to be n.p.o. after midnight.  Start NovoLog sliding scale.    3.  Hypertension.  Hold hydrochlorothiazide.  Restart hydralazine and valsartan.    4.  Hyperlipidemia.  Restart atorvastatin.    5.  Hyponatremia.  Mild.  Hold hydrochlorothiazide.  Start continuous IV fluids after blood products completed.  Recheck metabolic panel in the morning.    6.  Possible atelectasis.  Use incentive spirometry.    Code status: Full code.  Admit to inpatient.  Prophylaxis: Pneumatic compression devices.  Avoid pharmacologic anticoagulation until reason for anemia is determined.              Chief Complaint:   Dizziness.         History of Present Illness:   Shell Leon is a 68 year old male who presents with dizziness.  Has been feeling dizzy for the past week.  Seems to be worsening during that time.  Notices that he becomes dizzy when he stands up.  Also having headaches.  Headaches also seem to be happening when he stands up.  Has felt fatigued.  Occasionally short of breath.  Has not noted any cough.  No fevers or chills.  No recent diarrhea.  No dysuria.  He has not had dizziness like this previously.  Nothing at home has helped with the dizziness or headache.   No other complaints.  He does fairly well with English.  However,  did help with a few questions and clarifications.            Past Medical History:     Past Medical History:   Diagnosis Date     Diabetes (H)      Hypertension              Past Surgical History:     Past Surgical History:   Procedure Laterality Date     LAPAROSCOPIC CHOLECYSTECTOMY N/A 1/6/2017    Procedure: LAPAROSCOPIC CHOLECYSTECTOMY;  Surgeon: Michael Caba MD;  Location:  OR             Social History:     Social History   Substance Use Topics     Smoking status: Never Smoker     Smokeless tobacco: Never Used     Alcohol use No             Family History:   The family history was fully reviewed and non-contributory in this case.         Allergies:   No Known Allergies          Medications:     Prior to Admission medications    Medication Sig Last Dose Taking? Auth Provider   atorvastatin (LIPITOR) 80 MG tablet Take 1 tablet (80 mg) by mouth daily 5/19/2018 at lunch Yes Cresencio Landry MD   glipiZIDE (GLUCOTROL) 5 MG tablet Take 1 tablet (5 mg) by mouth every morning (before breakfast) 5/20/2018 at am Yes Kathi Mondragon PA-C   hydrALAZINE (APRESOLINE) 50 MG tablet Take 1 tablet (50 mg) by mouth 3 times daily 5/20/2018 at am Yes Cresencio Landry MD   hydrochlorothiazide (HYDRODIURIL) 25 MG tablet Take 1 tablet (25 mg) by mouth daily 5/19/2018 at dinner Yes Cresencio Landry MD   metFORMIN (GLUCOPHAGE) 1000 MG tablet TAKE 1 TABLET BY MOUTH TWICE DAILY WITH MEALS 5/20/2018 at am Yes Kathi Mondragon PA-C   SM CHILDRENS ASPIRIN 81 MG chewable tablet CHEW AND SWALLOW ONE TABLET BY MOUTH ONE TIME DAILY  5/19/2018 at lunch Yes Kathi Mondragon PA-C   valsartan (DIOVAN) 320 MG tablet Take 1 tablet (320 mg) by mouth daily 5/20/2018 at am Yes Cresencio Landry MD   blood glucose (NO BRAND SPECIFIED) lancets standard Use to test blood sugar 3 times daily or as directed.   Kathi Mondragon  ULYSSES   blood glucose monitoring (NO BRAND SPECIFIED) test strip Use to test blood sugars 3 times daily or as directed   Kathi Mondragon PA-C   order for DME Equipment being ordered: blood pressure cuff   Kathi Mondragon PA-C             Review of Systems:   A Comprehensive greater than 10 system review of systems was carried out.  Pertinent positives and negatives are noted above.  Otherwise negative for contributory information.           Physical Exam:   Blood pressure 165/45, pulse 65, temperature 95.3  F (35.2  C), temperature source Oral, resp. rate 18, weight 62.1 kg (136 lb 14.4 oz), SpO2 100 %.  Wt Readings from Last 1 Encounters:   05/20/18 62.1 kg (136 lb 14.4 oz)     Exam:  GENERAL: No apparent distress. Awake, alert, and fully oriented.  HEENT: Normocephalic, atraumatic. Extraocular movements intact.  CARDIOVASCULAR: Regular rate and rhythm without murmurs or rubs. No S3.  PULMONARY: Clear to auscultation bilaterally.  ABDOMINAL: Soft, non-tender, non-distended. Bowel sounds normoactive.   EXTREMITIES: No cyanosis or clubbing. No appreciable edema.  NEUROLOGICAL: CN 2-12 grossly intact, no focal neurological deficits.  DERMATOLOGICAL: No rash, ulcer, bruising, nor jaundice.          Data:   EKG:  Personally reviewed.  Sinus.  No acute ischemia.    Laboratory:    Recent Labs  Lab 05/20/18  1718   WBC 8.7   HGB 6.4*   HCT 21.7*   MCV 76*          Recent Labs  Lab 05/20/18  1718   *   POTASSIUM 5.2   CHLORIDE 103   CO2 20   ANIONGAP 7   *   BUN 37*   CR 1.14   GFRESTIMATED 64   GFRESTBLACK 77   STEVE 8.5     No results for input(s): CULT in the last 168 hours.    Imaging:  Recent Results (from the past 24 hour(s))   US Lower Extremity Venous Duplex Left    Narrative    VENOUS ULTRASOUND LEFT LEG  5/20/2018 6:04 PM     HISTORY: Edema.    COMPARISON: None.    FINDINGS:  Examination of the deep veins with graded compression and  color flow Doppler with spectral wave form  analysis shows no evidence  of thrombus in the common femoral vein, femoral vein, popliteal vein  or calf veins.      Impression    IMPRESSION: No evidence of deep venous thrombosis.    KRYSTA DELGADILLO MD   XR Chest 2 Views    Narrative    CHEST TWO VIEWS  5/20/2018 6:07 PM     HISTORY: Dizziness.     COMPARISON: 8/10/2017      Impression    IMPRESSION: Current exam shows shallow inspiration with some minimal  thickening or fluid in the minor fissure of the right lung. There is  also some parenchymal changes in the lung bases which appear to be due  to scarring or atelectasis as there is no definite abnormalities on  the lateral. Heart size is normal. Pulmonary vasculature is minimally  prominent. No pleural effusions.    KRYSTA DELGADILLO MD   MR Brain w/o & w Contrast    Narrative    MRI BRAIN WITHOUT AND WITH CONTRAST  5/20/2018 7:00 PM    HISTORY:  Vertigo.      TECHNIQUE:  Multiplanar, multisequence MRI of the brain without and  with 6 mL Gadavist.    COMPARISON: None.    FINDINGS: Diffusion-weighted images are normal. There is no evidence  for intracranial hemorrhage or acute infarct. There are some focal  signal abnormalities in deep gray matter bilaterally including both  thalamic nuclei, and both caudate and putamen regions. In addition  there are some minimal nonspecific white matter changes. These are not  associated with any mass effect or enhancement. Vascular structures  are patent at the skull base. Postcontrast images do not show any  abnormal areas of enhancement or any focal mass lesions.      Impression    IMPRESSION:  1. Focal nonenhancing signal abnormalities in bilateral thalamic  nuclei and bilateral basal ganglia regions with some scattered  nonspecific white matter changes. These are nonspecific but probably  related to chronic small vessel ischemic disease.  2. No evidence for intracranial hemorrhage, acute infarct, or any  focal mass lesions.    KRYSTA DELGADILLO MD

## 2018-05-21 NOTE — CONSULTS
GASTROENTEROLOGY CONSULTATION      Shell Leon  [address] [place] [address]  68 year old male     Admission Date/Time: 5/20/2018  Primary Care Provider: Kathi Mondragon  Referring / Attending Physician:  Dr. Vick     We were asked to see the patient in consultation by Dr. Vick for evaluation of anemia.        HPI:  Shell Leon is a 68 year old male with history of hypertension, diabetes, hyperlipidemia who presented with 4 days of progressive worsening of dizziness and fatigue. He was found to have severe anemia. He reports no change in bowel habits. He has a stool every other day. He has not noticed any bloody or black stools. He denies abdominal pain, heartburn, nausea, vomiting. He denies previous EGD or colonoscopy. He does not take NSAID medications other than baby aspirin.     History obtained from patient and daughter-in-law.  offered but patient preferred his daughter-in-law interpret.       PAST MEDICAL HISTORY:  Patient Active Problem List    Diagnosis Date Noted     Anemia 05/20/2018     Priority: Medium     Benign essential hypertension 10/03/2016     Priority: Medium     Edema, unspecified type 10/03/2016     Priority: Medium     Type 2 diabetes mellitus without complication (H) 10/03/2016     Priority: Medium     Internal carotid artery stenosis, bilateral 09/26/2016     Priority: Medium     Essential hypertension with goal blood pressure less than 140/90 09/02/2016     Priority: Medium          ROS: A comprehensive ten point review of systems was negative aside from those in mentioned in the HPI.       MEDICATIONS:   Prior to Admission medications    Medication Sig Start Date End Date Taking? Authorizing Provider   atorvastatin (LIPITOR) 80 MG tablet Take 1 tablet (80 mg) by mouth daily 10/6/17  Yes Cresencio Landry MD   glipiZIDE (GLUCOTROL) 5 MG tablet Take 1 tablet (5 mg) by mouth every morning (before breakfast) 4/16/18  Yes Giancarlo  Kathi Ceballos PA-C   hydrALAZINE (APRESOLINE) 50 MG tablet Take 1 tablet (50 mg) by mouth 3 times daily 10/4/17  Yes Cresencio Landry MD   hydrochlorothiazide (HYDRODIURIL) 25 MG tablet Take 1 tablet (25 mg) by mouth daily 12/21/17  Yes Cresencio Landry MD   metFORMIN (GLUCOPHAGE) 1000 MG tablet TAKE 1 TABLET BY MOUTH TWICE DAILY WITH MEALS 4/16/18  Yes Kathi Mondragon PA-C   [facility] ASPIRIN 81 MG chewable tablet CHEW AND SWALLOW ONE TABLET BY MOUTH ONE TIME DAILY  9/7/17  Yes Kathi Mondragon PA-C   valsartan (DIOVAN) 320 MG tablet Take 1 tablet (320 mg) by mouth daily 3/13/18  Yes Cresencio Landry MD   blood glucose (NO BRAND SPECIFIED) lancets standard Use to test blood sugar 3 times daily or as directed. 1/13/17   Kathi Mondragon PA-C   blood glucose monitoring (NO BRAND SPECIFIED) test strip Use to test blood sugars 3 times daily or as directed 1/13/17   Kathi Mondragon PA-C   order for DME Equipment being ordered: blood pressure cuff 4/16/18   Kathi Mondragon PA-C        ALLERGIES: No Known Allergies     SOCIAL HISTORY:  Social History   Substance Use Topics     Smoking status: Never Smoker     Smokeless tobacco: Never Used     Alcohol use No        FAMILY HISTORY:  Denies any family members with GI issues including ulcers, colon polyps, cancers, IBD     PHYSICAL EXAM:     /41 (BP Location: Right arm)  Pulse 52  Temp 97.7  F (36.5  C) (Oral)  Resp 16  Wt 62.1 kg (136 lb 14.4 oz)  SpO2 99%  BMI 23.5 kg/m2     PHYSICAL EXAM:  GENERAL: No acute distress  SKIN: no suspicious lesions, rashes, jaundice, or spider angiomas  HEAD: Normocephalic. Atraumatic.  NECK: Neck supple. No adenopathy.   EYES: No scleral icterus  RESPIRATORY: Good transmission. CTA bilaterally.   CARDIOVASCULAR: RRR, normal S1, S2,  No murmur appreciated  GASTROINTESTINAL: +BS, soft, non tender, non distended, no hepatosplenomegaly, no  masses/guarding/rebound  JOINT/EXTREMITIES:  no gross deformities noted, normal muscle tone  NEURO: CN 2-12 grossly intact, no focal deficits  PSYCH: Normal affect              ADDITIONAL COMMENTS:   I reviewed the patient's new clinical lab test results.   Recent Labs   Lab Test  05/21/18 0646  05/20/18 1718 01/03/17 2011   WBC  6.4  8.7  7.2   HGB  8.3*  6.4*  13.8   MCV  79  76*  88   PLT  228  273  211     Recent Labs   Lab Test  05/21/18   0646  05/20/18 1718  10/02/17   0828   POTASSIUM  5.4*  5.2  5.1   CHLORIDE  110*  103  107   CO2  21  20  25   BUN  33*  37*  24   ANIONGAP  5  7  5     Recent Labs   Lab Test  05/20/18   1736  05/20/18 1718  08/10/17   1635  01/03/17   2155  01/03/17 2011   ALBUMIN   --   3.5  3.6   --   4.2   BILITOTAL   --   0.3  0.3   --   0.7   ALT   --   38  42   --   30   AST   --   40  40   --   22   PROTEIN  30*   --    --   100*   --    LIPASE   --    --    --    --   179             CONSULTATION ASSESSMENT AND PLAN:    Shell Leon is a 68 year old admitted with dizziness found to have hg of 6.4 (now up to 8.3 s/p 2 units transfused). He has not signs of overt GI bleeding. He has never had endoscopic evaluation.   -EGD today  -We also recommend colonoscopy as he has never had a screening colonoscopy. Timing of colonoscopy is pending and can be determined if needs to be done this admission vs as outpatient if upper GI source is found.    I discussed the patient plan with Dr. Rabago. Thank you for asking us to participate in the care of this patient.    Keyla Marroquin PA-C  [facility]

## 2018-05-21 NOTE — PROCEDURES
Pre-Endoscopy History and Physical     Shell Leon MRN# [id number]   YOB: 1950 Age: 68 year old     Date of Procedure: 5/20/2018  Primary care provider: Kathi Mondragon  Type of Endoscopy: esophagogastroduodenoscopy (upper GI endoscopy)  Reason for Procedure: anemia  Type of Anesthesia Anticipated: Moderate (conscious) sedation    HPI:    Shell is a 68 year old male who will be undergoing the above procedure.      A history and physical has been performed. The patient's medications and allergies have been reviewed. The risks and benefits of the procedure and the sedation options and risks were discussed with the patient.  All questions were answered and informed consent was obtained.      No Known Allergies     Current Facility-Administered Medications   Medication     acetaminophen (TYLENOL) Suppository 650 mg     acetaminophen (TYLENOL) tablet 650 mg     atorvastatin (LIPITOR) tablet 80 mg     bisacodyl (DULCOLAX) Suppository 10 mg     glucose gel 15-30 g    Or     dextrose 50 % injection 25-50 mL    Or     glucagon injection 1 mg     hydrALAZINE (APRESOLINE) tablet 50 mg     HYDROmorphone (PF) (DILAUDID) injection 0.2 mg     insulin aspart (NovoLOG) inj (RAPID ACTING)     insulin aspart (NovoLOG) inj (RAPID ACTING)     lidocaine (LMX4) kit     lidocaine (LMX4) kit     lidocaine 1 % 1 mL     lidocaine 1 % 1 mL     May continue current IV fluids if patient has IV fluids infusing.     May take regular AM medications except those listed below     melatonin tablet 1 mg     naloxone (NARCAN) injection 0.1-0.4 mg     nitroGLYcerin (NITROSTAT) sublingual tablet 0.4 mg     ondansetron (ZOFRAN-ODT) ODT tab 4 mg    Or     ondansetron (ZOFRAN) injection 4 mg     oxyCODONE IR (ROXICODONE) tablet 5 mg     pantoprazole (PROTONIX) 40 mg IV push injection     polyethylene glycol (MIRALAX/GLYCOLAX) Packet 17 g     prochlorperazine (COMPAZINE) injection 5 mg    Or     prochlorperazine (COMPAZINE)  tablet 5 mg    Or     prochlorperazine (COMPAZINE) Suppository 12.5 mg     senna-docusate (SENOKOT-S;PERICOLACE) 8.6-50 MG per tablet 1 tablet    Or     senna-docusate (SENOKOT-S;PERICOLACE) 8.6-50 MG per tablet 2 tablet     sodium chloride (PF) 0.9% PF flush 3 mL     sodium chloride (PF) 0.9% PF flush 3 mL     sodium chloride (PF) 0.9% PF flush 3 mL     sodium chloride (PF) 0.9% PF flush 3 mL     sodium chloride 0.9% infusion       Patient Active Problem List   Diagnosis     Essential hypertension with goal blood pressure less than 140/90     Internal carotid artery stenosis, bilateral     Benign essential hypertension     Edema, unspecified type     Type 2 diabetes mellitus without complication (H)     Anemia        Past Medical History:   Diagnosis Date     Diabetes (H)      Hypertension         Past Surgical History:   Procedure Laterality Date     LAPAROSCOPIC CHOLECYSTECTOMY N/A 1/6/2017    Procedure: LAPAROSCOPIC CHOLECYSTECTOMY;  Surgeon: Michael Caba MD;  Location:  OR       Social History   Substance Use Topics     Smoking status: Never Smoker     Smokeless tobacco: Never Used     Alcohol use No       Family History   Problem Relation Age of Onset     Unknown/Adopted No family hx of             Medications:     Prescriptions Prior to Admission   Medication Sig Dispense Refill Last Dose     atorvastatin (LIPITOR) 80 MG tablet Take 1 tablet (80 mg) by mouth daily 90 tablet 3 5/19/2018 at lunch     glipiZIDE (GLUCOTROL) 5 MG tablet Take 1 tablet (5 mg) by mouth every morning (before breakfast) 90 tablet 1 5/20/2018 at am     hydrALAZINE (APRESOLINE) 50 MG tablet Take 1 tablet (50 mg) by mouth 3 times daily 180 tablet 3 5/20/2018 at am     hydrochlorothiazide (HYDRODIURIL) 25 MG tablet Take 1 tablet (25 mg) by mouth daily 90 tablet 1 5/19/2018 at dinner     metFORMIN (GLUCOPHAGE) 1000 MG tablet TAKE 1 TABLET BY MOUTH TWICE DAILY WITH MEALS 60 tablet 5 5/20/2018 at am     [facility] ASPIRIN 81  "MG chewable tablet CHEW AND SWALLOW ONE TABLET BY MOUTH ONE TIME DAILY  90 tablet 3 5/19/2018 at lunch     valsartan (DIOVAN) 320 MG tablet Take 1 tablet (320 mg) by mouth daily 90 tablet 1 5/20/2018 at am     blood glucose (NO BRAND SPECIFIED) lancets standard Use to test blood sugar 3 times daily or as directed. 1 Box 11 Taking     blood glucose monitoring (NO BRAND SPECIFIED) test strip Use to test blood sugars 3 times daily or as directed 100 strip 11 Taking     order for DME Equipment being ordered: blood pressure cuff 1 Units 0        Scheduled Medications:    atorvastatin  80 mg Oral Daily     hydrALAZINE  50 mg Oral TID     insulin aspart  1-7 Units Subcutaneous TID AC     insulin aspart  1-5 Units Subcutaneous At Bedtime     pantoprazole (PROTONIX) IV  40 mg Intravenous BID     sodium chloride (PF)  3 mL Intracatheter Q8H     sodium chloride (PF)  3 mL Intracatheter Q8H       PRN:  acetaminophen, acetaminophen, bisacodyl, glucose **OR** dextrose **OR** glucagon, HYDROmorphone, lidocaine 4%, lidocaine 4%, lidocaine (buffered or not buffered), lidocaine (buffered or not buffered), - MEDICATION INSTRUCTIONS -, - MEDICATION INSTRUCTIONS -, melatonin, naloxone, nitroGLYcerin, ondansetron **OR** ondansetron, oxyCODONE IR, polyethylene glycol, prochlorperazine **OR** prochlorperazine **OR** prochlorperazine, senna-docusate **OR** senna-docusate, sodium chloride (PF), sodium chloride (PF)    PHYSICAL EXAM:   /60  Pulse 52  Temp 97.7  F (36.5  C) (Oral)  Resp 16  Wt 62.1 kg (136 lb 14.4 oz)  SpO2 100%  BMI 23.5 kg/m2 Estimated body mass index is 23.5 kg/(m^2) as calculated from the following:    Height as of 4/16/18: 1.626 m (5' 4\").    Weight as of this encounter: 62.1 kg (136 lb 14.4 oz).   RESP: lungs clear to auscultation - no rales, rhonchi or wheezes  CV: regular rates and rhythm    IMPRESSION   ASA Class 2 - Mild systemic disease      Signed Electronically by: Charlie Rabago MD  May 21, " 2018    .

## 2018-05-21 NOTE — PROGRESS NOTES
[facility]  Hospitalist Progress Note  Name: Shell Leon    MRN: [id number]  Provider:  Brenda Frederick MD  05/21/18    Initial presenting complaint/issue to hospital (Diagnosis): ABL likely due to upper GI bleed.         Assessment and Plan:      Summary of Stay: Shell Leon is a 68 year old male admitted on 5/20/2018 with ABL anemia likely due to UGI bleed. Hx of resistant HTN, DM type 2.      Problem List:     1. ABL anemia likely due to UGI bleed.  - s/p 2 units PRBC.  - start IV PPI.  - IVF's.  - GI to eval for EGD +/-colonoscopy.    2. DM.  - On SSI.    3. HTN.  - On hydralazine.    # Pain Assessment:  Current Pain Score 5/21/2018   Patient currently in pain? denies   Pain score (0-10) -   Pain location -   Pain descriptors -   Shell smith pain level was assessed and he currently denies pain.        DVT Prophylaxis:  -  PCD's.  Code Status: Full Code  Discharge Dispo: home  Estimated Disch Date / # of Days until Discharge: 1-2 days.        Interval History:        + dizzy. No abdominal pain/chest pain/SOB. + black stools.                  Physical Exam:      Last Vital Signs:  Temp: 97.7  F (36.5  C) Temp src: Oral BP: 146/41 Pulse: 52   Resp: 16 SpO2: 99 % O2 Device: None (Room air)      Intake/Output Summary (Last 24 hours) at 05/21/18 1000  Last data filed at 05/21/18 0600   Gross per 24 hour   Intake             1300 ml   Output                0 ml   Net             1300 ml     I/O last 3 completed shifts:  In: 1300 [I.V.:700]  Out: -   Vitals:    05/20/18 1935   Weight: 62.1 kg (136 lb 14.4 oz)       Gen - AAO x 3 in NAD.  Lungs - CTA B.  Heart - RR,S1+S2 nml, no m/g/r.  Abd - soft, NT, ND, + BS.  Ext - trace edema.         Medications:      All current medications were reviewed.         Data:      All new lab and imaging data was reviewed.   Labs:  No results for input(s): CULT in the last 168 hours.    Recent Labs  Lab 05/21/18  0646 05/20/18  1718   WBC 6.4 8.7   HGB 8.3* 6.4*    HCT 26.9* 21.7*   MCV 79 76*    273       Recent Labs  Lab 05/21/18  0646 05/20/18  1718    130*   POTASSIUM 5.4* 5.2   CHLORIDE 110* 103   CO2 21 20   ANIONGAP 5 7   GLC 86 105*   BUN 33* 37*   CR 1.00 1.14   GFRESTIMATED 74 64   GFRESTBLACK 90 77   STEVE 8.4* 8.5   PROTTOTAL  --  7.1   ALBUMIN  --  3.5   BILITOTAL  --  0.3   ALKPHOS  --  98   AST  --  40   ALT  --  38      Recent Imaging:   Recent Results (from the past 24 hour(s))   US Lower Extremity Venous Duplex Left    Narrative    VENOUS ULTRASOUND LEFT LEG  5/20/2018 6:04 PM     HISTORY: Edema.    COMPARISON: None.    FINDINGS:  Examination of the deep veins with graded compression and  color flow Doppler with spectral wave form analysis shows no evidence  of thrombus in the common femoral vein, femoral vein, popliteal vein  or calf veins.      Impression    IMPRESSION: No evidence of deep venous thrombosis.    KRYSTA DELGADILLO MD   XR Chest 2 Views    Narrative    CHEST TWO VIEWS  5/20/2018 6:07 PM     HISTORY: Dizziness.     COMPARISON: 8/10/2017      Impression    IMPRESSION: Current exam shows shallow inspiration with some minimal  thickening or fluid in the minor fissure of the right lung. There is  also some parenchymal changes in the lung bases which appear to be due  to scarring or atelectasis as there is no definite abnormalities on  the lateral. Heart size is normal. Pulmonary vasculature is minimally  prominent. No pleural effusions.    KRYSTA DELGADILLO MD   MR Brain w/o & w Contrast    Narrative    MRI BRAIN WITHOUT AND WITH CONTRAST  5/20/2018 7:00 PM    HISTORY:  Vertigo.      TECHNIQUE:  Multiplanar, multisequence MRI of the brain without and  with 6 mL Gadavist.    COMPARISON: None.    FINDINGS: Diffusion-weighted images are normal. There is no evidence  for intracranial hemorrhage or acute infarct. There are some focal  signal abnormalities in deep gray matter bilaterally including both  thalamic nuclei, and both caudate and putamen  regions. In addition  there are some minimal nonspecific white matter changes. These are not  associated with any mass effect or enhancement. Vascular structures  are patent at the skull base. Postcontrast images do not show any  abnormal areas of enhancement or any focal mass lesions.      Impression    IMPRESSION:  1. Focal nonenhancing signal abnormalities in bilateral thalamic  nuclei and bilateral basal ganglia regions with some scattered  nonspecific white matter changes. These are nonspecific but probably  related to chronic small vessel ischemic disease.  2. No evidence for intracranial hemorrhage, acute infarct, or any  focal mass lesions.    KRYSTA DELGADILLO MD

## 2018-05-22 VITALS
WEIGHT: 136.9 LBS | DIASTOLIC BLOOD PRESSURE: 39 MMHG | TEMPERATURE: 96.8 F | BODY MASS INDEX: 23.5 KG/M2 | HEART RATE: 50 BPM | RESPIRATION RATE: 16 BRPM | OXYGEN SATURATION: 100 % | SYSTOLIC BLOOD PRESSURE: 146 MMHG

## 2018-05-22 LAB
ANION GAP SERPL CALCULATED.3IONS-SCNC: 8 MMOL/L (ref 3–14)
BUN SERPL-MCNC: 18 MG/DL (ref 7–30)
CALCIUM SERPL-MCNC: 8.7 MG/DL (ref 8.5–10.1)
CHLORIDE SERPL-SCNC: 114 MMOL/L (ref 94–109)
CO2 SERPL-SCNC: 21 MMOL/L (ref 20–32)
COLONOSCOPY: NORMAL
CREAT SERPL-MCNC: 0.9 MG/DL (ref 0.66–1.25)
GFR SERPL CREATININE-BSD FRML MDRD: 83 ML/MIN/1.7M2
GLUCOSE BLDC GLUCOMTR-MCNC: 82 MG/DL (ref 70–99)
GLUCOSE BLDC GLUCOMTR-MCNC: 88 MG/DL (ref 70–99)
GLUCOSE SERPL-MCNC: 93 MG/DL (ref 70–99)
HGB BLD-MCNC: 9.5 G/DL (ref 13.3–17.7)
POTASSIUM SERPL-SCNC: 5 MMOL/L (ref 3.4–5.3)
SODIUM SERPL-SCNC: 143 MMOL/L (ref 133–144)

## 2018-05-22 PROCEDURE — G0500 MOD SEDAT ENDO SERVICE >5YRS: HCPCS | Performed by: INTERNAL MEDICINE

## 2018-05-22 PROCEDURE — 88305 TISSUE EXAM BY PATHOLOGIST: CPT | Performed by: INTERNAL MEDICINE

## 2018-05-22 PROCEDURE — 36415 COLL VENOUS BLD VENIPUNCTURE: CPT | Performed by: INTERNAL MEDICINE

## 2018-05-22 PROCEDURE — 45380 COLONOSCOPY AND BIOPSY: CPT | Performed by: INTERNAL MEDICINE

## 2018-05-22 PROCEDURE — 0DBP8ZZ EXCISION OF RECTUM, VIA NATURAL OR ARTIFICIAL OPENING ENDOSCOPIC: ICD-10-PCS | Performed by: INTERNAL MEDICINE

## 2018-05-22 PROCEDURE — 25000128 H RX IP 250 OP 636: Performed by: INTERNAL MEDICINE

## 2018-05-22 PROCEDURE — 88305 TISSUE EXAM BY PATHOLOGIST: CPT | Mod: 26 | Performed by: INTERNAL MEDICINE

## 2018-05-22 PROCEDURE — 25000132 ZZH RX MED GY IP 250 OP 250 PS 637: Performed by: INTERNAL MEDICINE

## 2018-05-22 PROCEDURE — 99239 HOSP IP/OBS DSCHRG MGMT >30: CPT | Performed by: INTERNAL MEDICINE

## 2018-05-22 PROCEDURE — 80048 BASIC METABOLIC PNL TOTAL CA: CPT | Performed by: INTERNAL MEDICINE

## 2018-05-22 PROCEDURE — 00000146 ZZHCL STATISTIC GLUCOSE BY METER IP

## 2018-05-22 PROCEDURE — 85018 HEMOGLOBIN: CPT | Performed by: INTERNAL MEDICINE

## 2018-05-22 RX ORDER — FLUMAZENIL 0.1 MG/ML
0.2 INJECTION, SOLUTION INTRAVENOUS
Status: DISCONTINUED | OUTPATIENT
Start: 2018-05-22 | End: 2018-05-22 | Stop reason: HOSPADM

## 2018-05-22 RX ORDER — FENTANYL CITRATE 50 UG/ML
INJECTION, SOLUTION INTRAMUSCULAR; INTRAVENOUS PRN
Status: DISCONTINUED | OUTPATIENT
Start: 2018-05-22 | End: 2018-05-22

## 2018-05-22 RX ORDER — NALOXONE HYDROCHLORIDE 0.4 MG/ML
.1-.4 INJECTION, SOLUTION INTRAMUSCULAR; INTRAVENOUS; SUBCUTANEOUS
Status: DISCONTINUED | OUTPATIENT
Start: 2018-05-22 | End: 2018-05-22 | Stop reason: HOSPADM

## 2018-05-22 RX ADMIN — HYDRALAZINE HYDROCHLORIDE 50 MG: 50 TABLET ORAL at 08:47

## 2018-05-22 RX ADMIN — MAGESIUM CITRATE 296 ML: 1.75 LIQUID ORAL at 06:32

## 2018-05-22 RX ADMIN — SODIUM CHLORIDE: 9 INJECTION, SOLUTION INTRAVENOUS at 04:00

## 2018-05-22 ASSESSMENT — ACTIVITIES OF DAILY LIVING (ADL)
ADLS_ACUITY_SCORE: 11

## 2018-05-22 NOTE — PLAN OF CARE
Problem: Patient Care Overview  Goal: Plan of Care/Patient Progress Review  Outcome: Improving  Pt had a colonoscopy today. Pt arrived back to the floor at 12:50. Pt is tolerating reg diet. Pt denies pain. Pt is hoping to discharge on the PM shift.

## 2018-05-22 NOTE — PROGRESS NOTES
Would keep on omeprazole daily as long as aspirin or other NSAIDS are used given ulcer seen on endoscopy.

## 2018-05-22 NOTE — PROGRESS NOTES
Reviewed discharge instructions and medications with patient and son. Questions answered. Patient discharged to home with son, discharge instructions, medications omeprazole order sent to pt pharmacy, and belongings at this time. DC time [id number]

## 2018-05-22 NOTE — PROGRESS NOTES
Paged regarding giving his scheduled hydralazine.  /42.  Diastolic little low.  Okay to give his PTA scheduled hydralazine at this time.

## 2018-05-22 NOTE — PLAN OF CARE
Problem: Patient Care Overview  Goal: Plan of Care/Patient Progress Review  /45 (BP Location: Left arm)  Pulse 50  Temp 96.2  F (35.7  C) (Oral)  Resp 16  Wt 62.1 kg (136 lb 14.4 oz)  SpO2 98%  BMI 23.5 kg/m2  Pt up SBA to restroom. Tele: SB heart rate in 50s. Denies pain, LS clear, no nausea. On clear diet.  BS hypoactive.  Passing flatus.  Last BM today, had bowel prep yesterday. Colonoscopy today at 1130 am. Labs yesterday were:  K high (5.4 and 5.2) and hemoglobin stable (8.3). BS: 82.

## 2018-05-22 NOTE — PROCEDURES
Pre-Endoscopy History and Physical     Shell Leon MRN# [id number]   YOB: 1950 Age: 68 year old     Date of Procedure: 5/20/2018  Primary care provider: Kathi Mondragon  Type of Endoscopy: colonoscopy  Reason for Procedure: anemia  Type of Anesthesia Anticipated: Moderate (conscious) sedation    HPI:    Shell is a 68 year old male who will be undergoing the above procedure.      A history and physical has been performed. The patient's medications and allergies have been reviewed. The risks and benefits of the procedure and the sedation options and risks were discussed with the patient.  All questions were answered and informed consent was obtained.      No Known Allergies     Current Facility-Administered Medications   Medication     atorvastatin (LIPITOR) tablet 80 mg     hydrALAZINE (APRESOLINE) tablet 50 mg     insulin aspart (NovoLOG) inj (RAPID ACTING)     insulin aspart (NovoLOG) inj (RAPID ACTING)     lidocaine (LMX4) kit     lidocaine 1 % 1 mL     May continue current IV fluids if patient has IV fluids infusing.     May take regular AM medications except those listed below     naloxone (NARCAN) injection 0.1-0.4 mg     sodium chloride (PF) 0.9% PF flush 3 mL     sodium chloride (PF) 0.9% PF flush 3 mL     sodium chloride (PF) 0.9% PF flush 3 mL     sodium chloride 0.9% infusion       Patient Active Problem List   Diagnosis     Essential hypertension with goal blood pressure less than 140/90     Internal carotid artery stenosis, bilateral     Benign essential hypertension     Edema, unspecified type     Type 2 diabetes mellitus without complication (H)     Anemia        Past Medical History:   Diagnosis Date     Diabetes (H)      Hypertension         Past Surgical History:   Procedure Laterality Date     LAPAROSCOPIC CHOLECYSTECTOMY N/A 1/6/2017    Procedure: LAPAROSCOPIC CHOLECYSTECTOMY;  Surgeon: Michael Caba MD;  Location:  OR       Social History   Substance  Use Topics     Smoking status: Never Smoker     Smokeless tobacco: Never Used     Alcohol use No       Family History   Problem Relation Age of Onset     Unknown/Adopted No family hx of             Medications:     Prescriptions Prior to Admission   Medication Sig Dispense Refill Last Dose     atorvastatin (LIPITOR) 80 MG tablet Take 1 tablet (80 mg) by mouth daily 90 tablet 3 5/19/2018 at lunch     glipiZIDE (GLUCOTROL) 5 MG tablet Take 1 tablet (5 mg) by mouth every morning (before breakfast) 90 tablet 1 5/20/2018 at am     hydrALAZINE (APRESOLINE) 50 MG tablet Take 1 tablet (50 mg) by mouth 3 times daily 180 tablet 3 5/20/2018 at am     hydrochlorothiazide (HYDRODIURIL) 25 MG tablet Take 1 tablet (25 mg) by mouth daily 90 tablet 1 5/19/2018 at dinner     metFORMIN (GLUCOPHAGE) 1000 MG tablet TAKE 1 TABLET BY MOUTH TWICE DAILY WITH MEALS 60 tablet 5 5/20/2018 at am      [facility] ASPIRIN 81 MG chewable tablet CHEW AND SWALLOW ONE TABLET BY MOUTH ONE TIME DAILY  90 tablet 3 5/19/2018 at lunch     valsartan (DIOVAN) 320 MG tablet Take 1 tablet (320 mg) by mouth daily 90 tablet 1 5/20/2018 at am     blood glucose (NO BRAND SPECIFIED) lancets standard Use to test blood sugar 3 times daily or as directed. 1 Box 11 Taking     blood glucose monitoring (NO BRAND SPECIFIED) test strip Use to test blood sugars 3 times daily or as directed 100 strip 11 Taking     order for DME Equipment being ordered: blood pressure cuff 1 Units 0        Scheduled Medications:    atorvastatin  80 mg Oral Daily     hydrALAZINE  50 mg Oral TID     insulin aspart  1-7 Units Subcutaneous TID AC     insulin aspart  1-5 Units Subcutaneous At Bedtime     sodium chloride (PF)  3 mL Intracatheter Q8H       PRN:  lidocaine 4%, lidocaine (buffered or not buffered), - MEDICATION INSTRUCTIONS -, - MEDICATION INSTRUCTIONS -, naloxone, sodium chloride (PF), sodium chloride (PF)    PHYSICAL EXAM:   /58  Pulse 50  Temp 96.8  F (36  C) (Oral)  Resp  "16  Wt 62.1 kg (136 lb 14.4 oz)  SpO2 100%  BMI 23.5 kg/m2 Estimated body mass index is 23.5 kg/(m^2) as calculated from the following:    Height as of 4/16/18: 1.626 m (5' 4\").    Weight as of this encounter: 62.1 kg (136 lb 14.4 oz).   RESP: lungs clear to auscultation - no rales, rhonchi or wheezes  CV: regular rates and rhythm    IMPRESSION   ASA Class 2 - Mild systemic disease      Signed Electronically by: Charlie Rabago MD  May 22, 2018    .            "

## 2018-05-22 NOTE — PLAN OF CARE
Problem: Patient Care Overview  Goal: Plan of Care/Patient Progress Review  Outcome: No Change  Ambulatory Status:  Pt up SBA to restroom.  VS:  Tele: SB heart rate in 50s. Held first dose of hydralazine per MD but was ok to give HS.   Pain:  none  Resp: LS clear  GI:  no nausea.  fair appetite and on clear diet.  BS hypoactive.  Passing flatus.  Last BM today, had bowel prep x4 stools this shift.  :  good  Skin:  WDL  Tx:  Colonoscopy tomorrow at 1130 am.   Labs:  K high (5.4 and 5.2) and hemoglobin stable (8.3). BS: 75 for dinner time and 114 at HS.

## 2018-05-23 LAB — COPATH REPORT: NORMAL

## 2018-05-23 NOTE — DISCHARGE SUMMARY
Admit Date:     05/20/2018   Discharge Date:     05/22/2018      DISPOSITION:  Discharged to home.      DIAGNOSES:   1.  Acute blood loss anemia thought secondary to gastrointestinal bleeding.   2.  Hypertension.   3.  Diabetes mellitus.      PROCEDURES:   1.  Gastroenterology consultation.   2.  EGD which showed nonbleeding gastric ulcer.   3.  Colonoscopy which was unremarkable.   4.  MRI of the brain showed no acute findings.   5.  Chest x-ray showed no acute findings.   6.  Lower extremity Doppler ultrasound, which showed no evidence of DVT.      ALLERGIES:  NO KNOWN DRUG ALLERGIES.      PENDING TEST RESULTS:  None.      PRIMARY CARE PROVIDER:  Kathi Mondragon PA-C      HOSPITAL COURSE:   1.  Anemia and presumed gastrointestinal bleed with presumed acute blood loss anemia.  Mr. Leon is a 68-year-old man who presented with dizziness.  It seemed to be worsened with standing up.  Initially symptoms were difficult to pin down.  He went onto extensive workup in the Emergency Department.  He was found to be anemic with a hemoglobin of 6.4.  He did not have any clear evidence of ongoing bleeding.  He presumably had GI bleeding and had been on an aspirin a day.  He was transfused 2 units of packed red blood cells and his dizziness symptoms resolved.  Hemoglobin improved to 9.5.  Gastroenterology was consulted and he underwent EGD and colonoscopy.  He was found to have a small nonbleeding gastric ulcer, but this was not felt to be the source of his blood loss.  Gastroenterology felt it was reasonable for him to be discharged with a plan to have pill endoscopy as an outpatient.  We also elected to hold his aspirin for now and start him on a proton pump inhibitor for the gastric ulcer.  He will need followup on these medications with his primary care provider and with Gastroenterology.      DISCHARGE MEDICATIONS:   1.  Lipitor 80 mg a day.   2.  Glipizide 5 mg a day.   3.  Hydralazine 50 mg 3 times a day.   4.   Hydrochlorothiazide 25 mg a day.   5.  Metformin 1000 mg twice a day.   6.  Omeprazole 20 mg a day (this is a new medication).   7.  Diovan 320 mg a day.   8.  Hold aspirin for now.      FOLLOWUP APPOINTMENTS:   1.  With [facility] for pill endoscopy as directed by Dr. Rabago.   2.  Follow up with primary care provider in 3-4 days and check basic metabolic panel and hemoglobin.         KRYSTA HADDAD MD             D: 2018   T: 2018   MT: REJI      Name:     NARESH GARCIA   MRN:      [id number]        Account:        [id number]   :      1950           Admit Date:     2018                                  Discharge Date: 2018      Document: [id number]       cc: Charlie Mondragon PA-C

## 2018-05-24 ENCOUNTER — TELEPHONE (OUTPATIENT)
Dept: FAMILY MEDICINE | Facility: CLINIC | Age: 68
End: 2018-05-24

## 2018-05-24 ENCOUNTER — DOCUMENTATION ONLY (OUTPATIENT)
Dept: LAB | Facility: CLINIC | Age: 68
End: 2018-05-24

## 2018-05-24 NOTE — PROGRESS NOTES
Shell has a lab only appointment, tomorrow, 5/25/18. Please place future lab orders and sign them. Thanks, Lorraine

## 2018-05-25 ENCOUNTER — OFFICE VISIT (OUTPATIENT)
Dept: FAMILY MEDICINE | Facility: CLINIC | Age: 68
End: 2018-05-25
Payer: COMMERCIAL

## 2018-05-25 VITALS
RESPIRATION RATE: 14 BRPM | WEIGHT: 136 LBS | BODY MASS INDEX: 23.22 KG/M2 | HEART RATE: 57 BPM | OXYGEN SATURATION: 98 % | DIASTOLIC BLOOD PRESSURE: 58 MMHG | TEMPERATURE: 98.1 F | HEIGHT: 64 IN | SYSTOLIC BLOOD PRESSURE: 164 MMHG

## 2018-05-25 DIAGNOSIS — D62 ANEMIA DUE TO BLOOD LOSS, ACUTE: Primary | ICD-10-CM

## 2018-05-25 LAB — HGB BLD-MCNC: 8.2 G/DL (ref 13.3–17.7)

## 2018-05-25 PROCEDURE — 85018 HEMOGLOBIN: CPT | Performed by: NURSE PRACTITIONER

## 2018-05-25 PROCEDURE — 36415 COLL VENOUS BLD VENIPUNCTURE: CPT | Performed by: NURSE PRACTITIONER

## 2018-05-25 PROCEDURE — 80048 BASIC METABOLIC PNL TOTAL CA: CPT | Performed by: NURSE PRACTITIONER

## 2018-05-25 PROCEDURE — 99214 OFFICE O/P EST MOD 30 MIN: CPT | Performed by: NURSE PRACTITIONER

## 2018-05-25 NOTE — MR AVS SNAPSHOT
After Visit Summary   5/25/2018    Shell Leon    MRN: [id number]           Patient Information     Date Of Birth          1950        Visit Information        Provider Department      5/25/2018 3:00 PM Jennifer Brasher Ra, APRN [facility]3] [place]        Today's Diagnoses     Anemia due to blood loss, acute    -  1      Care Instructions    Please start taking an iron supplement.  This will be dosed at 324 or 325mg and is sold over the counter as ferrous sulfate or ferrous gluconate.  Take this twice daily with a cup of orange juice.    Monitor your symptoms.  If you start to feel poorly please seek care.    Please go to the [facility] for a recheck on Sunday.    Schedule with the hematologist as well and keep your appointment with the GI specialist.          Follow-ups after your visit        Additional Services     ONC/HEME ADULT REFERRAL       Your provider has referred you to: [facility]: Cancer Care/Hematology (All Cancer Related Services) - [place] [phone]   [email or web address]    Please be aware that coverage of these services is subject to the terms and limitations of your health insurance plan.  Call member services at your health plan with any benefit or coverage questions.      Please bring the following with you to your appointment:    (1) Any X-Rays, CTs or MRIs which have been performed.  Contact the facility where they were done to arrange for  prior to your scheduled appointment.   (2) List of current medications  (3) This referral request   (4) Any documents/labs given to you for this referral                  Follow-up notes from your care team     Return in about 4 weeks (around 6/22/2018) for follow up.      Your next 10 appointments already scheduled     May 30, 2018  8:30 AM CDT   Return Visit with Cresencio Landry MD   [facility] ([facility])  "   [address]  [facility] [id number] [phone]            Jul 16, 2018  3:10 PM CDT   Office Visit with Kathi Mondragon PA-C   [facility]    [address] [address] [phone]           Bring a current list of meds and any records pertaining to this visit. For Physicals, please bring immunization records and any forms needing to be filled out. Please arrive 10 minutes early to complete paperwork.              Who to contact     If you have questions or need follow up information about today's clinic visit or your schedule please contact [facility] directly at [phone].  Normal or non-critical lab and imaging results will be communicated to you by MyChart, letter or phone within 4 business days after the clinic has received the results. If you do not hear from us within 7 days, please contact the clinic through MyChart or phone. If you have a critical or abnormal lab result, we will notify you by phone as soon as possible.  Submit refill requests through [employer] or call your pharmacy and they will forward the refill request to us. Please allow 3 business days for your refill to be completed.          Additional Information About Your Visit        Care EveryWhere ID     This is your Care EveryWhere ID. This could be used by other organizations to access your [place] medical records  [id number]        Your Vitals Were     Pulse Temperature Respirations Height Pulse Oximetry BMI (Body Mass Index)    57 98.1  F (36.7  C) (Oral) 14 5' 4\" (1.626 m) 98% 23.34 kg/m2       Blood Pressure from Last 3 Encounters:   05/25/18 164/58   05/22/18 (!) 146/39   04/16/18 180/50    Weight from Last 3 Encounters:   05/25/18 136 lb (61.7 kg)   05/20/18 136 lb 14.4 oz (62.1 kg)   04/16/18 135 lb 4.8 oz (61.4 kg)              We Performed the Following     Basic metabolic panel     Hemoglobin     ONC/HEME ADULT " REFERRAL        Primary Care Provider Office Phone # Fax #    Kathi Mondragon PA-C [phone]       [address] [facility] [address]        Equal Access to Services     TERRA FROST : Hadii aad ku hadrubéno Soomaali, waaxda luqadaha, qaybta kaalmada adeegyada, waxkacey ramsayin colinn didi andujar laWallacebell vela. So [facility] [phone].    ATENCIÓN: Si habla español, tiene a sullivan disposición servicios gratuitos de asistencia lingüística. Llame al [phone].    We comply with applicable federal civil rights laws and [place] laws. We do not discriminate on the basis of race, color, national origin, age, disability, sex, sexual orientation, or gender identity.            Thank you!     Thank you for choosing [facility]  for your care. Our goal is always to provide you with excellent care. Hearing back from our patients is one way we can continue to improve our services. Please take a few minutes to complete the written survey that you may receive in the mail after your visit with us. Thank you!             Your Updated Medication List - Protect others around you: Learn how to safely use, store and throw away your medicines at www.disposemymeds.org.          This list is accurate as of 5/25/18  3:44 PM.  Always use your most recent med list.                   Brand Name Dispense Instructions for use Diagnosis    atorvastatin 80 MG tablet    LIPITOR    90 tablet    Take 1 tablet (80 mg) by mouth daily    Type 2 diabetes mellitus without complication, unspecified long term insulin use status (H)       blood glucose lancets standard    no brand specified    1 Box    Use to test blood sugar 3 times daily or as directed.    Type 2 diabetes mellitus with hyperglycemia, without long-term current use of insulin (H)       blood glucose monitoring test strip    no brand specified    100 strip    Use to test blood sugars 3 times daily or as directed    Type 2 diabetes mellitus with hyperglycemia,  without long-term current use of insulin (H)       glipiZIDE 5 MG tablet    GLUCOTROL    90 tablet    Take 1 tablet (5 mg) by mouth every morning (before breakfast)    Type 2 diabetes mellitus with hyperglycemia, without long-term current use of insulin (H)       hydrALAZINE 50 MG tablet    APRESOLINE    180 tablet    Take 1 tablet (50 mg) by mouth 3 times daily    Resistant hypertension       hydrochlorothiazide 25 MG tablet    HYDRODIURIL    90 tablet    Take 1 tablet (25 mg) by mouth daily    Benign essential hypertension       metFORMIN 1000 MG tablet    GLUCOPHAGE    60 tablet    TAKE 1 TABLET BY MOUTH TWICE DAILY WITH MEALS    Type 2 diabetes mellitus with hyperglycemia, without long-term current use of insulin (H)       omeprazole 20 MG CR capsule    priLOSEC    30 capsule    Take 1 capsule (20 mg) by mouth every morning (before breakfast)    Gastrointestinal hemorrhage, unspecified gastrointestinal hemorrhage type       order for DME     1 Units    Equipment being ordered: blood pressure cuff    Resistant hypertension       valsartan 320 MG tablet    DIOVAN    90 tablet    Take 1 tablet (320 mg) by mouth daily    Resistant hypertension

## 2018-05-25 NOTE — PATIENT INSTRUCTIONS
Please start taking an iron supplement.  This will be dosed at 324 or 325mg and is sold over the counter as ferrous sulfate or ferrous gluconate.  Take this twice daily with a cup of orange juice.    Monitor your symptoms.  If you start to feel poorly please seek care.    Please go to the [facility] for a recheck on Sunday.    Schedule with the hematologist as well and keep your appointment with the GI specialist.

## 2018-05-25 NOTE — PROGRESS NOTES
SUBJECTIVE:   Shell Leon is a 68 year old male who presents to clinic today for the following health issues:      Hospital Follow-up Visit:    Hospital/Nursing Home/IP Rehab Facility: [facility]  Date of Admission: 5/20/18  Date of Discharge: 5/22/18  Reason(s) for Admission:   1.  Acute blood loss anemia thought secondary to gastrointestinal bleeding.   2.  Hypertension.   3.  Diabetes mellitus.             Problems taking medications regularly:  None       Medication changes since discharge: prilosec       Problems adhering to non-medication therapy:  None    Summary of hospitalization:  [facility] discharge summary reviewed  Diagnostic Tests/Treatments reviewed.  Follow up needed: pill cam with GI scheduled for next Wednesday.  Other Healthcare Providers Involved in Patient s Care:         Specialist appointment - 5/30/18  Update since discharge: stable.     Daughter present.  Pt has been doing well since discharge.  Denies any chest pain, palpitations, sob.  No abdominal pain.  Normal intake.  Normal stools, no black or bloody stools.  Has been taking PPI as directed and has stopped aspirin.  Feeling improved compared to when he presented to the ED and found to be very anemic.  Daughter states appointment for procedure with GI is scheduled for next week.  He is not taking any iron at this time.    Post Discharge Medication Reconciliation: discharge medications reconciled, continue medications without change.  Plan of care communicated with patient and daughter     Coding guidelines for this visit:  Type of Medical   Decision Making Face-to-Face Visit       within 7 Days of discharge Face-to-Face Visit        within 14 days of discharge   Moderate Complexity 34188 [id number]   High Complexity 54324 16936              Problem list and histories reviewed & adjusted, as indicated.  Additional history: as documented    Patient Active Problem List   Diagnosis     Essential hypertension with  "goal blood pressure less than 140/90     Internal carotid artery stenosis, bilateral     Benign essential hypertension     Edema, unspecified type     Type 2 diabetes mellitus without complication (H)     Anemia     Past Surgical History:   Procedure Laterality Date     COLONOSCOPY N/A 5/22/2018    Procedure: COMBINED COLONOSCOPY, SINGLE OR MULTIPLE BIOPSY/POLYPECTOMY BY BIOPSY;  COLONOSCOPY  Room 521, with polypectomy x1 using cold biopsy forceps;  Surgeon: Charlie Rabago MD;  Location:  GI     ESOPHAGOSCOPY, GASTROSCOPY, DUODENOSCOPY (EGD), COMBINED N/A 5/21/2018    Procedure: COMBINED ESOPHAGOSCOPY, GASTROSCOPY, DUODENOSCOPY (EGD), BIOPSY SINGLE OR MULTIPLE;  ESOPHAGOSCOPY, GASTROSCOPY, DUODENOSCOPY (EGD)  Room 521 and cold biopsies;  Surgeon: Charlie Rabago MD;  Location:  GI     LAPAROSCOPIC CHOLECYSTECTOMY N/A 1/6/2017    Procedure: LAPAROSCOPIC CHOLECYSTECTOMY;  Surgeon: Michael Caba MD;  Location:  OR       Social History   Substance Use Topics     Smoking status: Never Smoker     Smokeless tobacco: Never Used     Alcohol use No     Family History   Problem Relation Age of Onset     Unknown/Adopted No family hx of            Reviewed and updated as needed this visit by clinical staff  Tobacco  Allergies  Meds  Med Hx  Surg Hx  Fam Hx  Soc Hx      Reviewed and updated as needed this visit by Provider         ROS:  SEE HPI.    OBJECTIVE:     /58 (BP Location: Right arm, Patient Position: Chair, Cuff Size: Adult Regular)  Pulse 57  Temp 98.1  F (36.7  C) (Oral)  Resp 14  Ht 5' 4\" (1.626 m)  Wt 136 lb (61.7 kg)  SpO2 98%  BMI 23.34 kg/m2  Body mass index is 23.34 kg/(m^2).  GENERAL: healthy, alert and no distress  NECK: no adenopathy, no asymmetry, masses, or scars and thyroid normal to palpation  RESP: lungs clear to auscultation - no rales, rhonchi or wheezes  CV: regular rates and rhythm and normal S1 S2, no S3 or S4  ABDOMEN: soft, nontender, no " hepatosplenomegaly, no masses and bowel sounds normal  PSYCH: mentation appears normal, affect normal/bright    Diagnostic Test Results:  Results for orders placed or performed in visit on 05/25/18 (from the past 24 hour(s))   Hemoglobin   Result Value Ref Range    Hemoglobin 8.2 (L) 13.3 - 17.7 g/dL       ASSESSMENT/PLAN:   1. Anemia due to blood loss, acute  68 y.o. Male, hx of well controlled DM, here today to follow up on anemia which is believed to be due to acute blood loss, although this has yet to be identified.  There was a small ulcer seen during an EGD, but this was not thought to be impressive enough to cause the degree of anemia seen.  He is scheduled for further investigation next week.  His vs are stable today but his hemoglobin has decreased from 9.5 to 8.2 over the past 3 days.  Concern regarding the long weekend and access to care.  I discussed options with the pt and his daughter.  We will have them monitor very closely.  If any change or worsening he will go to the ED.  He will start BID iron now.  Hematology referral was placed- they will schedule this appointment now.  He will present for a recheck at the [facility] urgent care clinic on 5/27/18.  If stable, hopefully can be monitored until GI is able to see him next week.  Pt and daughter agree with the plan and verbalized understanding.  - Basic metabolic panel  - Hemoglobin  - ONC/HEME ADULT REFERRAL    DELPHINE Avendano Ra, CNP  [facility]

## 2018-05-26 LAB
ANION GAP SERPL CALCULATED.3IONS-SCNC: 13 MMOL/L (ref 3–14)
BUN SERPL-MCNC: 19 MG/DL (ref 7–30)
CALCIUM SERPL-MCNC: 8.3 MG/DL (ref 8.5–10.1)
CHLORIDE SERPL-SCNC: 106 MMOL/L (ref 94–109)
CO2 SERPL-SCNC: 16 MMOL/L (ref 20–32)
CREAT SERPL-MCNC: 1.07 MG/DL (ref 0.66–1.25)
GFR SERPL CREATININE-BSD FRML MDRD: 68 ML/MIN/1.7M2
GLUCOSE SERPL-MCNC: 75 MG/DL (ref 70–99)
POTASSIUM SERPL-SCNC: 5.1 MMOL/L (ref 3.4–5.3)
SODIUM SERPL-SCNC: 135 MMOL/L (ref 133–144)

## 2018-05-27 ENCOUNTER — OFFICE VISIT (OUTPATIENT)
Dept: URGENT CARE | Facility: URGENT CARE | Age: 68
End: 2018-05-27
Payer: COMMERCIAL

## 2018-05-27 VITALS
WEIGHT: 136 LBS | OXYGEN SATURATION: 100 % | HEART RATE: 54 BPM | DIASTOLIC BLOOD PRESSURE: 68 MMHG | TEMPERATURE: 98.3 F | SYSTOLIC BLOOD PRESSURE: 140 MMHG | BODY MASS INDEX: 23.34 KG/M2

## 2018-05-27 DIAGNOSIS — D62 ANEMIA DUE TO BLOOD LOSS, ACUTE: ICD-10-CM

## 2018-05-27 DIAGNOSIS — K25.4 GASTROINTESTINAL HEMORRHAGE ASSOCIATED WITH GASTRIC ULCER: Primary | ICD-10-CM

## 2018-05-27 LAB
BASOPHILS # BLD AUTO: 0.1 10E9/L (ref 0–0.2)
BASOPHILS NFR BLD AUTO: 0.7 %
DIFFERENTIAL METHOD BLD: ABNORMAL
EOSINOPHIL # BLD AUTO: 1.1 10E9/L (ref 0–0.7)
EOSINOPHIL NFR BLD AUTO: 10.8 %
ERYTHROCYTE [DISTWIDTH] IN BLOOD BY AUTOMATED COUNT: 19.5 % (ref 10–15)
HCT VFR BLD AUTO: 27.6 % (ref 40–53)
HGB BLD-MCNC: 8.8 G/DL (ref 13.3–17.7)
LYMPHOCYTES # BLD AUTO: 1.8 10E9/L (ref 0.8–5.3)
LYMPHOCYTES NFR BLD AUTO: 17 %
MCH RBC QN AUTO: 25.5 PG (ref 26.5–33)
MCHC RBC AUTO-ENTMCNC: 31.9 G/DL (ref 31.5–36.5)
MCV RBC AUTO: 80 FL (ref 78–100)
MONOCYTES # BLD AUTO: 1 10E9/L (ref 0–1.3)
MONOCYTES NFR BLD AUTO: 9.9 %
NEUTROPHILS # BLD AUTO: 6.3 10E9/L (ref 1.6–8.3)
NEUTROPHILS NFR BLD AUTO: 61.6 %
PLATELET # BLD AUTO: 213 10E9/L (ref 150–450)
RBC # BLD AUTO: 3.45 10E12/L (ref 4.4–5.9)
WBC # BLD AUTO: 10.3 10E9/L (ref 4–11)

## 2018-05-27 PROCEDURE — 36415 COLL VENOUS BLD VENIPUNCTURE: CPT | Performed by: PHYSICIAN ASSISTANT

## 2018-05-27 PROCEDURE — 99214 OFFICE O/P EST MOD 30 MIN: CPT | Performed by: PHYSICIAN ASSISTANT

## 2018-05-27 PROCEDURE — 85025 COMPLETE CBC W/AUTO DIFF WBC: CPT | Performed by: PHYSICIAN ASSISTANT

## 2018-05-27 RX ORDER — FERROUS SULFATE 325(65) MG
325 TABLET ORAL
COMMUNITY
End: 2018-07-25

## 2018-05-27 NOTE — PROGRESS NOTES
Shell Leon is a 68 y.o. Man who presents to  today, requesting recheck of post transfusion hemoglobin check.     HPI: Patient presented to [facility] ER on 5/20/18 c/o of dizziness. His ER presenting hbg was 6.4. On review of Hospital Discharge Summary, I see patient was transfused with 2 units of packed red cells. Patient was admitted for 2 days. IP EGD showed non-bleeding gastric ulcer and colonoscopy was unremarkable. IP note discusses presumed GI bleed related to daily ASA with no ongoing active bleeding via endoscopy. His ASA was stopped. He was started on PPI and advised to follow-up with GI MD (states he has an apt Wednesday/2 days from now). Patient followed up with PCP clinic Friday (2 days ago) were hgb was noted to have dropped from 9.5 to 8.2 so PCP reportedly advised he come here for recheck over holiday weekend. Patient states he feels 75% better then he did prior to hospital admit.     Review Of Systems  CONSTITUTIONAL: Denies any unexplained weight loss, fever,fatigue, night sweats or bone pain.  Skin: Denies any rash or unexplained bruising   Eyes: Denies any visual changes   Respiratory: No shortness of breath, dyspnea on exertion, cough, or hemoptysis  Cardiovascular: Bradycardia noted here today. Patient reports he is aware of lower heart rate. Review of VS shows similar rate 2 days ago at PCP office. Admits to longstanding, bilateral, LE edema. Denies any acute changes in this longstanding condition for many months. Denies any orthostatic sxs. Denies any palpitations, tachycardia, irregular heart beat, chest pain, exertional chest pain or pressure, dyspnea on exertion and syncope or near-syncope  Gastrointestinal: He denies any abdominal pain. Patient admits to a single small emesis (denies any bloody or black vomitus) after breakfast. He has otherwise been eating well and had no other emesis. Denies any diarrhea. Denies any black, bloody or maroon colored stools.   Musculoskeletal: Denies  any acute arthralgias or myalgias.   Hematologic/Lymphatic/Immunologic: Anemia as per above. Prior to recent anemia, denies any past hx of anemia. Denies any primary fmhx of blood dyscrasia.   Endocrine: Positive for DM. Denies any BS changes       Past Medical History:   Diagnosis Date     Diabetes (H)      Hypertension      Current Outpatient Prescriptions   Medication     atorvastatin (LIPITOR) 80 MG tablet     ferrous sulfate (IRON) 325 (65 Fe) MG tablet     glipiZIDE (GLUCOTROL) 5 MG tablet     hydrALAZINE (APRESOLINE) 50 MG tablet     hydrochlorothiazide (HYDRODIURIL) 25 MG tablet     metFORMIN (GLUCOPHAGE) 1000 MG tablet     omeprazole (PRILOSEC) 20 MG CR capsule     valsartan (DIOVAN) 320 MG tablet     blood glucose (NO BRAND SPECIFIED) lancets standard     blood glucose monitoring (NO BRAND SPECIFIED) test strip     order for DME     No current facility-administered medications for this visit.      No Known Allergies    OBJECTIVE:  /68 (BP Location: Right arm, Patient Position: Chair, Cuff Size: Adult Regular)  Pulse 54  Temp 98.3  F (36.8  C) (Oral)  Wt 136 lb (61.7 kg)  SpO2 100%  BMI 23.34 kg/m2    GENERAL:  Very pleasant, comfortable and generally well appearing.  HEENT:   Conjunctiva without infection.  Sclera clear.  Left TM is normal: no effusions, no erythema, and normal landmarks.  Right TM is normal: no effusions, no erythema, and normal landmarks.  Nasal mucosa is normal.  Oropharyngeal exam is normal: no lesions, erythema, adenopathy or exudate.  Neck is supple with no adenopathy  CARDIAC:NORMAL - regular rate and rhythm without murmur., normal s1/s2 and without extra heart sounds  RESP: Normal - CTA without rales, rhonchi, or wheezing.    Component      Latest Ref Rng & Units 5/20/2018 5/21/2018 5/25/2018   WBC      4.0 - 11.0 10e9/L 8.7 6.4    RBC Count      4.4 - 5.9 10e12/L 2.84 (L) 3.40 (L)    Hemoglobin      13.3 - 17.7 g/dL 6.4 (LL) 8.3 (L) 8.2 (L)   Hematocrit      40.0 - 53.0  % 21.7 (L) 26.9 (L)    MCV      78 - 100 fl 76 (L) 79    MCH      26.5 - 33.0 pg 22.5 (L) 24.4 (L)    MCHC      31.5 - 36.5 g/dL 29.5 (L) 30.9 (L)    RDW      10.0 - 15.0 % 16.7 (H) 18.0 (H)    Platelet Count      150 - 450 10e9/L 273 228    Diff Method       Automated Method Automated Method    % Neutrophils      % 60.7 44.2    % Lymphocytes      % 20.9 27.5    % Monocytes      % 6.8 10.9    % Eosinophils      % 10.7 15.7    % Basophils      % 0.6 1.4    % Immature Granulocytes      % 0.3 0.3    Nucleated RBCs      0 /100 0 0    Absolute Neutrophil      1.6 - 8.3 10e9/L 5.3 2.9    Absolute Lymphocytes      0.8 - 5.3 10e9/L 1.8 1.8    Absolute Monocytes      0.0 - 1.3 10e9/L 0.6 0.7    Absolute Eosinophils      0.0 - 0.7 10e9/L 0.9 (H) 1.0 (H)    Absolute Basophils      0.0 - 0.2 10e9/L 0.1 0.1    Abs Immature Granulocytes      0 - 0.4 10e9/L 0.0 0.0    Absolute Nucleated RBC       0.0 0.0      Component      Latest Ref Rng & Units 5/27/2018   WBC      4.0 - 11.0 10e9/L 10.3   RBC Count      4.4 - 5.9 10e12/L 3.45 (L)   Hemoglobin      13.3 - 17.7 g/dL 8.8 (L)   Hematocrit      40.0 - 53.0 % 27.6 (L)   MCV      78 - 100 fl 80   MCH      26.5 - 33.0 pg 25.5 (L)   MCHC      31.5 - 36.5 g/dL 31.9   RDW      10.0 - 15.0 % 19.5 (H)   Platelet Count      150 - 450 10e9/L 213   Diff Method       Automated Method   % Neutrophils      % 61.6   % Lymphocytes      % 17.0   % Monocytes      % 9.9   % Eosinophils      % 10.8   % Basophils      % 0.7   % Immature Granulocytes      %    Nucleated RBCs      0 /100    Absolute Neutrophil      1.6 - 8.3 10e9/L 6.3   Absolute Lymphocytes      0.8 - 5.3 10e9/L 1.8   Absolute Monocytes      0.0 - 1.3 10e9/L 1.0   Absolute Eosinophils      0.0 - 0.7 10e9/L 1.1 (H)   Absolute Basophils      0.0 - 0.2 10e9/L 0.1   Abs Immature Granulocytes      0 - 0.4 10e9/L    Absolute Nucleated RBC              ASSESSMENT/PLAN:    (K25.4) Gastrointestinal hemorrhage associated with gastric ulcer   (primary encounter diagnosis)    MDM: Patient reports post-IP stay improvement. Isolated small emesis (no blood or black emesis) but has been eating full meals since that time without any N/V. Denies any abdominal pain.  HBG 2 days ago 8.2 and today's HGB 8.8. MCV and MCH also noted to be increasing/trending in positive direction. No clinical or lab findings to suggest ongoing active bleeding at this time.     1. Continue to avoid ASA   2. Continue PPI   3. Continue PO iron supplements   4. Keep apt for Wed to see GI MD   5. Follow-up with PCP next Tuesday for direction regarding follow-up office visits to track HGB   6. Report directly to ER if vomiting, abdominal pain, weakness, fainting, near fainting or heart racing, shortness of breath, black or bloody stools     Pt and daughter (who lives with him and who is present today) verbalize understanding of and agree to the above plan.       Plan: CBC with platelets differential    (D62) Anemia due to blood loss, acute  Plan: CBC with platelets differential  As per above     Copy of note cc'd to PCP

## 2018-05-27 NOTE — MR AVS SNAPSHOT
After Visit Summary   5/27/2018    Shell Leon    MRN: [id number]           Patient Information     Date Of Birth          1950        Visit Information        Provider Department      5/27/2018 3:40 PM Amado Isabel PA-C [facility]        Today's Diagnoses     Gastrointestinal hemorrhage associated with gastric ulcer    -  1    Anemia due to blood loss, acute           Follow-ups after your visit        Your next 10 appointments already scheduled     May 30, 2018  8:30 AM CDT   Return Visit with Cresencio Landry MD   [facility] ([facility])    [address]  [facility] [id number] [phone]            Jul 16, 2018  3:10 PM CDT   Office Visit with Kathi Mondragon PA-C   [facility]    [address] [address] [phone]           Bring a current list of meds and any records pertaining to this visit. For Physicals, please bring immunization records and any forms needing to be filled out. Please arrive 10 minutes early to complete paperwork.              Who to contact     If you have questions or need follow up information about today's clinic visit or your schedule please contact [facility] directly at [phone].  Normal or non-critical lab and imaging results will be communicated to you by MyChart, letter or phone within 4 business days after the clinic has received the results. If you do not hear from us within 7 days, please contact the clinic through MyChart or phone. If you have a critical or abnormal lab result, we will notify you by phone as soon as possible.  Submit refill requests through [employer] or call your pharmacy and they will forward the refill request to us. Please allow 3 business days for your refill to be completed.          Additional Information About Your Visit         Care EveryWhere ID     This is your Care EveryWhere ID. This could be used by other organizations to access your [facility] medical records  [id number]        Your Vitals Were     Pulse Temperature Pulse Oximetry BMI (Body Mass Index)          54 98.3  F (36.8  C) (Oral) 100% 23.34 kg/m2         Blood Pressure from Last 3 Encounters:   05/27/18 140/68   05/25/18 164/58   05/22/18 (!) 146/39    Weight from Last 3 Encounters:   05/27/18 136 lb (61.7 kg)   05/25/18 136 lb (61.7 kg)   05/20/18 136 lb 14.4 oz (62.1 kg)              We Performed the Following     CBC with platelets differential        Primary Care Provider Office Phone # Fax #    Kathi Mondragon PA-C [phone] [address] [id number]        Equal Access to Services     [facility] : Hadii aad ku hadasho Soomaali, waaxda luqadaha, qaybta kaalmada adeegyada, waxay devendrain haychristellen didi power . So [facility] [phone].    ATENCIÓN: Si habla español, tiene a sullivan disposición servicios gratuitos de asistencia lingüística. July al [phone].    We comply with applicable federal civil rights laws and [place] laws. We do not discriminate on the basis of race, color, national origin, age, disability, sex, sexual orientation, or gender identity.            Thank you!     Thank you for choosing [facility]  for your care. Our goal is always to provide you with excellent care. Hearing back from our patients is one way we can continue to improve our services. Please take a few minutes to complete the written survey that you may receive in the mail after your visit with us. Thank you!             Your Updated Medication List - Protect others around you: Learn how to safely use, store and throw away your medicines at www.disposemymeds.org.          This list is accurate as of 5/27/18  5:25 PM.  Always use your most recent med list.                   Brand Name Dispense Instructions for use Diagnosis     atorvastatin 80 MG tablet    LIPITOR    90 tablet    Take 1 tablet (80 mg) by mouth daily    Type 2 diabetes mellitus without complication, unspecified long term insulin use status (H)       blood glucose lancets standard    no brand specified    1 Box    Use to test blood sugar 3 times daily or as directed.    Type 2 diabetes mellitus with hyperglycemia, without long-term current use of insulin (H)       blood glucose monitoring test strip    no brand specified    100 strip    Use to test blood sugars 3 times daily or as directed    Type 2 diabetes mellitus with hyperglycemia, without long-term current use of insulin (H)       ferrous sulfate 325 (65 Fe) MG tablet    IRON     Take 325 mg by mouth daily (with breakfast)        glipiZIDE 5 MG tablet    GLUCOTROL    90 tablet    Take 1 tablet (5 mg) by mouth every morning (before breakfast)    Type 2 diabetes mellitus with hyperglycemia, without long-term current use of insulin (H)       hydrALAZINE 50 MG tablet    APRESOLINE    180 tablet    Take 1 tablet (50 mg) by mouth 3 times daily    Resistant hypertension       hydrochlorothiazide 25 MG tablet    HYDRODIURIL    90 tablet    Take 1 tablet (25 mg) by mouth daily    Benign essential hypertension       metFORMIN 1000 MG tablet    GLUCOPHAGE    60 tablet    TAKE 1 TABLET BY MOUTH TWICE DAILY WITH MEALS    Type 2 diabetes mellitus with hyperglycemia, without long-term current use of insulin (H)       omeprazole 20 MG CR capsule    priLOSEC    30 capsule    Take 1 capsule (20 mg) by mouth every morning (before breakfast)    Gastrointestinal hemorrhage, unspecified gastrointestinal hemorrhage type       order for DME     1 Units    Equipment being ordered: blood pressure cuff    Resistant hypertension       valsartan 320 MG tablet    DIOVAN    90 tablet    Take 1 tablet (320 mg) by mouth daily    Resistant hypertension

## 2018-05-27 NOTE — Clinical Note
Carter Armenta,   I saw your very kind patient at [facility] today for post-hospital/post packed RBC transfusion x 2 units- presumed GI bleed check.   My note is complete  in Epic for your review.   Sincerely,   Amado

## 2018-05-30 ENCOUNTER — OFFICE VISIT (OUTPATIENT)
Dept: CARDIOLOGY | Facility: CLINIC | Age: 68
End: 2018-05-30
Attending: INTERNAL MEDICINE
Payer: COMMERCIAL

## 2018-05-30 VITALS
HEART RATE: 60 BPM | SYSTOLIC BLOOD PRESSURE: 160 MMHG | HEIGHT: 64 IN | DIASTOLIC BLOOD PRESSURE: 58 MMHG | BODY MASS INDEX: 24.92 KG/M2 | WEIGHT: 146 LBS

## 2018-05-30 DIAGNOSIS — I10 BENIGN ESSENTIAL HYPERTENSION: ICD-10-CM

## 2018-05-30 DIAGNOSIS — I65.23 BILATERAL CAROTID ARTERY STENOSIS: ICD-10-CM

## 2018-05-30 DIAGNOSIS — R60.0 LOCALIZED EDEMA: Primary | ICD-10-CM

## 2018-05-30 PROCEDURE — 99213 OFFICE O/P EST LOW 20 MIN: CPT | Performed by: INTERNAL MEDICINE

## 2018-05-30 RX ORDER — FUROSEMIDE 20 MG
20 TABLET ORAL DAILY
Qty: 60 TABLET | Refills: 0 | Status: SHIPPED | OUTPATIENT
Start: 2018-05-30 | End: 2018-07-16

## 2018-05-30 RX ORDER — SPIRONOLACTONE 25 MG/1
12.5 TABLET ORAL DAILY
Qty: 90 TABLET | Refills: 3 | Status: SHIPPED | OUTPATIENT
Start: 2018-05-30 | End: 2018-07-09

## 2018-05-30 NOTE — LETTER
5/30/2018      Kathi Mondragon PA-C  [address]  [facility] [address]      RE: Shell Leon       Dear Colleague,    I had the pleasure of seeing Shell Leon in the [facility].    Service Date: 05/30/2018      REASON FOR VISIT:   1.  Resistant hypertension.   2.  Lower extremity edema.      HISTORY OF PRESENT ILLNESS:  I had the pleasure of seeing Jude Leon  at the [facility] in [place] this morning.  He is a very pleasant 68-year-old gentleman with peripheral arterial disease, hypertension and diabetes who is here for followup.  He has had difficult-to-control hypertension over the years.  He was on multiple antihypertensives and his blood pressure has improved compared to previously, but still not optimally controlled.      We did perform a renal ultrasound in the past which showed no significant renal artery stenosis.  We also performed labs to assess possible other causes of secondary hypertension.  His serum aldosterone and renin were normal.  His serum normetanephrine was elevated consistent with possible pheo.  We subsequently sent him to Endocrinology, whom he has not seen yet.      Since our last visit, the patient has developed lower extremity pitting edema bilaterally.  He was also found to be anemic with hemoglobin in the 6 range.  He tells me that on upper and lower endoscopy, no active source of bleeding was found.  He is scheduled to undergo capsule endoscopy to see if there are any issues with his small bowel.      IMPRESSION, REPORT AND PLAN:     1.  Persistent hypertension.   2.  Peripheral arterial disease, stable.   3.  Diabetes.   4.  Hyperlipidemia   5.  Lower extremity pitting edema bilaterally.   6.  Anemia.        The cause of the patient's recent lower extremity pitting edema is unclear at this point in time.  We will get an echocardiogram to make sure he has not developed any systolic  dysfunction, although my suspicion for this is low as he is not having any pulmonary issues.      To improve his edema, we will place him on a low-dose furosemide (20 mg daily) and also to improve his blood pressure.  I will take him off the hydrochlorothiazide and place him on spironolactone.  We will start at 12.5 mg daily.  We will watch for hyperkalemia since the patient is already on an ARB.  We will check BMP next week.  We will also have him come in for hypertension check with one of our nurses.  If his potassium is okay and his blood pressure is still up, we will probably up titrate the spironolactone to 25 mg daily.      Finally, he is due for repeat carotid ultrasound, which I have ordered.      It was a pleasure seeing Mr. Leon in the clinic this morning.  I appreciate the opportunity to participate in this patient's care.         ALE SPICER MD             D: 2018   T: 2018   MT: TARA      Name:     NARESH LEON   MRN:      [id number]        Account:      [id number]   :      1950           Service Date: 2018      Document: [id number]         Outpatient Encounter Prescriptions as of 2018   Medication Sig Dispense Refill     atorvastatin (LIPITOR) 80 MG tablet Take 1 tablet (80 mg) by mouth daily 90 tablet 3     blood glucose (NO BRAND SPECIFIED) lancets standard Use to test blood sugar 3 times daily or as directed. 1 Box 11     blood glucose monitoring (NO BRAND SPECIFIED) test strip Use to test blood sugars 3 times daily or as directed 100 strip 11     ferrous sulfate (IRON) 325 (65 Fe) MG tablet Take 325 mg by mouth daily (with breakfast)       furosemide (LASIX) 20 MG tablet Take 1 tablet (20 mg) by mouth daily 60 tablet 0     glipiZIDE (GLUCOTROL) 5 MG tablet Take 1 tablet (5 mg) by mouth every morning (before breakfast) 90 tablet 1     hydrALAZINE (APRESOLINE) 50 MG tablet Take 1 tablet (50 mg) by mouth 3 times daily 180 tablet 3     metFORMIN (GLUCOPHAGE)  1000 MG tablet TAKE 1 TABLET BY MOUTH TWICE DAILY WITH MEALS 60 tablet 5     omeprazole (PRILOSEC) 20 MG CR capsule Take 1 capsule (20 mg) by mouth every morning (before breakfast) 30 capsule 0     order for DME Equipment being ordered: blood pressure cuff 1 Units 0     spironolactone (ALDACTONE) 25 MG tablet Take 0.5 tablets (12.5 mg) by mouth daily 90 tablet 3     valsartan (DIOVAN) 320 MG tablet Take 1 tablet (320 mg) by mouth daily 90 tablet 1     [DISCONTINUED] hydrochlorothiazide (HYDRODIURIL) 25 MG tablet Take 1 tablet (25 mg) by mouth daily 90 tablet 1     No facility-administered encounter medications on file as of 5/30/2018.        Again, thank you for allowing me to participate in the care of your patient.      Sincerely,    Cresencio Landry MD    [facility]

## 2018-05-30 NOTE — PROGRESS NOTES
Service Date: 05/30/2018      REASON FOR VISIT:   1.  Resistant hypertension.   2.  Lower extremity edema.      HISTORY OF PRESENT ILLNESS:  I had the pleasure of seeing Jude Leon  at the [facility] in [place] this morning.  He is a very pleasant 68-year-old gentleman with peripheral arterial disease, hypertension and diabetes who is here for followup.  He has had difficult-to-control hypertension over the years.  He was on multiple antihypertensives and his blood pressure has improved compared to previously, but still not optimally controlled.      We did perform a renal ultrasound in the past which showed no significant renal artery stenosis.  We also performed labs to assess possible other causes of secondary hypertension.  His serum aldosterone and renin were normal.  His serum normetanephrine was elevated consistent with possible pheo.  We subsequently sent him to Endocrinology, whom he has not seen yet.      Since our last visit, the patient has developed lower extremity pitting edema bilaterally.  He was also found to be anemic with hemoglobin in the 6 range.  He tells me that on upper and lower endoscopy, no active source of bleeding was found.  He is scheduled to undergo capsule endoscopy to see if there are any issues with his small bowel.      IMPRESSION, REPORT AND PLAN:     1.  Persistent hypertension.   2.  Peripheral arterial disease, stable.   3.  Diabetes.   4.  Hyperlipidemia   5.  Lower extremity pitting edema bilaterally.   6.  Anemia.        The cause of the patient's recent lower extremity pitting edema is unclear at this point in time.  We will get an echocardiogram to make sure he has not developed any systolic dysfunction, although my suspicion for this is low as he is not having any pulmonary issues.      To improve his edema, we will place him on a low-dose furosemide (20 mg daily) and also to improve his blood pressure.  I will take him off the  hydrochlorothiazide and place him on spironolactone.  We will start at 12.5 mg daily.  We will watch for hyperkalemia since the patient is already on an ARB.  We will check BMP next week.  We will also have him come in for hypertension check with one of our nurses.  If his potassium is okay and his blood pressure is still up, we will probably up titrate the spironolactone to 25 mg daily.      Finally, he is due for repeat carotid ultrasound, which I have ordered.      It was a pleasure seeing Mr. Leon in the clinic this morning.  I appreciate the opportunity to participate in this patient's care.         ALE SPICER MD             D: 2018   T: 2018   MT: TARA      Name:     NARESH LEON   MRN:      [id number]        Account:      [id number]   :      1950           Service Date: 2018      Document: [id number]

## 2018-05-30 NOTE — PROGRESS NOTES
HPI and Plan:   See dictation    Orders Placed This Encounter   Procedures     US Carotid Bilateral     Basic metabolic panel     Follow-Up with Nurse     Follow-Up with Cardiologist     Echocardiogram       Orders Placed This Encounter   Medications     spironolactone (ALDACTONE) 25 MG tablet     Sig: Take 0.5 tablets (12.5 mg) by mouth daily     Dispense:  90 tablet     Refill:  3     furosemide (LASIX) 20 MG tablet     Sig: Take 1 tablet (20 mg) by mouth daily     Dispense:  60 tablet     Refill:  0       Medications Discontinued During This Encounter   Medication Reason     hydrochlorothiazide (HYDRODIURIL) 25 MG tablet          Encounter Diagnoses   Name Primary?     Benign essential hypertension      Localized edema Yes     Bilateral carotid artery stenosis        CURRENT MEDICATIONS:  Current Outpatient Prescriptions   Medication Sig Dispense Refill     atorvastatin (LIPITOR) 80 MG tablet Take 1 tablet (80 mg) by mouth daily 90 tablet 3     blood glucose (NO BRAND SPECIFIED) lancets standard Use to test blood sugar 3 times daily or as directed. 1 Box 11     blood glucose monitoring (NO BRAND SPECIFIED) test strip Use to test blood sugars 3 times daily or as directed 100 strip 11     ferrous sulfate (IRON) 325 (65 Fe) MG tablet Take 325 mg by mouth daily (with breakfast)       furosemide (LASIX) 20 MG tablet Take 1 tablet (20 mg) by mouth daily 60 tablet 0     glipiZIDE (GLUCOTROL) 5 MG tablet Take 1 tablet (5 mg) by mouth every morning (before breakfast) 90 tablet 1     hydrALAZINE (APRESOLINE) 50 MG tablet Take 1 tablet (50 mg) by mouth 3 times daily 180 tablet 3     metFORMIN (GLUCOPHAGE) 1000 MG tablet TAKE 1 TABLET BY MOUTH TWICE DAILY WITH MEALS 60 tablet 5     omeprazole (PRILOSEC) 20 MG CR capsule Take 1 capsule (20 mg) by mouth every morning (before breakfast) 30 capsule 0     order for DME Equipment being ordered: blood pressure cuff 1 Units 0     spironolactone (ALDACTONE) 25 MG tablet Take 0.5  tablets (12.5 mg) by mouth daily 90 tablet 3     valsartan (DIOVAN) 320 MG tablet Take 1 tablet (320 mg) by mouth daily 90 tablet 1       ALLERGIES   No Known Allergies    PAST MEDICAL HISTORY:  Past Medical History:   Diagnosis Date     Diabetes (H)      Hypertension        PAST SURGICAL HISTORY:  Past Surgical History:   Procedure Laterality Date     COLONOSCOPY N/A 5/22/2018    Procedure: COMBINED COLONOSCOPY, SINGLE OR MULTIPLE BIOPSY/POLYPECTOMY BY BIOPSY;  COLONOSCOPY  Room 521, with polypectomy x1 using cold biopsy forceps;  Surgeon: Charlie Rabago MD;  Location:  GI     ESOPHAGOSCOPY, GASTROSCOPY, DUODENOSCOPY (EGD), COMBINED N/A 5/21/2018    Procedure: COMBINED ESOPHAGOSCOPY, GASTROSCOPY, DUODENOSCOPY (EGD), BIOPSY SINGLE OR MULTIPLE;  ESOPHAGOSCOPY, GASTROSCOPY, DUODENOSCOPY (EGD)  Room 521 and cold biopsies;  Surgeon: Charlie Rabago MD;  Location:  GI     LAPAROSCOPIC CHOLECYSTECTOMY N/A 1/6/2017    Procedure: LAPAROSCOPIC CHOLECYSTECTOMY;  Surgeon: Michael Caba MD;  Location:  OR       FAMILY HISTORY:  Family History   Problem Relation Age of Onset     Unknown/Adopted No family hx of        SOCIAL HISTORY:  Social History     Social History     Marital status:      Spouse name: N/A     Number of children: N/A     Years of education: N/A     Social History Main Topics     Smoking status: Never Smoker     Smokeless tobacco: Never Used     Alcohol use No     Drug use: No     Sexual activity: No     Other Topics Concern     Parent/Sibling W/ Cabg, Mi Or Angioplasty Before 65f 55m? No     Social History Narrative       Review of Systems:  Skin:  Positive for bruising     Eyes:  Positive for glasses    ENT:  Negative for      Respiratory:  Negative for       Cardiovascular:    edema;lightheadedness;Positive for    Gastroenterology:   nausea;constipation    Genitourinary:  not assessed      Musculoskeletal:         Neurologic:    headaches;numbness or tingling of feet   "  Psychiatric:  Positive for sleep disturbances    Heme/Lymph/Imm:  Negative for      Endocrine:  Positive for diabetes      Physical Exam:  Vitals: /58  Pulse 60  Ht 1.626 m (5' 4\")  Wt 66.2 kg (146 lb)  BMI 25.06 kg/m2    Constitutional:           Skin:             Head:           Eyes:           Lymph:      ENT:           Neck:           Respiratory:            Cardiac:                                                           GI:           Extremities and Muscular Skeletal:                 Neurological:           Psych:           CC  Cresencio Landry MD  [address]  [place] [address]              "

## 2018-05-30 NOTE — LETTER
5/30/2018    Kathi Mondragon PA-C  [id number]  [facility] [address]    RE: Shell Leon       Dear Colleague,    I had the pleasure of seeing Shell Leon in the [facility].    HPI and Plan:   See dictation    Orders Placed This Encounter   Procedures     US Carotid Bilateral     Basic metabolic panel     Follow-Up with Nurse     Follow-Up with Cardiologist     Echocardiogram       Orders Placed This Encounter   Medications     spironolactone (ALDACTONE) 25 MG tablet     Sig: Take 0.5 tablets (12.5 mg) by mouth daily     Dispense:  90 tablet     Refill:  3     furosemide (LASIX) 20 MG tablet     Sig: Take 1 tablet (20 mg) by mouth daily     Dispense:  60 tablet     Refill:  0       Medications Discontinued During This Encounter   Medication Reason     hydrochlorothiazide (HYDRODIURIL) 25 MG tablet          Encounter Diagnoses   Name Primary?     Benign essential hypertension      Localized edema Yes     Bilateral carotid artery stenosis        CURRENT MEDICATIONS:  Current Outpatient Prescriptions   Medication Sig Dispense Refill     atorvastatin (LIPITOR) 80 MG tablet Take 1 tablet (80 mg) by mouth daily 90 tablet 3     blood glucose (NO BRAND SPECIFIED) lancets standard Use to test blood sugar 3 times daily or as directed. 1 Box 11     blood glucose monitoring (NO BRAND SPECIFIED) test strip Use to test blood sugars 3 times daily or as directed 100 strip 11     ferrous sulfate (IRON) 325 (65 Fe) MG tablet Take 325 mg by mouth daily (with breakfast)       furosemide (LASIX) 20 MG tablet Take 1 tablet (20 mg) by mouth daily 60 tablet 0     glipiZIDE (GLUCOTROL) 5 MG tablet Take 1 tablet (5 mg) by mouth every morning (before breakfast) 90 tablet 1     hydrALAZINE (APRESOLINE) 50 MG tablet Take 1 tablet (50 mg) by mouth 3 times daily 180 tablet 3     metFORMIN (GLUCOPHAGE) 1000 MG tablet TAKE 1 TABLET BY MOUTH TWICE DAILY WITH MEALS 60 tablet 5     omeprazole  (PRILOSEC) 20 MG CR capsule Take 1 capsule (20 mg) by mouth every morning (before breakfast) 30 capsule 0     order for DME Equipment being ordered: blood pressure cuff 1 Units 0     spironolactone (ALDACTONE) 25 MG tablet Take 0.5 tablets (12.5 mg) by mouth daily 90 tablet 3     valsartan (DIOVAN) 320 MG tablet Take 1 tablet (320 mg) by mouth daily 90 tablet 1       ALLERGIES   No Known Allergies    PAST MEDICAL HISTORY:  Past Medical History:   Diagnosis Date     Diabetes (H)      Hypertension        PAST SURGICAL HISTORY:  Past Surgical History:   Procedure Laterality Date     COLONOSCOPY N/A 5/22/2018    Procedure: COMBINED COLONOSCOPY, SINGLE OR MULTIPLE BIOPSY/POLYPECTOMY BY BIOPSY;  COLONOSCOPY  Room 521, with polypectomy x1 using cold biopsy forceps;  Surgeon: Charlie Rabago MD;  Location:  GI     ESOPHAGOSCOPY, GASTROSCOPY, DUODENOSCOPY (EGD), COMBINED N/A 5/21/2018    Procedure: COMBINED ESOPHAGOSCOPY, GASTROSCOPY, DUODENOSCOPY (EGD), BIOPSY SINGLE OR MULTIPLE;  ESOPHAGOSCOPY, GASTROSCOPY, DUODENOSCOPY (EGD)  Room 521 and cold biopsies;  Surgeon: Charlie Rabago MD;  Location:  GI     LAPAROSCOPIC CHOLECYSTECTOMY N/A 1/6/2017    Procedure: LAPAROSCOPIC CHOLECYSTECTOMY;  Surgeon: Michael Caba MD;  Location:  OR       FAMILY HISTORY:  Family History   Problem Relation Age of Onset     Unknown/Adopted No family hx of        SOCIAL HISTORY:  Social History     Social History     Marital status:      Spouse name: N/A     Number of children: N/A     Years of education: N/A     Social History Main Topics     Smoking status: Never Smoker     Smokeless tobacco: Never Used     Alcohol use No     Drug use: No     Sexual activity: No     Other Topics Concern     Parent/Sibling W/ Cabg, Mi Or Angioplasty Before 65f 55m? No     Social History Narrative       Review of Systems:  Skin:  Positive for bruising     Eyes:  Positive for glasses    ENT:  Negative for      Respiratory:   "Negative for       Cardiovascular:    edema;lightheadedness;Positive for    Gastroenterology:   nausea;constipation    Genitourinary:  not assessed      Musculoskeletal:         Neurologic:    headaches;numbness or tingling of feet    Psychiatric:  Positive for sleep disturbances    Heme/Lymph/Imm:  Negative for      Endocrine:  Positive for diabetes      Physical Exam:  Vitals: /58  Pulse 60  Ht 1.626 m (5' 4\")  Wt 66.2 kg (146 lb)  BMI 25.06 kg/m2    Constitutional:           Skin:             Head:           Eyes:           Lymph:      ENT:           Neck:           Respiratory:            Cardiac:                                                           GI:           Extremities and Muscular Skeletal:                 Neurological:           Psych:           CC  Cresencio Landry MD  [address]  [place] [address]                Thank you for allowing me to participate in the care of your patient.      Sincerely,     Cresencio Landry MD, MD     [facility]    cc:   Cresencio Landry MD  [address]  [place] [address]        "

## 2018-05-30 NOTE — MR AVS SNAPSHOT
After Visit Summary   5/30/2018    Shell Leon    MRN: [id number]           Patient Information     Date Of Birth          1950        Visit Information        Provider Department      5/30/2018 8:30 AM Cresencio Landry MD [facility]        Today's Diagnoses     Localized edema    -  1    Benign essential hypertension        Bilateral carotid artery stenosis           Follow-ups after your visit        Additional Services     Follow-Up with Nurse           Follow-Up with Cardiologist                 Your next 10 appointments already scheduled     Jun 06, 2018  3:00 PM CDT   Nurse Only with RU [facility] HRT NURSE   [facility] ([facility])    [id number] [place] [address] [address]  [facility] [address]   [phone]            Jun 06, 2018  3:15 PM CDT   LAB with RU LAB   [facility] ([facility])    [address] [place] [address] [address]  [facility] [address]   [phone]           Please do not eat 10-12 hours before your appointment if you are coming in fasting for labs on lipids, cholesterol, or glucose (sugar). This does not apply to pregnant women. Water, hot tea and black coffee (with nothing added) are okay. Do not drink other fluids, diet soda or chew gum.            Jun 22, 2018  1:00 PM CDT   [facility] ([facility])    [address] [place] [address] [address]  [facility] [address]   [phone]           Please bring a list of your medicines (including vitamins, minerals and over-the-counter drugs). Also, tell your doctor about any allergies you may have. Wear comfortable clothes and leave your valuables at home.  You do not need to do anything special to prepare for your exam.  Please call the Imaging Department at your exam site with any questions.            Jun 22, 2018  2:45  PM CDT   Ech Complete with RSCCECHO1   [facility] ([facility])    [address]  [facility] [address] [phone]           1. Please bring or wear a comfortable two-piece outfit. 2. You may eat, drink and take your normal medicines. 3. For any questions that cannot be answered, please contact the ordering physician ***Please check-in at the [place] Registration Office located in [address] in the [facility] building. When you are finished registering, please go to [address] and have a seat. The technician will call your name for the test.            Jun 26, 2018  3:15 PM CDT   New Visit with Lonny Lopez MD   [facility] ([facility])    [facility]  [address]  [facility] [id number] [phone]            Jul 16, 2018  3:10 PM CDT   Office Visit with Kathi Mondragon PA-C   [facility] ([facility])    [address] [place] [address] [phone]           Bring a current list of meds and any records pertaining to this visit. For Physicals, please bring immunization records and any forms needing to be filled out. Please arrive 10 minutes early to complete paperwork.              Future tests that were ordered for you today     Open Future Orders        Priority Expected Expires Ordered    Follow-Up with Cardiologist Routine 11/26/2018 5/30/2019 5/30/2018    Echocardiogram Routine 6/6/2018 5/30/2019 5/30/2018    US Carotid Bilateral Routine 6/6/2018 5/30/2019 5/30/2018    Follow-Up with Nurse Routine 6/6/2018 5/30/2019 5/30/2018    Basic metabolic panel Routine 6/6/2018 5/30/2019 5/30/2018            Who to contact     If you have questions or need follow up information about today's clinic visit or your schedule please contact [facility] directly at  "[phone].  Normal or non-critical lab and imaging results will be communicated to you by MyChart, letter or phone within 4 business days after the clinic has received the results. If you do not hear from us within 7 days, please contact the clinic through MyChart or phone. If you have a critical or abnormal lab result, we will notify you by phone as soon as possible.  Submit refill requests through [employer] or call your pharmacy and they will forward the refill request to us. Please allow 3 business days for your refill to be completed.          Additional Information About Your Visit        Care EveryWhere ID     This is your Care EveryWhere ID. This could be used by other organizations to access your [place] medical records  [id number]        Your Vitals Were     Pulse Height BMI (Body Mass Index)             60 1.626 m (5' 4\") 25.06 kg/m2          Blood Pressure from Last 3 Encounters:   05/30/18 160/58   05/27/18 140/68   05/25/18 164/58    Weight from Last 3 Encounters:   05/30/18 66.2 kg (146 lb)   05/27/18 61.7 kg (136 lb)   05/25/18 61.7 kg (136 lb)              We Performed the Following     Follow-Up with Cardiologist          Today's Medication Changes          These changes are accurate as of 5/30/18  9:25 AM.  If you have any questions, ask your nurse or doctor.               Start taking these medicines.        Dose/Directions    furosemide 20 MG tablet   Commonly known as:  LASIX   Used for:  Localized edema   Started by:  Cresencio Landry MD        Dose:  20 mg   Take 1 tablet (20 mg) by mouth daily   Quantity:  60 tablet   Refills:  0       spironolactone 25 MG tablet   Commonly known as:  ALDACTONE   Used for:  Benign essential hypertension   Started by:  Cresencio Landry MD        Dose:  12.5 mg   Take 0.5 tablets (12.5 mg) by mouth daily   Quantity:  90 tablet   Refills:  3         Stop taking these medicines if you haven't already. Please contact your care team if you have questions.     " hydrochlorothiazide 25 MG tablet   Commonly known as:  HYDRODIURIL   Stopped by:  Cresencio Landry MD                Where to get your medicines      These medications were sent to [id number] - [place], [place] [address] - [address] [place], [place] [address] [address] [place], [facility] [address]     Phone:  [phone]     furosemide 20 MG tablet    spironolactone 25 MG tablet                Primary Care Provider Office Phone # Fax #    Kathi Lin Mondragon PA-C [phone] [address]  [facility] [address]        Equal Access to Services     [facility]: Hadii aad ku hadasho Soomaali, waaxda luqadaha, qaybta kaalmada adeegyada, zachary power . So [facility] [phone].    ATENCIÓN: Si habla español, tiene a sullivan disposición servicios gratuitos de asistencia lingüística. [facility] [phone].    We comply with applicable federal civil rights laws and [place] laws. We do not discriminate on the basis of race, color, national origin, age, disability, sex, sexual orientation, or gender identity.            Thank you!     Thank you for choosing [facility]  for your care. Our goal is always to provide you with excellent care. Hearing back from our patients is one way we can continue to improve our services. Please take a few minutes to complete the written survey that you may receive in the mail after your visit with us. Thank you!             Your Updated Medication List - Protect others around you: Learn how to safely use, store and throw away your medicines at www.disposemymeds.org.          This list is accurate as of 5/30/18  9:25 AM.  Always use your most recent med list.                   Brand Name Dispense Instructions for use Diagnosis    atorvastatin 80 MG tablet    LIPITOR    90 tablet    Take 1 tablet (80 mg) by mouth daily    Type 2 diabetes mellitus without complication, unspecified long term insulin use status (H)        blood glucose lancets standard    no brand specified    1 Box    Use to test blood sugar 3 times daily or as directed.    Type 2 diabetes mellitus with hyperglycemia, without long-term current use of insulin (H)       blood glucose monitoring test strip    no brand specified    100 strip    Use to test blood sugars 3 times daily or as directed    Type 2 diabetes mellitus with hyperglycemia, without long-term current use of insulin (H)       ferrous sulfate 325 (65 Fe) MG tablet    IRON     Take 325 mg by mouth daily (with breakfast)        furosemide 20 MG tablet    LASIX    60 tablet    Take 1 tablet (20 mg) by mouth daily    Localized edema       glipiZIDE 5 MG tablet    GLUCOTROL    90 tablet    Take 1 tablet (5 mg) by mouth every morning (before breakfast)    Type 2 diabetes mellitus with hyperglycemia, without long-term current use of insulin (H)       hydrALAZINE 50 MG tablet    APRESOLINE    180 tablet    Take 1 tablet (50 mg) by mouth 3 times daily    Resistant hypertension       metFORMIN 1000 MG tablet    GLUCOPHAGE    60 tablet    TAKE 1 TABLET BY MOUTH TWICE DAILY WITH MEALS    Type 2 diabetes mellitus with hyperglycemia, without long-term current use of insulin (H)       omeprazole 20 MG CR capsule    priLOSEC    30 capsule    Take 1 capsule (20 mg) by mouth every morning (before breakfast)    Gastrointestinal hemorrhage, unspecified gastrointestinal hemorrhage type       order for DME     1 Units    Equipment being ordered: blood pressure cuff    Resistant hypertension       spironolactone 25 MG tablet    ALDACTONE    90 tablet    Take 0.5 tablets (12.5 mg) by mouth daily    Benign essential hypertension       valsartan 320 MG tablet    DIOVAN    90 tablet    Take 1 tablet (320 mg) by mouth daily    Resistant hypertension

## 2018-05-31 LAB — COPATH REPORT: NORMAL

## 2018-06-04 LAB — COPATH REPORT: NORMAL

## 2018-06-06 ENCOUNTER — ALLIED HEALTH/NURSE VISIT (OUTPATIENT)
Dept: CARDIOLOGY | Facility: CLINIC | Age: 68
End: 2018-06-06
Attending: INTERNAL MEDICINE

## 2018-06-06 ENCOUNTER — TELEPHONE (OUTPATIENT)
Dept: CARDIOLOGY | Facility: CLINIC | Age: 68
End: 2018-06-06

## 2018-06-06 VITALS — DIASTOLIC BLOOD PRESSURE: 50 MMHG | SYSTOLIC BLOOD PRESSURE: 158 MMHG | HEART RATE: 60 BPM

## 2018-06-06 DIAGNOSIS — I10 BENIGN ESSENTIAL HYPERTENSION: ICD-10-CM

## 2018-06-06 DIAGNOSIS — I10 ESSENTIAL HYPERTENSION WITH GOAL BLOOD PRESSURE LESS THAN 140/90: Primary | ICD-10-CM

## 2018-06-06 LAB
ANION GAP SERPL CALCULATED.3IONS-SCNC: 5 MMOL/L (ref 3–14)
BUN SERPL-MCNC: 28 MG/DL (ref 7–30)
CALCIUM SERPL-MCNC: 8.8 MG/DL (ref 8.5–10.1)
CHLORIDE SERPL-SCNC: 112 MMOL/L (ref 94–109)
CO2 SERPL-SCNC: 23 MMOL/L (ref 20–32)
CREAT SERPL-MCNC: 1.05 MG/DL (ref 0.66–1.25)
GFR SERPL CREATININE-BSD FRML MDRD: 70 ML/MIN/1.7M2
GLUCOSE SERPL-MCNC: 113 MG/DL (ref 70–99)
POTASSIUM SERPL-SCNC: 5.2 MMOL/L (ref 3.4–5.3)
SODIUM SERPL-SCNC: 140 MMOL/L (ref 133–144)

## 2018-06-06 PROCEDURE — 36415 COLL VENOUS BLD VENIPUNCTURE: CPT | Performed by: INTERNAL MEDICINE

## 2018-06-06 PROCEDURE — 80048 BASIC METABOLIC PNL TOTAL CA: CPT | Performed by: INTERNAL MEDICINE

## 2018-06-06 PROCEDURE — 99207 ZZC NO CHARGE LOS: CPT | Performed by: INTERNAL MEDICINE

## 2018-06-06 NOTE — MR AVS SNAPSHOT
After Visit Summary   6/6/2018    Shell Leon    MRN: [id number]           Patient Information     Date Of Birth          1950        Visit Information        Provider Department      6/6/2018 3:00 PM [facility] HRT NURSE [facility]        Today's Diagnoses     Essential hypertension with goal blood pressure less than 140/90    -  1      Care Instructions    See phone encounter for nurse only BP check.   TERRA Thomas            Follow-ups after your visit        Your next 10 appointments already scheduled     Jun 22, 2018  1:00 PM CDT   US CAROTID BILATERAL with ZAIRE   [facility] ([facility])    [address]  [facility] [id number] [phone]           Please bring a list of your medicines (including vitamins, minerals and over-the-counter drugs). Also, tell your doctor about any allergies you may have. Wear comfortable clothes and leave your valuables at home.  You do not need to do anything special to prepare for your exam.  Please call the Imaging Department at your exam site with any questions.            Jun 22, 2018  2:45 PM CDT   Ech Complete with RSCCECHO1   [facility] ([facility])    [address]  [facility] [address] [phone]           1. Please bring or wear a comfortable two-piece outfit. 2. You may eat, drink and take your normal medicines. 3. For any questions that cannot be answered, please contact the ordering physician ***Please check-in at the [place] Registration Office located in [address] in the [facility] building. When you are finished registering, please go to [address] and have a seat. The technician will call your name for the test.            Jun 26, 2018  3:15 PM CDT   New Visit with Lonny Lopez MD   [facility]    [facility]  [address]  [place] [address]   [phone]            Jul 16, 2018  3:10 PM CDT   Office Visit with Kathi Mondragon PA-C   [facility] ([facility])    [address] [facility] [address] [phone]           Bring a current list of meds and any records pertaining to this visit. For Physicals, please bring immunization records and any forms needing to be filled out. Please arrive 10 minutes early to complete paperwork.              Who to contact     If you have questions or need follow up information about today's clinic visit or your schedule please contact [facility] directly at [phone].  Normal or non-critical lab and imaging results will be communicated to you by MyChart, letter or phone within 4 business days after the clinic has received the results. If you do not hear from us within 7 days, please contact the clinic through MyChart or phone. If you have a critical or abnormal lab result, we will notify you by phone as soon as possible.  Submit refill requests through [employer] or call your pharmacy and they will forward the refill request to us. Please allow 3 business days for your refill to be completed.          Additional Information About Your Visit        Care EveryWhere ID     This is your Care EveryWhere ID. This could be used by other organizations to access your [place] medical records  [id number]        Your Vitals Were     Pulse                   60            Blood Pressure from Last 3 Encounters:   06/06/18 158/50   05/30/18 160/58   05/27/18 140/68    Weight from Last 3 Encounters:   05/30/18 66.2 kg (146 lb)   05/27/18 61.7 kg (136 lb)   05/25/18 61.7 kg (136 lb)              Today, you had the following     No orders found for display       Primary Care Provider Office Phone # Fax #    Kathi Mondragon PA-C  [phone]       [address] [address] [facility] [address]        Equal Access to Services     TERRA FROST : Hadii aad ku hadmark Dillon, waariasda luqadaha, qaybta kaalmada fernando, zachary raymondkoki dane. So [facility] [phone].    ATENCIÓN: Si habla español, tiene a sullivan disposición servicios gratuitos de asistencia lingüística. Llame al [phone].    We comply with applicable federal civil rights laws and [place] laws. We do not discriminate on the basis of race, color, national origin, age, disability, sex, sexual orientation, or gender identity.            Thank you!     Thank you for choosing [facility]  for your care. Our goal is always to provide you with excellent care. Hearing back from our patients is one way we can continue to improve our services. Please take a few minutes to complete the written survey that you may receive in the mail after your visit with us. Thank you!             Your Updated Medication List - Protect others around you: Learn how to safely use, store and throw away your medicines at www.disposemymeds.org.          This list is accurate as of 6/6/18 11:59 PM.  Always use your most recent med list.                   Brand Name Dispense Instructions for use Diagnosis    atorvastatin 80 MG tablet    LIPITOR    90 tablet    Take 1 tablet (80 mg) by mouth daily    Type 2 diabetes mellitus without complication, unspecified long term insulin use status (H)       blood glucose lancets standard    no brand specified    1 Box    Use to test blood sugar 3 times daily or as directed.    Type 2 diabetes mellitus with hyperglycemia, without long-term current use of insulin (H)       blood glucose monitoring test strip    no brand specified    100 strip    Use to test blood sugars 3 times daily or as directed    Type 2 diabetes mellitus with hyperglycemia, without long-term current use of insulin (H)       ferrous  sulfate 325 (65 Fe) MG tablet    IRON     Take 325 mg by mouth daily (with breakfast)        furosemide 20 MG tablet    LASIX    60 tablet    Take 1 tablet (20 mg) by mouth daily    Localized edema       glipiZIDE 5 MG tablet    GLUCOTROL    90 tablet    Take 1 tablet (5 mg) by mouth every morning (before breakfast)    Type 2 diabetes mellitus with hyperglycemia, without long-term current use of insulin (H)       hydrALAZINE 50 MG tablet    APRESOLINE    180 tablet    Take 1 tablet (50 mg) by mouth 3 times daily    Resistant hypertension       metFORMIN 1000 MG tablet    GLUCOPHAGE    60 tablet    TAKE 1 TABLET BY MOUTH TWICE DAILY WITH MEALS    Type 2 diabetes mellitus with hyperglycemia, without long-term current use of insulin (H)       omeprazole 20 MG CR capsule    priLOSEC    30 capsule    Take 1 capsule (20 mg) by mouth every morning (before breakfast)    Gastrointestinal hemorrhage, unspecified gastrointestinal hemorrhage type       order for DME     1 Units    Equipment being ordered: blood pressure cuff    Resistant hypertension       spironolactone 25 MG tablet    ALDACTONE    90 tablet    Take 0.5 tablets (12.5 mg) by mouth daily    Benign essential hypertension       valsartan 320 MG tablet    DIOVAN    90 tablet    Take 1 tablet (320 mg) by mouth daily    Resistant hypertension

## 2018-06-06 NOTE — TELEPHONE ENCOUNTER
[facility] NURSE VISIT    On 5/30/18, Patient's BP was 160/58 , pulse was 60. Furosemide 20 mg added. HCTZ stopped and Spironolactone 12.5 mg added.     Patient is here today for a blood pressure check. Patient took Spironolactone 12.5 mg, Valsartan 320 mg, hydralazine 50 mg medications at 8am. Blood pressure today was taken at 3:30pm.     Office blood pressure and pulse  Site: RA  Position: sitting  Cuff size: adult reg  BP: 160/56    Pulse: 60 bpm    After sitting for 5 mins: /50 HR 60    Pt states he has been taking his lasix 20 mg in the afternoon because he doesn't want to urinate so much during the day. Pt understands recommendations were for pt to take in the AM. Still complaining of lower extremity swelling when standing for long periods of time. Gave patient education on how to elevate feet.     Pt accompanied by son today. Brought a BP log from home (note all BPs were taken prior to taking medications). Advised pt and son to document BPs at least 2 hours after taking meds.   5/31/18 /61  6/3/18 /61  6/4/18 /48    Component      Latest Ref Rng & Units 6/6/2018   Sodium      133 - 144 mmol/L 140   Potassium      3.4 - 5.3 mmol/L 5.2   Chloride      94 - 109 mmol/L 112 (H)   Carbon Dioxide      20 - 32 mmol/L 23   Anion Gap      3 - 14 mmol/L 5   Glucose      70 - 99 mg/dL 113 (H)   Urea Nitrogen      7 - 30 mg/dL 28   Creatinine      0.66 - 1.25 mg/dL 1.05   GFR Estimate      >60 mL/min/1.7m2 70   GFR Estimate If Black      >60 mL/min/1.7m2 85   Calcium      8.5 - 10.1 mg/dL 8.8       Visit ordered by:    Provider in clinic today:   Patient's cardiologist: Dr.Jama Thomas RN  [facility]

## 2018-06-19 DIAGNOSIS — K92.2 GASTROINTESTINAL HEMORRHAGE, UNSPECIFIED GASTROINTESTINAL HEMORRHAGE TYPE: ICD-10-CM

## 2018-06-19 NOTE — TELEPHONE ENCOUNTER
"Requested Prescriptions   Pending Prescriptions Disp Refills     omeprazole (PRILOSEC) 20 MG CR capsule  Last Written Prescription Date:  5/22/18  Last Fill Quantity: 30,  # refills: 0   Last office visit: 5/25/2018 with prescribing provider:  5/25/2018     Future Office Visit:   Next 5 appointments (look out 90 days)     Jul 16, 2018  3:10 PM CDT   Office Visit with Kathi Mondragon PA-C   [address] [address] [phone]                  30 capsule 0     Sig: Take 1 capsule (20 mg) by mouth every morning (before breakfast)    PPI Protocol Passed    6/19/2018  4:37 PM       Passed - Not on Clopidogrel (unless Pantoprazole ordered)       Passed - No diagnosis of osteoporosis on record       Passed - Recent (12 mo) or future (30 days) visit within the authorizing provider's specialty    Patient had office visit in the last 12 months or has a visit in the next 30 days with authorizing provider or within the authorizing provider's specialty.  See \"Patient Info\" tab in inbasket, or \"Choose Columns\" in Meds & Orders section of the refill encounter.           Passed - Patient is age 18 or older          "

## 2018-06-20 NOTE — TELEPHONE ENCOUNTER
Routing refill request to provider for review/approval because:  Medication previously prescribed by gastroenterology. Please advise if ok to take over med.      Yulissa MURGUIA RN, BSN, PHN  [place] [facility] RN

## 2018-06-22 ENCOUNTER — HOSPITAL ENCOUNTER (OUTPATIENT)
Dept: CARDIOLOGY | Facility: CLINIC | Age: 68
Discharge: HOME | End: 2018-06-22
Attending: INTERNAL MEDICINE | Admitting: INTERNAL MEDICINE
Payer: COMMERCIAL

## 2018-06-22 DIAGNOSIS — I65.23 BILATERAL CAROTID ARTERY STENOSIS: ICD-10-CM

## 2018-06-22 DIAGNOSIS — R60.0 LOCALIZED EDEMA: ICD-10-CM

## 2018-06-22 PROCEDURE — 93880 EXTRACRANIAL BILAT STUDY: CPT | Mod: 26 | Performed by: INTERNAL MEDICINE

## 2018-06-22 PROCEDURE — 93306 TTE W/DOPPLER COMPLETE: CPT | Mod: 26 | Performed by: INTERNAL MEDICINE

## 2018-06-22 PROCEDURE — 93880 EXTRACRANIAL BILAT STUDY: CPT

## 2018-06-22 PROCEDURE — 25500064 ZZH RX 255 OP 636: Performed by: INTERNAL MEDICINE

## 2018-06-22 RX ADMIN — HUMAN ALBUMIN MICROSPHERES AND PERFLUTREN 2 ML: 10; .22 INJECTION, SOLUTION INTRAVENOUS at 15:44

## 2018-06-25 ENCOUNTER — TELEPHONE (OUTPATIENT)
Dept: ONCOLOGY | Facility: CLINIC | Age: 68
End: 2018-06-25

## 2018-06-25 NOTE — TELEPHONE ENCOUNTER
Phoned patient today for Pre-visit welcome call.  Spoke with patients son, Merlin, and confirmed appointment.     Scheduled for:  Date:  6/26/18  Time: 3:15  with :  Yung    Patient requested:  Please check in at [address]  [facility].  Please Arrive 15 minutes before your appointment time.    Please Bring:  Insurance Card(s)  Photo ID      TERRA Ferreira

## 2018-06-26 ENCOUNTER — ONCOLOGY VISIT (OUTPATIENT)
Dept: ONCOLOGY | Facility: CLINIC | Age: 68
End: 2018-06-26
Attending: INTERNAL MEDICINE
Payer: COMMERCIAL

## 2018-06-26 VITALS
WEIGHT: 138.2 LBS | DIASTOLIC BLOOD PRESSURE: 60 MMHG | TEMPERATURE: 98 F | OXYGEN SATURATION: 98 % | SYSTOLIC BLOOD PRESSURE: 181 MMHG | BODY MASS INDEX: 23.6 KG/M2 | HEIGHT: 64 IN | RESPIRATION RATE: 16 BRPM | HEART RATE: 57 BPM

## 2018-06-26 DIAGNOSIS — I10 ESSENTIAL HYPERTENSION WITH GOAL BLOOD PRESSURE LESS THAN 140/90: ICD-10-CM

## 2018-06-26 DIAGNOSIS — D50.0 IRON DEFICIENCY ANEMIA DUE TO CHRONIC BLOOD LOSS: Primary | ICD-10-CM

## 2018-06-26 DIAGNOSIS — R60.9 EDEMA, UNSPECIFIED TYPE: ICD-10-CM

## 2018-06-26 PROCEDURE — G0463 HOSPITAL OUTPT CLINIC VISIT: HCPCS

## 2018-06-26 PROCEDURE — 99205 OFFICE O/P NEW HI 60 MIN: CPT | Performed by: INTERNAL MEDICINE

## 2018-06-26 ASSESSMENT — PAIN SCALES - GENERAL: PAINLEVEL: NO PAIN (0)

## 2018-06-26 NOTE — MR AVS SNAPSHOT
After Visit Summary   6/26/2018    Shell Leon    MRN: [id number]           Patient Information     Date Of Birth          1950        Visit Information        Provider Department      6/26/2018 3:15 PM Lonny Lopez MD [facility]        Today's Diagnoses     Iron deficiency anemia due to chronic blood loss    -  1    Edema, unspecified type        Essential hypertension with goal blood pressure less than 140/90          Care Instructions    Increase lasix to 40 mg po daily for a week    Refer to lymphedema therapy for pedal edema    Please call [phone] to schedule this lymphedema treatment. Rina HUNT    Compression stocking    Injectafer infusion next week - weekly x 2   You are scheduled for  7/12/18.  I will call you with the scheduled appointment for your other injectafer appointment. Rina HUNT    CBC, BMP in a week (prior to injectafer); review results with me You are scheduled for your lab draw at our clinic 8/24/18    To see me in 2-3 months with labs (CBC with diff, ferritin, iron panel, soluble transferrin receptor, vitamin B12) day or 2 prior to visit  You are scheduled with Dr Lopez 8/31/18.  AVS given to patient                    Follow-ups after your visit        Additional Services     LYMPHEDEMA THERAPY REFERRAL       *This therapy referral will be filtered to a centralized scheduling office at [facility] and the patient will receive a call to schedule an appointment at a [employer] location most convenient for them. *   If you have not heard from the scheduling office within 2 business days, please call [phone] for all locations, with the exception of [place], please call [phone].     Treatment: PT or OT Evaluation & Treatment  Special Instructions: Patient is distressed by his pedal edema  PT/OT Treatment Diagnosis: Lymphedema in both legs    Please be aware that coverage of these services is subject to the terms  "and limitations of your health insurance plan.  Call member services at your health plan with any benefit or coverage questions.      **Note to Provider:  If you are referring outside of [place] for the therapy appointment, please list the name of the location in the \"special instructions\" above, print the referral and give to the patient to schedule the appointment.                  Your next 10 appointments already scheduled     Jul 12, 2018  3:00 PM CDT   Level 1 with RH INFUSION CHAIR 3   [facility] ([facility])    [facility]  [address]  [facility] [id number] [phone]            Jul 16, 2018  3:10 PM CDT   Office Visit with Kathi Mondragon PA-C   [facility] ([address] [address] [phone]           Bring a current list of meds and any records pertaining to this visit. For Physicals, please bring immunization records and any forms needing to be filled out. Please arrive 10 minutes early to complete paperwork.            Aug 24, 2018  3:30 PM CDT   Return Visit with  ONCOLOGY NURSE   [facility] ([facility])    [facility]  [address]  [facility] [id number] [phone]            Aug 31, 2018  3:15 PM CDT   Return Visit with Lonny Lopez MD   [facility] ([facility])    [facility]  [address]  [facility] [id number] [phone]              Who to contact     If you have questions or need follow up information about today's clinic visit or your schedule please contact [facility] directly at [phone].  Normal or non-critical lab and imaging results will be communicated to you by MyChart, letter or phone within 4 business days after the clinic " "has received the results. If you do not hear from us within 7 days, please contact the clinic through [employer] or phone. If you have a critical or abnormal lab result, we will notify you by phone as soon as possible.  Submit refill requests through [employer] or call your pharmacy and they will forward the refill request to us. Please allow 3 business days for your refill to be completed.          Additional Information About Your Visit        Care EveryWhere ID     This is your Care EveryWhere ID. This could be used by other organizations to access your [place] medical records  [id number]        Your Vitals Were     Pulse Temperature Respirations Height Pulse Oximetry BMI (Body Mass Index)    57 98  F (36.7  C) (Tympanic) 16 1.626 m (5' 4\") 98% 23.72 kg/m2       Blood Pressure from Last 3 Encounters:   06/26/18 181/60   06/06/18 158/50   05/30/18 160/58    Weight from Last 3 Encounters:   06/26/18 62.7 kg (138 lb 3.2 oz)   05/30/18 66.2 kg (146 lb)   05/27/18 61.7 kg (136 lb)              We Performed the Following     LYMPHEDEMA THERAPY REFERRAL          Today's Medication Changes          These changes are accurate as of 6/26/18  4:34 PM.  If you have any questions, ask your nurse or doctor.               Start taking these medicines.        Dose/Directions    order for DME   Used for:  Iron deficiency anemia due to chronic blood loss, Edema, unspecified type, Essential hypertension with goal blood pressure less than 140/90   Started by:  Lonny Lopez MD        Equipment being ordered: Compression stockings   Quantity:  2 each   Refills:  1            Where to get your medicines      Some of these will need a paper prescription and others can be bought over the counter.  Ask your nurse if you have questions.     Bring a paper prescription for each of these medications     order for DME                Primary Care Provider Office Phone # Fax #    Kathi Mondragon PA-C [phone]       " [address] [facility] [address]        Equal Access to Services     MARILINBRYAN MARGOT : Hadii lianet shaw hadrubénbonita Sonayeliali, waaxda luqadaha, qaybta kaalmada fernando, zachary mockseanmagalie vela. So [facility] [phone].    ATENCIÓN: Si habla español, tiene a sullivan disposición servicios gratuitos de asistencia lingüística. Llame al [phone].    We comply with applicable federal civil rights laws and [place] laws. We do not discriminate on the basis of race, color, national origin, age, disability, sex, sexual orientation, or gender identity.            Thank you!     Thank you for choosing [facility]  for your care. Our goal is always to provide you with excellent care. Hearing back from our patients is one way we can continue to improve our services. Please take a few minutes to complete the written survey that you may receive in the mail after your visit with us. Thank you!             Your Updated Medication List - Protect others around you: Learn how to safely use, store and throw away your medicines at www.disposemymeds.org.          This list is accurate as of 6/26/18  4:34 PM.  Always use your most recent med list.                   Brand Name Dispense Instructions for use Diagnosis    atorvastatin 80 MG tablet    LIPITOR    90 tablet    Take 1 tablet (80 mg) by mouth daily    Type 2 diabetes mellitus without complication, unspecified long term insulin use status (H)       blood glucose lancets standard    no brand specified    1 Box    Use to test blood sugar 3 times daily or as directed.    Type 2 diabetes mellitus with hyperglycemia, without long-term current use of insulin (H)       blood glucose monitoring test strip    no brand specified    100 strip    Use to test blood sugars 3 times daily or as directed    Type 2 diabetes mellitus with hyperglycemia, without long-term current use of insulin (H)       ferrous sulfate 325 (65 Fe) MG tablet    IRON     Take 325 mg by  mouth daily (with breakfast)        furosemide 20 MG tablet    LASIX    60 tablet    Take 1 tablet (20 mg) by mouth daily    Localized edema       glipiZIDE 5 MG tablet    GLUCOTROL    90 tablet    Take 1 tablet (5 mg) by mouth every morning (before breakfast)    Type 2 diabetes mellitus with hyperglycemia, without long-term current use of insulin (H)       hydrALAZINE 50 MG tablet    APRESOLINE    180 tablet    Take 1 tablet (50 mg) by mouth 3 times daily    Resistant hypertension       metFORMIN 1000 MG tablet    GLUCOPHAGE    60 tablet    TAKE 1 TABLET BY MOUTH TWICE DAILY WITH MEALS    Type 2 diabetes mellitus with hyperglycemia, without long-term current use of insulin (H)       omeprazole 20 MG CR capsule    priLOSEC    30 capsule    Take 1 capsule (20 mg) by mouth every morning (before breakfast)    Gastrointestinal hemorrhage, unspecified gastrointestinal hemorrhage type       order for DME     1 Units    Equipment being ordered: blood pressure cuff    Resistant hypertension       order for DME     2 each    Equipment being ordered: Compression stockings    Iron deficiency anemia due to chronic blood loss, Edema, unspecified type, Essential hypertension with goal blood pressure less than 140/90       spironolactone 25 MG tablet    ALDACTONE    90 tablet    Take 0.5 tablets (12.5 mg) by mouth daily    Benign essential hypertension       valsartan 320 MG tablet    DIOVAN    90 tablet    Take 1 tablet (320 mg) by mouth daily    Resistant hypertension

## 2018-06-26 NOTE — PROGRESS NOTES
[facility]    NEW PATIENT VISIT NOTE    PATIENT NAME: Shell Leon MRN # [id number]  DATE OF VISIT: June 26, 2018 YOB: 1950    REFERRING PROVIDER: DELPHINE Avendano Ra CNP  [id number] [place] [address]     HISTORY OF PRESENT ILLNESS   Shell Leon is 68 year old gentleman with HTN, DM TII, PVD who has been referred for severe iron deficiency anemia.    Shell is here with his son. History is narrated by his son. He has DM TII and is well controlled with medications. He has hypertension. He has been on medications but it has been poorly controlled. He is very frustrated that the blood pressures are still elevated despite medications. He has bilateral pedal edema. It is difficult for him to walk around. He has been started on lasix 20 mg po daily. This has not helped him at all.     He had dizziness for which he presented to ED on 5/18/18. He was noted to have Hgb of 6.4 g/dl. He was admitted and had GI work up including EGD and colonoscopy. His EGD did reveal small non bleeding gastric ulcer. His colonoscopy was unremarkable. He was transfused 2 units of PRBC and he was discharged home on stable Hgb.     He denies any dizziness. He does not have SOB, CP. He feels fatigued because of his swelling in his legs.     He has fair appetite. He denies any pain.     He denies any anxiety or depression.      PAST MEDICAL HISTORY   1. Persistent poorly controlled HTN  2. DM TII  3. PVD  4. Dyslipidemia  5. Iron deficiency anemia  6. Lower extremity edema      CURRENT OUTPATIENT MEDICATIONS     Current Outpatient Prescriptions   Medication Sig     atorvastatin (LIPITOR) 80 MG tablet Take 1 tablet (80 mg) by mouth daily     blood glucose (NO BRAND SPECIFIED) lancets standard Use to test blood sugar 3 times daily or as directed.     blood glucose monitoring (NO BRAND SPECIFIED) test strip Use to test blood sugars 3 times daily or as directed     ferrous  "sulfate (IRON) 325 (65 Fe) MG tablet Take 325 mg by mouth daily (with breakfast)     furosemide (LASIX) 20 MG tablet Take 1 tablet (20 mg) by mouth daily     glipiZIDE (GLUCOTROL) 5 MG tablet Take 1 tablet (5 mg) by mouth every morning (before breakfast)     hydrALAZINE (APRESOLINE) 50 MG tablet Take 1 tablet (50 mg) by mouth 3 times daily     metFORMIN (GLUCOPHAGE) 1000 MG tablet TAKE 1 TABLET BY MOUTH TWICE DAILY WITH MEALS     omeprazole (PRILOSEC) 20 MG CR capsule Take 1 capsule (20 mg) by mouth every morning (before breakfast)     order for DME Equipment being ordered: blood pressure cuff     spironolactone (ALDACTONE) 25 MG tablet Take 0.5 tablets (12.5 mg) by mouth daily     valsartan (DIOVAN) 320 MG tablet Take 1 tablet (320 mg) by mouth daily     No current facility-administered medications for this visit.         ALLERGIES    No Known Allergies     SOCIAL HISTORY   He is  and lives with his wife and his son.     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   He does not smoke and is never smoker. He quit alcohol long time ago.      FAMILY HISTORY   - None contributory  - Specifically patient denies CAD, CVA, Cancers in any of family members.      REVIEW OF SYSTEMS   As above in the HPI, o/w complete 12-point ROS was negative.     PHYSICAL EXAM   /60  Pulse 57  Temp 98  F (36.7  C) (Tympanic)  Resp 16  Ht 1.626 m (5' 4\")  Wt 62.7 kg (138 lb 3.2 oz)  SpO2 98%  BMI 23.72 kg/m2   SpO2 Readings from Last 4 Encounters:   05/27/18 100%   05/25/18 98%   05/22/18 100%   04/16/18 100%     Wt Readings from Last 3 Encounters:   06/26/18 62.7 kg (138 lb 3.2 oz)   05/30/18 66.2 kg (146 lb)   05/27/18 61.7 kg (136 lb)     GEN: NAD  HEENT: PERRL, EOMI, no icterus, injection or pallor. Oropharynx is clear.  NECK: no cervical or supraclavicular lymphadenopathy  LUNGS: clear bilaterally  CV: regular, no murmurs, rubs, or gallops  ABDOMEN: soft, non-tender, non-distended, " normal bowel sounds, no hepatosplenomegaly by percussion or palpation  EXT: warm, well perfused, no edema  NEURO: alert  SKIN: no rashes     LABORATORY AND IMAGING STUDIES     Recent Labs   Lab Test  06/06/18   1538  05/25/18   1512  05/22/18   1419  05/21/18 2105  05/21/18   1524   05/21/18   0646  05/20/18   1718   NA  140  135  143   --    --    --   136  130*   POTASSIUM  5.2  5.1  5.0  5.2  5.4*   < >  5.4*  5.2   CHLORIDE  112*  106  114*   --    --    --   110*  103   CO2  23  16*  21   --    --    --   21  20   ANIONGAP  5  13  8   --    --    --   5  7   BUN  28  19  18   --    --    --   33*  37*   CR  1.05  1.07  0.90   --    --    --   1.00  1.14   GLC  113*  75  93   --    --    --   86  105*   STEVE  8.8  8.3*  8.7   --    --    --   8.4*  8.5    < > = values in this interval not displayed.       Recent Labs   Lab Test  05/27/18   1604 05/25/18   1512 05/22/18   1419 05/21/18   0646  05/20/18 1718 01/03/17 2011 08/28/16   2137   WBC  10.3   --    --   6.4  8.7  7.2  10.3   HGB  8.8*  8.2*  9.5*  8.3*  6.4*  13.8  15.3   PLT  213   --    --   228  273  211  260   MCV  80   --    --   79  76*  88  90   NEUTROPHIL  61.6   --    --   44.2  60.7  69.1  39.8     Recent Labs   Lab Test  05/20/18   1718  08/10/17   1635  01/03/17 2011   BILITOTAL  0.3  0.3  0.7   ALKPHOS  98  88  71   ALT  38  42  30   AST  40  40  22   ALBUMIN  3.5  3.6  4.2   LDH  316*   --    --      TSH   Date Value Ref Range Status   04/26/2017 1.42 0.40 - 4.00 mU/L Final     Recent Labs   Lab Test  05/27/18   1604  05/25/18   1512 05/22/18   1419  05/21/18   0646  05/20/18 2209 05/20/18   1718   B12   --    --    --    --   252   --    FOLIC   --    --    --    --   12.8   --    HGB  8.8*  8.2*  9.5*  8.3*   --   6.4*   RETICABSCT   --    --    --    --   44.3   --    RETP   --    --    --    --   1.7   --      Recent Labs   Lab Test  05/20/18 2209   YANCY  9*   IRON  10*   FEB  354   IRONSAT  3*      ASSESSMENT    Severe  iron deficiency anemia  Poorly controlled hypertension  Bilateral pedal edema  Diabetes mellitus type 2 and peripheral vascular disease    DISCUSSION   I had a lengthy discussion with patient in presence of his son.  He is distressed by his pedal edema.  He was visibly upset that his blood pressures have not improved despite taking the medication and his pedal edema is worse.  He expressed that he would like to discontinue all his medications.  His blood pressure is significantly elevated at this clinic visit with a systolic blood pressure of 181 mm of Hg. I explained to him that ignoring the problem will not make the problem go away.  He has to take a more aggressive approach to controlling his blood pressure.  Suggested that he keep a blood pressure diary and monitor his blood pressures twice a day.  I suggested that he call his primary care or his cardiology team if the blood pressure remains elevated despite taking the medications that have been recommended for him for a week.    I suggested ideas for helping him with his pedal edema.  He can go up on the dose of his Lasix to 40 mg once a day.  His potassium was elevated at the last check and so it should be safe for a short while.  I will recheck his BMP in a week.  I would refer him for lymphedema therapy.  I have prescribed him compression stockings.  He should wear this first thing in the morning and during the waking hours of the day to prevent reaccumulation of the fluid.  I suggested eliminating/minimizing free sodium intake and avoiding NSAIDs.  He is already been doing that.    He clearly has iron deficiency anemia.  His last workup revealed a ferritin of 9 and iron of 10 with only 3% saturation.  This clearly suggests iron deficiency anemia.  I am not sure why he is deficient in iron but this is most likely from GI blood loss.  He had esophagogastroduodenoscopy and colonoscopy done during the recent hospital admission.  Both of these were negative for  any definitive cause for bleed.  But more importantly there was no evidence of malignancy.  I would recommend parenteral iron and reviewed ferric carboxy maltose preparation.  I reviewed alternatives including oral iron supplementation.  I will still recommend him to take oral multivitamins like prenatal vitamin which has high iron, B12 and folic acid.  This will aid in recovery of his red cells.  I would like to follow him in 2-3 months time.     PLAN   1. I recommend to increase Lasix to 40 mg p.o. daily for a week  2. I will check a CBC and a BMP next week to follow his electrolytes post Lasix  3. Referred to lymphedema therapy for bilateral pedal edema  4. Recommend keeping a diary for blood pressure.  Monitor blood pressure twice a day and check with cardiology/primary care if it persists to be elevated.  5. Recommend using compression stockings 30-40 during the waking hours of the day.  6. I recommend ferric carboxy maltose (Injectafer) 750 mg weekly twice  7. I will see him in 2-3 months with labs including CBC with differential, ferritin, iron panel, soluble transferrin receptor, vitamin B12 a couple of days prior to clinic visit.  8. I do not see a reason to get a bone marrow biopsy at this point in time.  We will consider this after correcting his iron deficiency if he persists to have significant anemia.    Over 60 min of direct face to face time spent with patient with more than 50% time spent in counseling and coordinating care.      Lonny Kenney ,  Division of Hematology, Oncology & Transplantation  [facility].

## 2018-06-26 NOTE — LETTER
6/26/2018         RE: Shell Leon  [address] [place] [address]        Dear Colleague,    Thank you for referring your patient, Shell Leon, to the [facility]. Please see a copy of my visit note below.    [facility]    NEW PATIENT VISIT NOTE    PATIENT NAME: Shell Leon MRN # [id number]  DATE OF VISIT: June 26, 2018 YOB: 1950    REFERRING PROVIDER: Jennifer Brasher, APRN CNP  [address] [place] [address]     HISTORY OF PRESENT ILLNESS   Shell Leon is 68 year old gentleman with HTN, DM TII, PVD who has been referred for severe iron deficiency anemia.    Shell is here with his son. History is narrated by his son. He has DM TII and is well controlled with medications. He has hypertension. He has been on medications but it has been poorly controlled. He is very frustrated that the blood pressures are still elevated despite medications. He has bilateral pedal edema. It is difficult for him to walk around. He has been started on lasix 20 mg po daily. This has not helped him at all.     He had dizziness for which he presented to ED on 5/18/18. He was noted to have Hgb of 6.4 g/dl. He was admitted and had GI work up including EGD and colonoscopy. His EGD did reveal small non bleeding gastric ulcer. His colonoscopy was unremarkable. He was transfused 2 units of PRBC and he was discharged home on stable Hgb.     He denies any dizziness. He does not have SOB, CP. He feels fatigued because of his swelling in his legs.     He has fair appetite. He denies any pain.     He denies any anxiety or depression.      PAST MEDICAL HISTORY   1. Persistent poorly controlled HTN  2. DM TII  3. PVD  4. Dyslipidemia  5. Iron deficiency anemia  6. Lower extremity edema      CURRENT OUTPATIENT MEDICATIONS     Current Outpatient Prescriptions   Medication Sig     atorvastatin (LIPITOR) 80 MG tablet Take 1  "tablet (80 mg) by mouth daily     blood glucose (NO BRAND SPECIFIED) lancets standard Use to test blood sugar 3 times daily or as directed.     blood glucose monitoring (NO BRAND SPECIFIED) test strip Use to test blood sugars 3 times daily or as directed     ferrous sulfate (IRON) 325 (65 Fe) MG tablet Take 325 mg by mouth daily (with breakfast)     furosemide (LASIX) 20 MG tablet Take 1 tablet (20 mg) by mouth daily     glipiZIDE (GLUCOTROL) 5 MG tablet Take 1 tablet (5 mg) by mouth every morning (before breakfast)     hydrALAZINE (APRESOLINE) 50 MG tablet Take 1 tablet (50 mg) by mouth 3 times daily     metFORMIN (GLUCOPHAGE) 1000 MG tablet TAKE 1 TABLET BY MOUTH TWICE DAILY WITH MEALS     omeprazole (PRILOSEC) 20 MG CR capsule Take 1 capsule (20 mg) by mouth every morning (before breakfast)     order for DME Equipment being ordered: blood pressure cuff     spironolactone (ALDACTONE) 25 MG tablet Take 0.5 tablets (12.5 mg) by mouth daily     valsartan (DIOVAN) 320 MG tablet Take 1 tablet (320 mg) by mouth daily     No current facility-administered medications for this visit.         ALLERGIES    No Known Allergies     SOCIAL HISTORY   He is  and lives with his wife and his son.     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   He does not smoke and is never smoker. He quit alcohol long time ago.      FAMILY HISTORY   - None contributory  - Specifically patient denies CAD, CVA, Cancers in any of family members.      REVIEW OF SYSTEMS   As above in the HPI, o/w complete 12-point ROS was negative.     PHYSICAL EXAM   /60  Pulse 57  Temp 98  F (36.7  C) (Tympanic)  Resp 16  Ht 1.626 m (5' 4\")  Wt 62.7 kg (138 lb 3.2 oz)  SpO2 98%  BMI 23.72 kg/m2   SpO2 Readings from Last 4 Encounters:   05/27/18 100%   05/25/18 98%   05/22/18 100%   04/16/18 100%     Wt Readings from Last 3 Encounters:   06/26/18 62.7 kg (138 lb 3.2 oz)   05/30/18 66.2 kg (146 lb)   05/27/18 61.7 kg " (136 lb)     GEN: NAD  HEENT: PERRL, EOMI, no icterus, injection or pallor. Oropharynx is clear.  NECK: no cervical or supraclavicular lymphadenopathy  LUNGS: clear bilaterally  CV: regular, no murmurs, rubs, or gallops  ABDOMEN: soft, non-tender, non-distended, normal bowel sounds, no hepatosplenomegaly by percussion or palpation  EXT: warm, well perfused, no edema  NEURO: alert  SKIN: no rashes     LABORATORY AND IMAGING STUDIES     Recent Labs   Lab Test  06/06/18   1538  05/25/18   1512  05/22/18   1419  05/21/18   2105  05/21/18   1524   05/21/18   0646  05/20/18   1718   NA  140  135  143   --    --    --   136  130*   POTASSIUM  5.2  5.1  5.0  5.2  5.4*   < >  5.4*  5.2   CHLORIDE  112*  106  114*   --    --    --   110*  103   CO2  23  16*  21   --    --    --   21  20   ANIONGAP  5  13  8   --    --    --   5  7   BUN  28  19  18   --    --    --   33*  37*   CR  1.05  1.07  0.90   --    --    --   1.00  1.14   GLC  113*  75  93   --    --    --   86  105*   STEVE  8.8  8.3*  8.7   --    --    --   8.4*  8.5    < > = values in this interval not displayed.       Recent Labs   Lab Test  05/27/18   1604  05/25/18   1512  05/22/18   1419  05/21/18   0646  05/20/18 1718  01/03/17 2011 08/28/16 2137   WBC  10.3   --    --   6.4  8.7  7.2  10.3   HGB  8.8*  8.2*  9.5*  8.3*  6.4*  13.8  15.3   PLT  213   --    --   228  273  211  260   MCV  80   --    --   79  76*  88  90   NEUTROPHIL  61.6   --    --   44.2  60.7  69.1  39.8     Recent Labs   Lab Test  05/20/18   1718  08/10/17   1635  01/03/17 2011   BILITOTAL  0.3  0.3  0.7   ALKPHOS  98  88  71   ALT  38  42  30   AST  40  40  22   ALBUMIN  3.5  3.6  4.2   LDH  316*   --    --      TSH   Date Value Ref Range Status   04/26/2017 1.42 0.40 - 4.00 mU/L Final     Recent Labs   Lab Test  05/27/18   1604  05/25/18   1512  05/22/18   1419  05/21/18   0646  05/20/18   2209  05/20/18   1718   B12   --    --    --    --   252   --    FOLIC   --    --    --    --    12.8   --    HGB  8.8*  8.2*  9.5*  8.3*   --   6.4*   RETICABSCT   --    --    --    --   44.3   --    RETP   --    --    --    --   1.7   --      Recent Labs   Lab Test  05/20/18   2209   YANCY  9*   IRON  10*   FEB  354   IRONSAT  3*      ASSESSMENT    Severe iron deficiency anemia  Poorly controlled hypertension  Bilateral pedal edema  Diabetes mellitus type 2 and peripheral vascular disease    DISCUSSION   I had a lengthy discussion with patient in presence of his son.  He is distressed by his pedal edema.  He was visibly upset that his blood pressures have not improved despite taking the medication and his pedal edema is worse.  He expressed that he would like to discontinue all his medications.  His blood pressure is significantly elevated at this clinic visit with a systolic blood pressure of 181 mm of Hg. I explained to him that ignoring the problem will not make the problem go away.  He has to take a more aggressive approach to controlling his blood pressure.  Suggested that he keep a blood pressure diary and monitor his blood pressures twice a day.  I suggested that he call his primary care or his cardiology team if the blood pressure remains elevated despite taking the medications that have been recommended for him for a week.    I suggested ideas for helping him with his pedal edema.  He can go up on the dose of his Lasix to 40 mg once a day.  His potassium was elevated at the last check and so it should be safe for a short while.  I will recheck his BMP in a week.  I would refer him for lymphedema therapy.  I have prescribed him compression stockings.  He should wear this first thing in the morning and during the waking hours of the day to prevent reaccumulation of the fluid.  I suggested eliminating/minimizing free sodium intake and avoiding NSAIDs.  He is already been doing that.    He clearly has iron deficiency anemia.  His last workup revealed a ferritin of 9 and iron of 10 with only 3% saturation.   This clearly suggests iron deficiency anemia.  I am not sure why he is deficient in iron but this is most likely from GI blood loss.  He had esophagogastroduodenoscopy and colonoscopy done during the recent hospital admission.  Both of these were negative for any definitive cause for bleed.  But more importantly there was no evidence of malignancy.  I would recommend parenteral iron and reviewed ferric carboxy maltose preparation.  I reviewed alternatives including oral iron supplementation.  I will still recommend him to take oral multivitamins like prenatal vitamin which has high iron, B12 and folic acid.  This will aid in recovery of his red cells.  I would like to follow him in 2-3 months time.     PLAN   1. I recommend to increase Lasix to 40 mg p.o. daily for a week  2. I will check a CBC and a BMP next week to follow his electrolytes post Lasix  3. Referred to lymphedema therapy for bilateral pedal edema  4. Recommend keeping a diary for blood pressure.  Monitor blood pressure twice a day and check with cardiology/primary care if it persists to be elevated.  5. Recommend using compression stockings 30-40 during the waking hours of the day.  6. I recommend ferric carboxy maltose (Injectafer) 750 mg weekly twice  7. I will see him in 2-3 months with labs including CBC with differential, ferritin, iron panel, soluble transferrin receptor, vitamin B12 a couple of days prior to clinic visit.  8. I do not see a reason to get a bone marrow biopsy at this point in time.  We will consider this after correcting his iron deficiency if he persists to have significant anemia.    Over 60 min of direct face to face time spent with patient with more than 50% time spent in counseling and coordinating care.      Lonny Kenney ,  Division of Hematology, Oncology & Transplantation  [facility].       Again, thank you for allowing me to participate in the care of your patient.         Sincerely,        Lonny Lopez MD

## 2018-06-26 NOTE — NURSING NOTE
"Oncology Rooming Note    June 26, 2018 3:19 PM   Shell Leon is a 68 year old male who presents for:    Chief Complaint   Patient presents with     Oncology Clinic Visit     New Consult-Anemia     Initial Vitals: Resp 16  Ht 1.626 m (5' 4\")  Wt 62.7 kg (138 lb 3.2 oz)  BMI 23.72 kg/m2 Estimated body mass index is 23.72 kg/(m^2) as calculated from the following:    Height as of this encounter: 1.626 m (5' 4\").    Weight as of this encounter: 62.7 kg (138 lb 3.2 oz). Body surface area is 1.68 meters squared.  No Pain (0) Comment: Data Unavailable   No LMP for male patient.  Allergies reviewed: Yes  Medications reviewed: Yes    Medications: Medication refills not needed today.  Pharmacy name entered into [employer]: [id number] - [place], [address]    Clinical concerns: New Consult-Anemia, patient mentions he has swelling in his feet    8 minutes for nursing intake (face to face time)     DISCHARGE PLAN:  Next appointments: See patient instruction section  Departure Mode: Ambulatory  Accompanied by: Lizandro  0 minutes for nursing discharge (face to face time)   Enid Espinoza                "

## 2018-06-26 NOTE — PATIENT INSTRUCTIONS
Increase lasix to 40 mg po daily for a week    Refer to lymphedema therapy for pedal edema    Please call [phone] to schedule this lymphedema treatment. Rina HUNT    Compression stocking    Injectafer infusion next week - weekly x 2   You are scheduled for  7/12/18.  I will call you with the scheduled appointment for your other injectafer appointment. Rina HUNT    CBC, BMP in a week (prior to injectafer); review results with me You are scheduled for your lab draw at our clinic 8/24/18    To see me in 2-3 months with labs (CBC with diff, ferritin, iron panel, soluble transferrin receptor, vitamin B12) day or 2 prior to visit  You are scheduled with Dr Lopez 8/31/18.  AVS given to patient    Addendum:  6/27/18  Scheduled for injectafer 7/6/18 and 7/13/18.  Called patient to confirm. Rina HUNT

## 2018-07-06 ENCOUNTER — INFUSION THERAPY VISIT (OUTPATIENT)
Dept: INFUSION THERAPY | Facility: CLINIC | Age: 68
End: 2018-07-06
Attending: INTERNAL MEDICINE
Payer: COMMERCIAL

## 2018-07-06 VITALS — TEMPERATURE: 97.5 F | HEART RATE: 68 BPM | DIASTOLIC BLOOD PRESSURE: 51 MMHG | SYSTOLIC BLOOD PRESSURE: 166 MMHG

## 2018-07-06 DIAGNOSIS — D50.0 IRON DEFICIENCY ANEMIA DUE TO CHRONIC BLOOD LOSS: Primary | ICD-10-CM

## 2018-07-06 PROCEDURE — 96374 THER/PROPH/DIAG INJ IV PUSH: CPT

## 2018-07-06 PROCEDURE — 25000128 H RX IP 250 OP 636: Performed by: INTERNAL MEDICINE

## 2018-07-06 RX ADMIN — SODIUM CHLORIDE 250 ML: 9 INJECTION, SOLUTION INTRAVENOUS at 15:21

## 2018-07-06 RX ADMIN — FERRIC CARBOXYMALTOSE INJECTION 750 MG: 50 INJECTION, SOLUTION INTRAVENOUS at 15:21

## 2018-07-06 NOTE — MR AVS SNAPSHOT
After Visit Summary   7/6/2018    Shell Leon    MRN: [id number]           Patient Information     Date Of Birth          1950        Visit Information        Provider Department      7/6/2018 3:00 PM RH INFUSION CHAIR 4 [facility]        Today's Diagnoses     Iron deficiency anemia due to chronic blood loss    -  1       Follow-ups after your visit        Your next 10 appointments already scheduled     Jul 09, 2018  8:00 AM CDT   Return Visit with Cresencio Landry MD   [facility] ([facility])    [address] [place] [address] [address]  [facility] [address]   [phone]            Jul 13, 2018  3:00 PM CDT   Level 1 with RH INFUSION CHAIR 7   [facility] ([facility])    [facility]  [address]  [facility] [id number] [phone]            Jul 16, 2018  3:10 PM CDT   Office Visit with Kathi Mondragon PA-C   [facility]    [address] [place], [place] [address]   [phone]           Bring a current list of meds and any records pertaining to this visit. For Physicals, please bring immunization records and any forms needing to be filled out. Please arrive 10 minutes early to complete paperwork.            Aug 24, 2018  3:30 PM CDT   Return Visit with RH ONCOLOGY NURSE   [facility] ([facility])    [facility]  [address]  [facility] [address] [phone]            Sep 07, 2018  1:45 PM CDT   Return Visit with Lonny Lopez MD   [facility] ([facility])    [facility]  [address]  [facility] [address] [phone]              Who to contact     If you have questions or need follow up information  about today's clinic visit or your schedule please contact [facility] directly at [phone].  Normal or non-critical lab and imaging results will be communicated to you by MyChart, letter or phone within 4 business days after the clinic has received the results. If you do not hear from us within 7 days, please contact the clinic through MyChart or phone. If you have a critical or abnormal lab result, we will notify you by phone as soon as possible.  Submit refill requests through [employer] or call your pharmacy and they will forward the refill request to us. Please allow 3 business days for your refill to be completed.          Additional Information About Your Visit        Care EveryWhere ID     This is your Care EveryWhere ID. This could be used by other organizations to access your [place] medical records  [id number]        Your Vitals Were     Pulse Temperature                68 97.5  F (36.4  C) (Tympanic)           Blood Pressure from Last 3 Encounters:   07/06/18 166/51   06/26/18 181/60   06/06/18 158/50    Weight from Last 3 Encounters:   06/26/18 62.7 kg (138 lb 3.2 oz)   05/30/18 66.2 kg (146 lb)   05/27/18 61.7 kg (136 lb)              Today, you had the following     No orders found for display       Primary Care Provider Office Phone # Fax #    Kathi Mondragon PA-C [phone]       [address] [facility] [address]        Equal Access to Services     TERRA FROST : Hadii aad ku hadasho Sonayeliali, waaxda luqadaha, qaybta kaalmada nattyegyabrian, wax aracely sunshine [facility] Center[facility] tucker power . So [facility] [phone].    ATENCIÓN: Si habla lorraine, tiene a sullivan disposición servicios gratuitos de asistencia lingüística. July al [phone].    We comply with applicable federal civil rights laws and [place] laws. We do not discriminate on the basis of race, color, national origin, age, disability, sex, sexual orientation, or gender identity.             Thank you!     Thank you for choosing [facility]  for your care. Our goal is always to provide you with excellent care. Hearing back from our patients is one way we can continue to improve our services. Please take a few minutes to complete the written survey that you may receive in the mail after your visit with us. Thank you!             Your Updated Medication List - Protect others around you: Learn how to safely use, store and throw away your medicines at www.disposemymeds.org.          This list is accurate as of 7/6/18  3:58 PM.  Always use your most recent med list.                   Brand Name Dispense Instructions for use Diagnosis    atorvastatin 80 MG tablet    LIPITOR    90 tablet    Take 1 tablet (80 mg) by mouth daily    Type 2 diabetes mellitus without complication, unspecified long term insulin use status (H)       blood glucose lancets standard    no brand specified    1 Box    Use to test blood sugar 3 times daily or as directed.    Type 2 diabetes mellitus with hyperglycemia, without long-term current use of insulin (H)       blood glucose monitoring test strip    no brand specified    100 strip    Use to test blood sugars 3 times daily or as directed    Type 2 diabetes mellitus with hyperglycemia, without long-term current use of insulin (H)       ferrous sulfate 325 (65 Fe) MG tablet    IRON     Take 325 mg by mouth daily (with breakfast)        furosemide 20 MG tablet    LASIX    60 tablet    Take 1 tablet (20 mg) by mouth daily    Localized edema       glipiZIDE 5 MG tablet    GLUCOTROL    90 tablet    Take 1 tablet (5 mg) by mouth every morning (before breakfast)    Type 2 diabetes mellitus with hyperglycemia, without long-term current use of insulin (H)       hydrALAZINE 50 MG tablet    APRESOLINE    180 tablet    Take 1 tablet (50 mg) by mouth 3 times daily    Resistant hypertension       metFORMIN 1000 MG tablet    GLUCOPHAGE    60 tablet    TAKE 1  TABLET BY MOUTH TWICE DAILY WITH MEALS    Type 2 diabetes mellitus with hyperglycemia, without long-term current use of insulin (H)       omeprazole 20 MG CR capsule    priLOSEC    30 capsule    Take 1 capsule (20 mg) by mouth every morning (before breakfast)    Gastrointestinal hemorrhage, unspecified gastrointestinal hemorrhage type       order for DME     1 Units    Equipment being ordered: blood pressure cuff    Resistant hypertension       order for DME     2 each    Equipment being ordered: Compression stockings    Iron deficiency anemia due to chronic blood loss, Edema, unspecified type, Essential hypertension with goal blood pressure less than 140/90       spironolactone 25 MG tablet    ALDACTONE    90 tablet    Take 0.5 tablets (12.5 mg) by mouth daily    Benign essential hypertension       valsartan 320 MG tablet    DIOVAN    90 tablet    Take 1 tablet (320 mg) by mouth daily    Resistant hypertension

## 2018-07-06 NOTE — PROGRESS NOTES
Infusion Nursing Note:  Shell Leon presents today for Injectafer.    Patient seen by provider today: No   present during visit today: Yes.     Note: Pt here for first dose of Injectafer.  States he is not symptomatic with low iron.  Reviewed potential side effects and potential for hypersensitivity reaction.  Pt verbalized understanding.    Intravenous Access:  Peripheral IV placed.    Treatment Conditions:  Not Applicable.      Post Infusion Assessment:  Patient tolerated infusion without incident.  Blood return noted pre and post infusion.  Site patent and intact, free from redness, edema or discomfort.  No evidence of extravasations.  Access discontinued per protocol.    Discharge Plan:   Discharge instructions reviewed with: Patient.  AVS to patient via [employer].  Patient will return 7/13/18 for last dose of Injectafer for next appointment.   Patient discharged in stable condition accompanied by: self.  Departure Mode: Ambulatory.    Bernarda Ng RN

## 2018-07-09 ENCOUNTER — OFFICE VISIT (OUTPATIENT)
Dept: CARDIOLOGY | Facility: CLINIC | Age: 68
End: 2018-07-09
Payer: COMMERCIAL

## 2018-07-09 ENCOUNTER — HOSPITAL ENCOUNTER (OUTPATIENT)
Dept: CT IMAGING | Facility: CLINIC | Age: 68
Discharge: HOME | End: 2018-07-09
Attending: INTERNAL MEDICINE | Admitting: INTERNAL MEDICINE
Payer: COMMERCIAL

## 2018-07-09 VITALS
HEART RATE: 54 BPM | HEIGHT: 64 IN | SYSTOLIC BLOOD PRESSURE: 154 MMHG | DIASTOLIC BLOOD PRESSURE: 50 MMHG | BODY MASS INDEX: 22.62 KG/M2 | WEIGHT: 132.5 LBS

## 2018-07-09 DIAGNOSIS — I1A.0 RESISTANT HYPERTENSION: Primary | ICD-10-CM

## 2018-07-09 DIAGNOSIS — R60.0 LOCALIZED EDEMA: ICD-10-CM

## 2018-07-09 DIAGNOSIS — I10 BENIGN ESSENTIAL HYPERTENSION: ICD-10-CM

## 2018-07-09 DIAGNOSIS — I73.9 PAD (PERIPHERAL ARTERY DISEASE) (H): ICD-10-CM

## 2018-07-09 DIAGNOSIS — I65.23 BILATERAL CAROTID ARTERY STENOSIS: ICD-10-CM

## 2018-07-09 DIAGNOSIS — I1A.0 RESISTANT HYPERTENSION: ICD-10-CM

## 2018-07-09 LAB
CREAT BLD-MCNC: 1.1 MG/DL (ref 0.66–1.25)
GFR SERPL CREATININE-BSD FRML MDRD: 66 ML/MIN/1.7M2

## 2018-07-09 PROCEDURE — 82565 ASSAY OF CREATININE: CPT

## 2018-07-09 PROCEDURE — 25000128 H RX IP 250 OP 636: Performed by: INTERNAL MEDICINE

## 2018-07-09 PROCEDURE — 74177 CT ABD & PELVIS W/CONTRAST: CPT

## 2018-07-09 PROCEDURE — 99214 OFFICE O/P EST MOD 30 MIN: CPT | Performed by: INTERNAL MEDICINE

## 2018-07-09 RX ORDER — SPIRONOLACTONE 25 MG/1
25 TABLET ORAL DAILY
Qty: 90 TABLET | Refills: 3 | Status: SHIPPED | OUTPATIENT
Start: 2018-07-09 | End: 2018-08-17

## 2018-07-09 RX ORDER — IOPAMIDOL 755 MG/ML
66 INJECTION, SOLUTION INTRAVASCULAR ONCE
Status: COMPLETED | OUTPATIENT
Start: 2018-07-09 | End: 2018-07-09

## 2018-07-09 RX ORDER — HYDROCHLOROTHIAZIDE 25 MG/1
25 TABLET ORAL DAILY
COMMUNITY
End: 2018-07-24

## 2018-07-09 RX ADMIN — SODIUM CHLORIDE 58 ML: 900 INJECTION, SOLUTION INTRAVENOUS at 16:12

## 2018-07-09 RX ADMIN — IOPAMIDOL 66 ML: 755 INJECTION, SOLUTION INTRAVENOUS at 16:12

## 2018-07-09 NOTE — LETTER
7/9/2018    Kathi Mondragon PA-C  [address]  [facility] [address]    RE: Shell Leon       Dear Colleague,    I had the pleasure of seeing Shell Leon in the [facility].    HPI and Plan:      REASON FOR VISIT:   1.  Resistant hypertension.      HISTORY OF PRESENT ILLNESS:  I had the pleasure of seeing Shell Leon at the [facility] in [place] this morning.  He is a very pleasant 68-year-old gentleman with peripheral arterial disease, hypertension and diabetes who is here for followup.  He has had difficult-to-control hypertension over the years.  He is on multiple antihypertensives and his blood pressure has improved compared to previously, but still not optimally controlled.      We did perform a renal ultrasound in the past which showed no significant renal artery stenosis.  We also performed labs to assess possible other causes of secondary hypertension.  His serum aldosterone and renin were normal.  His serum normetanephrine was elevated consistent with possible pheo.  We subsequently sent him to Endocrinology, whom he has not seen yet.      Over the past few months he has developed lower extremity pitting edema bilaterally.  He was also found to be anemic with hemoglobin in the 6 range.  He underwent colonoscopy and upper endoscopy.  No evidence of active bleeding was seen.  He was advised to undergo capsule endoscopy to make sure there is no bleeding in the small bowel.  He has yet to undergo that.  He was also seen by hematology and was diagnosed with severe iron deficiency anemia.  He is currently receiving iron therapy.    Since our last visit approximately a month ago the patient has had difficulty to control hypertension at times.  He also developed worsening lower extremity edema which has resolved with increased dose of furosemide.  Additionally, he has had approximately unintentional 15 pound weight loss  over the past month.  This is quite worried him as he has not had any change to his diet.     IMPRESSION, REPORT AND PLAN:     1.  Persistent hypertension.   2.  Peripheral arterial disease, stable.   3.  Diabetes.   4.  Hyperlipidemia   5.  Lower extremity pitting edema bilaterally.   6.  Anemia.    7. Weight loss      I reviewed the patient's recent echocardiogram as well as carotid ultrasound.  The echo demonstrated normal LV function and no significant pulmonary hypertension.  His carotid ultrasound demonstrated stable bilateral moderate (50-69%) internal carotid artery stenosis.    His blood pressure remains elevated with systolic over 150 today in the office.  However his son tells me the pressures are actually better over the last week compared to earlier last month.  He is currently on 4 antihypertensive medications including hydralazine, Diovan, hydrochlorothiazide and spironolactone.  I would recommend increasing the spironolactone to 25 mg daily.  We will have him return in 1 week with a nurse blood pressure check as well as BMP to make sure his potassium is stable.      Given his resistant hypertension, and the previously elevated serum metanephrine I again recommended that he sees an endocrinologist for further evaluation.  Question is whether he has pheochromocytoma.  Additionally, given the recent weight loss and possible pheochromocytoma I will obtain a CT abdomen and pelvis prior to his visit with the endocrinologist.  I have also asked him to follow-up with this primary provider for further evaluation regarding his weight loss. As far as  I can tell he is not up-to-date with his age-appropriate cancer screening.     It was a pleasure seeing Mr. Leon in the clinic this morning.  I will see him in approximately 3 months for follow-up but sooner if he develops symptoms.  I appreciate the opportunity to participate in this patient's care.         ALE SPICER MD         Orders Placed This Encounter    Procedures     CT Abdomen Pelvis w Contrast     Basic metabolic panel     Follow-Up with Nurse     ENDOCRINOLOGY ADULT REFERRAL       Orders Placed This Encounter   Medications     hydrochlorothiazide (HYDRODIURIL) 25 MG tablet     Sig: Take 25 mg by mouth daily     spironolactone (ALDACTONE) 25 MG tablet     Sig: Take 1 tablet (25 mg) by mouth daily     Dispense:  90 tablet     Refill:  3       Medications Discontinued During This Encounter   Medication Reason     spironolactone (ALDACTONE) 25 MG tablet Reorder         Encounter Diagnoses   Name Primary?     Benign essential hypertension      Resistant hypertension Yes     Bilateral carotid artery stenosis      Localized edema      PAD (peripheral artery disease) (H)        CURRENT MEDICATIONS:  Current Outpatient Prescriptions   Medication Sig Dispense Refill     atorvastatin (LIPITOR) 80 MG tablet Take 1 tablet (80 mg) by mouth daily 90 tablet 3     blood glucose (NO BRAND SPECIFIED) lancets standard Use to test blood sugar 3 times daily or as directed. 1 Box 11     blood glucose monitoring (NO BRAND SPECIFIED) test strip Use to test blood sugars 3 times daily or as directed 100 strip 11     ferrous sulfate (IRON) 325 (65 Fe) MG tablet Take 325 mg by mouth daily (with breakfast)       glipiZIDE (GLUCOTROL) 5 MG tablet Take 1 tablet (5 mg) by mouth every morning (before breakfast) 90 tablet 1     hydrALAZINE (APRESOLINE) 50 MG tablet Take 1 tablet (50 mg) by mouth 3 times daily 180 tablet 3     hydrochlorothiazide (HYDRODIURIL) 25 MG tablet Take 25 mg by mouth daily       metFORMIN (GLUCOPHAGE) 1000 MG tablet TAKE 1 TABLET BY MOUTH TWICE DAILY WITH MEALS 60 tablet 5     order for DME Equipment being ordered: Compression stockings 2 each 1     order for DME Equipment being ordered: blood pressure cuff 1 Units 0     spironolactone (ALDACTONE) 25 MG tablet Take 1 tablet (25 mg) by mouth daily 90 tablet 3     valsartan (DIOVAN) 320 MG tablet Take 1 tablet (320 mg)  by mouth daily 90 tablet 1     furosemide (LASIX) 20 MG tablet Take 1 tablet (20 mg) by mouth daily (Patient not taking: Reported on 6/26/2018) 60 tablet 0     omeprazole (PRILOSEC) 20 MG CR capsule Take 1 capsule (20 mg) by mouth every morning (before breakfast) (Patient not taking: Reported on 6/26/2018) 30 capsule 0     [DISCONTINUED] spironolactone (ALDACTONE) 25 MG tablet Take 0.5 tablets (12.5 mg) by mouth daily 90 tablet 3       ALLERGIES   No Known Allergies    PAST MEDICAL HISTORY:  Past Medical History:   Diagnosis Date     Diabetes (H)      Hypertension        PAST SURGICAL HISTORY:  Past Surgical History:   Procedure Laterality Date     COLONOSCOPY N/A 5/22/2018    Procedure: COMBINED COLONOSCOPY, SINGLE OR MULTIPLE BIOPSY/POLYPECTOMY BY BIOPSY;  COLONOSCOPY  Room 521, with polypectomy x1 using cold biopsy forceps;  Surgeon: Charlie Rabago MD;  Location:  GI     ESOPHAGOSCOPY, GASTROSCOPY, DUODENOSCOPY (EGD), COMBINED N/A 5/21/2018    Procedure: COMBINED ESOPHAGOSCOPY, GASTROSCOPY, DUODENOSCOPY (EGD), BIOPSY SINGLE OR MULTIPLE;  ESOPHAGOSCOPY, GASTROSCOPY, DUODENOSCOPY (EGD)  Room 521 and cold biopsies;  Surgeon: Charlie Rabago MD;  Location:  GI     LAPAROSCOPIC CHOLECYSTECTOMY N/A 1/6/2017    Procedure: LAPAROSCOPIC CHOLECYSTECTOMY;  Surgeon: Michael Caba MD;  Location:  OR       FAMILY HISTORY:  Family History   Problem Relation Age of Onset     Unknown/Adopted No family hx of        SOCIAL HISTORY:  Social History     Social History     Marital status:      Spouse name: N/A     Number of children: N/A     Years of education: N/A     Social History Main Topics     Smoking status: Never Smoker     Smokeless tobacco: Never Used     Alcohol use No     Drug use: No     Sexual activity: No     Other Topics Concern     Parent/Sibling W/ Cabg, Mi Or Angioplasty Before 65f 55m? No     Social History Narrative       Review of Systems:  Skin:  Positive for bruising     Eyes:  " Positive for glasses    ENT:  Negative      Respiratory:  Negative       Cardiovascular:    Positive for;edema swelling in both legs   Gastroenterology: Negative      Genitourinary:  Negative      Musculoskeletal:  Negative      Neurologic:  Positive for numbness or tingling of feet    Psychiatric:  Negative      Heme/Lymph/Imm:  Positive for weight loss    Endocrine:  Positive for diabetes      Physical Exam:  Vitals: /50 (BP Location: Left arm, Patient Position: Sitting, Cuff Size: Adult Regular)  Pulse 54  Ht 1.626 m (5' 4\")  Wt 60.1 kg (132 lb 8 oz)  BMI 22.74 kg/m2    Constitutional:  cooperative;in no acute distress        Skin:  warm and dry to the touch          Head:  normocephalic        Eyes:           Lymph:      ENT:  no pallor or cyanosis        Neck:  carotid pulses are full and equal bilaterally;JVP normal right carotid bruit      Respiratory:  clear to auscultation         Cardiac: regular rhythm;normal S1 and S2;no murmurs, gallops or rubs detected                    2+         0   2+         0            GI:  abdomen soft;no masses;non-tender        Extremities and Muscular Skeletal:  no edema              Neurological:  no gross motor deficits        Psych:           CC  Kathi Mondragon PA-C  [address] [place] [address]                Thank you for allowing me to participate in the care of your patient.      Sincerely,     Cresencio Landry MD, MD     [facility]    cc:   Kathi Mondragon PA-C  [address] [place] [address]        "

## 2018-07-09 NOTE — MR AVS SNAPSHOT
After Visit Summary   7/9/2018    Shell Leon    MRN: [id number]           Patient Information     Date Of Birth          1950        Visit Information        Provider Department      7/9/2018 8:00 AM Cresencio Landry MD [facility]        Today's Diagnoses     Resistant hypertension    -  1    Benign essential hypertension           Follow-ups after your visit        Additional Services     ENDOCRINOLOGY ADULT REFERRAL       Your provider has referred you to: ENDOCRINOLOGY ADULT. Pt has elevated serum metanephrine, resistant HTN and recent weight loss. Evaluate for possible pheo      Please be aware that coverage of these services is subject to the terms and limitations of your health insurance plan.  Call member services at your health plan with any benefit or coverage questions.      Please bring the following to your appointment:    >>   Any x-rays, CTs or MRIs which have been performed.  Contact the facility where they were done to arrange for  prior to your scheduled appointment.    >>   List of current medications   >>   This referral request   >>   Any documents/labs given to you for this referral            Follow-Up with Nurse                 Your next 10 appointments already scheduled     Jul 09, 2018  4:00 PM CDT   (Arrive by 3:45 PM)   CT ABDOMEN PELVIS W CONTRAST with RHCT2   [facility] ([facility])    [address] [place] [address] [phone]           Please bring any scans or X-rays taken at other hospitals, if similar tests were done. Also bring a list of your medicines, including vitamins, minerals and over-the-counter drugs. It is safest to leave personal items at home.  Be sure to tell your doctor:   If you have any allergies.   If there s any chance you are pregnant.   If you are breastfeeding.  How to prepare:   Do not eat or drink for 2 hours before your exam. If you  need to take medicine, you may take it with small sips of water. (We may ask you to take liquid medicine as well.)   Please wear loose clothing, such as a sweat suit or jogging clothes. Avoid snaps, zippers and other metal. We may ask you to undress and put on a hospital gown.  Please arrive 30 minutes early for your CT. Once in the department you might be asked to drink water 15-20 minutes prior to your exam.  If indicated you may be asked to drink an oral contrast in advance of your CT.  If this is the case, the imaging team will let you know or be in contact with you prior to your appointment  Patients over 70 or patients with diabetes or kidney problems:   If you haven t had a blood test (creatinine test) within the last 30 days, the Cardiologist/Radiologist may require you to get this test prior to your exam.  If you have diabetes:   Continue to take your metformin medication on the day of your exam  If you have any questions, please call the Imaging Department where you will have your exam.            Jul 13, 2018  3:00 PM CDT   Level 1 with  INFUSION CHAIR 7   [facility] ([facility])    [facility]  [address]  [facility] [id number] [phone]            Jul 16, 2018  3:10 PM CDT   Office Visit with Kathi Mondragon PA-C   [facility] ([facility])    [address] [address] [phone]           Bring a current list of meds and any records pertaining to this visit. For Physicals, please bring immunization records and any forms needing to be filled out. Please arrive 10 minutes early to complete paperwork.            Jul 16, 2018  3:30 PM CDT   Nurse Only with RU [facility] HRT NURSE   [facility] ([facility])    [address]  [facility] [id number] [phone]            Jul 16, 2018  3:45  PM CDT   LAB with RU LAB   [facility] ([facility])    [address]  [facility] [address]   [phone]           Please do not eat 10-12 hours before your appointment if you are coming in fasting for labs on lipids, cholesterol, or glucose (sugar). This does not apply to pregnant women. Water, hot tea and black coffee (with nothing added) are okay. Do not drink other fluids, diet soda or chew gum.            Aug 24, 2018  3:30 PM CDT   Return Visit with RH ONCOLOGY NURSE   [facility] ([facility])    [facility] [place]  [address]  [facility] [address]   [phone]            Sep 07, 2018  1:45 PM CDT   Return Visit with Lonny Lopez MD   [facility] ([facility])    [facility]  [address]  [facility] [address]   [phone]              Future tests that were ordered for you today     Open Future Orders        Priority Expected Expires Ordered    ENDOCRINOLOGY ADULT REFERRAL Routine 7/9/2018 7/8/2020 7/9/2018    CT Abdomen Pelvis w Contrast Routine 7/9/2018 7/9/2019 7/9/2018    Follow-Up with Nurse Routine 7/16/2018 7/9/2019 7/9/2018    Basic metabolic panel Routine 7/16/2018 7/9/2019 7/9/2018            Who to contact     If you have questions or need follow up information about today's clinic visit or your schedule please contact [facility] directly at [phone].  Normal or non-critical lab and imaging results will be communicated to you by MyChart, letter or phone within 4 business days after the clinic has received the results. If you do not hear from us within 7 days, please contact the clinic through MyChart or phone. If you have a critical or abnormal lab result, we will notify you by phone as soon as possible.  Submit refill requests  "through [facility] or call your pharmacy and they will forward the refill request to us. Please allow 3 business days for your refill to be completed.          Additional Information About Your Visit        Care EveryWhere ID     This is your Care EveryWhere ID. This could be used by other organizations to access your [facility] medical records  [id number]        Your Vitals Were     Pulse Height BMI (Body Mass Index)             54 1.626 m (5' 4\") 22.74 kg/m2          Blood Pressure from Last 3 Encounters:   07/09/18 154/50   07/06/18 166/51   06/26/18 181/60    Weight from Last 3 Encounters:   07/09/18 60.1 kg (132 lb 8 oz)   06/26/18 62.7 kg (138 lb 3.2 oz)   05/30/18 66.2 kg (146 lb)                 Today's Medication Changes          These changes are accurate as of 7/9/18  9:04 AM.  If you have any questions, ask your nurse or doctor.               These medicines have changed or have updated prescriptions.        Dose/Directions    spironolactone 25 MG tablet   Commonly known as:  ALDACTONE   This may have changed:  how much to take   Used for:  Benign essential hypertension   Changed by:  Cresencio Landry MD        Dose:  25 mg   Take 1 tablet (25 mg) by mouth daily   Quantity:  90 tablet   Refills:  3            Where to get your medicines      These medications were sent to [id number], [address] [address]     Phone:  [phone]     spironolactone 25 MG tablet                Primary Care Provider Office Phone # Fax #    Kathi Mondragon PA-C [phone] [address]  [facility] [address]        Equal Access to Services     [facility] : Hadii lianet Dillon, waariasda lunedaadaha, qaybta kaalmabrian king, zachary vela. So [facility] [phone].    ATENCIÓN: Si habla español, tiene a sullivan disposición servicios gratuitos de asistencia lingüística. Llame al [phone].    We comply " with applicable federal civil rights laws and [place] laws. We do not discriminate on the basis of race, color, national origin, age, disability, sex, sexual orientation, or gender identity.            Thank you!     Thank you for choosing [facility]  for your care. Our goal is always to provide you with excellent care. Hearing back from our patients is one way we can continue to improve our services. Please take a few minutes to complete the written survey that you may receive in the mail after your visit with us. Thank you!             Your Updated Medication List - Protect others around you: Learn how to safely use, store and throw away your medicines at www.disposemymeds.org.          This list is accurate as of 7/9/18  9:04 AM.  Always use your most recent med list.                   Brand Name Dispense Instructions for use Diagnosis    atorvastatin 80 MG tablet    LIPITOR    90 tablet    Take 1 tablet (80 mg) by mouth daily    Type 2 diabetes mellitus without complication, unspecified long term insulin use status (H)       blood glucose lancets standard    no brand specified    1 Box    Use to test blood sugar 3 times daily or as directed.    Type 2 diabetes mellitus with hyperglycemia, without long-term current use of insulin (H)       blood glucose monitoring test strip    no brand specified    100 strip    Use to test blood sugars 3 times daily or as directed    Type 2 diabetes mellitus with hyperglycemia, without long-term current use of insulin (H)       ferrous sulfate 325 (65 Fe) MG tablet    IRON     Take 325 mg by mouth daily (with breakfast)        furosemide 20 MG tablet    LASIX    60 tablet    Take 1 tablet (20 mg) by mouth daily    Localized edema       glipiZIDE 5 MG tablet    GLUCOTROL    90 tablet    Take 1 tablet (5 mg) by mouth every morning (before breakfast)    Type 2 diabetes mellitus with hyperglycemia, without long-term current use of  insulin (H)       hydrALAZINE 50 MG tablet    APRESOLINE    180 tablet    Take 1 tablet (50 mg) by mouth 3 times daily    Resistant hypertension       hydrochlorothiazide 25 MG tablet    HYDRODIURIL     Take 25 mg by mouth daily        metFORMIN 1000 MG tablet    GLUCOPHAGE    60 tablet    TAKE 1 TABLET BY MOUTH TWICE DAILY WITH MEALS    Type 2 diabetes mellitus with hyperglycemia, without long-term current use of insulin (H)       omeprazole 20 MG CR capsule    priLOSEC    30 capsule    Take 1 capsule (20 mg) by mouth every morning (before breakfast)    Gastrointestinal hemorrhage, unspecified gastrointestinal hemorrhage type       order for DME     1 Units    Equipment being ordered: blood pressure cuff    Resistant hypertension       order for DME     2 each    Equipment being ordered: Compression stockings    Iron deficiency anemia due to chronic blood loss, Edema, unspecified type, Essential hypertension with goal blood pressure less than 140/90       spironolactone 25 MG tablet    ALDACTONE    90 tablet    Take 1 tablet (25 mg) by mouth daily    Benign essential hypertension       valsartan 320 MG tablet    DIOVAN    90 tablet    Take 1 tablet (320 mg) by mouth daily    Resistant hypertension

## 2018-07-09 NOTE — PROGRESS NOTES
HPI and Plan:      REASON FOR VISIT:   1.  Resistant hypertension.      HISTORY OF PRESENT ILLNESS:  I had the pleasure of seeing Shell Leon at the [facility] in [place] this morning.  He is a very pleasant 68-year-old gentleman with peripheral arterial disease, hypertension and diabetes who is here for followup.  He has had difficult-to-control hypertension over the years.  He is on multiple antihypertensives and his blood pressure has improved compared to previously, but still not optimally controlled.      We did perform a renal ultrasound in the past which showed no significant renal artery stenosis.  We also performed labs to assess possible other causes of secondary hypertension.  His serum aldosterone and renin were normal.  His serum normetanephrine was elevated consistent with possible pheo.  We subsequently sent him to Endocrinology, whom he has not seen yet.      Over the past few months he has developed lower extremity pitting edema bilaterally.  He was also found to be anemic with hemoglobin in the 6 range.  He underwent colonoscopy and upper endoscopy.  No evidence of active bleeding was seen.  He was advised to undergo capsule endoscopy to make sure there is no bleeding in the small bowel.  He has yet to undergo that.  He was also seen by hematology and was diagnosed with severe iron deficiency anemia.  He is currently receiving iron therapy.    Since our last visit approximately a month ago the patient has had difficulty to control hypertension at times.  He also developed worsening lower extremity edema which has resolved with increased dose of furosemide.  Additionally, he has had approximately unintentional 15 pound weight loss over the past month.  This is quite worried him as he has not had any change to his diet.     IMPRESSION, REPORT AND PLAN:     1.  Persistent hypertension.   2.  Peripheral arterial disease, stable.   3.  Diabetes.   4.  Hyperlipidemia    5.  Lower extremity pitting edema bilaterally.   6.  Anemia.    7. Weight loss      I reviewed the patient's recent echocardiogram as well as carotid ultrasound.  The echo demonstrated normal LV function and no significant pulmonary hypertension.  His carotid ultrasound demonstrated stable bilateral moderate (50-69%) internal carotid artery stenosis.    His blood pressure remains elevated with systolic over 150 today in the office.  However his son tells me the pressures are actually better over the last week compared to earlier last month.  He is currently on 4 antihypertensive medications including hydralazine, Diovan, hydrochlorothiazide and spironolactone.  I would recommend increasing the spironolactone to 25 mg daily.  We will have him return in 1 week with a nurse blood pressure check as well as BMP to make sure his potassium is stable.      Given his resistant hypertension, and the previously elevated serum metanephrine I again recommended that he sees an endocrinologist for further evaluation.  Question is whether he has pheochromocytoma.  Additionally, given the recent weight loss and possible pheochromocytoma I will obtain a CT abdomen and pelvis prior to his visit with the endocrinologist.  I have also asked him to follow-up with this primary provider for further evaluation regarding his weight loss. As far as  I can tell he is not up-to-date with his age-appropriate cancer screening.     It was a pleasure seeing Mr. Leon in the clinic this morning.  I will see him in approximately 3 months for follow-up but sooner if he develops symptoms.  I appreciate the opportunity to participate in this patient's care.         ALE SPICER MD         Orders Placed This Encounter   Procedures     CT Abdomen Pelvis w Contrast     Basic metabolic panel     Follow-Up with Nurse     ENDOCRINOLOGY ADULT REFERRAL       Orders Placed This Encounter   Medications     hydrochlorothiazide (HYDRODIURIL) 25 MG tablet      Sig: Take 25 mg by mouth daily     spironolactone (ALDACTONE) 25 MG tablet     Sig: Take 1 tablet (25 mg) by mouth daily     Dispense:  90 tablet     Refill:  3       Medications Discontinued During This Encounter   Medication Reason     spironolactone (ALDACTONE) 25 MG tablet Reorder         Encounter Diagnoses   Name Primary?     Benign essential hypertension      Resistant hypertension Yes     Bilateral carotid artery stenosis      Localized edema      PAD (peripheral artery disease) (H)        CURRENT MEDICATIONS:  Current Outpatient Prescriptions   Medication Sig Dispense Refill     atorvastatin (LIPITOR) 80 MG tablet Take 1 tablet (80 mg) by mouth daily 90 tablet 3     blood glucose (NO BRAND SPECIFIED) lancets standard Use to test blood sugar 3 times daily or as directed. 1 Box 11     blood glucose monitoring (NO BRAND SPECIFIED) test strip Use to test blood sugars 3 times daily or as directed 100 strip 11     ferrous sulfate (IRON) 325 (65 Fe) MG tablet Take 325 mg by mouth daily (with breakfast)       glipiZIDE (GLUCOTROL) 5 MG tablet Take 1 tablet (5 mg) by mouth every morning (before breakfast) 90 tablet 1     hydrALAZINE (APRESOLINE) 50 MG tablet Take 1 tablet (50 mg) by mouth 3 times daily 180 tablet 3     hydrochlorothiazide (HYDRODIURIL) 25 MG tablet Take 25 mg by mouth daily       metFORMIN (GLUCOPHAGE) 1000 MG tablet TAKE 1 TABLET BY MOUTH TWICE DAILY WITH MEALS 60 tablet 5     order for DME Equipment being ordered: Compression stockings 2 each 1     order for DME Equipment being ordered: blood pressure cuff 1 Units 0     spironolactone (ALDACTONE) 25 MG tablet Take 1 tablet (25 mg) by mouth daily 90 tablet 3     valsartan (DIOVAN) 320 MG tablet Take 1 tablet (320 mg) by mouth daily 90 tablet 1     furosemide (LASIX) 20 MG tablet Take 1 tablet (20 mg) by mouth daily (Patient not taking: Reported on 6/26/2018) 60 tablet 0     omeprazole (PRILOSEC) 20 MG CR capsule Take 1 capsule (20 mg) by mouth  every morning (before breakfast) (Patient not taking: Reported on 6/26/2018) 30 capsule 0     [DISCONTINUED] spironolactone (ALDACTONE) 25 MG tablet Take 0.5 tablets (12.5 mg) by mouth daily 90 tablet 3       ALLERGIES   No Known Allergies    PAST MEDICAL HISTORY:  Past Medical History:   Diagnosis Date     Diabetes (H)      Hypertension        PAST SURGICAL HISTORY:  Past Surgical History:   Procedure Laterality Date     COLONOSCOPY N/A 5/22/2018    Procedure: COMBINED COLONOSCOPY, SINGLE OR MULTIPLE BIOPSY/POLYPECTOMY BY BIOPSY;  COLONOSCOPY  Room 521, with polypectomy x1 using cold biopsy forceps;  Surgeon: Charlie Rabago MD;  Location: [facility]     ESOPHAGOSCOPY, GASTROSCOPY, DUODENOSCOPY (EGD), COMBINED N/A 5/21/2018    Procedure: COMBINED ESOPHAGOSCOPY, GASTROSCOPY, DUODENOSCOPY (EGD), BIOPSY SINGLE OR MULTIPLE;  ESOPHAGOSCOPY, GASTROSCOPY, DUODENOSCOPY (EGD)  Room 521 and cold biopsies;  Surgeon: Charlie Rabago MD;  Location:  GI     LAPAROSCOPIC CHOLECYSTECTOMY N/A 1/6/2017    Procedure: LAPAROSCOPIC CHOLECYSTECTOMY;  Surgeon: Michael Caba MD;  Location: [facility] OR       FAMILY HISTORY:  Family History   Problem Relation Age of Onset     Unknown/Adopted No family hx of        SOCIAL HISTORY:  Social History     Social History     Marital status:      Spouse name: N/A     Number of children: N/A     Years of education: N/A     Social History Main Topics     Smoking status: Never Smoker     Smokeless tobacco: Never Used     Alcohol use No     Drug use: No     Sexual activity: No     Other Topics Concern     Parent/Sibling W/ Cabg, Mi Or Angioplasty Before 65f 55m? No     Social History Narrative       Review of Systems:  Skin:  Positive for bruising     Eyes:  Positive for glasses    ENT:  Negative      Respiratory:  Negative       Cardiovascular:    Positive for;edema swelling in both legs   Gastroenterology: Negative      Genitourinary:  Negative      Musculoskeletal:  Negative     "  Neurologic:  Positive for numbness or tingling of feet    Psychiatric:  Negative      Heme/Lymph/Imm:  Positive for weight loss    Endocrine:  Positive for diabetes      Physical Exam:  Vitals: /50 (BP Location: Left arm, Patient Position: Sitting, Cuff Size: Adult Regular)  Pulse 54  Ht 1.626 m (5' 4\")  Wt 60.1 kg (132 lb 8 oz)  BMI 22.74 kg/m2    Constitutional:  cooperative;in no acute distress        Skin:  warm and dry to the touch          Head:  normocephalic        Eyes:           Lymph:      ENT:  no pallor or cyanosis        Neck:  carotid pulses are full and equal bilaterally;JVP normal right carotid bruit      Respiratory:  clear to auscultation         Cardiac: regular rhythm;normal S1 and S2;no murmurs, gallops or rubs detected                    2+         0   2+         0            GI:  abdomen soft;no masses;non-tender        Extremities and Muscular Skeletal:  no edema              Neurological:  no gross motor deficits        Psych:           CC  Kathi Mondragon PA-C  [address] [place] [address]              "

## 2018-07-09 NOTE — LETTER
7/9/2018    Kathi Mondragon PA-C  [address]  [facility] [address]    RE: Shell Leon       Dear Colleague,    I had the pleasure of seeing Shell Leno in the [facility].    HPI and Plan:      REASON FOR VISIT:   1.  Resistant hypertension.      HISTORY OF PRESENT ILLNESS:  I had the pleasure of seeing Shell Leon at the [facility] in [place] this morning.  He is a very pleasant 68-year-old gentleman with peripheral arterial disease, hypertension and diabetes who is here for followup.  He has had difficult-to-control hypertension over the years.  He is on multiple antihypertensives and his blood pressure has improved compared to previously, but still not optimally controlled.      We did perform a renal ultrasound in the past which showed no significant renal artery stenosis.  We also performed labs to assess possible other causes of secondary hypertension.  His serum aldosterone and renin were normal.  His serum normetanephrine was elevated consistent with possible pheo.  We subsequently sent him to Endocrinology, whom he has not seen yet.      Over the past few months he has developed lower extremity pitting edema bilaterally.  He was also found to be anemic with hemoglobin in the 6 range.  He underwent colonoscopy and upper endoscopy.  No evidence of active bleeding was seen.  He was advised to undergo capsule endoscopy to make sure there is no bleeding in the small bowel.  He has yet to undergo that.  He was also seen by hematology and was diagnosed with severe iron deficiency anemia.  He is currently receiving iron therapy.    Since our last visit approximately a month ago the patient has had difficulty to control hypertension at times.  He also developed worsening lower extremity edema which has resolved with increased dose of furosemide.  Additionally, he has had approximately unintentional 15 pound weight loss  over the past month.  This is quite worried him as he has not had any change to his diet.     IMPRESSION, REPORT AND PLAN:     1.  Persistent hypertension.   2.  Peripheral arterial disease, stable.   3.  Diabetes.   4.  Hyperlipidemia   5.  Lower extremity pitting edema bilaterally.   6.  Anemia.    7. Weight loss      I reviewed the patient's recent echocardiogram as well as carotid ultrasound.  The echo demonstrated normal LV function and no significant pulmonary hypertension.  His carotid ultrasound demonstrated stable bilateral moderate (50-69%) internal carotid artery stenosis.    His blood pressure remains elevated with systolic over 150 today in the office.  However his son tells me the pressures are actually better over the last week compared to earlier last month.  He is currently on 4 antihypertensive medications including hydralazine, Diovan, hydrochlorothiazide and spironolactone.  I would recommend increasing the spironolactone to 25 mg daily.  We will have him return in 1 week with a nurse blood pressure check as well as BMP to make sure his potassium is stable.      Given his resistant hypertension, and the previously elevated serum metanephrine I again recommended that he sees an endocrinologist for further evaluation.  Question is whether he has pheochromocytoma.  Additionally, given the recent weight loss and possible pheochromocytoma I will obtain a CT abdomen and pelvis prior to his visit with the endocrinologist.  I have also asked him to follow-up with this primary provider for further evaluation regarding his weight loss. As far as  I can tell he is not up-to-date with his age-appropriate cancer screening.     It was a pleasure seeing Mr. Leon in the clinic this morning.  I will see him in approximately 3 months for follow-up but sooner if he develops symptoms.  I appreciate the opportunity to participate in this patient's care.         ALE SPICER MD         Orders Placed This Encounter    Procedures     CT Abdomen Pelvis w Contrast     Basic metabolic panel     Follow-Up with Nurse     ENDOCRINOLOGY ADULT REFERRAL       Orders Placed This Encounter   Medications     hydrochlorothiazide (HYDRODIURIL) 25 MG tablet     Sig: Take 25 mg by mouth daily     spironolactone (ALDACTONE) 25 MG tablet     Sig: Take 1 tablet (25 mg) by mouth daily     Dispense:  90 tablet     Refill:  3       Medications Discontinued During This Encounter   Medication Reason     spironolactone (ALDACTONE) 25 MG tablet Reorder         Encounter Diagnoses   Name Primary?     Benign essential hypertension      Resistant hypertension Yes     Bilateral carotid artery stenosis      Localized edema      PAD (peripheral artery disease) (H)        CURRENT MEDICATIONS:  Current Outpatient Prescriptions   Medication Sig Dispense Refill     atorvastatin (LIPITOR) 80 MG tablet Take 1 tablet (80 mg) by mouth daily 90 tablet 3     blood glucose (NO BRAND SPECIFIED) lancets standard Use to test blood sugar 3 times daily or as directed. 1 Box 11     blood glucose monitoring (NO BRAND SPECIFIED) test strip Use to test blood sugars 3 times daily or as directed 100 strip 11     ferrous sulfate (IRON) 325 (65 Fe) MG tablet Take 325 mg by mouth daily (with breakfast)       glipiZIDE (GLUCOTROL) 5 MG tablet Take 1 tablet (5 mg) by mouth every morning (before breakfast) 90 tablet 1     hydrALAZINE (APRESOLINE) 50 MG tablet Take 1 tablet (50 mg) by mouth 3 times daily 180 tablet 3     hydrochlorothiazide (HYDRODIURIL) 25 MG tablet Take 25 mg by mouth daily       metFORMIN (GLUCOPHAGE) 1000 MG tablet TAKE 1 TABLET BY MOUTH TWICE DAILY WITH MEALS 60 tablet 5     order for DME Equipment being ordered: Compression stockings 2 each 1     order for DME Equipment being ordered: blood pressure cuff 1 Units 0     spironolactone (ALDACTONE) 25 MG tablet Take 1 tablet (25 mg) by mouth daily 90 tablet 3     valsartan (DIOVAN) 320 MG tablet Take 1 tablet (320 mg)  by mouth daily 90 tablet 1     furosemide (LASIX) 20 MG tablet Take 1 tablet (20 mg) by mouth daily (Patient not taking: Reported on 6/26/2018) 60 tablet 0     omeprazole (PRILOSEC) 20 MG CR capsule Take 1 capsule (20 mg) by mouth every morning (before breakfast) (Patient not taking: Reported on 6/26/2018) 30 capsule 0     [DISCONTINUED] spironolactone (ALDACTONE) 25 MG tablet Take 0.5 tablets (12.5 mg) by mouth daily 90 tablet 3       ALLERGIES   No Known Allergies    PAST MEDICAL HISTORY:  Past Medical History:   Diagnosis Date     Diabetes (H)      Hypertension        PAST SURGICAL HISTORY:  Past Surgical History:   Procedure Laterality Date     COLONOSCOPY N/A 5/22/2018    Procedure: COMBINED COLONOSCOPY, SINGLE OR MULTIPLE BIOPSY/POLYPECTOMY BY BIOPSY;  COLONOSCOPY  Room 521, with polypectomy x1 using cold biopsy forceps;  Surgeon: Charlie Rabago MD;  Location:  GI     ESOPHAGOSCOPY, GASTROSCOPY, DUODENOSCOPY (EGD), COMBINED N/A 5/21/2018    Procedure: COMBINED ESOPHAGOSCOPY, GASTROSCOPY, DUODENOSCOPY (EGD), BIOPSY SINGLE OR MULTIPLE;  ESOPHAGOSCOPY, GASTROSCOPY, DUODENOSCOPY (EGD)  Room 521 and cold biopsies;  Surgeon: Charlie Rabago MD;  Location:  GI     LAPAROSCOPIC CHOLECYSTECTOMY N/A 1/6/2017    Procedure: LAPAROSCOPIC CHOLECYSTECTOMY;  Surgeon: Michael Caba MD;  Location:  OR       FAMILY HISTORY:  Family History   Problem Relation Age of Onset     Unknown/Adopted No family hx of        SOCIAL HISTORY:  Social History     Social History     Marital status:      Spouse name: N/A     Number of children: N/A     Years of education: N/A     Social History Main Topics     Smoking status: Never Smoker     Smokeless tobacco: Never Used     Alcohol use No     Drug use: No     Sexual activity: No     Other Topics Concern     Parent/Sibling W/ Cabg, Mi Or Angioplasty Before 65f 55m? No     Social History Narrative       Review of Systems:  Skin:  Positive for bruising     Eyes:  " Positive for glasses    ENT:  Negative      Respiratory:  Negative       Cardiovascular:    Positive for;edema swelling in both legs   Gastroenterology: Negative      Genitourinary:  Negative      Musculoskeletal:  Negative      Neurologic:  Positive for numbness or tingling of feet    Psychiatric:  Negative      Heme/Lymph/Imm:  Positive for weight loss    Endocrine:  Positive for diabetes      Physical Exam:  Vitals: /50 (BP Location: Left arm, Patient Position: Sitting, Cuff Size: Adult Regular)  Pulse 54  Ht 1.626 m (5' 4\")  Wt 60.1 kg (132 lb 8 oz)  BMI 22.74 kg/m2    Constitutional:  cooperative;in no acute distress        Skin:  warm and dry to the touch          Head:  normocephalic        Eyes:           Lymph:      ENT:  no pallor or cyanosis        Neck:  carotid pulses are full and equal bilaterally;JVP normal right carotid bruit      Respiratory:  clear to auscultation         Cardiac: regular rhythm;normal S1 and S2;no murmurs, gallops or rubs detected                    2+         0   2+         0            GI:  abdomen soft;no masses;non-tender        Extremities and Muscular Skeletal:  no edema              Neurological:  no gross motor deficits        Psych:           Thank you for allowing me to participate in the care of your patient.    Sincerely,     Cresencio Landry MD, MD     [facility]    "

## 2018-07-13 ENCOUNTER — INFUSION THERAPY VISIT (OUTPATIENT)
Dept: INFUSION THERAPY | Facility: CLINIC | Age: 68
End: 2018-07-13
Attending: INTERNAL MEDICINE
Payer: COMMERCIAL

## 2018-07-13 VITALS
TEMPERATURE: 97.2 F | OXYGEN SATURATION: 100 % | SYSTOLIC BLOOD PRESSURE: 150 MMHG | RESPIRATION RATE: 16 BRPM | DIASTOLIC BLOOD PRESSURE: 53 MMHG | HEART RATE: 54 BPM

## 2018-07-13 DIAGNOSIS — D50.0 IRON DEFICIENCY ANEMIA DUE TO CHRONIC BLOOD LOSS: Primary | ICD-10-CM

## 2018-07-13 PROCEDURE — 96374 THER/PROPH/DIAG INJ IV PUSH: CPT

## 2018-07-13 PROCEDURE — 25000128 H RX IP 250 OP 636: Performed by: INTERNAL MEDICINE

## 2018-07-13 RX ADMIN — FERRIC CARBOXYMALTOSE INJECTION 750 MG: 50 INJECTION, SOLUTION INTRAVENOUS at 15:30

## 2018-07-13 ASSESSMENT — PAIN SCALES - GENERAL: PAINLEVEL: NO PAIN (0)

## 2018-07-13 NOTE — PROGRESS NOTES
Infusion Nursing Note:  Shell Leon presents today for Injectafer #2 of 2.    Patient seen by provider today: No   present during visit today: Not Applicable.    Note: Denies fatigue or SOB.  Did have emesis x2 last wk.  Denies other SE from Injectafer.    Intravenous Access:  Peripheral IV placed.    Treatment Conditions:  Not Applicable.      Post Infusion Assessment:  Patient tolerated infusion without incident.  Patient observed for 30 minutes post injectafer per protocol.  Blood return noted pre and post infusion.  Site patent and intact, free from redness, edema or discomfort.  No evidence of extravasations.  Access discontinued per protocol.    Discharge Plan:   Discharge instructions reviewed with: Patient.  Patient discharged in stable condition accompanied by: family.  Departure Mode: Ambulatory.  Labs scheduled for 8/24/18 with MD appointment on 9/7/18.    CURT SOUSA RN

## 2018-07-13 NOTE — MR AVS SNAPSHOT
After Visit Summary   7/13/2018    Shell Leon    MRN: [id number]           Patient Information     Date Of Birth          1950        Visit Information        Provider Department      7/13/2018 3:00 PM [facility] INFUSION CHAIR [facility]        Today's Diagnoses     Iron deficiency anemia due to chronic blood loss    -  1       Follow-ups after your visit        Your next 10 appointments already scheduled     Jul 16, 2018  3:10 PM CDT   Office Visit with Kathi Mondragon PA-C   [facility] ([facility]    [id number] [place] [address] [phone]           Bring a current list of meds and any records pertaining to this visit. For Physicals, please bring immunization records and any forms needing to be filled out. Please arrive 10 minutes early to complete paperwork.            Jul 16, 2018  3:30 PM CDT   Nurse Only with RU [facility] HRT NURSE   [facility] ([facility])    [address]  [facility] [address] [phone]            Jul 16, 2018  3:45 PM CDT   LAB with RU LAB   [facility] ([facility])    [address]  [facility] [address] [phone]           Please do not eat 10-12 hours before your appointment if you are coming in fasting for labs on lipids, cholesterol, or glucose (sugar). This does not apply to pregnant women. Water, hot tea and black coffee (with nothing added) are okay. Do not drink other fluids, diet soda or chew gum.            Aug 24, 2018  3:30 PM CDT   Return Visit with RH ONCOLOGY NURSE   [facility] ([facility])    [facility]  [address]  [facility] [address] [phone]            Sep 07, 2018  1:45 PM CDT   Return Visit with Lonny Lopez MD    [facility] ([facility])    [facility]  [address] [place]  [address]  [facility] [address] [phone]              Who to contact     If you have questions or need follow up information about today's clinic visit or your schedule please contact [facility] INF[facility]ION SERVICES directly at [phone].  Normal or non-critical lab and imaging results will be communicated to you by MyChart, letter or phone within 4 business days after the clinic has received the results. If you do not hear from us within 7 days, please contact the clinic through MyChart or phone. If you have a critical or abnormal lab result, we will notify you by phone as soon as possible.  Submit refill requests through [employer] or call your pharmacy and they will forward the refill request to us. Please allow 3 business days for your refill to be completed.          Additional Information About Your Visit        Care EveryWhere ID     This is your Care EveryWhere ID. This could be used by other organizations to access your [place] medical records  [id number]        Your Vitals Were     Pulse Temperature Respirations Pulse Oximetry          54 97.2  F (36.2  C) 16 100%         Blood Pressure from Last 3 Encounters:   07/13/18 150/53   07/09/18 154/50   07/06/18 166/51    Weight from Last 3 Encounters:   07/09/18 60.1 kg (132 lb 8 oz)   06/26/18 62.7 kg (138 lb 3.2 oz)   05/30/18 66.2 kg (146 lb)              Today, you had the following     No orders found for display       Primary Care Provider Office Phone # Fax #    Kathi Mondragon PA-C [phone]       [address]  [facility] [address]        Equal Access to Services     TERRA FROST : Hadii lianet Dillon, waariasda luqadaha, qaybta kaalmada fernando, zachary vela. So [facility] [phone].    ATENCIÓN: Si habla español, tiene a sullivan disposición servicios  nancy de asistencia lingüística. July swan [phone].    We comply with applicable federal civil rights laws and [place] laws. We do not discriminate on the basis of race, color, national origin, age, disability, sex, sexual orientation, or gender identity.            Thank you!     Thank you for choosing [facility]ION [facility]  for your care. Our goal is always to provide you with excellent care. Hearing back from our patients is one way we can continue to improve our services. Please take a few minutes to complete the written survey that you may receive in the mail after your visit with us. Thank you!             Your Updated Medication List - Protect others around you: Learn how to safely use, store and throw away your medicines at www.disposemymeds.org.          This list is accurate as of 7/13/18  4:22 PM.  Always use your most recent med list.                   Brand Name Dispense Instructions for use Diagnosis    atorvastatin 80 MG tablet    LIPITOR    90 tablet    Take 1 tablet (80 mg) by mouth daily    Type 2 diabetes mellitus without complication, unspecified long term insulin use status (H)       blood glucose lancets standard    no brand specified    1 Box    Use to test blood sugar 3 times daily or as directed.    Type 2 diabetes mellitus with hyperglycemia, without long-term current use of insulin (H)       blood glucose monitoring test strip    no brand specified    100 strip    Use to test blood sugars 3 times daily or as directed    Type 2 diabetes mellitus with hyperglycemia, without long-term current use of insulin (H)       ferrous sulfate 325 (65 Fe) MG tablet    IRON     Take 325 mg by mouth daily (with breakfast)        furosemide 20 MG tablet    LASIX    60 tablet    Take 1 tablet (20 mg) by mouth daily    Localized edema       glipiZIDE 5 MG tablet    GLUCOTROL    90 tablet    Take 1 tablet (5 mg) by mouth every morning (before breakfast)    Type 2 diabetes  mellitus with hyperglycemia, without long-term current use of insulin (H)       hydrALAZINE 50 MG tablet    APRESOLINE    180 tablet    Take 1 tablet (50 mg) by mouth 3 times daily    Resistant hypertension       hydrochlorothiazide 25 MG tablet    HYDRODIURIL     Take 25 mg by mouth daily        metFORMIN 1000 MG tablet    GLUCOPHAGE    60 tablet    TAKE 1 TABLET BY MOUTH TWICE DAILY WITH MEALS    Type 2 diabetes mellitus with hyperglycemia, without long-term current use of insulin (H)       omeprazole 20 MG CR capsule    priLOSEC    30 capsule    Take 1 capsule (20 mg) by mouth every morning (before breakfast)    Gastrointestinal hemorrhage, unspecified gastrointestinal hemorrhage type       order for DME     1 Units    Equipment being ordered: blood pressure cuff    Resistant hypertension       order for DME     2 each    Equipment being ordered: Compression stockings    Iron deficiency anemia due to chronic blood loss, Edema, unspecified type, Essential hypertension with goal blood pressure less than 140/90       spironolactone 25 MG tablet    ALDACTONE    90 tablet    Take 1 tablet (25 mg) by mouth daily    Benign essential hypertension       valsartan 320 MG tablet    DIOVAN    90 tablet    Take 1 tablet (320 mg) by mouth daily    Resistant hypertension

## 2018-07-16 ENCOUNTER — TELEPHONE (OUTPATIENT)
Dept: CARDIOLOGY | Facility: CLINIC | Age: 68
End: 2018-07-16

## 2018-07-16 ENCOUNTER — ALLIED HEALTH/NURSE VISIT (OUTPATIENT)
Dept: CARDIOLOGY | Facility: CLINIC | Age: 68
End: 2018-07-16
Attending: INTERNAL MEDICINE

## 2018-07-16 VITALS — DIASTOLIC BLOOD PRESSURE: 50 MMHG | HEART RATE: 60 BPM | SYSTOLIC BLOOD PRESSURE: 132 MMHG

## 2018-07-16 DIAGNOSIS — R60.0 LOCALIZED EDEMA: ICD-10-CM

## 2018-07-16 DIAGNOSIS — I10 BENIGN ESSENTIAL HYPERTENSION: ICD-10-CM

## 2018-07-16 DIAGNOSIS — I10 BENIGN ESSENTIAL HYPERTENSION: Primary | ICD-10-CM

## 2018-07-16 LAB
ANION GAP SERPL CALCULATED.3IONS-SCNC: 7 MMOL/L (ref 3–14)
BUN SERPL-MCNC: 25 MG/DL (ref 7–30)
CALCIUM SERPL-MCNC: 7.9 MG/DL (ref 8.5–10.1)
CHLORIDE SERPL-SCNC: 103 MMOL/L (ref 94–109)
CO2 SERPL-SCNC: 22 MMOL/L (ref 20–32)
CREAT SERPL-MCNC: 1.02 MG/DL (ref 0.66–1.25)
GFR SERPL CREATININE-BSD FRML MDRD: 72 ML/MIN/1.7M2
GLUCOSE SERPL-MCNC: 225 MG/DL (ref 70–99)
POTASSIUM SERPL-SCNC: 4.6 MMOL/L (ref 3.4–5.3)
SODIUM SERPL-SCNC: 132 MMOL/L (ref 133–144)

## 2018-07-16 PROCEDURE — 80048 BASIC METABOLIC PNL TOTAL CA: CPT | Performed by: INTERNAL MEDICINE

## 2018-07-16 PROCEDURE — 99207 ZZC NO CHARGE NURSE ONLY: CPT

## 2018-07-16 PROCEDURE — 36415 COLL VENOUS BLD VENIPUNCTURE: CPT | Performed by: INTERNAL MEDICINE

## 2018-07-16 RX ORDER — FUROSEMIDE 20 MG
40 TABLET ORAL PRN
Refills: 0 | COMMUNITY
Start: 2018-07-16 | End: 2018-07-24

## 2018-07-16 NOTE — TELEPHONE ENCOUNTER
Patient's son returned call and RN reviewed CT results with Patient's son and recommendations to f/u with patient's PMD d/t retroperitoneal adenopathy which was discovered on the CT. Patient's son acknowledged understanding and will schedule patient for f/u with his PMD to review CT findings. RN advised patient's son to call with any further questions/concerns.

## 2018-07-16 NOTE — MR AVS SNAPSHOT
After Visit Summary   7/16/2018    Shell Leon    MRN: [id number]           Patient Information     Date Of Birth          1950        Visit Information        Provider Department      7/16/2018 3:30 PM RU P HRT NURSE [facility]        Today's Diagnoses     Benign essential hypertension          Care Instructions    See telephone encounter for nurse only BP. Martha, RN            Follow-ups after your visit        Your next 10 appointments already scheduled     Jul 20, 2018  9:30 AM CDT   Office Visit with Kathi Mond[facility]agon PA-C   [facility]    [address] [address] [phone]           Bring a current list of meds and any records pertaining to this visit. For Physicals, please bring immunization records and any forms needing to be filled out. Please arrive 10 minutes early to complete paperwork.            Aug 24, 2018  3:30 PM CDT   Return Visit with  ONCOLOGY NURSE   [facility] ([facility])    [facility]  [address]  [facility] [address]   [phone]            Sep 07, 2018  1:45 PM CDT   Return Visit with Lonny Lopez MD   [facility] ([facility])    [facility]  [address]  [facility] [address]   [phone]              Who to contact     If you have questions or need follow up information about today's clinic visit or your schedule please contact [facility] directly at [phone].  Normal or non-critical lab and imaging results will be communicated to you by MyChart, letter or phone within 4 business days after the clinic has received the results. If you do not hear from us within 7 days, please contact the clinic through MyChart or phone. If you  have a critical or abnormal lab result, we will notify you by phone as soon as possible.  Submit refill requests through [employer] or call your pharmacy and they will forward the refill request to us. Please allow 3 business days for your refill to be completed.          Additional Information About Your Visit        Care EveryWhere ID     This is your Care EveryWhere ID. This could be used by other organizations to access your [place] medical records  [id number]        Your Vitals Were     Pulse                   60            Blood Pressure from Last 3 Encounters:   07/16/18 132/50   07/13/18 150/53   07/09/18 154/50    Weight from Last 3 Encounters:   07/09/18 60.1 kg (132 lb 8 oz)   06/26/18 62.7 kg (138 lb 3.2 oz)   05/30/18 66.2 kg (146 lb)              We Performed the Following     Follow-Up with Nurse          Today's Medication Changes          These changes are accurate as of 7/16/18 11:59 PM.  If you have any questions, ask your nurse or doctor.               These medicines have changed or have updated prescriptions.        Dose/Directions    LASIX 20 MG tablet   This may have changed:    - how much to take  - when to take this  - reasons to take this   Used for:  Localized edema   Generic drug:  furosemide   Changed by:  Nafisa Madison, RN        Dose:  40 mg   Take 2 tablets (40 mg) by mouth as needed   Refills:  0                Primary Care Provider Office Phone # Fax #    Kathi Lin Mondragon PA-C [phone]       [address] [facility] [address]        Equal Access to Services     BRYAN FROST : Hadii lianet anthonyo Sosaida, waaxda luqadaha, qaybta kaalmada fernando, waxay aracely vela. So [facility] [phone].    ATENCIÓN: Si habla español, tiene a sullivan disposición servicios gratuitos de asistencia lingüística. Llame al [phone].    We comply with applicable federal civil rights laws and [place] laws. We do not discriminate on the basis of  race, color, national origin, age, disability, sex, sexual orientation, or gender identity.            Thank you!     Thank you for choosing [facility]  for your care. Our goal is always to provide you with excellent care. Hearing back from our patients is one way we can continue to improve our services. Please take a few minutes to complete the written survey that you may receive in the mail after your visit with us. Thank you!             Your Updated Medication List - Protect others around you: Learn how to safely use, store and throw away your medicines at www.disposemymeds.org.          This list is accurate as of 7/16/18 11:59 PM.  Always use your most recent med list.                   Brand Name Dispense Instructions for use Diagnosis    atorvastatin 80 MG tablet    LIPITOR    90 tablet    Take 1 tablet (80 mg) by mouth daily    Type 2 diabetes mellitus without complication, unspecified long term insulin use status (H)       blood glucose lancets standard    no brand specified    1 Box    Use to test blood sugar 3 times daily or as directed.    Type 2 diabetes mellitus with hyperglycemia, without long-term current use of insulin (H)       blood glucose monitoring test strip    no brand specified    100 strip    Use to test blood sugars 3 times daily or as directed    Type 2 diabetes mellitus with hyperglycemia, without long-term current use of insulin (H)       ferrous sulfate 325 (65 Fe) MG tablet    IRON     Take 325 mg by mouth daily (with breakfast)        glipiZIDE 5 MG tablet    GLUCOTROL    90 tablet    Take 1 tablet (5 mg) by mouth every morning (before breakfast)    Type 2 diabetes mellitus with hyperglycemia, without long-term current use of insulin (H)       hydrALAZINE 50 MG tablet    APRESOLINE    180 tablet    Take 1 tablet (50 mg) by mouth 3 times daily    Resistant hypertension       hydrochlorothiazide 25 MG tablet    HYDRODIURIL     Take 25 mg by  mouth daily        LASIX 20 MG tablet   Generic drug:  furosemide      Take 2 tablets (40 mg) by mouth as needed    Localized edema       metFORMIN 1000 MG tablet    GLUCOPHAGE    60 tablet    TAKE 1 TABLET BY MOUTH TWICE DAILY WITH MEALS    Type 2 diabetes mellitus with hyperglycemia, without long-term current use of insulin (H)       omeprazole 20 MG CR capsule    priLOSEC    30 capsule    Take 1 capsule (20 mg) by mouth every morning (before breakfast)    Gastrointestinal hemorrhage, unspecified gastrointestinal hemorrhage type       order for DME     1 Units    Equipment being ordered: blood pressure cuff    Resistant hypertension       order for DME     2 each    Equipment being ordered: Compression stockings    Iron deficiency anemia due to chronic blood loss, Edema, unspecified type, Essential hypertension with goal blood pressure less than 140/90       spironolactone 25 MG tablet    ALDACTONE    90 tablet    Take 1 tablet (25 mg) by mouth daily    Benign essential hypertension       valsartan 320 MG tablet    DIOVAN    90 tablet    Take 1 tablet (320 mg) by mouth daily    Resistant hypertension

## 2018-07-16 NOTE — TELEPHONE ENCOUNTER
[facility] HEALTH NURSE VISIT    On 7/9/18, Patient's blood pressure was 154/50, pulse was 54   Spironolactone increased to 25 mg daily.     Patient is here today for a blood pressure check. Patient took spironolactone 25 mg, valsartan 320 mg, hydralazine 50 mg (TID med), HCTZ 25 mg at 9:30am. Blood pressure today was taken at 3:40pm.   Pt takes Lasix 40 mg PRN (?), pt and son state he takes lasix 40 when edema increases. Last dose on 7/13 and 7/14.       Office blood pressure and pulse  Site: RA  Position: sitting  Cuff size: adult reg   BP: 130/52     Pulse: 60 bpm    2nd BP: 132/52 right arm  Left arm /50    Tolerance: Good. No new symptoms. Labs today -    Component      Latest Ref Rng & Units 7/16/2018   Sodium      133 - 144 mmol/L 132 (L)   Potassium      3.4 - 5.3 mmol/L 4.6   Chloride      94 - 109 mmol/L 103   Carbon Dioxide      20 - 32 mmol/L 22   Anion Gap      3 - 14 mmol/L 7   Glucose      70 - 99 mg/dL 225 (H)   Urea Nitrogen      7 - 30 mg/dL 25   Creatinine      0.66 - 1.25 mg/dL 1.02   GFR Estimate      >60 mL/min/1.7m2 72   GFR Estimate If Black      >60 mL/min/1.7m2 88   Calcium      8.5 - 10.1 mg/dL 7.9 (L)       Visit ordered by:    Provider in clinic today:   Patient's cardiologist: Dr.Jama Thomas RN  [facility]

## 2018-07-16 NOTE — TELEPHONE ENCOUNTER
RN called patient and left VM advising patient to call back and review results of CT and Dr. Landry's recommendations below. This message was delivered utilizing an [facility] .      CT reviewed. No abdominal masses but new retroperitoneal adenopathy which can be nonspecific. Recommend follow up with primary to discuss any additional referral investigations given the new finding

## 2018-07-18 NOTE — TELEPHONE ENCOUNTER
RN called patient's son with Dr. Landry's recommendations to have patient repeat BMP and BP check next week. RN reviewed with patient's son that BP is better controlled with the increase in Aldactone, but we would still like to see the BP decrease more. Patent's son acknowledged understanding and is in agreement with plan. RN transferred patient's son to scheduling to arrange BMP and RN BP f/u apt.

## 2018-07-18 NOTE — TELEPHONE ENCOUNTER
Blood pressure is better controlled with increase Aldactone. Electrolyte panel is normal. Have him repeat another BMP and blood pressure check next week.

## 2018-07-20 DIAGNOSIS — K92.2 GASTROINTESTINAL HEMORRHAGE, UNSPECIFIED GASTROINTESTINAL HEMORRHAGE TYPE: ICD-10-CM

## 2018-07-20 NOTE — TELEPHONE ENCOUNTER
"Requested Prescriptions   Pending Prescriptions Disp Refills     omeprazole (PRILOSEC) 20 MG CR capsule [Pharmacy Med Name: Omeprazole Oral Capsule Delayed Release 20 MG]  Last Written Prescription Date:  6/20/18  Last Fill Quantity: 30,  # refills: 0   Last office visit: 5/25/2018 with prescribing provider:  Jennifer Brasher Ra, APRN CNP   Future Office Visit:   Next 5 appointments (look out 90 days)     Jul 25, 2018  4:10 PM CDT   Office Visit with Kathi Mondragon PA-C   [facility]    [address] [facility] [address] [facility] [address] [phone]            Jul 26, 2018  3:00 PM CDT   Nurse Only with RU [facility] HRT NURSE   [facility] ([facility])    [address]  [facility] [address]   [phone]            Aug 24, 2018  3:30 PM CDT   Return Visit with  ONCOLOGY NURSE   [facility] ([facility])    [facility]  [address]  [facility] [address]   [phone]            Sep 07, 2018  1:45 PM CDT   Return Visit with Lonny Lopez MD   [facility] ([facility])    [facility]  [address]  [facility] [address]   [phone]                  30 capsule 0     Sig: Take 1 capsule (20 mg) by mouth every morning (before breakfast)    PPI Protocol Passed    7/20/2018 10:42 AM       Passed - Not on Clopidogrel (unless Pantoprazole ordered)       Passed - No diagnosis of osteoporosis on record       Passed - Recent (12 mo) or future (30 days) visit within the authorizing provider's specialty    Patient had office visit in the last 12 months or has a visit in the next 30 days with authorizing provider or within the authorizing provider's specialty.  See \"Patient Info\" tab in inbasket, or \"Choose Columns\" in Meds & Orders section of the refill " encounter.           Passed - Patient is age 18 or older

## 2018-07-23 NOTE — TELEPHONE ENCOUNTER
Medication is being filled for 1 time refill only due to:  Patient needs to be seen because needs recheck. has appt in 2 days.   Susie Parks RN

## 2018-07-24 DIAGNOSIS — R60.0 LOCALIZED EDEMA: ICD-10-CM

## 2018-07-24 DIAGNOSIS — I1A.0 RESISTANT HYPERTENSION: ICD-10-CM

## 2018-07-24 RX ORDER — FUROSEMIDE 20 MG
40 TABLET ORAL PRN
Qty: 90 TABLET | Refills: 0 | Status: SHIPPED | OUTPATIENT
Start: 2018-07-24 | End: 2018-09-21

## 2018-07-24 RX ORDER — HYDROCHLOROTHIAZIDE 25 MG/1
25 TABLET ORAL DAILY
Qty: 90 TABLET | Refills: 1 | Status: SHIPPED | OUTPATIENT
Start: 2018-07-24 | End: 2018-10-12

## 2018-07-24 RX ORDER — HYDRALAZINE HYDROCHLORIDE 50 MG/1
50 TABLET, FILM COATED ORAL 3 TIMES DAILY
Qty: 180 TABLET | Refills: 3 | Status: SHIPPED | OUTPATIENT
Start: 2018-07-24 | End: 2018-08-09

## 2018-07-25 ENCOUNTER — OFFICE VISIT (OUTPATIENT)
Dept: FAMILY MEDICINE | Facility: CLINIC | Age: 68
End: 2018-07-25
Payer: COMMERCIAL

## 2018-07-25 VITALS
RESPIRATION RATE: 16 BRPM | BODY MASS INDEX: 22.86 KG/M2 | WEIGHT: 133.9 LBS | SYSTOLIC BLOOD PRESSURE: 150 MMHG | DIASTOLIC BLOOD PRESSURE: 46 MMHG | TEMPERATURE: 98.1 F | HEART RATE: 57 BPM | HEIGHT: 64 IN | OXYGEN SATURATION: 99 %

## 2018-07-25 DIAGNOSIS — I10 BENIGN ESSENTIAL HYPERTENSION: ICD-10-CM

## 2018-07-25 DIAGNOSIS — R60.0 LOCALIZED EDEMA: ICD-10-CM

## 2018-07-25 DIAGNOSIS — E11.9 TYPE 2 DIABETES MELLITUS WITHOUT COMPLICATION, WITHOUT LONG-TERM CURRENT USE OF INSULIN (H): Primary | ICD-10-CM

## 2018-07-25 DIAGNOSIS — R63.4 LOSS OF WEIGHT: ICD-10-CM

## 2018-07-25 LAB
ERYTHROCYTE [DISTWIDTH] IN BLOOD BY AUTOMATED COUNT: 22.6 % (ref 10–15)
HCT VFR BLD AUTO: 26.5 % (ref 40–53)
HGB BLD-MCNC: 8.5 G/DL (ref 13.3–17.7)
MCH RBC QN AUTO: 26.6 PG (ref 26.5–33)
MCHC RBC AUTO-ENTMCNC: 32.1 G/DL (ref 31.5–36.5)
MCV RBC AUTO: 83 FL (ref 78–100)
PLATELET # BLD AUTO: 203 10E9/L (ref 150–450)
RBC # BLD AUTO: 3.19 10E12/L (ref 4.4–5.9)
WBC # BLD AUTO: 7.6 10E9/L (ref 4–11)

## 2018-07-25 PROCEDURE — 82043 UR ALBUMIN QUANTITATIVE: CPT | Performed by: PHYSICIAN ASSISTANT

## 2018-07-25 PROCEDURE — 99214 OFFICE O/P EST MOD 30 MIN: CPT | Performed by: PHYSICIAN ASSISTANT

## 2018-07-25 PROCEDURE — 80048 BASIC METABOLIC PNL TOTAL CA: CPT | Performed by: INTERNAL MEDICINE

## 2018-07-25 PROCEDURE — 36415 COLL VENOUS BLD VENIPUNCTURE: CPT | Performed by: INTERNAL MEDICINE

## 2018-07-25 PROCEDURE — 85027 COMPLETE CBC AUTOMATED: CPT | Performed by: INTERNAL MEDICINE

## 2018-07-25 NOTE — PROGRESS NOTES
SUBJECTIVE:   Shell Leon is a 68 year old male who presents to clinic today for the following health issues:      1. Diabetes Follow-up    Patient is checking blood sugars: once daily.  Results are as follows:         am - 140-170    Diabetic concerns: None     Symptoms of hypoglycemia (low blood sugar): none     Paresthesias (numbness or burning in feet) or sores: No     Date of last diabetic eye exam: none    Diabetes Management Resources    Patient is here today to follow up on diabetes  Is checking sugars daily- getting good readings  No s/sxs of low glucose such as shaking, sweating, ill feeling  He has not had eye exam yet      2. Hypertension Follow-up      Outpatient blood pressures are being checked at home.  Results are usually around 160/53.    Low Salt Diet: low salt    BP Readings from Last 2 Encounters:   07/25/18 150/46   07/16/18 132/50     Hemoglobin A1C (%)   Date Value   05/20/2018 6.0 (H)   04/16/2018 7.2 (H)     LDL Cholesterol Calculated (mg/dL)   Date Value   04/16/2018 55   09/07/2016 105 (H)       Amount of exercise or physical activity: None    Problems taking medications regularly: No    Medication side effects: none    Diet: low salt    Patient is here today to follow up on his high blood pressure  Has been seeing Dr. Landry for his resistant hypertension and his blood pressure has improved  He does have an appointment with Endocrinology next week out of the system  He also has a nurse blood pressure visit tomorrow with Dr. Landry's nurse  He notes no chest pain, shortness of breath but does have bilateral leg swelling- which Dr. Landry addressed and started him on compression stockings which he is having difficulty wearing.      Problem list and histories reviewed & adjusted, as indicated.  Additional history: as documented    Patient Active Problem List   Diagnosis     Essential hypertension with goal blood pressure less than 140/90     Internal carotid artery stenosis, bilateral      Benign essential hypertension     Edema, unspecified type     Type 2 diabetes mellitus without complication (H)     Anemia     Iron deficiency anemia due to chronic blood loss     Localized edema     Past Surgical History:   Procedure Laterality Date     COLONOSCOPY N/A 5/22/2018    Procedure: COMBINED COLONOSCOPY, SINGLE OR MULTIPLE BIOPSY/POLYPECTOMY BY BIOPSY;  COLONOSCOPY  Room 521, with polypectomy x1 using cold biopsy forceps;  Surgeon: Charlie Rabago MD;  Location:  GI     ESOPHAGOSCOPY, GASTROSCOPY, DUODENOSCOPY (EGD), COMBINED N/A 5/21/2018    Procedure: COMBINED ESOPHAGOSCOPY, GASTROSCOPY, DUODENOSCOPY (EGD), BIOPSY SINGLE OR MULTIPLE;  ESOPHAGOSCOPY, GASTROSCOPY, DUODENOSCOPY (EGD)  Room 521 and cold biopsies;  Surgeon: Charlie Rabago MD;  Location:  GI     LAPAROSCOPIC CHOLECYSTECTOMY N/A 1/6/2017    Procedure: LAPAROSCOPIC CHOLECYSTECTOMY;  Surgeon: Michael Caba MD;  Location:  OR       Social History   Substance Use Topics     Smoking status: Never Smoker     Smokeless tobacco: Never Used     Alcohol use No     Family History   Problem Relation Age of Onset     Unknown/Adopted No family hx of          Current Outpatient Prescriptions   Medication Sig Dispense Refill     atorvastatin (LIPITOR) 80 MG tablet Take 1 tablet (80 mg) by mouth daily 90 tablet 3     blood glucose (NO BRAND SPECIFIED) lancets standard Use to test blood sugar 3 times daily or as directed. 1 Box 11     blood glucose monitoring (NO BRAND SPECIFIED) test strip Use to test blood sugars 3 times daily or as directed 100 strip 11     furosemide (LASIX) 20 MG tablet Take 2 tablets (40 mg) by mouth as needed 90 tablet 0     glipiZIDE (GLUCOTROL) 5 MG tablet Take 1 tablet (5 mg) by mouth every morning (before breakfast) 90 tablet 1     hydrALAZINE (APRESOLINE) 50 MG tablet Take 1 tablet (50 mg) by mouth 3 times daily 180 tablet 3     hydrochlorothiazide (HYDRODIURIL) 25 MG tablet Take 1 tablet (25 mg) by  "mouth daily 90 tablet 1     metFORMIN (GLUCOPHAGE) 1000 MG tablet TAKE 1 TABLET BY MOUTH TWICE DAILY WITH MEALS 60 tablet 5     omeprazole (PRILOSEC) 20 MG CR capsule Take 1 capsule (20 mg) by mouth every morning (before breakfast) 30 capsule 0     order for DME Equipment being ordered: blood pressure cuff 1 Units 0     spironolactone (ALDACTONE) 25 MG tablet Take 1 tablet (25 mg) by mouth daily 90 tablet 3     valsartan (DIOVAN) 320 MG tablet Take 1 tablet (320 mg) by mouth daily 90 tablet 1     No Known Allergies    Reviewed and updated as needed this visit by clinical staff  Tobacco  Allergies  Meds  Med Hx  Surg Hx  Fam Hx  Soc Hx      Reviewed and updated as needed this visit by Provider         ROS:  Constitutional, HEENT, cardiovascular, pulmonary, gi and gu systems are negative, except as otherwise noted.    OBJECTIVE:     /46 (BP Location: Right arm, Patient Position: Chair, Cuff Size: Adult Regular)  Pulse 57  Temp 98.1  F (36.7  C) (Oral)  Resp 16  Ht 5' 4\" (1.626 m)  Wt 133 lb 14.4 oz (60.7 kg)  SpO2 99%  BMI 22.98 kg/m2  Body mass index is 22.98 kg/(m^2).  GENERAL: healthy, alert and no distress  NECK: no adenopathy, no asymmetry, masses, or scars, thyroid normal to palpation and carotid bruit noted:  bilateral  RESP: lungs clear to auscultation - no rales, rhonchi or wheezes  CV: regular rate and rhythm, normal S1 S2, no S3 or S4, no murmur, click or rub, +1 edema bilateral and peripheral pulses strong  ABDOMEN: soft, nontender, no hepatosplenomegaly, no masses and bowel sounds normal  MS: no gross musculoskeletal defects noted, no edema  SKIN: no suspicious lesions or rashes    Diagnostic Test Results:  none     ASSESSMENT/PLAN:             1. Type 2 diabetes mellitus without complication, without long-term current use of insulin (H)  Chronic issue, A1C is much improved and at goal.  Continue medication without change. Encouraged him to continue checking his glucose.  Labs up to " date.  - Albumin Random Urine Quantitative with Creat Ratio    2. Localized edema  Chronic issue, per Dr. Landry, needs BMP, labs released.  Encouraged him to wear his compression stockings.  - Basic metabolic panel    3. Benign essential hypertension  Chronic issue, still has high systolic readings.  F/U with Nurse Visit in cardiology tomorrow and with Dr. Landry regarding this.  Encouraged him to send his Endocrine notes to us once seen.  - Basic metabolic panel    4. Loss of weight  Reviewed chart, patient does appear to have 15 lb weight loss since in the hospital, suspect was fluid overloaded at that time. Long term review of weight over past few years, patient is holding stable.  - CBC with platelets    Risks, benefits and alternatives were discussed with patient. Agreeable to the plan of care.      Kathi Mondragon PA-C  [facility]

## 2018-07-25 NOTE — MR AVS SNAPSHOT
After Visit Summary   7/25/2018    Shell Leon    MRN: [id number]           Patient Information     Date Of Birth          1950        Visit Information        Provider Department      7/25/2018 4:10 PM Kathi Mondragon PA-C [facility]        Today's Diagnoses     Type 2 diabetes mellitus without complication, without long-term current use of insulin (H)    -  1    Localized edema        Benign essential hypertension        Loss of weight           Follow-ups after your visit        Follow-up notes from your care team     Return in about 3 months (around 10/25/2018) for Med Recheck.      Your next 10 appointments already scheduled     Jul 26, 2018  3:00 PM CDT   Nurse Only with RU [facility] HRT NURSE   [facility] ([facility])    [address]  [place] [id number] [phone]            Jul 26, 2018  3:30 PM CDT   LAB with RU LAB   [facility] ([facility])    [address]  [place] [id number] [phone]           Please do not eat 10-12 hours before your appointment if you are coming in fasting for labs on lipids, cholesterol, or glucose (sugar). This does not apply to pregnant women. Water, hot tea and black coffee (with nothing added) are okay. Do not drink other fluids, diet soda or chew gum.            Aug 24, 2018  3:30 PM CDT   Return Visit with  ONCOLOGY NURSE   [facility])    [facility]  [address]  [place] [address] [phone]            Sep 07, 2018  1:45 PM CDT   Return Visit with Lonny Lopez MD   [facility] ([facility])    [facility]  [address]  [place] [address] [phone]              Who to contact     If you have  "questions or need follow up information about today's clinic visit or your schedule please contact [facility] directly at [phone].  Normal or non-critical lab and imaging results will be communicated to you by MyChart, letter or phone within 4 business days after the clinic has received the results. If you do not hear from us within 7 days, please contact the clinic through MyChart or phone. If you have a critical or abnormal lab result, we will notify you by phone as soon as possible.  Submit refill requests through [employer] or call your pharmacy and they will forward the refill request to us. Please allow 3 business days for your refill to be completed.          Additional Information About Your Visit        Care EveryWhere ID     This is your Care EveryWhere ID. This could be used by other organizations to access your [place] medical records  [id number]        Your Vitals Were     Pulse Temperature Respirations Height Pulse Oximetry BMI (Body Mass Index)    57 98.1  F (36.7  C) (Oral) 16 5' 4\" (1.626 m) 99% 22.98 kg/m2       Blood Pressure from Last 3 Encounters:   07/25/18 150/46   07/16/18 132/50   07/13/18 150/53    Weight from Last 3 Encounters:   07/25/18 133 lb 14.4 oz (60.7 kg)   07/09/18 132 lb 8 oz (60.1 kg)   06/26/18 138 lb 3.2 oz (62.7 kg)              We Performed the Following     Albumin Random Urine Quantitative with Creat Ratio     Basic metabolic panel     CBC with platelets        Primary Care Provider Office Phone # Fax #    Kathi Mondragon PA-C [phone]       [address] [address] [facility] [address]        Equal Access to Services     BRYAN FROST : Hadii aad valeria Dillon, waaxda luqadaha, qaybta kaalmabrian king, zachary power . So [facility] [phone].    ATENCIÓN: Si habla español, tiene a sullivan disposición servicios gratuitos de asistencia lingüística. Llame al [phone].    We comply with applicable federal " civil rights laws and [place] laws. We do not discriminate on the basis of race, color, national origin, age, disability, sex, sexual orientation, or gender identity.            Thank you!     Thank you for choosing [facility]  for your care. Our goal is always to provide you with excellent care. Hearing back from our patients is one way we can continue to improve our services. Please take a few minutes to complete the written survey that you may receive in the mail after your visit with us. Thank you!             Your Updated Medication List - Protect others around you: Learn how to safely use, store and throw away your medicines at www.disposemymeds.org.          This list is accurate as of 7/25/18  4:48 PM.  Always use your most recent med list.                   Brand Name Dispense Instructions for use Diagnosis    atorvastatin 80 MG tablet    LIPITOR    90 tablet    Take 1 tablet (80 mg) by mouth daily    Type 2 diabetes mellitus without complication, unspecified long term insulin use status (H)       blood glucose lancets standard    no brand specified    1 Box    Use to test blood sugar 3 times daily or as directed.    Type 2 diabetes mellitus with hyperglycemia, without long-term current use of insulin (H)       blood glucose monitoring test strip    no brand specified    100 strip    Use to test blood sugars 3 times daily or as directed    Type 2 diabetes mellitus with hyperglycemia, without long-term current use of insulin (H)       furosemide 20 MG tablet    LASIX    90 tablet    Take 2 tablets (40 mg) by mouth as needed    Localized edema, Resistant hypertension       glipiZIDE 5 MG tablet    GLUCOTROL    90 tablet    Take 1 tablet (5 mg) by mouth every morning (before breakfast)    Type 2 diabetes mellitus with hyperglycemia, without long-term current use of insulin (H)       hydrALAZINE 50 MG tablet    APRESOLINE    180 tablet    Take 1 tablet (50 mg) by mouth 3 times daily     Resistant hypertension, Localized edema       hydrochlorothiazide 25 MG tablet    HYDRODIURIL    90 tablet    Take 1 tablet (25 mg) by mouth daily    Localized edema, Resistant hypertension       metFORMIN 1000 MG tablet    GLUCOPHAGE    60 tablet    TAKE 1 TABLET BY MOUTH TWICE DAILY WITH MEALS    Type 2 diabetes mellitus with hyperglycemia, without long-term current use of insulin (H)       omeprazole 20 MG CR capsule    priLOSEC    30 capsule    Take 1 capsule (20 mg) by mouth every morning (before breakfast)    Gastrointestinal hemorrhage, unspecified gastrointestinal hemorrhage type       order for DME     1 Units    Equipment being ordered: blood pressure cuff    Resistant hypertension       spironolactone 25 MG tablet    ALDACTONE    90 tablet    Take 1 tablet (25 mg) by mouth daily    Benign essential hypertension       valsartan 320 MG tablet    DIOVAN    90 tablet    Take 1 tablet (320 mg) by mouth daily    Resistant hypertension

## 2018-07-26 ENCOUNTER — ALLIED HEALTH/NURSE VISIT (OUTPATIENT)
Dept: CARDIOLOGY | Facility: CLINIC | Age: 68
End: 2018-07-26

## 2018-07-26 ENCOUNTER — CARE COORDINATION (OUTPATIENT)
Dept: CARDIOLOGY | Facility: CLINIC | Age: 68
End: 2018-07-26

## 2018-07-26 VITALS — HEART RATE: 58 BPM | DIASTOLIC BLOOD PRESSURE: 50 MMHG | SYSTOLIC BLOOD PRESSURE: 170 MMHG

## 2018-07-26 DIAGNOSIS — I10 BENIGN ESSENTIAL HYPERTENSION: ICD-10-CM

## 2018-07-26 DIAGNOSIS — I10 BENIGN ESSENTIAL HYPERTENSION: Primary | ICD-10-CM

## 2018-07-26 DIAGNOSIS — R60.0 LOCALIZED EDEMA: ICD-10-CM

## 2018-07-26 LAB
ANION GAP SERPL CALCULATED.3IONS-SCNC: 8 MMOL/L (ref 3–14)
BUN SERPL-MCNC: 34 MG/DL (ref 7–30)
CALCIUM SERPL-MCNC: 7.9 MG/DL (ref 8.5–10.1)
CHLORIDE SERPL-SCNC: 104 MMOL/L (ref 94–109)
CO2 SERPL-SCNC: 22 MMOL/L (ref 20–32)
CREAT SERPL-MCNC: 0.99 MG/DL (ref 0.66–1.25)
CREAT UR-MCNC: 173 MG/DL
GFR SERPL CREATININE-BSD FRML MDRD: 75 ML/MIN/1.7M2
GLUCOSE SERPL-MCNC: 185 MG/DL (ref 70–99)
MICROALBUMIN UR-MCNC: 2070 MG/L
MICROALBUMIN/CREAT UR: 1196.53 MG/G CR (ref 0–17)
POTASSIUM SERPL-SCNC: 4.7 MMOL/L (ref 3.4–5.3)
SODIUM SERPL-SCNC: 134 MMOL/L (ref 133–144)

## 2018-07-26 NOTE — NURSING NOTE
ALLIED HEALTH NURSE VISIT    Time of visit: 3:40 pm     Today's BP/HR: 170/50, 58 bpm.   Five minute later:  (right),  (left)    See care coordination notes for visit details.     Visit ordered by: Dr. Landry  Provider in clinic today: Dr. Kb Tinoco RN

## 2018-07-26 NOTE — PROGRESS NOTES
ALLIED HEALTH NURSE VISIT    Reason for visit: BP check and BMP     On 07-09-18, Spironolactone was increased to 25 mg/d.        BP/HR on 07-09-18: 154/50, 54 bpm    Patient came in for a nurse only visit on 07-16-18, for a BP check and BMP.    BP/HR on 07-16-18: 132/50, 60 bpm    BMP on 07-16-18: NA was low at 132.    -----------------------------------------------------     Today's BP/HR: 170/50, 58 bpm.     Five minute later:  (right),  (left)    BMP was drawn yesterday by PMD.  Component      Latest Ref Rng & Units 7/25/2018   Sodium      133 - 144 mmol/L 134   Potassium      3.4 - 5.3 mmol/L 4.7   Chloride      94 - 109 mmol/L 104   Carbon Dioxide      20 - 32 mmol/L 22   Anion Gap      3 - 14 mmol/L 8   Glucose      70 - 99 mg/dL 185 (H)   Urea Nitrogen      7 - 30 mg/dL 34 (H)   Creatinine      0.66 - 1.25 mg/dL 0.99   GFR Estimate      >60 mL/min/1.7m2 75   GFR Estimate If Black      >60 mL/min/1.7m2 >90   Calcium      8.5 - 10.1 mg/dL 7.9 (L)     NA is normal, BUN is elevated, Low calcium.     Assessment/Plan     Patient reported unexplained weight loss. Patient was advised to discuss weight loss and low calcium with PMD.     Patient stated that his lower extremity edema improves with Lasix. Patient takes 40 mg of Lasix a day as needed. Last time patient took his Lasix was couple weeks ago. Will review with Dr. Landry if patient should be taking his Lasix daily.     Dr. Lopez referred patient to [facility]. Provided patient with contact number. I recommended that patient schedule a visit. Patient was also given compression stockings. I showed patient/son an inside out/reverse technique today to see if it will help them don the compression stockings.      Patient was advised to continue his current medical regimen.     Messaged Dr. Landry to review visit. (Dr. Frantz, please send recommendations to team 4)       TGayessy RN

## 2018-07-26 NOTE — MR AVS SNAPSHOT
After Visit Summary   7/26/2018    Shell Leon    MRN: [id number]           Patient Information     Date Of Birth          1950        Visit Information        Provider Department      7/26/2018 3:00 PM RU [facility] HRT NURSE [facility]        Today's Diagnoses     Localized edema        Benign essential hypertension           Follow-ups after your visit        Your next 10 appointments already scheduled     Aug 24, 2018  3:30 PM CDT   Return Visit with RH ONCOLOGY NURSE   [facility] ([facility])    [facility]  [address]  [place] [facility] [address]   [phone]            Sep 07, 2018  1:45 PM CDT   Return Visit with Lonny Lopez MD   [facility] ([facility])    [facility]  [address]  [place] [facility] [address]   [phone]            Oct 26, 2018  4:10 PM CDT   SHORT with Kathi Mondragon PA-C   [facility]    [address] [address] [phone]              Who to contact     If you have questions or need follow up information about today's clinic visit or your schedule please contact [facility] directly at [phone].  Normal or non-critical lab and imaging results will be communicated to you by MyChart, letter or phone within 4 business days after the clinic has received the results. If you do not hear from us within 7 days, please contact the clinic through MyChart or phone. If you have a critical or abnormal lab result, we will notify you by phone as soon as possible.  Submit refill requests through [employer] or call your pharmacy and they will forward the refill request to us. Please allow 3 business days for your refill to be completed.          Additional Information  About Your Visit        Care EveryWhere ID     This is your Care EveryWhere ID. This could be used by other organizations to access your [place] medical records  [id number]        Your Vitals Were     Pulse                   58            Blood Pressure from Last 3 Encounters:   07/26/18 170/50   07/25/18 150/46   07/16/18 132/50    Weight from Last 3 Encounters:   07/25/18 60.7 kg (133 lb 14.4 oz)   07/09/18 60.1 kg (132 lb 8 oz)   06/26/18 62.7 kg (138 lb 3.2 oz)              We Performed the Following     Follow-Up with Nurse        Primary Care Provider Office Phone # Fax #    Kathi Mondragon PA-C [phone] [address] [address] [address]        Equal Access to Services     [facility] : Hadii aad ku hadasho Soomaali, waaxda luqadaha, qaybta kaalmada adeegyada, waxkacey jessica haybell power . So [facility] [phone].    ATENCIÓN: Si habla español, tiene a sullivan disposición servicios gratuitos de asistencia lingüística. July al [phone].    We comply with applicable federal civil rights laws and [place] laws. We do not discriminate on the basis of race, color, national origin, age, disability, sex, sexual orientation, or gender identity.            Thank you!     Thank you for choosing [facility]  for your care. Our goal is always to provide you with excellent care. Hearing back from our patients is one way we can continue to improve our services. Please take a few minutes to complete the written survey that you may receive in the mail after your visit with us. Thank you!             Your Updated Medication List - Protect others around you: Learn how to safely use, store and throw away your medicines at www.disposemymeds.org.          This list is accurate as of 7/26/18  5:16 PM.  Always use your most recent med list.                   Brand Name Dispense Instructions for use Diagnosis    atorvastatin 80 MG tablet     LIPITOR    90 tablet    Take 1 tablet (80 mg) by mouth daily    Type 2 diabetes mellitus without complication, unspecified long term insulin use status (H)       blood glucose lancets standard    no brand specified    1 Box    Use to test blood sugar 3 times daily or as directed.    Type 2 diabetes mellitus with hyperglycemia, without long-term current use of insulin (H)       blood glucose monitoring test strip    no brand specified    100 strip    Use to test blood sugars 3 times daily or as directed    Type 2 diabetes mellitus with hyperglycemia, without long-term current use of insulin (H)       furosemide 20 MG tablet    LASIX    90 tablet    Take 2 tablets (40 mg) by mouth as needed    Localized edema, Resistant hypertension       glipiZIDE 5 MG tablet    GLUCOTROL    90 tablet    Take 1 tablet (5 mg) by mouth every morning (before breakfast)    Type 2 diabetes mellitus with hyperglycemia, without long-term current use of insulin (H)       hydrALAZINE 50 MG tablet    APRESOLINE    180 tablet    Take 1 tablet (50 mg) by mouth 3 times daily    Resistant hypertension, Localized edema       hydrochlorothiazide 25 MG tablet    HYDRODIURIL    90 tablet    Take 1 tablet (25 mg) by mouth daily    Localized edema, Resistant hypertension       metFORMIN 1000 MG tablet    GLUCOPHAGE    60 tablet    TAKE 1 TABLET BY MOUTH TWICE DAILY WITH MEALS    Type 2 diabetes mellitus with hyperglycemia, without long-term current use of insulin (H)       omeprazole 20 MG CR capsule    priLOSEC    30 capsule    Take 1 capsule (20 mg) by mouth every morning (before breakfast)    Gastrointestinal hemorrhage, unspecified gastrointestinal hemorrhage type       order for DME     1 Units    Equipment being ordered: blood pressure cuff    Resistant hypertension       spironolactone 25 MG tablet    ALDACTONE    90 tablet    Take 1 tablet (25 mg) by mouth daily    Benign essential hypertension       valsartan 320 MG tablet    DIOVAN    90  tablet    Take 1 tablet (320 mg) by mouth daily    Resistant hypertension

## 2018-08-02 NOTE — PROGRESS NOTES
RN reviewed patient's BP results with Dr. Landry. Dr. Landry advised RN he wanted patient to f/u with endocrinology as soon as possible and to have patient return in one week for BP recheck. If in one week patient's systolic BP >150 please increase hydralazine to 75mg TID.     RN called patient 's daughter Mitra and reviewed with her Dr. Landry's recommendations. Patient's daughter confirmed for RN that patient has OV with endocrinology tomorrow. RN reviewed BMP results with patients daughter. Patient's daughter was in agreement to have patient return in one week for BP recheck. RN advised patient's daughter to have patient check BP at home as well prior to OV. RN transferred patient's daughter to scheduling to arrange BP check f/u.

## 2018-08-07 ENCOUNTER — TELEPHONE (OUTPATIENT)
Dept: CARDIOLOGY | Facility: CLINIC | Age: 68
End: 2018-08-07

## 2018-08-07 DIAGNOSIS — I10 BENIGN ESSENTIAL HYPERTENSION: Primary | ICD-10-CM

## 2018-08-07 NOTE — TELEPHONE ENCOUNTER
I have received a phone call from the patient's endocrinologist this morning.  The patient had laboratory workup at the endocrinologist's office yesterday and per the physician his potassium was slightly elevated at 5.6 and his sodium was 127.  Patient is currently undergoing workup for secondary hypertension.    I subsequently contacted the patient's son and recommended that we discontinue the spironolactone.  I have also asked him to bring the patient in this week for laboratory evaluation as well as for follow-up.  I would recommend that we increase the patient's hydralazine to 75 mg 3 times daily.  If his blood pressure is still elevated, the hydralazine can be further uptitrated in the future.  He will continue to follow up with endocrinology to complete the workup for secondary hypertension.

## 2018-08-07 NOTE — TELEPHONE ENCOUNTER
Reviewed with Dr. Landry.  Pt will have nurse only BP check and BMP on 8/9 as scheduled and then set up for next available DAR appt.  Scheduling to contact pts son to set this up.  KMortimer RN

## 2018-08-07 NOTE — TELEPHONE ENCOUNTER
Per Dr. Landry, called pt's son to review need for f/u BMP. Pt is already scheduled for nurse only BP check on 8/9 and son would like to bring him in prior to that appt.  He is unable to come in tomorrow.  Order placed for BMP.  Scheduled for 1515, prior to BP check.  Will review DAR f/u with Dr. Landry.  KMortimer RN

## 2018-08-09 ENCOUNTER — CARE COORDINATION (OUTPATIENT)
Dept: CARDIOLOGY | Facility: CLINIC | Age: 68
End: 2018-08-09

## 2018-08-09 ENCOUNTER — ALLIED HEALTH/NURSE VISIT (OUTPATIENT)
Dept: CARDIOLOGY | Facility: CLINIC | Age: 68
End: 2018-08-09
Attending: INTERNAL MEDICINE
Payer: COMMERCIAL

## 2018-08-09 VITALS — DIASTOLIC BLOOD PRESSURE: 46 MMHG | SYSTOLIC BLOOD PRESSURE: 170 MMHG | HEART RATE: 54 BPM

## 2018-08-09 DIAGNOSIS — R60.0 LOCALIZED EDEMA: ICD-10-CM

## 2018-08-09 DIAGNOSIS — I1A.0 RESISTANT HYPERTENSION: ICD-10-CM

## 2018-08-09 DIAGNOSIS — I10 BENIGN ESSENTIAL HYPERTENSION: ICD-10-CM

## 2018-08-09 LAB
ANION GAP SERPL CALCULATED.3IONS-SCNC: 6 MMOL/L (ref 3–14)
BUN SERPL-MCNC: 25 MG/DL (ref 7–30)
CALCIUM SERPL-MCNC: 8.3 MG/DL (ref 8.5–10.1)
CHLORIDE SERPL-SCNC: 105 MMOL/L (ref 94–109)
CO2 SERPL-SCNC: 20 MMOL/L (ref 20–32)
CREAT SERPL-MCNC: 0.99 MG/DL (ref 0.66–1.25)
GFR SERPL CREATININE-BSD FRML MDRD: 75 ML/MIN/1.7M2
GLUCOSE SERPL-MCNC: 71 MG/DL (ref 70–99)
POTASSIUM SERPL-SCNC: 5.5 MMOL/L (ref 3.4–5.3)
SODIUM SERPL-SCNC: 131 MMOL/L (ref 133–144)

## 2018-08-09 PROCEDURE — 80048 BASIC METABOLIC PNL TOTAL CA: CPT | Performed by: INTERNAL MEDICINE

## 2018-08-09 PROCEDURE — 99207 ZZC NO CHARGE LOS: CPT | Performed by: INTERNAL MEDICINE

## 2018-08-09 PROCEDURE — 36415 COLL VENOUS BLD VENIPUNCTURE: CPT | Performed by: INTERNAL MEDICINE

## 2018-08-09 RX ORDER — HYDRALAZINE HYDROCHLORIDE 50 MG/1
75 TABLET, FILM COATED ORAL 3 TIMES DAILY
Qty: 135 TABLET | Refills: 1 | Status: SHIPPED | OUTPATIENT
Start: 2018-08-09 | End: 2018-08-09

## 2018-08-09 RX ORDER — HYDRALAZINE HYDROCHLORIDE 50 MG/1
75 TABLET, FILM COATED ORAL 3 TIMES DAILY
Qty: 135 TABLET | Refills: 1 | Status: SHIPPED | OUTPATIENT
Start: 2018-08-09 | End: 2018-10-12

## 2018-08-09 NOTE — NURSING NOTE
ALLIED HEALTH NURSE VISIT    Time of visit: 3:30 pm     Today's blood pressure: 170/46 mmHg  Today's heart rate: 54 bpm     See care coordination notes for visit details.     Visit ordered by: Dr. Landry  Provider in clinic today: Dr. Kb Tinoco RN

## 2018-08-09 NOTE — MR AVS SNAPSHOT
After Visit Summary   8/9/2018    Shell Leon    MRN: [id number]           Patient Information     Date Of Birth          1950        Visit Information        Provider Department      8/9/2018 3:30 PM RU UMP HRT NURSE [facility]        Today's Diagnoses     Benign essential hypertension           Follow-ups after your visit        Your next 10 appointments already scheduled     Aug 24, 2018  3:30 PM CDT   Return Visit with RH ONCOLOGY NURSE   [facility] ([facility])    [facility]  [address]  [facility] [address]   [phone]            Sep 07, 2018  1:45 PM CDT   Return Visit with Lonny Lopez MD   [facility] ([facility])    [facility]  [address]  [facility] [address]   [phone]            Oct 26, 2018  4:10 PM CDT   SHORT with Kathi Mondragon PA-C   [facility] ([facility])    [address] [facility] [address] [phone]              Who to contact     If you have questions or need follow up information about today's clinic visit or your schedule please contact [facility] directly at [phone].  Normal or non-critical lab and imaging results will be communicated to you by MyChart, letter or phone within 4 business days after the clinic has received the results. If you do not hear from us within 7 days, please contact the clinic through MyChart or phone. If you have a critical or abnormal lab result, we will notify you by phone as soon as possible.  Submit refill requests through [employer] or call your pharmacy and they will forward the refill request to us. Please allow 3 business days for your refill to be completed.          Additional Information About Your Visit         Care EveryWhere ID     This is your Care EveryWhere ID. This could be used by other organizations to access your [place] medical records  [id number]        Your Vitals Were     Pulse                   54            Blood Pressure from Last 3 Encounters:   08/09/18 170/46   07/26/18 170/50   07/25/18 150/46    Weight from Last 3 Encounters:   07/25/18 60.7 kg (133 lb 14.4 oz)   07/09/18 60.1 kg (132 lb 8 oz)   06/26/18 62.7 kg (138 lb 3.2 oz)              We Performed the Following     Follow-Up with Nurse        Primary Care Provider Office Phone # Fax #    Kathi Mondragon PA-C [phone]       [address] [address] [facility] [address]        Equal Access to Services     TERRA FROST : Hadii aad ku hadasho Soomaali, waaxda luqadaha, qaybta kaalmada adeegyada, zachary jessica haybell power . So [facility] [phone].    ATENCIÓN: Si habla español, tiene a sullivan disposición servicios gratuitos de asistencia lingüística. Llame al [phone].    We comply with applicable federal civil rights laws and [place] laws. We do not discriminate on the basis of race, color, national origin, age, disability, sex, sexual orientation, or gender identity.            Thank you!     Thank you for choosing [facility]  for your care. Our goal is always to provide you with excellent care. Hearing back from our patients is one way we can continue to improve our services. Please take a few minutes to complete the written survey that you may receive in the mail after your visit with us. Thank you!             Your Updated Medication List - Protect others around you: Learn how to safely use, store and throw away your medicines at www.disposemymeds.org.          This list is accurate as of 8/9/18  4:04 PM.  Always use your most recent med list.                   Brand Name Dispense Instructions for use Diagnosis    atorvastatin 80 MG tablet    LIPITOR    90 tablet     Take 1 tablet (80 mg) by mouth daily    Type 2 diabetes mellitus without complication, unspecified long term insulin use status (H)       blood glucose lancets standard    no brand specified    1 Box    Use to test blood sugar 3 times daily or as directed.    Type 2 diabetes mellitus with hyperglycemia, without long-term current use of insulin (H)       blood glucose monitoring test strip    no brand specified    100 strip    Use to test blood sugars 3 times daily or as directed    Type 2 diabetes mellitus with hyperglycemia, without long-term current use of insulin (H)       furosemide 20 MG tablet    LASIX    90 tablet    Take 2 tablets (40 mg) by mouth as needed    Localized edema, Resistant hypertension       glipiZIDE 5 MG tablet    GLUCOTROL    90 tablet    Take 1 tablet (5 mg) by mouth every morning (before breakfast)    Type 2 diabetes mellitus with hyperglycemia, without long-term current use of insulin (H)       hydrALAZINE 50 MG tablet    APRESOLINE    180 tablet    Take 1 tablet (50 mg) by mouth 3 times daily    Resistant hypertension, Localized edema       hydrochlorothiazide 25 MG tablet    HYDRODIURIL    90 tablet    Take 1 tablet (25 mg) by mouth daily    Localized edema, Resistant hypertension       metFORMIN 1000 MG tablet    GLUCOPHAGE    60 tablet    TAKE 1 TABLET BY MOUTH TWICE DAILY WITH MEALS    Type 2 diabetes mellitus with hyperglycemia, without long-term current use of insulin (H)       omeprazole 20 MG CR capsule    priLOSEC    30 capsule    Take 1 capsule (20 mg) by mouth every morning (before breakfast)    Gastrointestinal hemorrhage, unspecified gastrointestinal hemorrhage type       order for DME     1 Units    Equipment being ordered: blood pressure cuff    Resistant hypertension       spironolactone 25 MG tablet    ALDACTONE    90 tablet    Take 1 tablet (25 mg) by mouth daily    Benign essential hypertension       valsartan 320 MG tablet    DIOVAN    90 tablet    Take 1 tablet  (320 mg) by mouth daily    Resistant hypertension

## 2018-08-09 NOTE — PROGRESS NOTES
ALLIED HEALTH NURSE VISIT    Reason for visit: BP check and BMP      Today's BP/HR: 170/46, 54 bpm    Per Dr. Landry, if SBP is greater than 150 mmHg, increase Hydralazine to 75 mg BID.     Patient was advised to increase his Hydralazine from 50 mg TID to 75 mg TID. Script sent.      Endocrinology labs noted a K level of 5.6 and a Sodium level of 127.     BMP today showed some slight improvement. K level is 5.5 and Sodium level 131.    Patient was given education on how to reduce potassium in his diet.     Message Dr. Landry to review. Next f/u? Repeat BMP?    (Dr. Landry, please message your team with recommendations, thank you)    Earnest ELLISON

## 2018-08-16 ENCOUNTER — TRANSFERRED RECORDS (OUTPATIENT)
Dept: HEALTH INFORMATION MANAGEMENT | Facility: CLINIC | Age: 68
End: 2018-08-16

## 2018-08-17 ENCOUNTER — TELEPHONE (OUTPATIENT)
Dept: CARDIOLOGY | Facility: CLINIC | Age: 68
End: 2018-08-17

## 2018-08-17 NOTE — TELEPHONE ENCOUNTER
Follow up with DAR and repeat labs in 1-2 weeks. In the meantime, he should monitor BP closely at home

## 2018-08-17 NOTE — PROGRESS NOTES
RN called patient's son and left  advising him of Dr. Landry's recommendations below. RN placed orders for BMP and DAR f/u in 1 week. RN provided direct number for scheduling in  and also direct phone number for nursing if there are questions about the plan.       Follow up with DAR and repeat labs in 1-2 weeks. In the meantime, he should monitor BP closely at home

## 2018-08-17 NOTE — TELEPHONE ENCOUNTER
RN received fax from pharmacy advising that the  for patient's valsartan has been flagged for the recall. Pharmacy is requesting alternative medication for patient. RN will send to Dr. Landry for review and recommendation.    Patient currently taking valsartan 320mg daily

## 2018-08-24 ENCOUNTER — ONCOLOGY VISIT (OUTPATIENT)
Dept: ONCOLOGY | Facility: CLINIC | Age: 68
End: 2018-08-24
Attending: INTERNAL MEDICINE
Payer: COMMERCIAL

## 2018-08-24 ENCOUNTER — HOSPITAL ENCOUNTER (OUTPATIENT)
Facility: CLINIC | Age: 68
Setting detail: SPECIMEN
Discharge: HOME | End: 2018-08-24
Attending: INTERNAL MEDICINE | Admitting: INTERNAL MEDICINE
Payer: COMMERCIAL

## 2018-08-24 DIAGNOSIS — D50.0 IRON DEFICIENCY ANEMIA DUE TO CHRONIC BLOOD LOSS: Primary | ICD-10-CM

## 2018-08-24 DIAGNOSIS — D50.0 IRON DEFICIENCY ANEMIA DUE TO CHRONIC BLOOD LOSS: ICD-10-CM

## 2018-08-24 LAB
BASOPHILS # BLD AUTO: 0.1 10E9/L (ref 0–0.2)
BASOPHILS NFR BLD AUTO: 1.2 %
DIFFERENTIAL METHOD BLD: ABNORMAL
EOSINOPHIL # BLD AUTO: 1.1 10E9/L (ref 0–0.7)
EOSINOPHIL NFR BLD AUTO: 15.7 %
ERYTHROCYTE [DISTWIDTH] IN BLOOD BY AUTOMATED COUNT: 22 % (ref 10–15)
FERRITIN SERPL-MCNC: 316 NG/ML (ref 26–388)
HCT VFR BLD AUTO: 29.8 % (ref 40–53)
HGB BLD-MCNC: 9.3 G/DL (ref 13.3–17.7)
IMM GRANULOCYTES # BLD: 0 10E9/L (ref 0–0.4)
IMM GRANULOCYTES NFR BLD: 0.3 %
IRON SATN MFR SERPL: 20 % (ref 15–46)
IRON SERPL-MCNC: 50 UG/DL (ref 35–180)
LYMPHOCYTES # BLD AUTO: 0.7 10E9/L (ref 0.8–5.3)
LYMPHOCYTES NFR BLD AUTO: 10.6 %
MCH RBC QN AUTO: 28.6 PG (ref 26.5–33)
MCHC RBC AUTO-ENTMCNC: 31.2 G/DL (ref 31.5–36.5)
MCV RBC AUTO: 92 FL (ref 78–100)
MONOCYTES # BLD AUTO: 0.5 10E9/L (ref 0–1.3)
MONOCYTES NFR BLD AUTO: 7 %
NEUTROPHILS # BLD AUTO: 4.3 10E9/L (ref 1.6–8.3)
NEUTROPHILS NFR BLD AUTO: 65.2 %
NRBC # BLD AUTO: 0 10*3/UL
NRBC BLD AUTO-RTO: 0 /100
PLATELET # BLD AUTO: 185 10E9/L (ref 150–450)
RBC # BLD AUTO: 3.25 10E12/L (ref 4.4–5.9)
TIBC SERPL-MCNC: 247 UG/DL (ref 240–430)
VIT B12 SERPL-MCNC: 290 PG/ML (ref 193–986)
WBC # BLD AUTO: 6.7 10E9/L (ref 4–11)

## 2018-08-24 PROCEDURE — 83540 ASSAY OF IRON: CPT | Performed by: INTERNAL MEDICINE

## 2018-08-24 PROCEDURE — 82728 ASSAY OF FERRITIN: CPT | Performed by: INTERNAL MEDICINE

## 2018-08-24 PROCEDURE — 36415 COLL VENOUS BLD VENIPUNCTURE: CPT

## 2018-08-24 PROCEDURE — 83550 IRON BINDING TEST: CPT | Performed by: INTERNAL MEDICINE

## 2018-08-24 PROCEDURE — 82607 VITAMIN B-12: CPT | Performed by: INTERNAL MEDICINE

## 2018-08-24 PROCEDURE — 84238 ASSAY NONENDOCRINE RECEPTOR: CPT | Performed by: INTERNAL MEDICINE

## 2018-08-24 PROCEDURE — 85025 COMPLETE CBC W/AUTO DIFF WBC: CPT | Performed by: INTERNAL MEDICINE

## 2018-08-24 NOTE — PROGRESS NOTES
Medical Assistant Note:  Shell Leon presents today for lab draw.    Patient seen by provider today: No.   present during visit today: Not Applicable.    Concerns: No Concerns.    Procedure:  Labs drawn: .    Post Assessment:  Labs drawn without difficulty: Yes.    Discharge Plan:  Departure Mode: Ambulatory.    Face to Face Time: 10.    Liane Khan

## 2018-08-24 NOTE — MR AVS SNAPSHOT
After Visit Summary   8/24/2018    Shell Leon    MRN: [id number]           Patient Information     Date Of Birth          1950        Visit Information        Provider Department      8/24/2018 3:30 PM  ONCOLOGY NURSE [facility]        Today's Diagnoses     Iron deficiency anemia due to chronic blood loss           Follow-ups after your visit        Your next 10 appointments already scheduled     Sep 07, 2018  1:45 PM CDT   Return Visit with Lonny Lopez MD   [facility] ([facility])    [facility]  [address]  [facility] [address]   [phone]            Oct 26, 2018  4:10 PM CDT   SHORT with Kathi Mondragon PA-C   [facility]    [address] [facility] [address] [phone]              Future tests that were ordered for you today     Open Standing Orders        Priority Remaining Interval Expires Ordered    CBC with platelets differential Routine 24/25 8/24/2019 8/24/2018    Iron and iron binding capacity Routine 24/25 8/24/2019 8/24/2018    Vitamin B12 Routine 24/25 8/24/2019 8/24/2018    Soluble transferrin receptor Routine 24/25 8/24/2019 8/24/2018    Ferritin Routine 24/25 8/24/2019 8/24/2018            Who to contact     If you have questions or need follow up information about today's clinic visit or your schedule please contact [facility] directly at [phone].  Normal or non-critical lab and imaging results will be communicated to you by MyChart, letter or phone within 4 business days after the clinic has received the results. If you do not hear from us within 7 days, please contact the clinic through MyChart or phone. If you have a critical or abnormal lab result, we will notify you by phone as soon as possible.  Submit refill requests through [employer] or call your  "pharmacy and they will forward the refill request to us. Please allow 3 business days for your refill to be completed.          Additional Information About Your Visit        MyChart Information     [employer] lets you send messages to your doctor, view your test results, renew your prescriptions, schedule appointments and more. To sign up, go to [email or web address] . Click on \"Log in\" on the left side of the screen, which will take you to the Welcome page. Then click on \"Sign up Now\" on the right side of the page.     You will be asked to enter the access code listed below, as well as some personal information. Please follow the directions to create your username and password.     Your access code is: [email or web address]  Expires: 2018  4:01 PM     Your access code will  in 90 days. If you need help or a new code, please call your [place] clinic or [phone].        Care EveryWhere ID     This is your Care EveryWhere ID. This could be used by other organizations to access your [place] medical records  [id number]         Blood Pressure from Last 3 Encounters:   18 170/46   18 170/50   18 150/46    Weight from Last 3 Encounters:   18 60.7 kg (133 lb 14.4 oz)   18 60.1 kg (132 lb 8 oz)   18 62.7 kg (138 lb 3.2 oz)              We Performed the Following     CBC with platelets differential     Ferritin     Iron and iron binding capacity     Soluble transferrin receptor     Vitamin B12        Primary Care Provider Office Phone # Fax #    Kathi Mondragon PA-C [phone] [address] [facility] [address]        Equal Access to Services     BRYAN FROST : Hadii lianet Dillon, waariasda luqadaha, qaybta kaalmada fernando, zachary vela. So [facility] [phone].    ATENCIÓN: Si habla español, tiene a sullivan disposición servicios gratuitos de asistencia lingüística. Llame al [phone].    We comply with " applicable federal civil rights laws and [place] laws. We do not discriminate on the basis of race, color, national origin, age, disability, sex, sexual orientation, or gender identity.            Thank you!     Thank you for choosing [facility]  for your care. Our goal is always to provide you with excellent care. Hearing back from our patients is one way we can continue to improve our services. Please take a few minutes to complete the written survey that you may receive in the mail after your visit with us. Thank you!             Your Updated Medication List - Protect others around you: Learn how to safely use, store and throw away your medicines at www.disposemymeds.org.          This list is accurate as of 8/24/18  4:01 PM.  Always use your most recent med list.                   Brand Name Dispense Instructions for use Diagnosis    atorvastatin 80 MG tablet    LIPITOR    90 tablet    Take 1 tablet (80 mg) by mouth daily    Type 2 diabetes mellitus without complication, unspecified long term insulin use status (H)       blood glucose lancets standard    no brand specified    1 Box    Use to test blood sugar 3 times daily or as directed.    Type 2 diabetes mellitus with hyperglycemia, without long-term current use of insulin (H)       blood glucose monitoring test strip    no brand specified    100 strip    Use to test blood sugars 3 times daily or as directed    Type 2 diabetes mellitus with hyperglycemia, without long-term current use of insulin (H)       furosemide 20 MG tablet    LASIX    90 tablet    Take 2 tablets (40 mg) by mouth as needed    Localized edema, Resistant hypertension       glipiZIDE 5 MG tablet    GLUCOTROL    90 tablet    Take 1 tablet (5 mg) by mouth every morning (before breakfast)    Type 2 diabetes mellitus with hyperglycemia, without long-term current use of insulin (H)       hydrALAZINE 50 MG tablet    APRESOLINE    135 tablet    Take 1.5 tablets (75 mg) by  mouth 3 times daily    Resistant hypertension, Localized edema       hydrochlorothiazide 25 MG tablet    HYDRODIURIL    90 tablet    Take 1 tablet (25 mg) by mouth daily    Localized edema, Resistant hypertension       metFORMIN 1000 MG tablet    GLUCOPHAGE    60 tablet    TAKE 1 TABLET BY MOUTH TWICE DAILY WITH MEALS    Type 2 diabetes mellitus with hyperglycemia, without long-term current use of insulin (H)       omeprazole 20 MG CR capsule    priLOSEC    30 capsule    Take 1 capsule (20 mg) by mouth every morning (before breakfast)    Gastrointestinal hemorrhage, unspecified gastrointestinal hemorrhage type       order for DME     1 Units    Equipment being ordered: blood pressure cuff    Resistant hypertension       valsartan 320 MG tablet    DIOVAN    90 tablet    Take 1 tablet (320 mg) by mouth daily    Resistant hypertension

## 2018-08-24 NOTE — LETTER
8/24/2018         RE: Shell Leon  [address] [place] [address]        Dear Colleague,    Thank you for referring your patient, Shell Leon, to the [facility]. Please see a copy of my visit note below.    Medical Assistant Note:  Shell Leon presents today for lab draw.    Patient seen by provider today: No.   present during visit today: Not Applicable.    Concerns: No Concerns.    Procedure:  Labs drawn: .    Post Assessment:  Labs drawn without difficulty: Yes.    Discharge Plan:  Departure Mode: Ambulatory.    Face to Face Time: 10.    Liane Khan              Again, thank you for allowing me to participate in the care of your patient.        Sincerely,        [facility] Nurse

## 2018-08-26 LAB — STFR SERPL-SCNC: 4.5 MG/L (ref 2.2–5)

## 2018-09-06 ENCOUNTER — TRANSFERRED RECORDS (OUTPATIENT)
Dept: HEALTH INFORMATION MANAGEMENT | Facility: CLINIC | Age: 68
End: 2018-09-06

## 2018-09-17 DIAGNOSIS — K92.2 GASTROINTESTINAL HEMORRHAGE, UNSPECIFIED GASTROINTESTINAL HEMORRHAGE TYPE: ICD-10-CM

## 2018-09-18 NOTE — TELEPHONE ENCOUNTER
"Requested Prescriptions   Pending Prescriptions Disp Refills     omeprazole (PRILOSEC) 20 MG CR capsule [Pharmacy Med Name: Omeprazole Oral Capsule Delayed Release 20 MG]  Last Written Prescription Date:  7/23/18  Last Fill Quantity: 30,  # refills: 0   Last office visit: 7/25/2018 with prescribing provider:  Kathi Mondragon PA-C   Future Office Visit:   Next 5 appointments (look out 90 days)     Sep 21, 2018  3:45 PM CDT   Return Visit with Lonny Lopez MD   [facility] ([facility])    [facility]  [address]  [facility] [address]   [phone]            Oct 26, 2018  4:10 PM CDT   SHORT with Kathi Mondragon PA-C   [facility]    [address] [place] [address] [phone]                  30 capsule 0     Sig: Take 1 capsule (20 mg) by mouth every morning (before breakfast)    PPI Protocol Passed    9/17/2018  5:32 PM       Passed - Not on Clopidogrel (unless Pantoprazole ordered)       Passed - No diagnosis of osteoporosis on record       Passed - Recent (12 mo) or future (30 days) visit within the authorizing provider's specialty    Patient had office visit in the last 12 months or has a visit in the next 30 days with authorizing provider or within the authorizing provider's specialty.  See \"Patient Info\" tab in inbasket, or \"Choose Columns\" in Meds & Orders section of the refill encounter.           Passed - Patient is age 18 or older          "

## 2018-09-21 ENCOUNTER — ONCOLOGY VISIT (OUTPATIENT)
Dept: ONCOLOGY | Facility: CLINIC | Age: 68
End: 2018-09-21
Attending: INTERNAL MEDICINE
Payer: COMMERCIAL

## 2018-09-21 VITALS
HEART RATE: 58 BPM | RESPIRATION RATE: 16 BRPM | WEIGHT: 144.6 LBS | DIASTOLIC BLOOD PRESSURE: 63 MMHG | HEIGHT: 64 IN | BODY MASS INDEX: 24.69 KG/M2 | TEMPERATURE: 97.2 F | SYSTOLIC BLOOD PRESSURE: 195 MMHG | OXYGEN SATURATION: 99 %

## 2018-09-21 DIAGNOSIS — I10 ESSENTIAL HYPERTENSION WITH GOAL BLOOD PRESSURE LESS THAN 140/90: ICD-10-CM

## 2018-09-21 DIAGNOSIS — D50.0 IRON DEFICIENCY ANEMIA DUE TO CHRONIC BLOOD LOSS: Primary | ICD-10-CM

## 2018-09-21 PROCEDURE — 99214 OFFICE O/P EST MOD 30 MIN: CPT | Performed by: INTERNAL MEDICINE

## 2018-09-21 ASSESSMENT — PAIN SCALES - GENERAL: PAINLEVEL: NO PAIN (0)

## 2018-09-21 NOTE — NURSING NOTE
"Oncology Rooming Note    September 21, 2018 3:57 PM   Shell Leon is a 68 year old male who presents for:    Chief Complaint   Patient presents with     Oncology Clinic Visit     Iron deficiency anemia due to chronic blood loss     Initial Vitals: /63  Pulse 58  Temp 97.2  F (36.2  C) (Tympanic)  Resp 16  Ht 1.626 m (5' 4\")  Wt 65.6 kg (144 lb 9.6 oz)  SpO2 99%  BMI 24.82 kg/m2 Estimated body mass index is 24.82 kg/(m^2) as calculated from the following:    Height as of this encounter: 1.626 m (5' 4\").    Weight as of this encounter: 65.6 kg (144 lb 9.6 oz). Body surface area is 1.72 meters squared.  No Pain (0) Comment: Data Unavailable   No LMP for male patient.  Allergies reviewed: Yes  Medications reviewed: Yes    Medications: Medication refills not needed today.  Pharmacy name entered into [employer]: [id number] - [place], [place] - [address]    Clinical concerns: Iron deficiency anemia due to chronic blood loss     8 minutes for nursing intake (face to face time)     Hanna Eaton CMA            DISCHARGE PLAN:  Next appointments: See patient instruction section  Departure Mode: Ambulatory  Accompanied by: daughter  0 minutes for nursing discharge (face to face time)   Hanna Eaton CMA      "

## 2018-09-21 NOTE — MR AVS SNAPSHOT
After Visit Summary   9/21/2018    Shell Leon    MRN: [id number]           Patient Information     Date Of Birth          1950        Visit Information        Provider Department      9/21/2018 3:45 PM Lonny Lopez MD [facility]        Today's Diagnoses     Iron deficiency anemia due to chronic blood loss    -  1    Essential hypertension with goal blood pressure less than 140/90          Care Instructions    Follow up in 6 months with labs a week prior to visit scheduled Dolores Almaraz          Follow-ups after your visit        Your next 10 appointments already scheduled     Oct 26, 2018  4:10 PM CDT   SHORT with Kathi Mondragon PA-C   [facility] ([facility])    [address] [place] [address] [phone]            Mar 15, 2019  3:30 PM CDT   Return Visit with RH ONCOLOGY NURSE   [facility] ([facility])    [facility]  [address]  [facility] [id number] [phone]            Mar 26, 2019  3:45 PM CDT   Return Visit with Lonny Lopez MD   [facility] ([facility])    [facility]  [address]  [facility] [id number] [phone]              Future tests that were ordered for you today     Open Future Orders        Priority Expected Expires Ordered    Erythropoietin Routine  9/21/2019 9/21/2018    Folate RBC Routine  9/21/2019 9/21/2018            Who to contact     If you have questions or need follow up information about today's clinic visit or your schedule please contact [facility] directly at [phone].  Normal or non-critical lab and imaging results will be communicated to you by MyChart, letter or phone within 4 business days after the clinic has received the results. If you do not hear from us within 7  "days, please contact the clinic through SignalSet or phone. If you have a critical or abnormal lab result, we will notify you by phone as soon as possible.  Submit refill requests through SignalSet or call your pharmacy and they will forward the refill request to us. Please allow 3 business days for your refill to be completed.          Additional Information About Your Visit        SignalSet Information     SignalSet lets you send messages to your doctor, view your test results, renew your prescriptions, schedule appointments and more. To sign up, go to [email or web address] . Click on \"Log in\" on the left side of the screen, which will take you to the Welcome page. Then click on \"Sign up Now\" on the right side of the page.     You will be asked to enter the access code listed below, as well as some personal information. Please follow the directions to create your username and password.     Your access code is: [email or web address]  Expires: 2018  4:01 PM     Your access code will  in 90 days. If you need help or a new code, please call your [place] clinic or [phone].        Care EveryWhere ID     This is your Care EveryWhere ID. This could be used by other organizations to access your [place] medical records  [id number]        Your Vitals Were     Pulse Temperature Respirations Height Pulse Oximetry BMI (Body Mass Index)    58 97.2  F (36.2  C) (Tympanic) 16 1.626 m (5' 4\") 99% 24.82 kg/m2       Blood Pressure from Last 3 Encounters:   18 195/63   18 170/46   18 170/50    Weight from Last 3 Encounters:   18 65.6 kg (144 lb 9.6 oz)   18 60.7 kg (133 lb 14.4 oz)   18 60.1 kg (132 lb 8 oz)                 Today's Medication Changes          These changes are accurate as of 18  4:45 PM.  If you have any questions, ask your nurse or doctor.               Stop taking these medicines if you haven't already. Please contact your care team if you have questions.     furosemide " 20 MG tablet   Commonly known as:  LASIX   Stopped by:  Lonny Lopez MD                    Primary Care Provider Office Phone # Fax #    Kathi Mondragon PA-C [phone] [address] [facility] [address]        Equal Access to Services     BRYAN AMBRIZKAR : Hadii aad ku hadasho Soomaali, waaxda luqadaha, qaybta kaalmada adeegyada, waxay idiin hayaan adeeg kharash la'aan ah. So [facility] [phone].    ATENCIÓN: Si habla español, tiene a sullivan disposición servicios gratuitos de asistencia lingüística. Llame al [phone].    We comply with applicable federal civil rights laws and [place] laws. We do not discriminate on the basis of race, color, national origin, age, disability, sex, sexual orientation, or gender identity.            Thank you!     Thank you for choosing [facility]  for your care. Our goal is always to provide you with excellent care. Hearing back from our patients is one way we can continue to improve our services. Please take a few minutes to complete the written survey that you may receive in the mail after your visit with us. Thank you!             Your Updated Medication List - Protect others around you: Learn how to safely use, store and throw away your medicines at www.disposemymeds.org.          This list is accurate as of 9/21/18  4:45 PM.  Always use your most recent med list.                   Brand Name Dispense Instructions for use Diagnosis    atorvastatin 80 MG tablet    LIPITOR    90 tablet    Take 1 tablet (80 mg) by mouth daily    Type 2 diabetes mellitus without complication, unspecified long term insulin use status (H)       blood glucose lancets standard    no brand specified    1 Box    Use to test blood sugar 3 times daily or as directed.    Type 2 diabetes mellitus with hyperglycemia, without long-term current use of insulin (H)       blood glucose monitoring test strip    no brand specified    100 strip    Use to  test blood sugars 3 times daily or as directed    Type 2 diabetes mellitus with hyperglycemia, without long-term current use of insulin (H)       glipiZIDE 5 MG tablet    GLUCOTROL    90 tablet    Take 1 tablet (5 mg) by mouth every morning (before breakfast)    Type 2 diabetes mellitus with hyperglycemia, without long-term current use of insulin (H)       hydrALAZINE 50 MG tablet    APRESOLINE    135 tablet    Take 1.5 tablets (75 mg) by mouth 3 times daily    Resistant hypertension, Localized edema       hydrochlorothiazide 25 MG tablet    HYDRODIURIL    90 tablet    Take 1 tablet (25 mg) by mouth daily    Localized edema, Resistant hypertension       metFORMIN 1000 MG tablet    GLUCOPHAGE    60 tablet    TAKE 1 TABLET BY MOUTH TWICE DAILY WITH MEALS    Type 2 diabetes mellitus with hyperglycemia, without long-term current use of insulin (H)       * omeprazole 20 MG CR capsule    priLOSEC    30 capsule    Take 1 capsule (20 mg) by mouth every morning (before breakfast)    Gastrointestinal hemorrhage, unspecified gastrointestinal hemorrhage type       * omeprazole 20 MG CR capsule    priLOSEC    30 capsule    Take 1 capsule (20 mg) by mouth every morning (before breakfast)    Gastrointestinal hemorrhage, unspecified gastrointestinal hemorrhage type       order for DME     1 Units    Equipment being ordered: blood pressure cuff    Resistant hypertension       valsartan 320 MG tablet    DIOVAN    90 tablet    Take 1 tablet (320 mg) by mouth daily    Resistant hypertension       * Notice:  This list has 2 medication(s) that are the same as other medications prescribed for you. Read the directions carefully, and ask your doctor or other care provider to review them with you.

## 2018-09-21 NOTE — LETTER
9/21/2018         RE: Shell Leon  [address]  [place] [address]        Dear Colleague,    Thank you for referring your patient, Shell Leon, to the [facility] CANCER CARE. Please see a copy of my visit note below.    [facility]  HEMATOLOGY AND ONCOLOGY    FOLLOW-UP VISIT NOTE    PATIENT NAME: Shell Leon MRN # [id number]  DATE OF VISIT: Sep 21, 2018 YOB: 1950    REFERRING PROVIDER: No referring provider defined for this encounter.    REFERRING PROVIDER: DELPHINE Avendano Ra CNP  [address] [place] [address]    DIAGNOSIS:  Iron deficiency anemia  Gastric - MALT lymphoma    SUBJECTIVE   Shell Leon is 68 year old gentleman with HTN, DM TII, PVD who has been referred for severe iron deficiency anemia.    He comes for a scheduled follow up visit. He has been doing well since last visit.       PAST MEDICAL HISTORY     Past Medical History:   Diagnosis Date     Diabetes (H)      Hypertension          CURRENT OUTPATIENT MEDICATIONS     Current Outpatient Prescriptions   Medication Sig     order for DME Equipment being ordered: blood pressure cuff     atorvastatin (LIPITOR) 80 MG tablet Take 1 tablet (80 mg) by mouth daily     blood glucose (NO BRAND SPECIFIED) lancets standard Use to test blood sugar 3 times daily or as directed.     blood glucose monitoring (NO BRAND SPECIFIED) test strip Use to test blood sugars 3 times daily or as directed     furosemide (LASIX) 20 MG tablet Take 20 mg by mouth daily     glipiZIDE (GLUCOTROL) 5 MG tablet Take 1 tablet (5 mg) by mouth every morning (before breakfast)     hydrALAZINE (APRESOLINE) 50 MG tablet Take 1.5 tablets (75 mg) by mouth 3 times daily     hydrochlorothiazide (HYDRODIURIL) 25 MG tablet Take 1 tablet (25 mg) by mouth daily     irbesartan (AVAPRO) 300 MG tablet Take 300 mg by mouth daily     metFORMIN (GLUCOPHAGE) 1000 MG tablet TAKE 1 TABLET BY MOUTH TWICE DAILY  "WITH MEALS     omeprazole (PRILOSEC) 20 MG CR capsule Take 1 capsule (20 mg) by mouth every morning (before breakfast)     order for DME Equipment being ordered: thigh high compression stockings 20mmg Hg     No current facility-administered medications for this visit.         ALLERGIES    No Known Allergies     REVIEW OF SYSTEMS   As above in the HPI, o/w complete 12-point ROS was negative.     PHYSICAL EXAM   /63  Pulse 58  Temp 97.2  F (36.2  C) (Tympanic)  Resp 16  Ht 1.626 m (5' 4\")  Wt 65.6 kg (144 lb 9.6 oz)  SpO2 99%  BMI 24.82 kg/m2  GEN: NAD  HEENT: PERRL, EOMI, no icterus, injection or pallor. Oropharynx is clear.  LYMPHATICs: no cervical or supraclavicular lymphadenopathy; no other abn lymphadenopathy  PULMONARY: clear with good air entry bilaterally  CARDIOVASCULAR: regular, no murmurs, rubs, or gallops  GASTROINTESTINAL: soft, non-tender, non-distended, normal bowel sounds, no hepatosplenomegaly by percussion or palpation  MUSCULOSKELTAL: warm, well perfused, no edema  NEURO: awake, alert and oriented to time place and person, cranial nerves intact - II - XII, no focal neurologic deficits  SKIN: no rashes     LABORATORY AND IMAGING STUDIES     Recent Labs   Lab Test  08/09/18   1526  07/25/18   1651  07/16/18   1549  06/06/18   1538  05/25/18   1512   NA  131*  134  132*  140  135   POTASSIUM  5.5*  4.7  4.6  5.2  5.1   CHLORIDE  105  104  103  112*  106   CO2  20  22  22  23  16*   ANIONGAP  6  8  7  5  13   BUN  25  34*  25  28  19   CR  0.99  0.99  1.02  1.05  1.07   GLC  71  185*  225*  113*  75   STEVE  8.3*  7.9*  7.9*  8.8  8.3*     No results for input(s): MAG, PHOS in the last 90566 hours.  Recent Labs   Lab Test  08/24/18   1553  07/25/18   1651  05/27/18   1604  05/25/18   1512  05/22/18   1419  05/21/18   0646  05/20/18   1718  01/03/17 2011   WBC  6.7  7.6  10.3   --    --   6.4  8.7  7.2   HGB  9.3*  8.5*  8.8*  8.2*  9.5*  8.3*  6.4*  13.8   PLT  185  203  213   --    --   " 228  273  211   MCV  92  83  80   --    --   79  76*  88   NEUTROPHIL  65.2   --   61.6   --    --   44.2  60.7  69.1     Recent Labs   Lab Test  05/20/18   1718  08/10/17   1635  01/03/17 2011   BILITOTAL  0.3  0.3  0.7   ALKPHOS  98  88  71   ALT  38  42  30   AST  40  40  22   ALBUMIN  3.5  3.6  4.2   LDH  316*   --    --      TSH   Date Value Ref Range Status   04/26/2017 1.42 0.40 - 4.00 mU/L Final     No results for input(s): CEA in the last 73015 hours.  Results for orders placed or performed during the hospital encounter of 07/09/18   CT Abdomen Pelvis w Contrast    Narrative    CT ABDOMEN/PELVIS WITH CONTRAST July 9, 2018 4:21 PM     HISTORY: Resistant hypertension.    TECHNIQUE: 66mL Isovue-370 IV were administered. After contrast  administration, volumetric helical sections were acquired from the  lung bases to the ischial tuberosities. Coronal images were also  reconstructed. Radiation dose for this scan was reduced using  automated exposure control, adjustment of the mA and/or kV according  to patient size, or iterative reconstruction technique.    COMPARISON: CT of the abdomen and pelvis performed 1/3/2017.     FINDINGS: Prior cholecystectomy. The liver, spleen, adrenal glands,  pancreas, and kidneys are unremarkable. No hydronephrosis. Mild  atherosclerotic aortoiliac calcification. No bowel obstruction. No  convincing evidence for colitis or diverticulitis. The appendix is not  visualized. There is moderate prostatic enlargement. Mild  retroperitoneal adenopathy is new since the previous exam. For  example, a mildly enlarged left para-aortic lymph node (series 2 image  34) measures 1.3 x 1.2 cm. Small hiatal hernia. Coronary artery  calcification. The visualized lung bases are clear.         Impression    IMPRESSION:   1. No acute abnormality in the abdomen or pelvis. No cause for  hypertension is identified.  2. Mild retroperitoneal adenopathy is nonspecific, and is new since  the previous exam.    3. Small hiatal hernia.    GAIL SHULTZ MD         ASSESSMENT AND PLAN   Severe iron deficiency anemia  GI-MALT lymphoma of stomach  Poorly controlled hypertension  Bilateral pedal edema  Diabetes mellitus type 2 and peripheral vascular disease    He has markedly elevated blood pressure again at this visit. I think this should be the most serious health concern that he has.     His hemoglobin has been trending up with oral supplementation. I will refer him back to his PCP for HTN management. I will see him in 6 months with labs prior to visit    His GI work up was not available at the time of visit and has been scanned in system after visit. EGD has revealed a small foci of MALT lymphoma involving the stomach. This is a low grade lymphoma often associated with Helicobacter pylori infection. His H. Pylori studies were negative.     I would review his case with his GI. He would benefit from curative intent radiation to the area of disease if H. Pylori evaluation is completely negative.       Again, thank you for allowing me to participate in the care of your patient.        Sincerely,        Lonny Lopez MD

## 2018-09-25 ENCOUNTER — TELEPHONE (OUTPATIENT)
Dept: FAMILY MEDICINE | Facility: CLINIC | Age: 68
End: 2018-09-25

## 2018-09-25 NOTE — TELEPHONE ENCOUNTER
Patient has OV with KO on 10/26 but needs to be seen sooner due to highly elevated BP; please have patient reschedule apt for ASAP.  1st attempt, LM for pt to call back.    Gayatri Whitehead CMA ([facility])

## 2018-10-02 NOTE — TELEPHONE ENCOUNTER
2nd attempt, called and talked to son.  Apt scheduled for 10/12.  Gyaatri Whitehead CMA ([facility])

## 2018-10-12 ENCOUNTER — OFFICE VISIT (OUTPATIENT)
Dept: FAMILY MEDICINE | Facility: CLINIC | Age: 68
End: 2018-10-12
Payer: COMMERCIAL

## 2018-10-12 VITALS
HEIGHT: 64 IN | HEART RATE: 59 BPM | SYSTOLIC BLOOD PRESSURE: 156 MMHG | WEIGHT: 140.6 LBS | OXYGEN SATURATION: 100 % | RESPIRATION RATE: 16 BRPM | TEMPERATURE: 97.9 F | DIASTOLIC BLOOD PRESSURE: 56 MMHG | BODY MASS INDEX: 24.01 KG/M2

## 2018-10-12 DIAGNOSIS — Z23 NEED FOR PROPHYLACTIC VACCINATION AND INOCULATION AGAINST INFLUENZA: ICD-10-CM

## 2018-10-12 DIAGNOSIS — I1A.0 RESISTANT HYPERTENSION: Primary | ICD-10-CM

## 2018-10-12 DIAGNOSIS — E11.65 TYPE 2 DIABETES MELLITUS WITH HYPERGLYCEMIA, WITHOUT LONG-TERM CURRENT USE OF INSULIN (H): ICD-10-CM

## 2018-10-12 DIAGNOSIS — R60.0 LOCALIZED EDEMA: ICD-10-CM

## 2018-10-12 DIAGNOSIS — E11.9 TYPE 2 DIABETES MELLITUS WITHOUT COMPLICATION, WITHOUT LONG-TERM CURRENT USE OF INSULIN (H): Primary | ICD-10-CM

## 2018-10-12 DIAGNOSIS — K92.2 GASTROINTESTINAL HEMORRHAGE, UNSPECIFIED GASTROINTESTINAL HEMORRHAGE TYPE: ICD-10-CM

## 2018-10-12 PROCEDURE — 99214 OFFICE O/P EST MOD 30 MIN: CPT | Mod: 25 | Performed by: PHYSICIAN ASSISTANT

## 2018-10-12 PROCEDURE — G0008 ADMIN INFLUENZA VIRUS VAC: HCPCS | Performed by: PHYSICIAN ASSISTANT

## 2018-10-12 PROCEDURE — 90662 IIV NO PRSV INCREASED AG IM: CPT | Performed by: PHYSICIAN ASSISTANT

## 2018-10-12 RX ORDER — ATORVASTATIN CALCIUM 80 MG/1
80 TABLET, FILM COATED ORAL DAILY
Qty: 90 TABLET | Refills: 3 | Status: ON HOLD | OUTPATIENT
Start: 2018-10-12 | End: 2019-05-04

## 2018-10-12 RX ORDER — IRBESARTAN 300 MG/1
300 TABLET ORAL DAILY
Refills: 3 | COMMUNITY
Start: 2018-09-20 | End: 2018-12-13

## 2018-10-12 RX ORDER — FUROSEMIDE 20 MG
20 TABLET ORAL DAILY
Refills: 0 | COMMUNITY
Start: 2018-07-31 | End: 2018-12-13

## 2018-10-12 RX ORDER — GLIPIZIDE 5 MG/1
5 TABLET ORAL
Qty: 90 TABLET | Refills: 1 | Status: ON HOLD | OUTPATIENT
Start: 2018-10-12 | End: 2019-05-04

## 2018-10-12 RX ORDER — VALSARTAN 320 MG/1
320 TABLET ORAL DAILY
Qty: 90 TABLET | Refills: 1 | Status: CANCELLED | OUTPATIENT
Start: 2018-10-12

## 2018-10-12 RX ORDER — HYDRALAZINE HYDROCHLORIDE 50 MG/1
75 TABLET, FILM COATED ORAL 3 TIMES DAILY
Qty: 135 TABLET | Refills: 1 | Status: SHIPPED | OUTPATIENT
Start: 2018-10-12 | End: 2018-12-13

## 2018-10-12 RX ORDER — HYDROCHLOROTHIAZIDE 25 MG/1
25 TABLET ORAL DAILY
Qty: 90 TABLET | Refills: 1 | Status: ON HOLD | OUTPATIENT
Start: 2018-10-12 | End: 2019-05-04

## 2018-10-12 NOTE — MR AVS SNAPSHOT
After Visit Summary   10/12/2018    Shell Leon    MRN: [id number]           Patient Information     Date Of Birth          1950        Visit Information        Provider Department      10/12/2018 3:50 PM Kathi Mondragon PA-C [facility]        Today's Diagnoses     Resistant hypertension    -  1    Type 2 diabetes mellitus with hyperglycemia, without long-term current use of insulin (H)        Localized edema        Gastrointestinal hemorrhage, unspecified gastrointestinal hemorrhage type        Need for prophylactic vaccination and inoculation against influenza           Follow-ups after your visit        Follow-up notes from your care team     Return in about 2 weeks (around 10/26/2018) for Blood Pressure with Cardiology.      Your next 10 appointments already scheduled     Nov 23, 2018  3:40 PM CST   LAB with  LAB   [facility] ([facility])    [address] [facility] [id number] [phone]           Please do not eat 10-12 hours before your appointment if you are coming in fasting for labs on lipids, cholesterol, or glucose (sugar). This does not apply to pregnant women. Water, hot tea and black coffee (with nothing added) are okay. Do not drink other fluids, diet soda or chew gum.            Nov 23, 2018  3:50 PM CST   SHORT with Kathi Mondragon PA-C   [facility] ([facility])    [address] [facility] [id number] [phone]            Mar 15, 2019  3:30 PM CDT   Return Visit with  ONCOLOGY NURSE   [facility] ([facility])    [facility]  [address] [place] [address]   [phone]            Mar 26, 2019  3:45 PM CDT   Return Visit with Lonny Lopez MD   [facility] ([facility])    [facility]  [address]  [facility] [address]   [phone]              Who to contact     If you have questions or need follow up information about today's clinic visit or your schedule please contact [facility] directly at [phone].  Normal or non-critical lab and imaging results will be communicated to you by MyChart, letter or phone within 4 business days after the clinic has received the results. If you do not hear from us within 7 days, please contact the clinic through MyChart or phone. If you have a critical or abnormal lab result, we will notify you by phone as soon as possible.  Submit refill requests through [email or web address] or call your pharmacy and they will forward the refill request to us. Please allow 3 business days for your refill to be completed.          Additional Information About Your Visit        MyCharSABIA Information     [email or web address] lets you send messages to your doctor, view your test results, renew your prescriptions, schedule appointments and more. To sign up, go to [email or web address] . Click on \"Log in\" on the left side of the screen, which will take you to the Welcome page. Then click on \"Sign up Now\" on the right side of the page.     You will be asked to enter the access code listed below, as well as some personal information. Please follow the directions to create your username and password.     Your access code is: [email or web address]  Expires: 2018  4:01 PM     Your access code will  in 90 days. If you need help or a new code, please call your [place] clinic or [phone].        Care EveryWhere ID     This is your Care EveryWhere ID. This could be used by other organizations to access your [place] medical records  [id number]        Your Vitals Were     Pulse Temperature Respirations Height Pulse Oximetry BMI (Body Mass Index)    59 97.9  F (36.6  C) (Oral) 16 5' 4\" (1.626 m) 100% 24.13 kg/m2       Blood Pressure from Last 3 Encounters:   10/12/18 156/70   18 " 195/63   08/09/18 170/46    Weight from Last 3 Encounters:   10/12/18 140 lb 9.6 oz (63.8 kg)   09/21/18 144 lb 9.6 oz (65.6 kg)   07/25/18 133 lb 14.4 oz (60.7 kg)              We Performed the Following     ADMIN INFLUENZA (For MEDICARE Patients ONLY) []     FLU VACCINE, INCREASED ANTIGEN, PRESV FREE, AGE 65+ [81996]          Today's Medication Changes          These changes are accurate as of 10/12/18  4:14 PM.  If you have any questions, ask your nurse or doctor.               Start taking these medicines.        Dose/Directions    order for DME   Used for:  Localized edema   Started by:  Kathi Mondragon PA-C        Equipment being ordered: thigh high compression stockings 20mmg Hg   Quantity:  1 Units   Refills:  0            Where to get your medicines      These medications were sent to [facility][id number] - [address] [address]     Phone:  [phone]     atorvastatin 80 MG tablet    blood glucose lancets standard    blood glucose monitoring test strip    glipiZIDE 5 MG tablet    hydrALAZINE 50 MG tablet    hydrochlorothiazide 25 MG tablet    metFORMIN 1000 MG tablet    omeprazole 20 MG CR capsule         Some of these will need a paper prescription and others can be bought over the counter.  Ask your nurse if you have questions.     Bring a paper prescription for each of these medications     order for DME                Primary Care Provider Office Phone # Fax #    Kathi Mondragon PA-C [phone] [address]  [facility] [address]        Equal Access to Services     [facility]: Hadii lianet shaw hadasho Soomaali, waaxda luqadaha, qaybta kaalmada adeegyabrian, zachary power . So [facility] [phone].    ATENCIÓN: Si habla español, tiene a sullivan disposición servicios gratuitos de asistencia lingüística. Llame al [phone].    We comply with applicable federal civil rights laws and  [place] laws. We do not discriminate on the basis of race, color, national origin, age, disability, sex, sexual orientation, or gender identity.            Thank you!     Thank you for choosing [facility]  for your care. Our goal is always to provide you with excellent care. Hearing back from our patients is one way we can continue to improve our services. Please take a few minutes to complete the written survey that you may receive in the mail after your visit with us. Thank you!             Your Updated Medication List - Protect others around you: Learn how to safely use, store and throw away your medicines at www.disposemymeds.org.          This list is accurate as of 10/12/18  4:14 PM.  Always use your most recent med list.                   Brand Name Dispense Instructions for use Diagnosis    atorvastatin 80 MG tablet    LIPITOR    90 tablet    Take 1 tablet (80 mg) by mouth daily    Type 2 diabetes mellitus with hyperglycemia, without long-term current use of insulin (H)       blood glucose lancets standard    no brand specified    100 each    Use to test blood sugar 3 times daily or as directed.    Type 2 diabetes mellitus with hyperglycemia, without long-term current use of insulin (H)       blood glucose monitoring test strip    no brand specified    100 strip    Use to test blood sugars 3 times daily or as directed    Type 2 diabetes mellitus with hyperglycemia, without long-term current use of insulin (H)       furosemide 20 MG tablet    LASIX     Take 20 mg by mouth daily        glipiZIDE 5 MG tablet    GLUCOTROL    90 tablet    Take 1 tablet (5 mg) by mouth every morning (before breakfast)    Type 2 diabetes mellitus with hyperglycemia, without long-term current use of insulin (H)       hydrALAZINE 50 MG tablet    APRESOLINE    135 tablet    Take 1.5 tablets (75 mg) by mouth 3 times daily    Resistant hypertension, Localized edema       hydrochlorothiazide 25 MG tablet    HYDRODIURIL     90 tablet    Take 1 tablet (25 mg) by mouth daily    Localized edema, Resistant hypertension       irbesartan 300 MG tablet    AVAPRO     Take 300 mg by mouth daily        metFORMIN 1000 MG tablet    GLUCOPHAGE    60 tablet    TAKE 1 TABLET BY MOUTH TWICE DAILY WITH MEALS    Type 2 diabetes mellitus with hyperglycemia, without long-term current use of insulin (H)       omeprazole 20 MG CR capsule    priLOSEC    90 capsule    Take 1 capsule (20 mg) by mouth every morning (before breakfast)    Gastrointestinal hemorrhage, unspecified gastrointestinal hemorrhage type       order for DME     1 Units    Equipment being ordered: blood pressure cuff    Resistant hypertension       order for DME     1 Units    Equipment being ordered: thigh high compression stockings 20mmg Hg    Localized edema

## 2018-10-12 NOTE — PROGRESS NOTES
SUBJECTIVE:   Shell Leon is a 68 year old male who presents to clinic today for the following health issues:      1. Hypertension Follow-up      Outpatient blood pressures are not being checked.    Low Salt Diet: low salt    Amount of exercise or physical activity: None    Problems taking medications regularly: No    Medication side effects: none    Diet: low salt    2. Medication Followup of All meds    Taking Medication as prescribed: yes    Side Effects:  None    Medication Helping Symptoms:  yes     Patient is here today to follow up on blood pressure and refill medication  Denies chest pain, shortness of breath, does have some leg swelling   Improves with use of compression stockings but then upper legs swell  No concerns today  Needs medication refills    Problem list and histories reviewed & adjusted, as indicated.  Additional history: as documented    Patient Active Problem List   Diagnosis     Essential hypertension with goal blood pressure less than 140/90     Internal carotid artery stenosis, bilateral     Benign essential hypertension     Edema, unspecified type     Type 2 diabetes mellitus without complication (H)     Anemia     Iron deficiency anemia due to chronic blood loss     Localized edema     Past Surgical History:   Procedure Laterality Date     COLONOSCOPY N/A 5/22/2018    Procedure: COMBINED COLONOSCOPY, SINGLE OR MULTIPLE BIOPSY/POLYPECTOMY BY BIOPSY;  COLONOSCOPY  Room 521, with polypectomy x1 using cold biopsy forceps;  Surgeon: Charlie Rabago MD;  Location:  GI     ESOPHAGOSCOPY, GASTROSCOPY, DUODENOSCOPY (EGD), COMBINED N/A 5/21/2018    Procedure: COMBINED ESOPHAGOSCOPY, GASTROSCOPY, DUODENOSCOPY (EGD), BIOPSY SINGLE OR MULTIPLE;  ESOPHAGOSCOPY, GASTROSCOPY, DUODENOSCOPY (EGD)  Room 521 and cold biopsies;  Surgeon: Charlie Rabago MD;  Location:  GI     LAPAROSCOPIC CHOLECYSTECTOMY N/A 1/6/2017    Procedure: LAPAROSCOPIC CHOLECYSTECTOMY;  Surgeon: Rosales  Michael Betts MD;  Location:  OR       Social History   Substance Use Topics     Smoking status: Never Smoker     Smokeless tobacco: Never Used     Alcohol use No     Family History   Problem Relation Age of Onset     Unknown/Adopted No family hx of          Current Outpatient Prescriptions   Medication Sig Dispense Refill     atorvastatin (LIPITOR) 80 MG tablet Take 1 tablet (80 mg) by mouth daily 90 tablet 3     blood glucose (NO BRAND SPECIFIED) lancets standard Use to test blood sugar 3 times daily or as directed. 100 each 11     blood glucose monitoring (NO BRAND SPECIFIED) test strip Use to test blood sugars 3 times daily or as directed 100 strip 11     furosemide (LASIX) 20 MG tablet Take 20 mg by mouth daily  0     glipiZIDE (GLUCOTROL) 5 MG tablet Take 1 tablet (5 mg) by mouth every morning (before breakfast) 90 tablet 1     hydrALAZINE (APRESOLINE) 50 MG tablet Take 1.5 tablets (75 mg) by mouth 3 times daily 135 tablet 1     hydrochlorothiazide (HYDRODIURIL) 25 MG tablet Take 1 tablet (25 mg) by mouth daily 90 tablet 1     irbesartan (AVAPRO) 300 MG tablet Take 300 mg by mouth daily  3     metFORMIN (GLUCOPHAGE) 1000 MG tablet TAKE 1 TABLET BY MOUTH TWICE DAILY WITH MEALS 60 tablet 5     omeprazole (PRILOSEC) 20 MG CR capsule Take 1 capsule (20 mg) by mouth every morning (before breakfast) 90 capsule 11     order for DME Equipment being ordered: thigh high compression stockings 20mmg Hg 1 Units 0     order for DME Equipment being ordered: blood pressure cuff 1 Units 0     [DISCONTINUED] atorvastatin (LIPITOR) 80 MG tablet Take 1 tablet (80 mg) by mouth daily 90 tablet 3     [DISCONTINUED] glipiZIDE (GLUCOTROL) 5 MG tablet Take 1 tablet (5 mg) by mouth every morning (before breakfast) 90 tablet 1     [DISCONTINUED] hydrALAZINE (APRESOLINE) 50 MG tablet Take 1.5 tablets (75 mg) by mouth 3 times daily 135 tablet 1     [DISCONTINUED] hydrochlorothiazide (HYDRODIURIL) 25 MG tablet Take 1 tablet (25 mg) by  "mouth daily 90 tablet 1     [DISCONTINUED] metFORMIN (GLUCOPHAGE) 1000 MG tablet TAKE 1 TABLET BY MOUTH TWICE DAILY WITH MEALS 60 tablet 5     No Known Allergies    Reviewed and updated as needed this visit by clinical staff  Tobacco  Allergies  Meds  Med Hx  Surg Hx  Fam Hx  Soc Hx      Reviewed and updated as needed this visit by Provider         ROS:  Constitutional, HEENT, cardiovascular, pulmonary, gi and gu systems are negative, except as otherwise noted.    OBJECTIVE:     /56 (BP Location: Right arm, Patient Position: Chair, Cuff Size: Adult Regular)  Pulse 59  Temp 97.9  F (36.6  C) (Oral)  Resp 16  Ht 5' 4\" (1.626 m)  Wt 140 lb 9.6 oz (63.8 kg)  SpO2 100%  BMI 24.13 kg/m2  Body mass index is 24.13 kg/(m^2).  GENERAL: healthy, alert and no distress  NECK: no adenopathy, no asymmetry, masses, or scars and thyroid normal to palpation  RESP: lungs clear to auscultation - no rales, rhonchi or wheezes  CV: regular rate and rhythm, normal S1 S2, no S3 or S4, no murmur, click or rub, no peripheral edema and peripheral pulses strong  MS: no gross musculoskeletal defects noted, no edema    Diagnostic Test Results:  none     ASSESSMENT/PLAN:             1. Resistant hypertension  Chronic issue, refilled medication, still high today in office. Recommend f/u with Cardiology.  - hydrALAZINE (APRESOLINE) 50 MG tablet; Take 1.5 tablets (75 mg) by mouth 3 times daily  Dispense: 135 tablet; Refill: 1  - hydrochlorothiazide (HYDRODIURIL) 25 MG tablet; Take 1 tablet (25 mg) by mouth daily  Dispense: 90 tablet; Refill: 1    2. Type 2 diabetes mellitus with hyperglycemia, without long-term current use of insulin (H)  - atorvastatin (LIPITOR) 80 MG tablet; Take 1 tablet (80 mg) by mouth daily  Dispense: 90 tablet; Refill: 3  - blood glucose (NO BRAND SPECIFIED) lancets standard; Use to test blood sugar 3 times daily or as directed.  Dispense: 100 each; Refill: 11  - blood glucose monitoring (NO BRAND SPECIFIED) " test strip; Use to test blood sugars 3 times daily or as directed  Dispense: 100 strip; Refill: 11  - glipiZIDE (GLUCOTROL) 5 MG tablet; Take 1 tablet (5 mg) by mouth every morning (before breakfast)  Dispense: 90 tablet; Refill: 1  - metFORMIN (GLUCOPHAGE) 1000 MG tablet; TAKE 1 TABLET BY MOUTH TWICE DAILY WITH MEALS  Dispense: 60 tablet; Refill: 5    3. Localized edema  Chronic issue, medication refilled.  Will trial thigh high stockings to see if edema improves.  - hydrALAZINE (APRESOLINE) 50 MG tablet; Take 1.5 tablets (75 mg) by mouth 3 times daily  Dispense: 135 tablet; Refill: 1  - hydrochlorothiazide (HYDRODIURIL) 25 MG tablet; Take 1 tablet (25 mg) by mouth daily  Dispense: 90 tablet; Refill: 1  - order for DME; Equipment being ordered: thigh high compression stockings 20mmg Hg  Dispense: 1 Units; Refill: 0    4. Gastrointestinal hemorrhage, unspecified gastrointestinal hemorrhage type  - omeprazole (PRILOSEC) 20 MG CR capsule; Take 1 capsule (20 mg) by mouth every morning (before breakfast)  Dispense: 90 capsule; Refill: 11    5. Need for prophylactic vaccination and inoculation against influenza  - FLU VACCINE, INCREASED ANTIGEN, PRESV FREE, AGE 65+ [77309]  - ADMIN INFLUENZA (For MEDICARE Patients ONLY) []    Risks, benefits and alternatives were discussed with patient. Agreeable to the plan of care.      Kathi Mondragon PA-C  [facility]    Injectable Influenza Immunization Documentation    1.  Is the person to be vaccinated sick today?   No    2. Does the person to be vaccinated have an allergy to a component   of the vaccine?   No  Egg Allergy Algorithm Link    3. Has the person to be vaccinated ever had a serious reaction   to influenza vaccine in the past?   No    4. Has the person to be vaccinated ever had Guillain-Barré syndrome?   No    Form completed by Gayatri Whitehead CMA ([facility])

## 2018-10-15 NOTE — PROGRESS NOTES
[facility]  HEMATOLOGY AND ONCOLOGY    FOLLOW-UP VISIT NOTE    PATIENT NAME: Shell Leon MRN # [id number]  DATE OF VISIT: Sep 21, 2018 YOB: 1950    REFERRING PROVIDER: No referring provider defined for this encounter.    REFERRING PROVIDER: DELPHINE Avendano Ra CNP  [address] [place] [address]    DIAGNOSIS:  Iron deficiency anemia  Gastric - MALT lymphoma    SUBJECTIVE   Shell Leon is 68 year old gentleman with HTN, DM TII, PVD who has been referred for severe iron deficiency anemia.    He comes for a scheduled follow up visit. He has been doing well since last visit.       PAST MEDICAL HISTORY     Past Medical History:   Diagnosis Date     Diabetes (H)      Hypertension          CURRENT OUTPATIENT MEDICATIONS     Current Outpatient Prescriptions   Medication Sig     order for DME Equipment being ordered: blood pressure cuff     atorvastatin (LIPITOR) 80 MG tablet Take 1 tablet (80 mg) by mouth daily     blood glucose (NO BRAND SPECIFIED) lancets standard Use to test blood sugar 3 times daily or as directed.     blood glucose monitoring (NO BRAND SPECIFIED) test strip Use to test blood sugars 3 times daily or as directed     furosemide (LASIX) 20 MG tablet Take 20 mg by mouth daily     glipiZIDE (GLUCOTROL) 5 MG tablet Take 1 tablet (5 mg) by mouth every morning (before breakfast)     hydrALAZINE (APRESOLINE) 50 MG tablet Take 1.5 tablets (75 mg) by mouth 3 times daily     hydrochlorothiazide (HYDRODIURIL) 25 MG tablet Take 1 tablet (25 mg) by mouth daily     irbesartan (AVAPRO) 300 MG tablet Take 300 mg by mouth daily     metFORMIN (GLUCOPHAGE) 1000 MG tablet TAKE 1 TABLET BY MOUTH TWICE DAILY WITH MEALS     omeprazole (PRILOSEC) 20 MG CR capsule Take 1 capsule (20 mg) by mouth every morning (before breakfast)     order for DME Equipment being ordered: thigh high compression stockings 20mmg Hg     No current facility-administered medications for this  "visit.         ALLERGIES    No Known Allergies     REVIEW OF SYSTEMS   As above in the HPI, o/w complete 12-point ROS was negative.     PHYSICAL EXAM   /63  Pulse 58  Temp 97.2  F (36.2  C) (Tympanic)  Resp 16  Ht 1.626 m (5' 4\")  Wt 65.6 kg (144 lb 9.6 oz)  SpO2 99%  BMI 24.82 kg/m2  GEN: NAD  HEENT: PERRL, EOMI, no icterus, injection or pallor. Oropharynx is clear.  LYMPHATICs: no cervical or supraclavicular lymphadenopathy; no other abn lymphadenopathy  PULMONARY: clear with good air entry bilaterally  CARDIOVASCULAR: regular, no murmurs, rubs, or gallops  GASTROINTESTINAL: soft, non-tender, non-distended, normal bowel sounds, no hepatosplenomegaly by percussion or palpation  MUSCULOSKELTAL: warm, well perfused, no edema  NEURO: awake, alert and oriented to time place and person, cranial nerves intact - II - XII, no focal neurologic deficits  SKIN: no rashes     LABORATORY AND IMAGING STUDIES     Recent Labs   Lab Test  08/09/18   1526  07/25/18   1651  07/16/18   1549  06/06/18   1538  05/25/18   1512   NA  131*  134  132*  140  135   POTASSIUM  5.5*  4.7  4.6  5.2  5.1   CHLORIDE  105  104  103  112*  106   CO2  20  22  22  23  16*   ANIONGAP  6  8  7  5  13   BUN  25  34*  25  28  19   CR  0.99  0.99  1.02  1.05  1.07   GLC  71  185*  225*  113*  75   STEVE  8.3*  7.9*  7.9*  8.8  8.3*     No results for input(s): MAG, PHOS in the last 27453 hours.  Recent Labs   Lab Test  08/24/18   1553  07/25/18   1651  05/27/18   1604  05/25/18   1512  05/22/18   1419  05/21/18   0646  05/20/18   1718  01/03/17 2011   WBC  6.7  7.6  10.3   --    --   6.4  8.7  7.2   HGB  9.3*  8.5*  8.8*  8.2*  9.5*  8.3*  6.4*  13.8   PLT  185  203  213   --    --   228  273  211   MCV  92  83  80   --    --   79  76*  88   NEUTROPHIL  65.2   --   61.6   --    --   44.2  60.7  69.1     Recent Labs   Lab Test  05/20/18   1718  08/10/17   1635  01/03/17 2011   BILITOTAL  0.3  0.3  0.7   ALKPHOS  98  88  71   ALT  38  42  30 "   AST  40  40  22   ALBUMIN  3.5  3.6  4.2   LDH  316*   --    --      TSH   Date Value Ref Range Status   04/26/2017 1.42 0.40 - 4.00 mU/L Final     No results for input(s): CEA in the last 59524 hours.  Results for orders placed or performed during the hospital encounter of 07/09/18   CT Abdomen Pelvis w Contrast    Narrative    CT ABDOMEN/PELVIS WITH CONTRAST July 9, 2018 4:21 PM     HISTORY: Resistant hypertension.    TECHNIQUE: 66mL Isovue-370 IV were administered. After contrast  administration, volumetric helical sections were acquired from the  lung bases to the ischial tuberosities. Coronal images were also  reconstructed. Radiation dose for this scan was reduced using  automated exposure control, adjustment of the mA and/or kV according  to patient size, or iterative reconstruction technique.    COMPARISON: CT of the abdomen and pelvis performed 1/3/2017.     FINDINGS: Prior cholecystectomy. The liver, spleen, adrenal glands,  pancreas, and kidneys are unremarkable. No hydronephrosis. Mild  atherosclerotic aortoiliac calcification. No bowel obstruction. No  convincing evidence for colitis or diverticulitis. The appendix is not  visualized. There is moderate prostatic enlargement. Mild  retroperitoneal adenopathy is new since the previous exam. For  example, a mildly enlarged left para-aortic lymph node (series 2 image  34) measures 1.3 x 1.2 cm. Small hiatal hernia. Coronary artery  calcification. The visualized lung bases are clear.         Impression    IMPRESSION:   1. No acute abnormality in the abdomen or pelvis. No cause for  hypertension is identified.  2. Mild retroperitoneal adenopathy is nonspecific, and is new since  the previous exam.   3. Small hiatal hernia.    GAIL SHULTZ MD         ASSESSMENT AND PLAN   Severe iron deficiency anemia  GI-MALT lymphoma of stomach  Poorly controlled hypertension  Bilateral pedal edema  Diabetes mellitus type 2 and peripheral vascular disease    He has  markedly elevated blood pressure again at this visit. I think this should be the most serious health concern that he has.     His hemoglobin has been trending up with oral supplementation. I will refer him back to his PCP for HTN management. I will see him in 6 months with labs prior to visit    His GI work up was not available at the time of visit and has been scanned in system after visit. EGD has revealed a small foci of MALT lymphoma involving the stomach. This is a low grade lymphoma often associated with Helicobacter pylori infection. His H. Pylori studies were negative.     I would review his case with his GI. He would benefit from curative intent radiation to the area of disease if H. Pylori evaluation is completely negative.

## 2018-10-15 NOTE — TELEPHONE ENCOUNTER
One Touch Verio Flex Meter      Last Written Prescription Date:  na  Last Fill Quantity: na,   # refills: na  Last Office Visit: 10/12/2018    Future Office visit:    Next 5 appointments (look out 90 days)     Oct 17, 2018  9:00 AM CDT   Return Visit with Cresencio Landry MD   [facility] ([facility])    [address] [address] [address]   [phone]            Nov 23, 2018  3:50 PM CST   SHORT with Kathi Mondragon PA-C   [facility]    [address] [facility] [address] [phone]                   Routing refill request to provider for review/approval because:  Drug not active on patient's medication list

## 2018-10-25 NOTE — TELEPHONE ENCOUNTER
Pharmacy notes:  We gave him a meter to get insurance to pay. Not eligible for free meter program.

## 2018-10-29 ENCOUNTER — TRANSFERRED RECORDS (OUTPATIENT)
Dept: HEALTH INFORMATION MANAGEMENT | Facility: CLINIC | Age: 68
End: 2018-10-29

## 2018-11-06 NOTE — TELEPHONE ENCOUNTER
Stephanie from [facility] calls and is requesting a one touch verio flex meter order be sent over.  They need an order to be sent to insurance.  She said this needs to be dated 10/12/18 when she gave him the meter.  Jonn'd up.      Will forward to Kathi Alves for advisal.

## 2018-11-09 PROBLEM — E11.3553 STABLE PROLIFERATIVE DIABETIC RETINOPATHY OF BOTH EYES ASSOCIATED WITH TYPE 2 DIABETES MELLITUS (H): Status: ACTIVE | Noted: 2018-11-09

## 2018-11-09 PROBLEM — E11.311 DIABETIC MACULAR EDEMA (H): Status: ACTIVE | Noted: 2018-11-09

## 2018-11-23 ENCOUNTER — OFFICE VISIT (OUTPATIENT)
Dept: FAMILY MEDICINE | Facility: CLINIC | Age: 68
End: 2018-11-23

## 2018-11-23 VITALS
TEMPERATURE: 97.7 F | RESPIRATION RATE: 16 BRPM | HEIGHT: 64 IN | BODY MASS INDEX: 24.3 KG/M2 | HEART RATE: 62 BPM | DIASTOLIC BLOOD PRESSURE: 74 MMHG | OXYGEN SATURATION: 98 % | WEIGHT: 142.3 LBS | SYSTOLIC BLOOD PRESSURE: 158 MMHG

## 2018-11-23 DIAGNOSIS — I1A.0 RESISTANT HYPERTENSION: ICD-10-CM

## 2018-11-23 DIAGNOSIS — E11.3513 TYPE 2 DIABETES MELLITUS WITH BOTH EYES AFFECTED BY PROLIFERATIVE RETINOPATHY AND MACULAR EDEMA, WITHOUT LONG-TERM CURRENT USE OF INSULIN (H): ICD-10-CM

## 2018-11-23 DIAGNOSIS — E11.3513 TYPE 2 DIABETES MELLITUS WITH BOTH EYES AFFECTED BY PROLIFERATIVE RETINOPATHY AND MACULAR EDEMA, WITHOUT LONG-TERM CURRENT USE OF INSULIN (H): Primary | ICD-10-CM

## 2018-11-23 LAB — HBA1C MFR BLD: 5.8 % (ref 0–5.6)

## 2018-11-23 PROCEDURE — 83036 HEMOGLOBIN GLYCOSYLATED A1C: CPT | Performed by: PHYSICIAN ASSISTANT

## 2018-11-23 PROCEDURE — 99213 OFFICE O/P EST LOW 20 MIN: CPT | Performed by: PHYSICIAN ASSISTANT

## 2018-11-23 PROCEDURE — 36415 COLL VENOUS BLD VENIPUNCTURE: CPT | Performed by: PHYSICIAN ASSISTANT

## 2018-11-23 NOTE — MR AVS SNAPSHOT
After Visit Summary   11/23/2018    Shell Leon    MRN: [id number]           Patient Information     Date Of Birth          1950        Visit Information        Provider Department      11/23/2018 3:50 PM Kathi Mondragon PA-C [facility]        Today's Diagnoses     Type 2 diabetes mellitus with both eyes affected by proliferative retinopathy and macular edema, without long-term current use of insulin (H)    -  1    Resistant hypertension           Follow-ups after your visit        Follow-up notes from your care team     Return in about 6 months (around 5/23/2019) for Diabetic check.      Your next 10 appointments already scheduled     Dec 13, 2018  9:30 AM CST   Return Visit with Cresencio Landry MD   [facility] ([facility])    [address]  [facility] [address]   [phone]            Mar 15, 2019  3:30 PM CDT   Return Visit with RH ONCOLOGY NURSE   [facility] ([facility])    [facility]  [address]  [place] [address]   [phone]            Mar 26, 2019  3:45 PM CDT   Return Visit with Lonny Lopez MD   [facility] ([facility])    [facility]  [address]  [place] [address]   [phone]              Who to contact     If you have questions or need follow up information about today's clinic visit or your schedule please contact [facility] directly at [phone].  Normal or non-critical lab and imaging results will be communicated to you by MyChart, letter or phone within 4 business days after the clinic has received the results. If you do not hear from us within 7 days, please contact the clinic through MyChart or phone. If you have a critical or abnormal lab result, we will notify you by phone as soon as  "possible.  Submit refill requests through [employer] or call your pharmacy and they will forward the refill request to us. Please allow 3 business days for your refill to be completed.          Additional Information About Your Visit        [employer] Information     [employer] lets you send messages to your doctor, view your test results, renew your prescriptions, schedule appointments and more. To sign up, go to [email or web address] . Click on \"Log in\" on the left side of the screen, which will take you to the Welcome page. Then click on \"Sign up Now\" on the right side of the page.     You will be asked to enter the access code listed below, as well as some personal information. Please follow the directions to create your username and password.     Your access code is: [email or web address]  Expires: 2019  3:59 PM     Your access code will  in 90 days. If you need help or a new code, please call your [facility] clinic or [phone].        Care EveryWhere ID     This is your Care EveryWhere ID. This could be used by other organizations to access your [facility] medical records  [id number]        Your Vitals Were     Pulse Temperature Respirations Height Pulse Oximetry BMI (Body Mass Index)    62 97.7  F (36.5  C) (Oral) 16 5' 4\" (1.626 m) 98% 24.43 kg/m2       Blood Pressure from Last 3 Encounters:   18 158/74   10/12/18 156/56   18 195/63    Weight from Last 3 Encounters:   18 142 lb 4.8 oz (64.5 kg)   10/12/18 140 lb 9.6 oz (63.8 kg)   18 144 lb 9.6 oz (65.6 kg)               Primary Care Provider Office Phone # Fax #    Kathi Mondragon PA-C [phone] [address] [facility] [address]        Equal Access to Services     [facility] MARGOT : Hadii aad ku hadasho Soomaali, waaxda luqadaha, qaybta kaalmabrian king, zachary vela. So [facility] [phone].    ATENCIÓN: Si habla español, tiene a sullivan disposición servicios gratuitos de " asistencia lingüística. July al [phone].    We comply with applicable federal civil rights laws and [place] laws. We do not discriminate on the basis of race, color, national origin, age, disability, sex, sexual orientation, or gender identity.            Thank you!     Thank you for choosing [facility]  for your care. Our goal is always to provide you with excellent care. Hearing back from our patients is one way we can continue to improve our services. Please take a few minutes to complete the written survey that you may receive in the mail after your visit with us. Thank you!             Your Updated Medication List - Protect others around you: Learn how to safely use, store and throw away your medicines at www.disposemymeds.org.          This list is accurate as of 11/23/18  3:59 PM.  Always use your most recent med list.                   Brand Name Dispense Instructions for use Diagnosis    atorvastatin 80 MG tablet    LIPITOR    90 tablet    Take 1 tablet (80 mg) by mouth daily    Type 2 diabetes mellitus with hyperglycemia, without long-term current use of insulin (H)       blood glucose lancets standard    no brand specified    100 each    Use to test blood sugar 3 times daily or as directed.    Type 2 diabetes mellitus with hyperglycemia, without long-term current use of insulin (H)       blood glucose monitoring meter device kit    no brand specified    1 kit    Use to test blood sugar 3 times daily or as directed.    Type 2 diabetes mellitus without complication, without long-term current use of insulin (H)       blood glucose monitoring test strip    no brand specified    100 strip    Use to test blood sugars 3 times daily or as directed    Type 2 diabetes mellitus with hyperglycemia, without long-term current use of insulin (H)       furosemide 20 MG tablet    LASIX     Take 20 mg by mouth daily        glipiZIDE 5 MG tablet    GLUCOTROL    90 tablet    Take 1 tablet (5 mg) by  mouth every morning (before breakfast)    Type 2 diabetes mellitus with hyperglycemia, without long-term current use of insulin (H)       hydrALAZINE 50 MG tablet    APRESOLINE    135 tablet    Take 1.5 tablets (75 mg) by mouth 3 times daily    Resistant hypertension, Localized edema       hydrochlorothiazide 25 MG tablet    HYDRODIURIL    90 tablet    Take 1 tablet (25 mg) by mouth daily    Localized edema, Resistant hypertension       irbesartan 300 MG tablet    AVAPRO     Take 300 mg by mouth daily        metFORMIN 1000 MG tablet    GLUCOPHAGE    60 tablet    TAKE 1 TABLET BY MOUTH TWICE DAILY WITH MEALS    Type 2 diabetes mellitus with hyperglycemia, without long-term current use of insulin (H)       omeprazole 20 MG CR capsule    priLOSEC    90 capsule    Take 1 capsule (20 mg) by mouth every morning (before breakfast)    Gastrointestinal hemorrhage, unspecified gastrointestinal hemorrhage type       order for DME     1 Units    Equipment being ordered: blood pressure cuff    Resistant hypertension       order for DME     1 Units    Equipment being ordered: thigh high compression stockings 20mmg Hg    Localized edema

## 2018-11-23 NOTE — PROGRESS NOTES
SUBJECTIVE:   Shell Leon is a 68 year old male who presents to clinic today for the following health issues:      Diabetes Follow-up      Patient is checking blood sugars: every other day in the AM, results have been in the 80s    Diabetic concerns: None     Symptoms of hypoglycemia (low blood sugar): none     Paresthesias (numbness or burning in feet) or sores: No     Date of last diabetic eye exam: recently scanned    BP Readings from Last 2 Encounters:   11/23/18 158/74   10/12/18 156/56     Hemoglobin A1C (%)   Date Value   11/23/2018 5.8 (H)   05/20/2018 6.0 (H)     LDL Cholesterol Calculated (mg/dL)   Date Value   04/16/2018 55   09/07/2016 105 (H)       Diabetes Management Resources    Amount of exercise or physical activity: None    Problems taking medications regularly: No    Medication side effects: none    Diet: low salt    Patient is here today for diabetes check up  Has been checking sugars, usually getting mid 80s in the am, nothing lower  No s/e from medication  No chest pain, shortness of breath, no nausea or vomiting, no rash or sores  Feeling well    In regards to blood pressure, has appt with Cardiology next month to follow up on this      Problem list and histories reviewed & adjusted, as indicated.  Additional history: as documented    Patient Active Problem List   Diagnosis     Essential hypertension with goal blood pressure less than 140/90     Internal carotid artery stenosis, bilateral     Benign essential hypertension     Edema, unspecified type     Type 2 diabetes mellitus with both eyes affected by proliferative retinopathy and macular edema, without long-term current use of insulin (H)     Anemia     Iron deficiency anemia due to chronic blood loss     Localized edema     MALT lymphoma (H)     Diabetic macular edema (H)     Stable proliferative diabetic retinopathy of both eyes associated with type 2 diabetes mellitus (H)     Past Surgical History:   Procedure Laterality Date      COLONOSCOPY N/A 5/22/2018    Procedure: COMBINED COLONOSCOPY, SINGLE OR MULTIPLE BIOPSY/POLYPECTOMY BY BIOPSY;  COLONOSCOPY  Room 521, with polypectomy x1 using cold biopsy forceps;  Surgeon: Charlie Rabago MD;  Location:  GI     ESOPHAGOSCOPY, GASTROSCOPY, DUODENOSCOPY (EGD), COMBINED N/A 5/21/2018    Procedure: COMBINED ESOPHAGOSCOPY, GASTROSCOPY, DUODENOSCOPY (EGD), BIOPSY SINGLE OR MULTIPLE;  ESOPHAGOSCOPY, GASTROSCOPY, DUODENOSCOPY (EGD)  Room 521 and cold biopsies;  Surgeon: Charlie Rabago MD;  Location:  GI     LAPAROSCOPIC CHOLECYSTECTOMY N/A 1/6/2017    Procedure: LAPAROSCOPIC CHOLECYSTECTOMY;  Surgeon: Michael Caba MD;  Location:  OR       Social History   Substance Use Topics     Smoking status: Never Smoker     Smokeless tobacco: Never Used     Alcohol use No     Family History   Problem Relation Age of Onset     Unknown/Adopted No family hx of          Current Outpatient Prescriptions   Medication Sig Dispense Refill     atorvastatin (LIPITOR) 80 MG tablet Take 1 tablet (80 mg) by mouth daily 90 tablet 3     blood glucose (NO BRAND SPECIFIED) lancets standard Use to test blood sugar 3 times daily or as directed. 100 each 11     blood glucose monitoring (NO BRAND SPECIFIED) meter device kit Use to test blood sugar 3 times daily or as directed. 1 kit 0     blood glucose monitoring (NO BRAND SPECIFIED) test strip Use to test blood sugars 3 times daily or as directed 100 strip 11     furosemide (LASIX) 20 MG tablet Take 20 mg by mouth daily  0     glipiZIDE (GLUCOTROL) 5 MG tablet Take 1 tablet (5 mg) by mouth every morning (before breakfast) 90 tablet 1     hydrALAZINE (APRESOLINE) 50 MG tablet Take 1.5 tablets (75 mg) by mouth 3 times daily 135 tablet 1     hydrochlorothiazide (HYDRODIURIL) 25 MG tablet Take 1 tablet (25 mg) by mouth daily 90 tablet 1     irbesartan (AVAPRO) 300 MG tablet Take 300 mg by mouth daily  3     metFORMIN (GLUCOPHAGE) 1000 MG tablet TAKE 1  "TABLET BY MOUTH TWICE DAILY WITH MEALS 60 tablet 5     omeprazole (PRILOSEC) 20 MG CR capsule Take 1 capsule (20 mg) by mouth every morning (before breakfast) 90 capsule 11     order for DME Equipment being ordered: thigh high compression stockings 20mmg Hg 1 Units 0     order for DME Equipment being ordered: blood pressure cuff 1 Units 0     No Known Allergies    Reviewed and updated as needed this visit by clinical staff  Tobacco  Allergies  Meds  Med Hx  Surg Hx  Fam Hx  Soc Hx      Reviewed and updated as needed this visit by Provider         ROS:  Constitutional, HEENT, cardiovascular, pulmonary, gi and gu systems are negative, except as otherwise noted.    OBJECTIVE:     /74 (BP Location: Right arm, Patient Position: Chair, Cuff Size: Adult Regular)  Pulse 62  Temp 97.7  F (36.5  C) (Oral)  Resp 16  Ht 5' 4\" (1.626 m)  Wt 142 lb 4.8 oz (64.5 kg)  SpO2 98%  BMI 24.43 kg/m2  Body mass index is 24.43 kg/(m^2).  GENERAL: healthy, alert and no distress  NECK: no adenopathy, no asymmetry, masses, or scars and thyroid normal to palpation  RESP: lungs clear to auscultation - no rales, rhonchi or wheezes  CV: regular rate and rhythm, normal S1 S2, no S3 or S4, no murmur, click or rub, no peripheral edema and peripheral pulses strong  MS: no gross musculoskeletal defects noted, no edema    Diagnostic Test Results:  Results for orders placed or performed in visit on 11/23/18 (from the past 24 hour(s))   Hemoglobin A1c   Result Value Ref Range    Hemoglobin A1C 5.8 (H) 0 - 5.6 %       ASSESSMENT/PLAN:             1. Type 2 diabetes mellitus with both eyes affected by proliferative retinopathy and macular edema, without long-term current use of insulin (H)  Chronic issue, A1C updated today and well controlled.  Continue medication without change. F/U with eye doctors for macular edema.  Continue checking glucose in AM every other day.  Due for next DM check in 6 months.  - Hemoglobin A1c; Future    2. " Resistant hypertension  Chronic issue, BP elevated today, follow up with Cardiology for medication adjustment, has scheduled appt.      Risks, benefits and alternatives were discussed with patient. Agreeable to the plan of care.      Kathi Mondragon PA-C  [facility]

## 2018-12-13 ENCOUNTER — OFFICE VISIT (OUTPATIENT)
Dept: CARDIOLOGY | Facility: CLINIC | Age: 68
End: 2018-12-13
Attending: INTERNAL MEDICINE
Payer: COMMERCIAL

## 2018-12-13 VITALS
SYSTOLIC BLOOD PRESSURE: 152 MMHG | HEART RATE: 62 BPM | WEIGHT: 144.8 LBS | BODY MASS INDEX: 24.72 KG/M2 | HEIGHT: 64 IN | DIASTOLIC BLOOD PRESSURE: 66 MMHG

## 2018-12-13 DIAGNOSIS — R60.0 LOCALIZED EDEMA: ICD-10-CM

## 2018-12-13 DIAGNOSIS — I1A.0 RESISTANT HYPERTENSION: Primary | ICD-10-CM

## 2018-12-13 DIAGNOSIS — I10 BENIGN ESSENTIAL HYPERTENSION: ICD-10-CM

## 2018-12-13 DIAGNOSIS — I65.23 BILATERAL CAROTID ARTERY STENOSIS: ICD-10-CM

## 2018-12-13 PROCEDURE — 99214 OFFICE O/P EST MOD 30 MIN: CPT | Performed by: INTERNAL MEDICINE

## 2018-12-13 RX ORDER — HYDRALAZINE HYDROCHLORIDE 100 MG/1
100 TABLET, FILM COATED ORAL 3 TIMES DAILY
Qty: 90 TABLET | Refills: 3 | Status: ON HOLD | OUTPATIENT
Start: 2018-12-13 | End: 2019-05-04

## 2018-12-13 RX ORDER — FUROSEMIDE 20 MG
20 TABLET ORAL DAILY
Qty: 60 TABLET | Refills: 3 | Status: ON HOLD | OUTPATIENT
Start: 2018-12-13 | End: 2019-05-04

## 2018-12-13 RX ORDER — LISINOPRIL 40 MG/1
40 TABLET ORAL DAILY
Qty: 90 TABLET | Refills: 3 | Status: ON HOLD | OUTPATIENT
Start: 2018-12-13 | End: 2019-05-04

## 2018-12-13 ASSESSMENT — MIFFLIN-ST. JEOR: SCORE: 1333.06

## 2018-12-13 NOTE — LETTER
12/13/2018    Kathi Mondragon PA-C  [id number]  [facility] [address]    RE: Shell Leon       Dear Colleague,    I had the pleasure of seeing Shell Leon in the [facility].        HPI and Plan:   HPI and Plan:      REASON FOR VISIT:   1.  Resistant hypertension.      HISTORY OF PRESENT ILLNESS:  I had the pleasure of seeing Shell Leon at the [facility] in [place] this morning.  He is a very pleasant 68-year-old gentleman with peripheral arterial disease, hypertension and diabetes who is here for followup.  He has had difficult-to-control hypertension over the years.  He is on multiple antihypertensives and his blood pressure has improved compared to previously, but still not optimally controlled.      We did perform a renal ultrasound in the past which showed no significant renal artery stenosis.  We also performed labs to assess possible other causes of secondary hypertension.  His serum aldosterone and renin were normal.  His serum normetanephrine was elevated consistent with possible pheo.  We subsequently seen by Endocrinology and they determined that he did not have pheochromocytoma.  He also did not uncover any endocrine causes of secondary hypertension.     Over the past few months he has developed lower extremity pitting edema bilaterally.  He was also found to be anemic with hemoglobin in the 6 range over the summer.  He underwent colonoscopy and upper endoscopy.  No evidence of active bleeding was seen. 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 His H. Pylori studies were negative.  He was subsequently seen by hematology and diagnosed with severe iron deficiency anemia and is currently getting iron replacement.  His hemoglobin is improving.  He continues to have persistent lower external edema.    He continues to  have resistant hypertension.  His blood pressure medications have recently been adjusted by his primary provider.  He is currently on hydralazine 75 mg 3 times daily, hydrochlorothiazide 25 mg daily and irbesartan 300 mg daily.  He has had difficulty taking the irbesartan because of its taste.  His blood pressure is again elevated in the office today although much improved compared to prior readings.    IMPRESSION, REPORT AND PLAN:     1.  Resistant hypertension.   2.  Peripheral arterial disease, stable.   3.  Diabetes.   4.  Hyperlipidemia   5.  Lower extremity pitting edema bilaterally.   6.  Anemia.      His blood pressure remains elevated with systolic over 150 today in the office.  His medications have recently been adjusted but despite this he continues to have elevated pressures.  In reviewing his medication history I gather that he was on lisinopril in the past which was some what effective for him.  Given the difficulties had with the irbesartan I will have him switch to lisinopril.  Will discontinue the irbesartan and have him start lisinopril 40 mg daily.  Additionally will increase the hydralazine from  mg 3 times daily.  He will continue the hydrochlorothiazide 25 mg daily. Continue to monitor BP    The patient's lower extremity edema experience likely related to chronic venous insufficiency. He is wearing compression stockings at this point.  He did respond to furosemide in the past therefore we will prescribe him low-dose Lasix. We will also perform venous ultrasound to rule out significant reflux. BMP in 1 week.     It was a pleasure seeing Mr. Leon in the clinic this morning.  We will see him in approximately 3 months for follow-up but sooner if he develops symptoms.  I appreciate the opportunity to participate in this patient's care.         ALE SPICER MD         Orders Placed This Encounter   Procedures     CT Abdomen Pelvis w Contrast     Basic metabolic panel     Follow-Up with Nurse      ENDOCRINOLOGY ADULT REFERRAL       Orders Placed This Encounter   Medications     hydrochlorothiazide (HYDRODIURIL) 25 MG tablet     Sig: Take 25 mg by mouth daily     spironolactone (ALDACTONE) 25 MG tablet     Sig: Take 1 tablet (25 mg) by mouth daily     Dispense:  90 tablet     Refill:  3       Medications Discontinued During This Encounter   Medication Reason     spironolactone (ALDACTONE) 25 MG tablet Reorder         Encounter Diagnoses   Name Primary?     Benign essential hypertension      Resistant hypertension Yes     Bilateral carotid artery stenosis      Localized edema      PAD (peripheral artery disease) (H)        CURRENT MEDICATIONS:  Current Outpatient Prescriptions   Medication Sig Dispense Refill     atorvastatin (LIPITOR) 80 MG tablet Take 1 tablet (80 mg) by mouth daily 90 tablet 3     blood glucose (NO BRAND SPECIFIED) lancets standard Use to test blood sugar 3 times daily or as directed. 1 Box 11     blood glucose monitoring (NO BRAND SPECIFIED) test strip Use to test blood sugars 3 times daily or as directed 100 strip 11     ferrous sulfate (IRON) 325 (65 Fe) MG tablet Take 325 mg by mouth daily (with breakfast)       glipiZIDE (GLUCOTROL) 5 MG tablet Take 1 tablet (5 mg) by mouth every morning (before breakfast) 90 tablet 1     hydrALAZINE (APRESOLINE) 50 MG tablet Take 1 tablet (50 mg) by mouth 3 times daily 180 tablet 3     hydrochlorothiazide (HYDRODIURIL) 25 MG tablet Take 25 mg by mouth daily       metFORMIN (GLUCOPHAGE) 1000 MG tablet TAKE 1 TABLET BY MOUTH TWICE DAILY WITH MEALS 60 tablet 5     order for DME Equipment being ordered: Compression stockings 2 each 1     order for DME Equipment being ordered: blood pressure cuff 1 Units 0     spironolactone (ALDACTONE) 25 MG tablet Take 1 tablet (25 mg) by mouth daily 90 tablet 3     valsartan (DIOVAN) 320 MG tablet Take 1 tablet (320 mg) by mouth daily 90 tablet 1     furosemide (LASIX) 20 MG tablet Take 1 tablet (20 mg) by mouth  daily (Patient not taking: Reported on 6/26/2018) 60 tablet 0     omeprazole (PRILOSEC) 20 MG CR capsule Take 1 capsule (20 mg) by mouth every morning (before breakfast) (Patient not taking: Reported on 6/26/2018) 30 capsule 0     [DISCONTINUED] spironolactone (ALDACTONE) 25 MG tablet Take 0.5 tablets (12.5 mg) by mouth daily 90 tablet 3       ALLERGIES   No Known Allergies    PAST MEDICAL HISTORY:  Past Medical History:   Diagnosis Date     Diabetes (H)      Hypertension        PAST SURGICAL HISTORY:  Past Surgical History:   Procedure Laterality Date     COLONOSCOPY N/A 5/22/2018    Procedure: COMBINED COLONOSCOPY, SINGLE OR MULTIPLE BIOPSY/POLYPECTOMY BY BIOPSY;  COLONOSCOPY  Room 521, with polypectomy x1 using cold biopsy forceps;  Surgeon: Charlie Rabago MD;  Location:  GI     ESOPHAGOSCOPY, GASTROSCOPY, DUODENOSCOPY (EGD), COMBINED N/A 5/21/2018    Procedure: COMBINED ESOPHAGOSCOPY, GASTROSCOPY, DUODENOSCOPY (EGD), BIOPSY SINGLE OR MULTIPLE;  ESOPHAGOSCOPY, GASTROSCOPY, DUODENOSCOPY (EGD)  Room 521 and cold biopsies;  Surgeon: Charlie Rabago MD;  Location:  GI     LAPAROSCOPIC CHOLECYSTECTOMY N/A 1/6/2017    Procedure: LAPAROSCOPIC CHOLECYSTECTOMY;  Surgeon: Michael Caba MD;  Location:  OR       FAMILY HISTORY:  Family History   Problem Relation Age of Onset     Unknown/Adopted No family hx of        SOCIAL HISTORY:  Social History     Social History     Marital status:      Spouse name: N/A     Number of children: N/A     Years of education: N/A     Social History Main Topics     Smoking status: Never Smoker     Smokeless tobacco: Never Used     Alcohol use No     Drug use: No     Sexual activity: No     Other Topics Concern     Parent/Sibling W/ Cabg, Mi Or Angioplasty Before 65f 55m? No     Social History Narrative       Review of Systems:  Skin:  Positive for bruising     Eyes:  Positive for glasses    ENT:  Negative      Respiratory:  Negative       Cardiovascular:     "Positive for;edema swelling in both legs   Gastroenterology: Negative      Genitourinary:  Negative      Musculoskeletal:  Negative      Neurologic:  Positive for numbness or tingling of feet    Psychiatric:  Negative      Heme/Lymph/Imm:  Positive for weight loss    Endocrine:  Positive for diabetes      Physical Exam:  Vitals: /50 (BP Location: Left arm, Patient Position: Sitting, Cuff Size: Adult Regular)  Pulse 54  Ht 1.626 m (5' 4\")  Wt 60.1 kg (132 lb 8 oz)  BMI 22.74 kg/m2    Constitutional:  cooperative;in no acute distress        Skin:  warm and dry to the touch          Head:  normocephalic        Eyes:           Lymph:      ENT:  no pallor or cyanosis        Neck:  carotid pulses are full and equal bilaterally;JVP normal right carotid bruit      Respiratory:  clear to auscultation         Cardiac: regular rhythm;normal S1 and S2;no murmurs, gallops or rubs detected                    2+         0   2+         0            GI:  abdomen soft;no masses;non-tender        Extremities and Muscular Skeletal:  no edema              Neurological:  no gross motor deficits        Psych:           CC  Kathi Mondragon PA-C  [id number] [place] [address]                Orders Placed This Encounter   Procedures     US Venous Competency Bilateral     Basic metabolic panel     Follow-Up with Cardiac Advanced Practice Provider       Orders Placed This Encounter   Medications     hydrALAZINE (APRESOLINE) 100 MG tablet     Sig: Take 1 tablet (100 mg) by mouth 3 times daily     Dispense:  90 tablet     Refill:  3     furosemide (LASIX) 20 MG tablet     Sig: Take 1 tablet (20 mg) by mouth daily     Dispense:  60 tablet     Refill:  3     lisinopril (PRINIVIL/ZESTRIL) 40 MG tablet     Sig: Take 1 tablet (40 mg) by mouth daily     Dispense:  90 tablet     Refill:  3       Medications Discontinued During This Encounter   Medication Reason     hydrALAZINE (APRESOLINE) 50 MG tablet      furosemide " (LASIX) 20 MG tablet Reorder     irbesartan (AVAPRO) 300 MG tablet          Encounter Diagnoses   Name Primary?     Benign essential hypertension      Localized edema      Resistant hypertension Yes     Bilateral carotid artery stenosis        CURRENT MEDICATIONS:  Current Outpatient Medications   Medication Sig Dispense Refill     atorvastatin (LIPITOR) 80 MG tablet Take 1 tablet (80 mg) by mouth daily 90 tablet 3     blood glucose (NO BRAND SPECIFIED) lancets standard Use to test blood sugar 3 times daily or as directed. 100 each 11     blood glucose monitoring (NO BRAND SPECIFIED) meter device kit Use to test blood sugar 3 times daily or as directed. 1 kit 0     blood glucose monitoring (NO BRAND SPECIFIED) test strip Use to test blood sugars 3 times daily or as directed 100 strip 11     furosemide (LASIX) 20 MG tablet Take 1 tablet (20 mg) by mouth daily 60 tablet 3     glipiZIDE (GLUCOTROL) 5 MG tablet Take 1 tablet (5 mg) by mouth every morning (before breakfast) 90 tablet 1     hydrALAZINE (APRESOLINE) 100 MG tablet Take 1 tablet (100 mg) by mouth 3 times daily 90 tablet 3     hydrochlorothiazide (HYDRODIURIL) 25 MG tablet Take 1 tablet (25 mg) by mouth daily 90 tablet 1     lisinopril (PRINIVIL/ZESTRIL) 40 MG tablet Take 1 tablet (40 mg) by mouth daily 90 tablet 3     metFORMIN (GLUCOPHAGE) 1000 MG tablet TAKE 1 TABLET BY MOUTH TWICE DAILY WITH MEALS 60 tablet 5     omeprazole (PRILOSEC) 20 MG CR capsule Take 1 capsule (20 mg) by mouth every morning (before breakfast) 90 capsule 11     order for DME Equipment being ordered: thigh high compression stockings 20mmg Hg 1 Units 0     order for DME Equipment being ordered: blood pressure cuff 1 Units 0       ALLERGIES   No Known Allergies    PAST MEDICAL HISTORY:  Past Medical History:   Diagnosis Date     Diabetes (H)      Hypertension        PAST SURGICAL HISTORY:  Past Surgical History:   Procedure Laterality Date     COLONOSCOPY N/A 5/22/2018    Procedure:  COMBINED COLONOSCOPY, SINGLE OR MULTIPLE BIOPSY/POLYPECTOMY BY BIOPSY;  COLONOSCOPY  Room 521, with polypectomy x1 using cold biopsy forceps;  Surgeon: Charlie Rabago MD;  Location:  GI     ESOPHAGOSCOPY, GASTROSCOPY, DUODENOSCOPY (EGD), COMBINED N/A 5/21/2018    Procedure: COMBINED ESOPHAGOSCOPY, GASTROSCOPY, DUODENOSCOPY (EGD), BIOPSY SINGLE OR MULTIPLE;  ESOPHAGOSCOPY, GASTROSCOPY, DUODENOSCOPY (EGD)  Room 521 and cold biopsies;  Surgeon: Charlie Rabago MD;  Location:  GI     LAPAROSCOPIC CHOLECYSTECTOMY N/A 1/6/2017    Procedure: LAPAROSCOPIC CHOLECYSTECTOMY;  Surgeon: Michael Caba MD;  Location: [facility] OR       FAMILY HISTORY:  Family History   Problem Relation Age of Onset     Unknown/Adopted No family hx of        SOCIAL HISTORY:  Social History     Socioeconomic History     Marital status:      Spouse name: None     Number of children: None     Years of education: None     Highest education level: None   Social Needs     Financial resource strain: None     Food insecurity - worry: None     Food insecurity - inability: None     Transportation needs - medical: None     Transportation needs - non-medical: None   Occupational History     None   Tobacco Use     Smoking status: Never Smoker     Smokeless tobacco: Never Used   Substance and Sexual Activity     Alcohol use: No     Alcohol/week: 0.0 oz     Drug use: No     Sexual activity: No   Other Topics Concern     Parent/sibling w/ CABG, MI or angioplasty before 65F 55M? No   Social History Narrative     None       Review of Systems:  Skin:  Negative       Eyes:  Negative      ENT:  Negative      Respiratory:  Negative       Cardiovascular:    edema    Gastroenterology: Negative      Genitourinary:  Negative      Musculoskeletal:  Negative      Neurologic:  Negative      Psychiatric:  Negative      Heme/Lymph/Imm:  Negative      Endocrine:  Positive for diabetes      Physical Exam:  Vitals: /66 (BP Location: Left arm, Patient  "Position: Sitting, Cuff Size: Adult Regular)   Pulse 62   Ht 1.626 m (5' 4.02\")   Wt 65.7 kg (144 lb 12.8 oz)   BMI 24.84 kg/m       Constitutional:  cooperative;in no acute distress        Skin:  warm and dry to the touch          Head:  normocephalic        Eyes:           Lymph:      ENT:  no pallor or cyanosis        Neck:  carotid pulses are full and equal bilaterally;JVP normal right carotid bruit      Respiratory:  clear to auscultation         Cardiac: regular rhythm;normal S1 and S2;no murmurs, gallops or rubs detected                    2+         0   2+         0            GI:  abdomen soft;no masses;non-tender        Extremities and Muscular Skeletal:      bilateral LE edema;2+          Neurological:  no gross motor deficits        Psych:  Alert and Oriented x 3          Thank you for allowing me to participate in the care of your patient.    Sincerely,     Cresencio Landry MD, MD     [facility]    "

## 2018-12-13 NOTE — LETTER
12/13/2018    Kathi Mondragon PA-C  [id number]  [facility] [address]    RE: Shell Leon       Dear Colleague,    I had the pleasure of seeing Shell Leon in the [facility].        HPI and Plan:   HPI and Plan:      REASON FOR VISIT:   1.  Resistant hypertension.      HISTORY OF PRESENT ILLNESS:  I had the pleasure of seeing Shell Leon at the [facility] in [place] this morning.  He is a very pleasant 68-year-old gentleman with peripheral arterial disease, hypertension and diabetes who is here for followup.  He has had difficult-to-control hypertension over the years.  He is on multiple antihypertensives and his blood pressure has improved compared to previously, but still not optimally controlled.      We did perform a renal ultrasound in the past which showed no significant renal artery stenosis.  We also performed labs to assess possible other causes of secondary hypertension.  His serum aldosterone and renin were normal.  His serum normetanephrine was elevated consistent with possible pheo.  We subsequently seen by Endocrinology and they determined that he did not have pheochromocytoma.  He also did not uncover any endocrine causes of secondary hypertension.     Over the past few months he has developed lower extremity pitting edema bilaterally.  He was also found to be anemic with hemoglobin in the 6 range over the summer.  He underwent colonoscopy and upper endoscopy.  No evidence of active bleeding was seen. 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 His H. Pylori studies were negative.  He was subsequently seen by hematology and diagnosed with severe iron deficiency anemia and is currently getting iron replacement.  His hemoglobin is improving.  He continues to have persistent lower external edema.    He continues to  have resistant hypertension.  His blood pressure medications have recently been adjusted by his primary provider.  He is currently on hydralazine 75 mg 3 times daily, hydrochlorothiazide 25 mg daily and irbesartan 300 mg daily.  He has had difficulty taking the irbesartan because of its taste.  His blood pressure is again elevated in the office today although much improved compared to prior readings.    IMPRESSION, REPORT AND PLAN:     1.  Resistant hypertension.   2.  Peripheral arterial disease, stable.   3.  Diabetes.   4.  Hyperlipidemia   5.  Lower extremity pitting edema bilaterally.   6.  Anemia.      His blood pressure remains elevated with systolic over 150 today in the office.  His medications have recently been adjusted but despite this he continues to have elevated pressures.  In reviewing his medication history I gather that he was on lisinopril in the past which was some what effective for him.  Given the difficulties had with the irbesartan I will have him switch to lisinopril.  Will discontinue the irbesartan and have him start lisinopril 40 mg daily.  Additionally will increase the hydralazine from  mg 3 times daily.  He will continue the hydrochlorothiazide 25 mg daily. Continue to monitor BP    The patient's lower extremity edema experience likely related to chronic venous insufficiency. He is wearing compression stockings at this point.  He did respond to furosemide in the past therefore we will prescribe him low-dose Lasix. We will also perform venous ultrasound to rule out significant reflux. BMP in 1 week.     It was a pleasure seeing Mr. Leon in the clinic this morning.  We will see him in approximately 3 months for follow-up but sooner if he develops symptoms.  I appreciate the opportunity to participate in this patient's care.         ALE SPICER MD         Orders Placed This Encounter   Procedures     CT Abdomen Pelvis w Contrast     Basic metabolic panel     Follow-Up with Nurse      ENDOCRINOLOGY ADULT REFERRAL       Orders Placed This Encounter   Medications     hydrochlorothiazide (HYDRODIURIL) 25 MG tablet     Sig: Take 25 mg by mouth daily     spironolactone (ALDACTONE) 25 MG tablet     Sig: Take 1 tablet (25 mg) by mouth daily     Dispense:  90 tablet     Refill:  3       Medications Discontinued During This Encounter   Medication Reason     spironolactone (ALDACTONE) 25 MG tablet Reorder         Encounter Diagnoses   Name Primary?     Benign essential hypertension      Resistant hypertension Yes     Bilateral carotid artery stenosis      Localized edema      PAD (peripheral artery disease) (H)        CURRENT MEDICATIONS:  Current Outpatient Prescriptions   Medication Sig Dispense Refill     atorvastatin (LIPITOR) 80 MG tablet Take 1 tablet (80 mg) by mouth daily 90 tablet 3     blood glucose (NO BRAND SPECIFIED) lancets standard Use to test blood sugar 3 times daily or as directed. 1 Box 11     blood glucose monitoring (NO BRAND SPECIFIED) test strip Use to test blood sugars 3 times daily or as directed 100 strip 11     ferrous sulfate (IRON) 325 (65 Fe) MG tablet Take 325 mg by mouth daily (with breakfast)       glipiZIDE (GLUCOTROL) 5 MG tablet Take 1 tablet (5 mg) by mouth every morning (before breakfast) 90 tablet 1     hydrALAZINE (APRESOLINE) 50 MG tablet Take 1 tablet (50 mg) by mouth 3 times daily 180 tablet 3     hydrochlorothiazide (HYDRODIURIL) 25 MG tablet Take 25 mg by mouth daily       metFORMIN (GLUCOPHAGE) 1000 MG tablet TAKE 1 TABLET BY MOUTH TWICE DAILY WITH MEALS 60 tablet 5     order for DME Equipment being ordered: Compression stockings 2 each 1     order for DME Equipment being ordered: blood pressure cuff 1 Units 0     spironolactone (ALDACTONE) 25 MG tablet Take 1 tablet (25 mg) by mouth daily 90 tablet 3     valsartan (DIOVAN) 320 MG tablet Take 1 tablet (320 mg) by mouth daily 90 tablet 1     furosemide (LASIX) 20 MG tablet Take 1 tablet (20 mg) by mouth  daily (Patient not taking: Reported on 6/26/2018) 60 tablet 0     omeprazole (PRILOSEC) 20 MG CR capsule Take 1 capsule (20 mg) by mouth every morning (before breakfast) (Patient not taking: Reported on 6/26/2018) 30 capsule 0     [DISCONTINUED] spironolactone (ALDACTONE) 25 MG tablet Take 0.5 tablets (12.5 mg) by mouth daily 90 tablet 3       ALLERGIES   No Known Allergies    PAST MEDICAL HISTORY:  Past Medical History:   Diagnosis Date     Diabetes (H)      Hypertension        PAST SURGICAL HISTORY:  Past Surgical History:   Procedure Laterality Date     COLONOSCOPY N/A 5/22/2018    Procedure: COMBINED COLONOSCOPY, SINGLE OR MULTIPLE BIOPSY/POLYPECTOMY BY BIOPSY;  COLONOSCOPY  Room 521, with polypectomy x1 using cold biopsy forceps;  Surgeon: Charlie Rabago MD;  Location:  GI     ESOPHAGOSCOPY, GASTROSCOPY, DUODENOSCOPY (EGD), COMBINED N/A 5/21/2018    Procedure: COMBINED ESOPHAGOSCOPY, GASTROSCOPY, DUODENOSCOPY (EGD), BIOPSY SINGLE OR MULTIPLE;  ESOPHAGOSCOPY, GASTROSCOPY, DUODENOSCOPY (EGD)  Room 521 and cold biopsies;  Surgeon: Charlie Rabago MD;  Location:  GI     LAPAROSCOPIC CHOLECYSTECTOMY N/A 1/6/2017    Procedure: LAPAROSCOPIC CHOLECYSTECTOMY;  Surgeon: Michael Caba MD;  Location:  OR       FAMILY HISTORY:  Family History   Problem Relation Age of Onset     Unknown/Adopted No family hx of        SOCIAL HISTORY:  Social History     Social History     Marital status:      Spouse name: N/A     Number of children: N/A     Years of education: N/A     Social History Main Topics     Smoking status: Never Smoker     Smokeless tobacco: Never Used     Alcohol use No     Drug use: No     Sexual activity: No     Other Topics Concern     Parent/Sibling W/ Cabg, Mi Or Angioplasty Before 65f 55m? No     Social History Narrative       Review of Systems:  Skin:  Positive for bruising     Eyes:  Positive for glasses    ENT:  Negative      Respiratory:  Negative       Cardiovascular:     "Positive for;edema swelling in both legs   Gastroenterology: Negative      Genitourinary:  Negative      Musculoskeletal:  Negative      Neurologic:  Positive for numbness or tingling of feet    Psychiatric:  Negative      Heme/Lymph/Imm:  Positive for weight loss    Endocrine:  Positive for diabetes      Physical Exam:  Vitals: /50 (BP Location: Left arm, Patient Position: Sitting, Cuff Size: Adult Regular)  Pulse 54  Ht 1.626 m (5' 4\")  Wt 60.1 kg (132 lb 8 oz)  BMI 22.74 kg/m2    Constitutional:  cooperative;in no acute distress        Skin:  warm and dry to the touch          Head:  normocephalic        Eyes:           Lymph:      ENT:  no pallor or cyanosis        Neck:  carotid pulses are full and equal bilaterally;JVP normal right carotid bruit      Respiratory:  clear to auscultation         Cardiac: regular rhythm;normal S1 and S2;no murmurs, gallops or rubs detected                    2+         0   2+         0            GI:  abdomen soft;no masses;non-tender        Extremities and Muscular Skeletal:  no edema              Neurological:  no gross motor deficits        Psych:           CC  Kathi Mondragon PA-C  [address] [place] [address]                Orders Placed This Encounter   Procedures     US Venous Competency Bilateral     Basic metabolic panel     Follow-Up with Cardiac Advanced Practice Provider       Orders Placed This Encounter   Medications     hydrALAZINE (APRESOLINE) 100 MG tablet     Sig: Take 1 tablet (100 mg) by mouth 3 times daily     Dispense:  90 tablet     Refill:  3     furosemide (LASIX) 20 MG tablet     Sig: Take 1 tablet (20 mg) by mouth daily     Dispense:  60 tablet     Refill:  3     lisinopril (PRINIVIL/ZESTRIL) 40 MG tablet     Sig: Take 1 tablet (40 mg) by mouth daily     Dispense:  90 tablet     Refill:  3       Medications Discontinued During This Encounter   Medication Reason     hydrALAZINE (APRESOLINE) 50 MG tablet      furosemide " (LASIX) 20 MG tablet Reorder     irbesartan (AVAPRO) 300 MG tablet          Encounter Diagnoses   Name Primary?     Benign essential hypertension      Localized edema      Resistant hypertension Yes     Bilateral carotid artery stenosis        CURRENT MEDICATIONS:  Current Outpatient Medications   Medication Sig Dispense Refill     atorvastatin (LIPITOR) 80 MG tablet Take 1 tablet (80 mg) by mouth daily 90 tablet 3     blood glucose (NO BRAND SPECIFIED) lancets standard Use to test blood sugar 3 times daily or as directed. 100 each 11     blood glucose monitoring (NO BRAND SPECIFIED) meter device kit Use to test blood sugar 3 times daily or as directed. 1 kit 0     blood glucose monitoring (NO BRAND SPECIFIED) test strip Use to test blood sugars 3 times daily or as directed 100 strip 11     furosemide (LASIX) 20 MG tablet Take 1 tablet (20 mg) by mouth daily 60 tablet 3     glipiZIDE (GLUCOTROL) 5 MG tablet Take 1 tablet (5 mg) by mouth every morning (before breakfast) 90 tablet 1     hydrALAZINE (APRESOLINE) 100 MG tablet Take 1 tablet (100 mg) by mouth 3 times daily 90 tablet 3     hydrochlorothiazide (HYDRODIURIL) 25 MG tablet Take 1 tablet (25 mg) by mouth daily 90 tablet 1     lisinopril (PRINIVIL/ZESTRIL) 40 MG tablet Take 1 tablet (40 mg) by mouth daily 90 tablet 3     metFORMIN (GLUCOPHAGE) 1000 MG tablet TAKE 1 TABLET BY MOUTH TWICE DAILY WITH MEALS 60 tablet 5     omeprazole (PRILOSEC) 20 MG CR capsule Take 1 capsule (20 mg) by mouth every morning (before breakfast) 90 capsule 11     order for DME Equipment being ordered: thigh high compression stockings 20mmg Hg 1 Units 0     order for DME Equipment being ordered: blood pressure cuff 1 Units 0       ALLERGIES   No Known Allergies    PAST MEDICAL HISTORY:  Past Medical History:   Diagnosis Date     Diabetes (H)      Hypertension        PAST SURGICAL HISTORY:  Past Surgical History:   Procedure Laterality Date     COLONOSCOPY N/A 5/22/2018    Procedure:  COMBINED COLONOSCOPY, SINGLE OR MULTIPLE BIOPSY/POLYPECTOMY BY BIOPSY;  COLONOSCOPY  Room 521, with polypectomy x1 using cold biopsy forceps;  Surgeon: Charlie Rabago MD;  Location:  GI     ESOPHAGOSCOPY, GASTROSCOPY, DUODENOSCOPY (EGD), COMBINED N/A 5/21/2018    Procedure: COMBINED ESOPHAGOSCOPY, GASTROSCOPY, DUODENOSCOPY (EGD), BIOPSY SINGLE OR MULTIPLE;  ESOPHAGOSCOPY, GASTROSCOPY, DUODENOSCOPY (EGD)  Room 521 and cold biopsies;  Surgeon: Charlie Rabago MD;  Location:  GI     LAPAROSCOPIC CHOLECYSTECTOMY N/A 1/6/2017    Procedure: LAPAROSCOPIC CHOLECYSTECTOMY;  Surgeon: Michael Caba MD;  Location: [facility] OR       FAMILY HISTORY:  Family History   Problem Relation Age of Onset     Unknown/Adopted No family hx of        SOCIAL HISTORY:  Social History     Socioeconomic History     Marital status:      Spouse name: None     Number of children: None     Years of education: None     Highest education level: None   Social Needs     Financial resource strain: None     Food insecurity - worry: None     Food insecurity - inability: None     Transportation needs - medical: None     Transportation needs - non-medical: None   Occupational History     None   Tobacco Use     Smoking status: Never Smoker     Smokeless tobacco: Never Used   Substance and Sexual Activity     Alcohol use: No     Alcohol/week: 0.0 oz     Drug use: No     Sexual activity: No   Other Topics Concern     Parent/sibling w/ CABG, MI or angioplasty before 65F 55M? No   Social History Narrative     None       Review of Systems:  Skin:  Negative       Eyes:  Negative      ENT:  Negative      Respiratory:  Negative       Cardiovascular:    edema    Gastroenterology: Negative      Genitourinary:  Negative      Musculoskeletal:  Negative      Neurologic:  Negative      Psychiatric:  Negative      Heme/Lymph/Imm:  Negative      Endocrine:  Positive for diabetes      Physical Exam:  Vitals: /66 (BP Location: Left arm, Patient  "Position: Sitting, Cuff Size: Adult Regular)   Pulse 62   Ht 1.626 m (5' 4.02\")   Wt 65.7 kg (144 lb 12.8 oz)   BMI 24.84 kg/m       Constitutional:  cooperative;in no acute distress        Skin:  warm and dry to the touch          Head:  normocephalic        Eyes:           Lymph:      ENT:  no pallor or cyanosis        Neck:  carotid pulses are full and equal bilaterally;JVP normal right carotid bruit      Respiratory:  clear to auscultation         Cardiac: regular rhythm;normal S1 and S2;no murmurs, gallops or rubs detected                    2+         0   2+         0            GI:  abdomen soft;no masses;non-tender        Extremities and Muscular Skeletal:      bilateral LE edema;2+          Neurological:  no gross motor deficits        Psych:  Alert and Oriented x 3        CC  Cresencio Landry MD  [address]  [place] [address]            Thank you for allowing me to participate in the care of your patient.      Sincerely,     Cresencio Landry MD, MD     [facility]    cc:   Cresencio Landry MD  [address]  [place] [id number]        "

## 2018-12-13 NOTE — PROGRESS NOTES
HPI and Plan:   HPI and Plan:      REASON FOR VISIT:   1.  Resistant hypertension.      HISTORY OF PRESENT ILLNESS:  I had the pleasure of seeing Shell Leon at the [facility] in [place] this morning.  He is a very pleasant 68-year-old gentleman with peripheral arterial disease, hypertension and diabetes who is here for followup.  He has had difficult-to-control hypertension over the years.  He is on multiple antihypertensives and his blood pressure has improved compared to previously, but still not optimally controlled.      We did perform a renal ultrasound in the past which showed no significant renal artery stenosis.  We also performed labs to assess possible other causes of secondary hypertension.  His serum aldosterone and renin were normal.  His serum normetanephrine was elevated consistent with possible pheo.  We subsequently seen by Endocrinology and they determined that he did not have pheochromocytoma.  He also did not uncover any endocrine causes of secondary hypertension.     Over the past few months he has developed lower extremity pitting edema bilaterally.  He was also found to be anemic with hemoglobin in the 6 range over the summer.  He underwent colonoscopy and upper endoscopy.  No evidence of active bleeding was seen. 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 His H. Pylori studies were negative.  He was subsequently seen by hematology and diagnosed with severe iron deficiency anemia and is currently getting iron replacement.  His hemoglobin is improving.  He continues to have persistent lower external edema.    He continues to have resistant hypertension.  His blood pressure medications have recently been adjusted by his primary provider.  He is currently on hydralazine 75 mg 3 times daily, hydrochlorothiazide 25 mg daily and irbesartan 300 mg daily.  He has had  difficulty taking the irbesartan because of its taste.  His blood pressure is again elevated in the office today although much improved compared to prior readings.    IMPRESSION, REPORT AND PLAN:     1.  Resistant hypertension.   2.  Peripheral arterial disease, stable.   3.  Diabetes.   4.  Hyperlipidemia   5.  Lower extremity pitting edema bilaterally.   6.  Anemia.      His blood pressure remains elevated with systolic over 150 today in the office.  His medications have recently been adjusted but despite this he continues to have elevated pressures.  In reviewing his medication history I gather that he was on lisinopril in the past which was some what effective for him.  Given the difficulties had with the irbesartan I will have him switch to lisinopril.  Will discontinue the irbesartan and have him start lisinopril 40 mg daily.  Additionally will increase the hydralazine from  mg 3 times daily.  He will continue the hydrochlorothiazide 25 mg daily. Continue to monitor BP    The patient's lower extremity edema experience likely related to chronic venous insufficiency. He is wearing compression stockings at this point.  He did respond to furosemide in the past therefore we will prescribe him low-dose Lasix. We will also perform venous ultrasound to rule out significant reflux. BMP in 1 week.     It was a pleasure seeing Mr. Leon in the clinic this morning.  We will see him in approximately 3 months for follow-up but sooner if he develops symptoms.  I appreciate the opportunity to participate in this patient's care.         ALE SPICER MD         Orders Placed This Encounter   Procedures     CT Abdomen Pelvis w Contrast     Basic metabolic panel     Follow-Up with Nurse     ENDOCRINOLOGY ADULT REFERRAL       Orders Placed This Encounter   Medications     hydrochlorothiazide (HYDRODIURIL) 25 MG tablet     Sig: Take 25 mg by mouth daily     spironolactone (ALDACTONE) 25 MG tablet     Sig: Take 1 tablet (25  mg) by mouth daily     Dispense:  90 tablet     Refill:  3       Medications Discontinued During This Encounter   Medication Reason     spironolactone (ALDACTONE) 25 MG tablet Reorder         Encounter Diagnoses   Name Primary?     Benign essential hypertension      Resistant hypertension Yes     Bilateral carotid artery stenosis      Localized edema      PAD (peripheral artery disease) (H)        CURRENT MEDICATIONS:  Current Outpatient Prescriptions   Medication Sig Dispense Refill     atorvastatin (LIPITOR) 80 MG tablet Take 1 tablet (80 mg) by mouth daily 90 tablet 3     blood glucose (NO BRAND SPECIFIED) lancets standard Use to test blood sugar 3 times daily or as directed. 1 Box 11     blood glucose monitoring (NO BRAND SPECIFIED) test strip Use to test blood sugars 3 times daily or as directed 100 strip 11     ferrous sulfate (IRON) 325 (65 Fe) MG tablet Take 325 mg by mouth daily (with breakfast)       glipiZIDE (GLUCOTROL) 5 MG tablet Take 1 tablet (5 mg) by mouth every morning (before breakfast) 90 tablet 1     hydrALAZINE (APRESOLINE) 50 MG tablet Take 1 tablet (50 mg) by mouth 3 times daily 180 tablet 3     hydrochlorothiazide (HYDRODIURIL) 25 MG tablet Take 25 mg by mouth daily       metFORMIN (GLUCOPHAGE) 1000 MG tablet TAKE 1 TABLET BY MOUTH TWICE DAILY WITH MEALS 60 tablet 5     order for DME Equipment being ordered: Compression stockings 2 each 1     order for DME Equipment being ordered: blood pressure cuff 1 Units 0     spironolactone (ALDACTONE) 25 MG tablet Take 1 tablet (25 mg) by mouth daily 90 tablet 3     valsartan (DIOVAN) 320 MG tablet Take 1 tablet (320 mg) by mouth daily 90 tablet 1     furosemide (LASIX) 20 MG tablet Take 1 tablet (20 mg) by mouth daily (Patient not taking: Reported on 6/26/2018) 60 tablet 0     omeprazole (PRILOSEC) 20 MG CR capsule Take 1 capsule (20 mg) by mouth every morning (before breakfast) (Patient not taking: Reported on 6/26/2018) 30 capsule 0      [DISCONTINUED] spironolactone (ALDACTONE) 25 MG tablet Take 0.5 tablets (12.5 mg) by mouth daily 90 tablet 3       ALLERGIES   No Known Allergies    PAST MEDICAL HISTORY:  Past Medical History:   Diagnosis Date     Diabetes (H)      Hypertension        PAST SURGICAL HISTORY:  Past Surgical History:   Procedure Laterality Date     COLONOSCOPY N/A 5/22/2018    Procedure: COMBINED COLONOSCOPY, SINGLE OR MULTIPLE BIOPSY/POLYPECTOMY BY BIOPSY;  COLONOSCOPY  Room 521, with polypectomy x1 using cold biopsy forceps;  Surgeon: Charlie Rabago MD;  Location:  GI     ESOPHAGOSCOPY, GASTROSCOPY, DUODENOSCOPY (EGD), COMBINED N/A 5/21/2018    Procedure: COMBINED ESOPHAGOSCOPY, GASTROSCOPY, DUODENOSCOPY (EGD), BIOPSY SINGLE OR MULTIPLE;  ESOPHAGOSCOPY, GASTROSCOPY, DUODENOSCOPY (EGD)  Room 521 and cold biopsies;  Surgeon: Charlie Rabago MD;  Location:  GI     LAPAROSCOPIC CHOLECYSTECTOMY N/A 1/6/2017    Procedure: LAPAROSCOPIC CHOLECYSTECTOMY;  Surgeon: Michael Caba MD;  Location:  OR       FAMILY HISTORY:  Family History   Problem Relation Age of Onset     Unknown/Adopted No family hx of        SOCIAL HISTORY:  Social History     Social History     Marital status:      Spouse name: N/A     Number of children: N/A     Years of education: N/A     Social History Main Topics     Smoking status: Never Smoker     Smokeless tobacco: Never Used     Alcohol use No     Drug use: No     Sexual activity: No     Other Topics Concern     Parent/Sibling W/ Cabg, Mi Or Angioplasty Before 65f 55m? No     Social History Narrative       Review of Systems:  Skin:  Positive for bruising     Eyes:  Positive for glasses    ENT:  Negative      Respiratory:  Negative       Cardiovascular:    Positive for;edema swelling in both legs   Gastroenterology: Negative      Genitourinary:  Negative      Musculoskeletal:  Negative      Neurologic:  Positive for numbness or tingling of feet    Psychiatric:  Negative     "  Heme/Lymph/Imm:  Positive for weight loss    Endocrine:  Positive for diabetes      Physical Exam:  Vitals: /50 (BP Location: Left arm, Patient Position: Sitting, Cuff Size: Adult Regular)  Pulse 54  Ht 1.626 m (5' 4\")  Wt 60.1 kg (132 lb 8 oz)  BMI 22.74 kg/m2    Constitutional:  cooperative;in no acute distress        Skin:  warm and dry to the touch          Head:  normocephalic        Eyes:           Lymph:      ENT:  no pallor or cyanosis        Neck:  carotid pulses are full and equal bilaterally;JVP normal right carotid bruit      Respiratory:  clear to auscultation         Cardiac: regular rhythm;normal S1 and S2;no murmurs, gallops or rubs detected                    2+         0   2+         0            GI:  abdomen soft;no masses;non-tender        Extremities and Muscular Skeletal:  no edema              Neurological:  no gross motor deficits        Psych:           CC  Kathi Mondragon PA-C  [id number] [place] [address]                Orders Placed This Encounter   Procedures     US Venous Competency Bilateral     Basic metabolic panel     Follow-Up with Cardiac Advanced Practice Provider       Orders Placed This Encounter   Medications     hydrALAZINE (APRESOLINE) 100 MG tablet     Sig: Take 1 tablet (100 mg) by mouth 3 times daily     Dispense:  90 tablet     Refill:  3     furosemide (LASIX) 20 MG tablet     Sig: Take 1 tablet (20 mg) by mouth daily     Dispense:  60 tablet     Refill:  3     lisinopril (PRINIVIL/ZESTRIL) 40 MG tablet     Sig: Take 1 tablet (40 mg) by mouth daily     Dispense:  90 tablet     Refill:  3       Medications Discontinued During This Encounter   Medication Reason     hydrALAZINE (APRESOLINE) 50 MG tablet      furosemide (LASIX) 20 MG tablet Reorder     irbesartan (AVAPRO) 300 MG tablet          Encounter Diagnoses   Name Primary?     Benign essential hypertension      Localized edema      Resistant hypertension Yes     Bilateral carotid " artery stenosis        CURRENT MEDICATIONS:  Current Outpatient Medications   Medication Sig Dispense Refill     atorvastatin (LIPITOR) 80 MG tablet Take 1 tablet (80 mg) by mouth daily 90 tablet 3     blood glucose (NO BRAND SPECIFIED) lancets standard Use to test blood sugar 3 times daily or as directed. 100 each 11     blood glucose monitoring (NO BRAND SPECIFIED) meter device kit Use to test blood sugar 3 times daily or as directed. 1 kit 0     blood glucose monitoring (NO BRAND SPECIFIED) test strip Use to test blood sugars 3 times daily or as directed 100 strip 11     furosemide (LASIX) 20 MG tablet Take 1 tablet (20 mg) by mouth daily 60 tablet 3     glipiZIDE (GLUCOTROL) 5 MG tablet Take 1 tablet (5 mg) by mouth every morning (before breakfast) 90 tablet 1     hydrALAZINE (APRESOLINE) 100 MG tablet Take 1 tablet (100 mg) by mouth 3 times daily 90 tablet 3     hydrochlorothiazide (HYDRODIURIL) 25 MG tablet Take 1 tablet (25 mg) by mouth daily 90 tablet 1     lisinopril (PRINIVIL/ZESTRIL) 40 MG tablet Take 1 tablet (40 mg) by mouth daily 90 tablet 3     metFORMIN (GLUCOPHAGE) 1000 MG tablet TAKE 1 TABLET BY MOUTH TWICE DAILY WITH MEALS 60 tablet 5     omeprazole (PRILOSEC) 20 MG CR capsule Take 1 capsule (20 mg) by mouth every morning (before breakfast) 90 capsule 11     order for DME Equipment being ordered: thigh high compression stockings 20mmg Hg 1 Units 0     order for DME Equipment being ordered: blood pressure cuff 1 Units 0       ALLERGIES   No Known Allergies    PAST MEDICAL HISTORY:  Past Medical History:   Diagnosis Date     Diabetes (H)      Hypertension        PAST SURGICAL HISTORY:  Past Surgical History:   Procedure Laterality Date     COLONOSCOPY N/A 5/22/2018    Procedure: COMBINED COLONOSCOPY, SINGLE OR MULTIPLE BIOPSY/POLYPECTOMY BY BIOPSY;  COLONOSCOPY  Room 521, with polypectomy x1 using cold biopsy forceps;  Surgeon: Charlie Rabago MD;  Location:  GI     ESOPHAGOSCOPY,  "GASTROSCOPY, DUODENOSCOPY (EGD), COMBINED N/A 5/21/2018    Procedure: COMBINED ESOPHAGOSCOPY, GASTROSCOPY, DUODENOSCOPY (EGD), BIOPSY SINGLE OR MULTIPLE;  ESOPHAGOSCOPY, GASTROSCOPY, DUODENOSCOPY (EGD)  Room 521 and cold biopsies;  Surgeon: Charlie Rabago MD;  Location: [facility] GI     LAPAROSCOPIC CHOLECYSTECTOMY N/A 1/6/2017    Procedure: LAPAROSCOPIC CHOLECYSTECTOMY;  Surgeon: Michael Caba MD;  Location: [facility] OR       FAMILY HISTORY:  Family History   Problem Relation Age of Onset     Unknown/Adopted No family hx of        SOCIAL HISTORY:  Social History     Socioeconomic History     Marital status:      Spouse name: None     Number of children: None     Years of education: None     Highest education level: None   Social Needs     Financial resource strain: None     Food insecurity - worry: None     Food insecurity - inability: None     Transportation needs - medical: None     Transportation needs - non-medical: None   Occupational History     None   Tobacco Use     Smoking status: Never Smoker     Smokeless tobacco: Never Used   Substance and Sexual Activity     Alcohol use: No     Alcohol/week: 0.0 oz     Drug use: No     Sexual activity: No   Other Topics Concern     Parent/sibling w/ CABG, MI or angioplasty before 65F 55M? No   Social History Narrative     None       Review of Systems:  Skin:  Negative       Eyes:  Negative      ENT:  Negative      Respiratory:  Negative       Cardiovascular:    edema    Gastroenterology: Negative      Genitourinary:  Negative      Musculoskeletal:  Negative      Neurologic:  Negative      Psychiatric:  Negative      Heme/Lymph/Imm:  Negative      Endocrine:  Positive for diabetes      Physical Exam:  Vitals: /66 (BP Location: Left arm, Patient Position: Sitting, Cuff Size: Adult Regular)   Pulse 62   Ht 1.626 m (5' 4.02\")   Wt 65.7 kg (144 lb 12.8 oz)   BMI 24.84 kg/m      Constitutional:  cooperative;in no acute distress        Skin:  warm and dry " to the touch          Head:  normocephalic        Eyes:           Lymph:      ENT:  no pallor or cyanosis        Neck:  carotid pulses are full and equal bilaterally;JVP normal right carotid bruit      Respiratory:  clear to auscultation         Cardiac: regular rhythm;normal S1 and S2;no murmurs, gallops or rubs detected                    2+         0   2+         0            GI:  abdomen soft;no masses;non-tender        Extremities and Muscular Skeletal:      bilateral LE edema;2+          Neurological:  no gross motor deficits        Psych:  Alert and Oriented x 3        CC  Cresencio Landry MD  [address]  [place] [address]

## 2018-12-20 DIAGNOSIS — I10 BENIGN ESSENTIAL HYPERTENSION: ICD-10-CM

## 2018-12-20 DIAGNOSIS — I1A.0 RESISTANT HYPERTENSION: ICD-10-CM

## 2018-12-20 LAB
ANION GAP SERPL CALCULATED.3IONS-SCNC: 5 MMOL/L (ref 3–14)
BUN SERPL-MCNC: 30 MG/DL (ref 7–30)
CALCIUM SERPL-MCNC: 8.2 MG/DL (ref 8.5–10.1)
CHLORIDE SERPL-SCNC: 110 MMOL/L (ref 94–109)
CO2 SERPL-SCNC: 27 MMOL/L (ref 20–32)
CREAT SERPL-MCNC: 1.07 MG/DL (ref 0.66–1.25)
GFR SERPL CREATININE-BSD FRML MDRD: 70 ML/MIN/{1.73_M2}
GLUCOSE SERPL-MCNC: 100 MG/DL (ref 70–99)
POTASSIUM SERPL-SCNC: 3.5 MMOL/L (ref 3.4–5.3)
SODIUM SERPL-SCNC: 142 MMOL/L (ref 133–144)

## 2018-12-20 PROCEDURE — 36415 COLL VENOUS BLD VENIPUNCTURE: CPT | Performed by: INTERNAL MEDICINE

## 2018-12-20 PROCEDURE — 80048 BASIC METABOLIC PNL TOTAL CA: CPT | Performed by: INTERNAL MEDICINE

## 2018-12-28 ENCOUNTER — HOSPITAL ENCOUNTER (OUTPATIENT)
Dept: CARDIOLOGY | Facility: CLINIC | Age: 68
Discharge: HOME | End: 2018-12-28
Attending: INTERNAL MEDICINE | Admitting: INTERNAL MEDICINE
Payer: COMMERCIAL

## 2018-12-28 ENCOUNTER — TELEPHONE (OUTPATIENT)
Dept: CARDIOLOGY | Facility: CLINIC | Age: 68
End: 2018-12-28

## 2018-12-28 DIAGNOSIS — R60.0 LOCALIZED EDEMA: ICD-10-CM

## 2018-12-28 PROCEDURE — 93970 EXTREMITY STUDY: CPT | Mod: 26 | Performed by: INTERNAL MEDICINE

## 2018-12-28 PROCEDURE — 93970 EXTREMITY STUDY: CPT

## 2018-12-28 NOTE — TELEPHONE ENCOUNTER
"Call from patient's son, who states that the patient \"needs to see Dr. Landry as soon as possible.\" Patient's son states that the patient is having increased lower extremity swelling and they are also wondering what the test results are from today's US. Reviewed with patient that the results from the US are not ready yet. Patient's son states that the patient's swelling is an \"emergency.\" RN inquired if the patient is experiencing any shortness. Patient's son denies that the patient is experiencing any shortness of breath. Patient's son states that the patient's weight has fluctuated a little bit, but has not changed much since the OV (2 pounds). Reviewed medications that the patient is taking with the patient's son. Patient's son states that they were under the impression that the patient should only take lasix 20 mg when there is swelling. Reviewed with patient that the patient is supposed to be taking 20 mg daily of lasix. Patient's son states that the lasix is making the patient throw up when he takes it. Patient's son states also that when he take the lasix, the patient pees more. Reviewed with patient's son that the lasix is supposed to make the patient urinate more, as it helps rid the body of extra fluid. Patient's son states that he is taking all other medications as prescribed. Patient's son is insistent about a visit with Dr. Landry to review the plan and get an alternative medication for lasix. Patient scheduled for OV with Dr. Landry on 12/31.   "

## 2018-12-28 NOTE — LETTER
December 28, 2018       TO: Shell Leon   [address] [place] [address]       Dear Mr. Leon,    The results of your recent labs are below:    Results for orders placed or performed in visit on 12/20/18   Basic metabolic panel   Result Value Ref Range    Sodium 142 133 - 144 mmol/L    Potassium 3.5 3.4 - 5.3 mmol/L    Chloride 110 (H) 94 - 109 mmol/L    Carbon Dioxide 27 20 - 32 mmol/L    Anion Gap 5 3 - 14 mmol/L    Glucose 100 (H) 70 - 99 mg/dL    Urea Nitrogen 30 7 - 30 mg/dL    Creatinine 1.07 0.66 - 1.25 mg/dL    GFR Estimate 70 >60 mL/min/[1.73_m2]    GFR Estimate If Black 81 >60 mL/min/[1.73_m2]    Calcium 8.2 (L) 8.5 - 10.1 mg/dL     Dr. Landry reviewed your labs and states that they look good. Your potassium is back to normal.     If you have any further questions, please call [phone].      Sincerely,    [facility]

## 2018-12-28 NOTE — TELEPHONE ENCOUNTER
Notes recorded by Cresencio Landry MD on 12/28/2018 at 8:38 AM CST  Reviewed labs. Stable Cr and K    Results letter sent.

## 2018-12-31 ENCOUNTER — APPOINTMENT (OUTPATIENT)
Dept: CT IMAGING | Facility: CLINIC | Age: 68
End: 2018-12-31
Attending: EMERGENCY MEDICINE

## 2018-12-31 ENCOUNTER — HOSPITAL ENCOUNTER (EMERGENCY)
Facility: CLINIC | Age: 68
Discharge: HOME | End: 2018-12-31
Attending: EMERGENCY MEDICINE | Admitting: EMERGENCY MEDICINE

## 2018-12-31 VITALS
DIASTOLIC BLOOD PRESSURE: 79 MMHG | SYSTOLIC BLOOD PRESSURE: 213 MMHG | RESPIRATION RATE: 16 BRPM | OXYGEN SATURATION: 99 % | TEMPERATURE: 99.4 F | HEART RATE: 70 BPM
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
## 2018-12-31 DIAGNOSIS — S00.83XA CONTUSION OF FACE, INITIAL ENCOUNTER: ICD-10-CM

## 2018-12-31 DIAGNOSIS — I10 ESSENTIAL HYPERTENSION: ICD-10-CM

## 2018-12-31 DIAGNOSIS — S06.0X9A CONCUSSION WITH LOSS OF CONSCIOUSNESS, INITIAL ENCOUNTER: ICD-10-CM

## 2018-12-31 PROCEDURE — 70486 CT MAXILLOFACIAL W/O DYE: CPT

## 2018-12-31 PROCEDURE — 70450 CT HEAD/BRAIN W/O DYE: CPT

## 2018-12-31 PROCEDURE — 99284 EMERGENCY DEPT VISIT MOD MDM: CPT | Mod: 25

## 2018-12-31 RX ORDER — ACETAMINOPHEN 325 MG/1
650 TABLET ORAL EVERY 6 HOURS PRN
Qty: 20 TABLET | Refills: 0 | Status: ON HOLD | OUTPATIENT
Start: 2018-12-31 | End: 2019-05-04

## 2018-12-31 RX ORDER — ACETAMINOPHEN 325 MG/1
650 TABLET ORAL ONCE
Status: DISCONTINUED | OUTPATIENT
Start: 2018-12-31 | End: 2018-12-31 | Stop reason: HOSPADM

## 2018-12-31 ASSESSMENT — ENCOUNTER SYMPTOMS: HEADACHES: 1

## 2018-12-31 NOTE — ED AVS SNAPSHOT
[facility]  [address]  [facility] [address]  Phone:  [phone]  Fax:  [phone]                                    Shell Leon   MRN: [id number]    Department:  [facility] Emergency Department   Date of Visit:  12/31/2018           After Visit Summary Signature Page    I have received my discharge instructions, and my questions have been answered. I have discussed any challenges I see with this plan with the nurse or doctor.    ..........................................................................................................................................  Patient/Patient Representative Signature      ..........................................................................................................................................  Patient Representative Print Name and Relationship to Patient    ..................................................               ................................................  Date                                   Time    ..........................................................................................................................................  Reviewed by Signature/Title    ...................................................              ..............................................  Date                                               Time          22EPIC Rev 08/18
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
## 2018-12-31 NOTE — LETTER
December 31, 2018      To Whom It May Concern:      Shell Leon was seen in our Emergency Department today, 12/31/18.  I expect his condition to improve over the next 1-2 days.  He may return to work/school when improved.    Sincerely,        Meredith Beyer RN

## 2019-01-01 ASSESSMENT — ENCOUNTER SYMPTOMS
WOUND: 1
BACK PAIN: 0

## 2019-01-01 NOTE — DISCHARGE INSTRUCTIONS
Discharge Instructions  Concussion    You were seen today for signs of a concussion.  The symptoms will vary, depending on the nature of your injury and your health. You may have: headache, confusion, nausea (feel sick to your stomach), vomiting (throwing up) and problems with memory, concentrating, or sleep. You may feel dizzy, irritable, and tired. Children and teens may need help from their parents, teachers, and coaches to watch for symptoms as they recover.    Generally, every Emergency Department visit should have a follow-up clinic visit with either a primary or a specialty clinic/provider. Please follow-up as instructed by your emergency provider today.     Return to the Emergency Department if:  Your headache gets worse or you start to have a really bad headache even with the recommended treatment plan.   You feel drowsier, have growing confusion, or slurred speech.   You keep repeating yourself.   You have strange behavior or are feeling more irritable.   You have a seizure.   You vomit (throw up) more than once.   You have trouble walking.   You have weakness or numbness.  Your neck pain gets worse.   You have a loss of consciousness.   You have blood for fluid coming from your ears or nose.   You have new symptoms or anything that worries you.     Home Care:  Get lots of rest and get enough sleep at night. Take daytime naps or rest if you feel tired.   Limit physical activity and ?thinking? activities. These can make symptoms worse.   Physical activities include gym, sports, weight training, running, exercise, and heavy lifting.   Thinking activities include homework, class work, job-related work, and screen time (phone, computer, tablet, TV, and video games).   Stick to a healthy diet and drink lots of fluids. Avoid alcohol.  As symptoms improve, you may slowly return to your daily activities. If symptoms get worse or return, reduce your activity.   Know that it is normal to feel sad or frustrated when  you do not feel right and are less active.     Going Back to Work:  Your care team will tell you when you are ready to return to work.    Limit the amount of work you do soon after your injury. This may speed healing. Take breaks if your symptoms get worse. You should also reduce your physical activity as well as activities that require a lot of thinking until you see your doctor. You may need shorter work days and a lighter workload.  Avoid heavy lifting, working with machinery, driving and working at heights until your symptoms are gone or you are cleared by a provider.    Going Back to School:  If you are still having symptoms, you may need extra help at school.  Tell your teachers and school nurse about your injury and symptoms. Ask them to watch for problems with learning, memory, and concentrating. Symptoms may get worse when you do schoolwork, and you may become more irritable. You may need shorter school days, a reduced workload, and to postpone testing.  Do not drive or take gym class (physical activity) until cleared by a provider.    Returning to Sports:  Never return to play if you have any symptoms. A full recovery will reduce the chances of getting hurt again. Remember, it is better to miss one or two games than a whole season.  You should rest from all physical activity until you see your provider. Generally, if all symptoms have completely cleared, your provider can help guide you to slowly return to sports. If symptoms return or worsen, stop the activity and see your provider.  Important: If you are in an organized sport and under age 18, you will need written consent from a healthcare provider before you return to sports. Typically, this will be your primary care or sports medicine provider. Please make an appointment.    If you were given a prescription for medicine here today, be sure to read all of the information (including the package insert) that comes with your prescription.  This will  include important information about the medicine, its side effects, and any warnings that you need to know about.  The pharmacist who fills the prescription can provide more information and answer questions you may have about the medicine.  If you have questions or concerns that the pharmacist cannot address, please call or return to the Emergency Department.     Remember that you can always come back to the Emergency Department if you are not able to see your regular provider in the amount of time listed above, if you get any new symptoms, or if there is anything that worries you.

## 2019-01-03 ENCOUNTER — APPOINTMENT (OUTPATIENT)
Dept: GENERAL RADIOLOGY | Facility: CLINIC | Age: 69
End: 2019-01-03
Payer: COMMERCIAL

## 2019-01-03 ENCOUNTER — HOSPITAL ENCOUNTER (EMERGENCY)
Facility: CLINIC | Age: 69
Discharge: ANOTHER HOSPITAL | End: 2019-01-03
Attending: EMERGENCY MEDICINE | Admitting: EMERGENCY MEDICINE
Payer: COMMERCIAL

## 2019-01-03 ENCOUNTER — TELEPHONE (OUTPATIENT)
Dept: CARDIOLOGY | Facility: CLINIC | Age: 69
End: 2019-01-03

## 2019-01-03 VITALS
DIASTOLIC BLOOD PRESSURE: 105 MMHG | TEMPERATURE: 98.5 F | HEART RATE: 74 BPM | SYSTOLIC BLOOD PRESSURE: 171 MMHG | OXYGEN SATURATION: 97 % | RESPIRATION RATE: 16 BRPM

## 2019-01-03 DIAGNOSIS — R11.2 NAUSEA AND VOMITING, INTRACTABILITY OF VOMITING NOT SPECIFIED, UNSPECIFIED VOMITING TYPE: ICD-10-CM

## 2019-01-03 DIAGNOSIS — R79.89 ELEVATED TROPONIN: ICD-10-CM

## 2019-01-03 LAB
ALBUMIN SERPL-MCNC: 2.3 G/DL (ref 3.4–5)
ALP SERPL-CCNC: 276 U/L (ref 40–150)
ALT SERPL W P-5'-P-CCNC: 32 U/L (ref 0–70)
ANION GAP SERPL CALCULATED.3IONS-SCNC: 8 MMOL/L (ref 3–14)
AST SERPL W P-5'-P-CCNC: 48 U/L (ref 0–45)
BASOPHILS # BLD AUTO: 0 10E9/L (ref 0–0.2)
BASOPHILS NFR BLD AUTO: 0.3 %
BILIRUB SERPL-MCNC: 0.4 MG/DL (ref 0.2–1.3)
BUN SERPL-MCNC: 23 MG/DL (ref 7–30)
CALCIUM SERPL-MCNC: 8.3 MG/DL (ref 8.5–10.1)
CHLORIDE SERPL-SCNC: 106 MMOL/L (ref 94–109)
CO2 SERPL-SCNC: 25 MMOL/L (ref 20–32)
CREAT SERPL-MCNC: 1.16 MG/DL (ref 0.66–1.25)
DIFFERENTIAL METHOD BLD: ABNORMAL
EOSINOPHIL # BLD AUTO: 0 10E9/L (ref 0–0.7)
EOSINOPHIL NFR BLD AUTO: 0.3 %
ERYTHROCYTE [DISTWIDTH] IN BLOOD BY AUTOMATED COUNT: 15.1 % (ref 10–15)
GFR SERPL CREATININE-BSD FRML MDRD: 63 ML/MIN/{1.73_M2}
GLUCOSE SERPL-MCNC: 171 MG/DL (ref 70–99)
HCT VFR BLD AUTO: 33.9 % (ref 40–53)
HGB BLD-MCNC: 10.7 G/DL (ref 13.3–17.7)
IMM GRANULOCYTES # BLD: 0.1 10E9/L (ref 0–0.4)
IMM GRANULOCYTES NFR BLD: 0.5 %
INTERPRETATION ECG - MUSE: NORMAL
INTERPRETATION ECG - MUSE: NORMAL
LIPASE SERPL-CCNC: 352 U/L (ref 73–393)
LYMPHOCYTES # BLD AUTO: 0.9 10E9/L (ref 0.8–5.3)
LYMPHOCYTES NFR BLD AUTO: 9.4 %
MCH RBC QN AUTO: 29.2 PG (ref 26.5–33)
MCHC RBC AUTO-ENTMCNC: 31.6 G/DL (ref 31.5–36.5)
MCV RBC AUTO: 92 FL (ref 78–100)
MONOCYTES # BLD AUTO: 0.3 10E9/L (ref 0–1.3)
MONOCYTES NFR BLD AUTO: 2.7 %
NEUTROPHILS # BLD AUTO: 8 10E9/L (ref 1.6–8.3)
NEUTROPHILS NFR BLD AUTO: 86.8 %
NRBC # BLD AUTO: 0 10*3/UL
NRBC BLD AUTO-RTO: 0 /100
PLATELET # BLD AUTO: 270 10E9/L (ref 150–450)
POTASSIUM SERPL-SCNC: 2.9 MMOL/L (ref 3.4–5.3)
PROT SERPL-MCNC: 6.4 G/DL (ref 6.8–8.8)
RBC # BLD AUTO: 3.67 10E12/L (ref 4.4–5.9)
SODIUM SERPL-SCNC: 139 MMOL/L (ref 133–144)
TROPONIN I SERPL-MCNC: 0.06 UG/L (ref 0–0.04)
TROPONIN I SERPL-MCNC: 0.06 UG/L (ref 0–0.04)
WBC # BLD AUTO: 9.2 10E9/L (ref 4–11)

## 2019-01-03 PROCEDURE — 85025 COMPLETE CBC W/AUTO DIFF WBC: CPT | Performed by: EMERGENCY MEDICINE

## 2019-01-03 PROCEDURE — 84484 ASSAY OF TROPONIN QUANT: CPT | Mod: 91 | Performed by: EMERGENCY MEDICINE

## 2019-01-03 PROCEDURE — 93005 ELECTROCARDIOGRAM TRACING: CPT | Mod: 76

## 2019-01-03 PROCEDURE — 96365 THER/PROPH/DIAG IV INF INIT: CPT | Mod: 59

## 2019-01-03 PROCEDURE — 83690 ASSAY OF LIPASE: CPT | Performed by: EMERGENCY MEDICINE

## 2019-01-03 PROCEDURE — 25000132 ZZH RX MED GY IP 250 OP 250 PS 637: Performed by: EMERGENCY MEDICINE

## 2019-01-03 PROCEDURE — 96361 HYDRATE IV INFUSION ADD-ON: CPT

## 2019-01-03 PROCEDURE — 71046 X-RAY EXAM CHEST 2 VIEWS: CPT

## 2019-01-03 PROCEDURE — 25000128 H RX IP 250 OP 636: Performed by: EMERGENCY MEDICINE

## 2019-01-03 PROCEDURE — 96375 TX/PRO/DX INJ NEW DRUG ADDON: CPT

## 2019-01-03 PROCEDURE — 80053 COMPREHEN METABOLIC PANEL: CPT | Performed by: EMERGENCY MEDICINE

## 2019-01-03 PROCEDURE — 99285 EMERGENCY DEPT VISIT HI MDM: CPT | Mod: 25

## 2019-01-03 PROCEDURE — 93005 ELECTROCARDIOGRAM TRACING: CPT

## 2019-01-03 RX ORDER — NITROGLYCERIN 0.4 MG/1
0.4 TABLET SUBLINGUAL ONCE
Status: COMPLETED | OUTPATIENT
Start: 2019-01-03 | End: 2019-01-03

## 2019-01-03 RX ORDER — MORPHINE SULFATE 2 MG/ML
2 INJECTION, SOLUTION INTRAMUSCULAR; INTRAVENOUS ONCE
Status: COMPLETED | OUTPATIENT
Start: 2019-01-03 | End: 2019-01-03

## 2019-01-03 RX ORDER — ASPIRIN 325 MG
325 TABLET ORAL ONCE
Status: COMPLETED | OUTPATIENT
Start: 2019-01-03 | End: 2019-01-03

## 2019-01-03 RX ORDER — ONDANSETRON 2 MG/ML
4 INJECTION INTRAMUSCULAR; INTRAVENOUS EVERY 30 MIN PRN
Status: DISCONTINUED | OUTPATIENT
Start: 2019-01-03 | End: 2019-01-03 | Stop reason: HOSPADM

## 2019-01-03 RX ORDER — POTASSIUM CL/LIDO/0.9 % NACL 10MEQ/0.1L
10 INTRAVENOUS SOLUTION, PIGGYBACK (ML) INTRAVENOUS ONCE
Status: COMPLETED | OUTPATIENT
Start: 2019-01-03 | End: 2019-01-03

## 2019-01-03 RX ADMIN — ASPIRIN 325 MG ORAL TABLET 325 MG: 325 PILL ORAL at 07:58

## 2019-01-03 RX ADMIN — MORPHINE SULFATE 2 MG: 2 INJECTION, SOLUTION INTRAMUSCULAR; INTRAVENOUS at 07:01

## 2019-01-03 RX ADMIN — NITROGLYCERIN 0.4 MG: 0.4 TABLET SUBLINGUAL at 07:58

## 2019-01-03 RX ADMIN — NITROGLYCERIN 0.4 MG: 0.4 TABLET SUBLINGUAL at 08:27

## 2019-01-03 RX ADMIN — Medication 10 MEQ: at 08:02

## 2019-01-03 RX ADMIN — ONDANSETRON 4 MG: 2 INJECTION INTRAMUSCULAR; INTRAVENOUS at 06:43

## 2019-01-03 RX ADMIN — SODIUM CHLORIDE 500 ML: 9 INJECTION, SOLUTION INTRAVENOUS at 06:37

## 2019-01-03 ASSESSMENT — ENCOUNTER SYMPTOMS
VOMITING: 1
DIARRHEA: 1
CHILLS: 0
FEVER: 0
ABDOMINAL PAIN: 1
UNEXPECTED WEIGHT CHANGE: 0
NAUSEA: 1

## 2019-01-03 NOTE — ED TRIAGE NOTES
abd nader, NVD 6 days ago and stopped lasix and it went away but started lasix again and it is back yesterday.  Family thinks lasix side effect    Pt A&O x 3, CMS x 3, ABCD's adequate in triage

## 2019-01-03 NOTE — TELEPHONE ENCOUNTER
Patients son Mitra called and advised that his father was currently in the ED with elevated troponin's and advised that they are being informed that patient may be transferred to [facility] for admission. Patient's son had questions about patient's medications. RN advised patient's son that the ED team would treat his father for his acute needs and that if cardiology is needing to be involved regarding his current condition they will consult our service while he is admitted. RN reassured patient's son to let the ED team treat patient's acute needs and medications will be adjust as his condition progresses. Patient's son verbalized understanding and agreed with plan.

## 2019-01-03 NOTE — ED NOTES
"Pt states \" I feel good.\" Pt denies any pain. Denies nausea, or shortness of breath. Lungs clear. Heart tones S1 S2. Monitor shows SR without ectopy. Family at bedside.   "

## 2019-01-03 NOTE — ED NOTES
Pt ambulated to the . No pain with activity. Daughter in law called who works at [employer] and now family requesting admission to [facility].

## 2019-01-03 NOTE — ED NOTES
No change in pain level post nitroglycerin. Pain level 4/10. Dozing between cares. Family at bedside.

## 2019-01-03 NOTE — ED PROVIDER NOTES
History     Chief Complaint:  Nausea, Vomiting, Diarrhea    HPI   HPI supplemented and translated by the patient's family as the patient speaks Yakut primarily.    Shell Leon is a 68 year old male with a history of type II diabetes mellitus, hypertension, and high cholesterol who presents with nausea, vomiting, and diarrhea. Around 1800 last night, the patient states he first began experiencing nausea and promptly started vomiting thereafter. Throughout the night last night, the patient reports he vomited numerous times and also had two episodes of diarrhea. The patient describes the vomit as green in color; he notes he has not been able to keep any liquids down. About one week ago, the patient's son reports he had a similar episode of vomiting after taking his Lasix, so he did not take it for a few days, which seemed to help alleviate his symptoms. However, the patient started taking his Lasix again a few days ago, so the patient's son is concerned his symptoms are related to his Lasix use. This morning, the patient also began complaining of central chest pain and epigastric abdominal pain, so he had his son bring him to the ED for further evaluation. Here in the ED, the patient continues to endorse nausea, chest pain, and abdominal pain. The patient and his family deny any fevers, abnormal leg swelling or pain, unexpected weight gain, recent ill contacts, recent international travel, recent antibiotic use, or other acute symptoms or changes.    CARDIAC RISK FACTORS:  Sex:    Male  Tobacco:   No  Hypertension:   Yes  Hyperlipidemia:  Yes  Diabetes:   Yes  Family History:  Unknown    Allergies:  No known drug allergies    Medications:    Tylenol  Lipitor  Lasix  Glipizide  Hydralazine  Hydrochlorothiazide  Lisinopril  Metformin  Omeprazole  Spironolactone  Valsartan    Past Medical History:    Congestive heart failure  Type 2 diabetes mellitus  Gastroesophageal reflux disease  High  cholesterol  Hypertension  Bilateral internal carotid artery stenosis  MALT lymphoma  Anemia  Lower extremity edema    Past Surgical History:    Laparoscopic cholecystectomy    Family History:    Family history is unknown.    Social History:  Smoking status: No  Alcohol use: No  Drug use: No  PCP: Kathi Mondragon  Presents to the ED with his son  Marital Status:  [2]     Review of Systems   Constitutional: Negative for chills, fever and unexpected weight change.   Cardiovascular: Positive for chest pain. Negative for leg swelling.   Gastrointestinal: Positive for abdominal pain, diarrhea, nausea and vomiting.   All other systems reviewed and are negative.    Physical Exam     Patient Vitals for the past 24 hrs:   BP Temp Temp src Pulse Heart Rate Resp SpO2   01/03/19 1200 170/80 -- -- 74 67 10 --   01/03/19 1130 171/62 -- -- 70 70 9 --   01/03/19 1100 166/60 -- -- 68 69 14 --   01/03/19 1045 -- -- -- 67 -- 16 --   01/03/19 1030 168/61 -- -- 68 65 12 --   01/03/19 1000 173/65 -- -- 68 67 12 --   01/03/19 0930 173/64 -- -- 68 73 15 --   01/03/19 0900 173/63 -- -- 73 70 15 --   01/03/19 0830 163/61 -- -- 68 66 17 --   01/03/19 0800 185/64 -- -- 70 -- -- 100 %   01/03/19 0730 174/64 -- -- 69 -- -- 100 %   01/03/19 0700 (!) 202/68 -- -- 64 -- -- --   01/03/19 0650 196/70 -- -- 63 -- -- --   01/03/19 0605 (!) 207/73 98.5  F (36.9  C) Temporal 65 -- 20 100 %     Physical Exam  General: Resting comfortably on the gurney  Eyes:  The pupils are equal and round    Conjunctivae and sclerae are normal  ENT:    Moist mucous membranes  Neck:  Normal range of motion  CV:  Regular rate and rhythm    Skin warm and well perfused   Resp:  Lungs are clear    Non-labored    No rales    No wheezing   GI:  Abdomen is soft, there is no rigidity    No distension    No rebound tenderness     Minimal epigastric tenderness  MS:  Bilateral LE edema  Skin:  No rash or acute skin lesions noted  Neuro:   Awake, alert.      Speech is  normal and fluent.    Face is symmetric.     Moves all extremities equally  Psych: Normal affect.  Appropriate interactions.    Emergency Department Course   ECG (06:11:50):  Rate 62 bpm. OR interval 132. QRS duration 82. QT/QTc 412/418. P-R-T axes 60 21 -24. Normal sinus rhythm with sinus arrhythmia. Nonspecific T wave abnormality. Abnormal ECG. No significant change compared to ECG dated 5/20/18. Interpreted at 9618 by Henny Urbina MD.    ECG (07:26:41):  Rate 65 bpm. OR interval 104. QRS duration 72. QT/QTc 394/409. P-R-T axes 47 17 -26. Sinus rhythm with short OR. Nonspecific T wave abnormality. Abnormal ECG. No significant change compared to earlier ECG from today 1/3/19. Interpreted at 0730 by Henny Urbina MD.    Imaging:  Radiographic findings were communicated with the patient and family who voiced understanding of the findings.    XR Chest 2 views:  Lordotic positioning. The cardiac silhouette is mildly  enlarged. There are small bilateral pleural effusions. Infiltrate or  atelectasis at the left lung base.  As read by Radiology.    Laboratory:  CBC: HGB 10.7 (L), o/w WNL (WBC 9.2, )  CMP: Potassium 2.9 (L), Glucose 171 (H), Calcium 8.3 (L), Albumin 2.3 (L), Protein total 6.4 (L), Alkaline phosphatase 276 (H), AST 28 (H), o/w WNL (Creatinine 1.16)  Lipase: 352  Troponin (0636): 0.056 (H)  Troponin (0949): 0.064 (H)    Interventions:  0637:  mL IV Bolus  0643: 4 mg Zofran IV  0701: 2 mg Morphine IV  0758: 0.4 mg Nitroglycerin PO  0758: 325 mg Aspirin PO  0802: 10 mEq Potassium chloride with 10 mg Lidocaine IV  0827: 0.4 mg Nitroglycerin PO    Emergency Department Course:  Past medical records, nursing notes, and vitals reviewed.  0617: I performed an exam of the patient and obtained history, as documented above.  ECG performed, results above.  IV inserted and blood drawn.  The patient was sent for a chest x-ray while in the emergency department, findings above.    0710: I  rechecked the patient. Explained findings to the patient and his family.    0726: Repeat ECG performed, results above.    0824: I rechecked the patient. He states he is feeling improved, though continues to have some pain.    0834: There are no beds here and we will likely have to transfer the patient to another facility. I relayed this information to the patient and his family who agreed to transfer and admission.    0928: I rechecked the patient. There are no beds at [facility], so we will plan to transfer him to the [facility].    0944: I spoke to Dr. Mckeon of the hospitalist service at [place] at the [facility] who accepts the patient for admission.     1033: I discussed the serial troponin findings with Dr. Mckeon.    1208: It will be multiple hours before the patient is able to be transferred to the [place], so a bed at [facility] in [place] was requested instead.    1209: I spoke with Dr. Granger of the hospitalist service at [facility] who accepts the patient for admission.    1230: The patient's family member arrived and states she would like him transferred to [facility]     1235: I spoke with Dr. Johnson of the hospitalist service at [facility] who accepts him for admission.    Findings and plan explained to the Patient. Patient will be transferred to [facility] via EMS. Discussed the case with Dr. Johnson, who will admit the patient to a monitored bed for further monitoring, evaluation, and treatment.     Impression & Plan    Medical Decision Making:  Shell Leon is a 68 year old male who presents to the ED for evaluation of nausea and vomiting.  Patient with multiple symptoms primarily complaining of chest pain and epigastric pain at this time.  Blood pressure was very elevated likely from him throwing up his blood pressure medications yesterday.  Has minimal abdominal tenderness to suggest need for abdominal imaging.   Troponin was elevated.  Suspect that this may be related more to the severe hypertension and less likely primary cardiac event. Pain resolved in ED. spent significant amount of time working on getting him transferred as there is no beds here, no beds at [facility].  Spoke to the [facility] who initially said that they can take him but then said it would be a long time before he would have a bed and so then spoke to [facility] and they had a bed.  About to be transferred to [facility] and then family wanted transfer to [facility] so spoke to [facility] for transfer. Transfer arranged to [facility]. Did not think abdominal imaging such as CT abdomen indicated. Doubt PE, dissection.     Diagnosis:    ICD-10-CM   1. Elevated troponin R74.8   2. Nausea and vomiting, intractability of vomiting not specified, unspecified vomiting type R11.2     Disposition: Transferred and admitted to the [facility]    Mariluz Angeles  1/3/2019   [facility] EMERGENCY DEPARTMENT    I, Mariluz Angeles, am serving as a scribe at 6:17 AM on 1/3/2019 to document services personally performed by Henny Urbina MD based on my observations and the provider's statements to me.      Henny Urbina MD  01/03/19 6859

## 2019-01-22 ENCOUNTER — TRANSFERRED RECORDS (OUTPATIENT)
Dept: HEALTH INFORMATION MANAGEMENT | Facility: CLINIC | Age: 69
End: 2019-01-22

## 2019-01-22 LAB — HEP C HIM: NORMAL

## 2019-02-08 ENCOUNTER — TELEPHONE (OUTPATIENT)
Dept: FAMILY MEDICINE | Facility: CLINIC | Age: 69
End: 2019-02-08

## 2019-02-08 NOTE — LETTER
February 22, 2019      Shell Leon  [id number]  [place] [address]        Dear Mr. Shell Leon,      We care about your health and have reviewed your health plan including your medical conditions, medications, and lab results.  Based on this review, we recommend you take the following action(s):     -schedule a NURSE ONLY APPOINTMENT for a blood pressure check.  This is an appointment that does not involve a provider.  The needs you currently have can be handled by one of our nursing staff and is usually much quicker than a regular office visit.        Please call us at the [facility] - [phone] to address the above recommendations.   Thank you for trusting [facility] and we appreciate the opportunity to serve you.  We look forward to supporting your healthcare needs in the future.     Sincerely,  Gayatri Whitehead CMA ([facility])

## 2019-02-08 NOTE — TELEPHONE ENCOUNTER
Type of outreach:  LM for son to call back  Health Maintenance Due   Topic Date Due     ZOSTER IMMUNIZATION (1 of 2) 02/07/1999     ADVANCE DIRECTIVE PLANNING Q5 YRS  02/07/2004     Needs nurse only BP check.  Gayatri Whitehead CMA ([facility])

## 2019-03-26 ENCOUNTER — HOSPITAL ENCOUNTER (OUTPATIENT)
Facility: CLINIC | Age: 69
Setting detail: SPECIMEN
End: 2019-03-26
Attending: INTERNAL MEDICINE
Payer: COMMERCIAL

## 2019-04-02 ENCOUNTER — HOSPITAL ENCOUNTER (OUTPATIENT)
Facility: CLINIC | Age: 69
Setting detail: SPECIMEN
End: 2019-04-02
Attending: INTERNAL MEDICINE
Payer: COMMERCIAL

## 2019-05-03 ENCOUNTER — HOSPITAL ENCOUNTER (INPATIENT)
Facility: CLINIC | Age: 69
LOS: 1 days | Discharge: HOME | End: 2019-05-04
Attending: EMERGENCY MEDICINE | Admitting: INTERNAL MEDICINE
Payer: COMMERCIAL

## 2019-05-03 DIAGNOSIS — D64.9 SYMPTOMATIC ANEMIA: ICD-10-CM

## 2019-05-03 DIAGNOSIS — N28.9 ACUTE ON CHRONIC RENAL INSUFFICIENCY: ICD-10-CM

## 2019-05-03 DIAGNOSIS — E87.70 HYPERVOLEMIA, UNSPECIFIED HYPERVOLEMIA TYPE: ICD-10-CM

## 2019-05-03 DIAGNOSIS — N18.9 ACUTE ON CHRONIC RENAL INSUFFICIENCY: ICD-10-CM

## 2019-05-03 PROBLEM — R76.12 POSITIVE QUANTIFERON-TB GOLD TEST: Status: ACTIVE | Noted: 2019-01-24

## 2019-05-03 PROBLEM — N18.30 CKD (CHRONIC KIDNEY DISEASE) STAGE 3, GFR 30-59 ML/MIN (H): Status: ACTIVE | Noted: 2019-04-29

## 2019-05-03 PROBLEM — E88.09 HYPOALBUMINEMIA: Status: ACTIVE | Noted: 2019-01-06

## 2019-05-03 PROBLEM — N17.9 AKI (ACUTE KIDNEY INJURY) (H): Status: ACTIVE | Noted: 2019-01-08

## 2019-05-03 PROBLEM — N04.9 NEPHROTIC SYNDROME: Status: ACTIVE | Noted: 2019-04-29

## 2019-05-03 PROBLEM — H52.4 PRESBYOPIA: Status: ACTIVE | Noted: 2018-10-25

## 2019-05-03 LAB
ABO + RH BLD: NORMAL
ABO + RH BLD: NORMAL
ALBUMIN SERPL-MCNC: 1.5 G/DL (ref 3.4–5)
ALP SERPL-CCNC: 429 U/L (ref 40–150)
ALT SERPL W P-5'-P-CCNC: 45 U/L (ref 0–70)
ANION GAP SERPL CALCULATED.3IONS-SCNC: 6 MMOL/L (ref 3–14)
AST SERPL W P-5'-P-CCNC: 32 U/L (ref 0–45)
BASOPHILS # BLD AUTO: 0.1 10E9/L (ref 0–0.2)
BASOPHILS NFR BLD AUTO: 0.9 %
BILIRUB DIRECT SERPL-MCNC: <0.1 MG/DL (ref 0–0.2)
BILIRUB SERPL-MCNC: 0.2 MG/DL (ref 0.2–1.3)
BLD GP AB SCN SERPL QL: NORMAL
BLD PROD TYP BPU: NORMAL
BLD PROD TYP BPU: NORMAL
BLD UNIT ID BPU: 0
BLOOD BANK CMNT PATIENT-IMP: NORMAL
BLOOD PRODUCT CODE: NORMAL
BPU ID: NORMAL
BUN SERPL-MCNC: 52 MG/DL (ref 7–30)
CALCIUM SERPL-MCNC: 8.3 MG/DL (ref 8.5–10.1)
CHLORIDE SERPL-SCNC: 102 MMOL/L (ref 94–109)
CO2 SERPL-SCNC: 22 MMOL/L (ref 20–32)
CREAT SERPL-MCNC: 1.41 MG/DL (ref 0.66–1.25)
DIFFERENTIAL METHOD BLD: ABNORMAL
EOSINOPHIL # BLD AUTO: 0.8 10E9/L (ref 0–0.7)
EOSINOPHIL NFR BLD AUTO: 9.2 %
ERYTHROCYTE [DISTWIDTH] IN BLOOD BY AUTOMATED COUNT: 16.3 % (ref 10–15)
GFR SERPL CREATININE-BSD FRML MDRD: 50 ML/MIN/{1.73_M2}
GLUCOSE SERPL-MCNC: 284 MG/DL (ref 70–99)
HCT VFR BLD AUTO: 20.2 % (ref 40–53)
HGB BLD-MCNC: 6.4 G/DL (ref 13.3–17.7)
IMM GRANULOCYTES # BLD: 0.1 10E9/L (ref 0–0.4)
IMM GRANULOCYTES NFR BLD: 1.1 %
LDH SERPL L TO P-CCNC: 210 U/L (ref 85–227)
LYMPHOCYTES # BLD AUTO: 1.5 10E9/L (ref 0.8–5.3)
LYMPHOCYTES NFR BLD AUTO: 18.1 %
MCH RBC QN AUTO: 28.3 PG (ref 26.5–33)
MCHC RBC AUTO-ENTMCNC: 31.7 G/DL (ref 31.5–36.5)
MCV RBC AUTO: 89 FL (ref 78–100)
MONOCYTES # BLD AUTO: 0.6 10E9/L (ref 0–1.3)
MONOCYTES NFR BLD AUTO: 6.8 %
NEUTROPHILS # BLD AUTO: 5.2 10E9/L (ref 1.6–8.3)
NEUTROPHILS NFR BLD AUTO: 63.9 %
NRBC # BLD AUTO: 0 10*3/UL
NRBC BLD AUTO-RTO: 0 /100
NT-PROBNP SERPL-MCNC: ABNORMAL PG/ML (ref 0–900)
NUM BPU REQUESTED: 1
PLATELET # BLD AUTO: 632 10E9/L (ref 150–450)
POTASSIUM SERPL-SCNC: 4.8 MMOL/L (ref 3.4–5.3)
PROT SERPL-MCNC: 6.5 G/DL (ref 6.8–8.8)
RBC # BLD AUTO: 2.26 10E12/L (ref 4.4–5.9)
SODIUM SERPL-SCNC: 130 MMOL/L (ref 133–144)
SPECIMEN EXP DATE BLD: NORMAL
TRANSFUSION STATUS PATIENT QL: NORMAL
TRANSFUSION STATUS PATIENT QL: NORMAL
WBC # BLD AUTO: 8.1 10E9/L (ref 4–11)

## 2019-05-03 PROCEDURE — 93005 ELECTROCARDIOGRAM TRACING: CPT

## 2019-05-03 PROCEDURE — 99285 EMERGENCY DEPT VISIT HI MDM: CPT | Mod: 25

## 2019-05-03 PROCEDURE — 80076 HEPATIC FUNCTION PANEL: CPT | Performed by: EMERGENCY MEDICINE

## 2019-05-03 PROCEDURE — 86850 RBC ANTIBODY SCREEN: CPT | Performed by: EMERGENCY MEDICINE

## 2019-05-03 PROCEDURE — 99223 1ST HOSP IP/OBS HIGH 75: CPT | Mod: AI | Performed by: INTERNAL MEDICINE

## 2019-05-03 PROCEDURE — 85025 COMPLETE CBC W/AUTO DIFF WBC: CPT | Performed by: EMERGENCY MEDICINE

## 2019-05-03 PROCEDURE — 80048 BASIC METABOLIC PNL TOTAL CA: CPT | Performed by: EMERGENCY MEDICINE

## 2019-05-03 PROCEDURE — 12000000 ZZH R&B MED SURG/OB

## 2019-05-03 PROCEDURE — 36415 COLL VENOUS BLD VENIPUNCTURE: CPT | Performed by: EMERGENCY MEDICINE

## 2019-05-03 PROCEDURE — 25000132 ZZH RX MED GY IP 250 OP 250 PS 637: Performed by: EMERGENCY MEDICINE

## 2019-05-03 PROCEDURE — 86901 BLOOD TYPING SEROLOGIC RH(D): CPT | Performed by: EMERGENCY MEDICINE

## 2019-05-03 PROCEDURE — 83880 ASSAY OF NATRIURETIC PEPTIDE: CPT | Performed by: EMERGENCY MEDICINE

## 2019-05-03 PROCEDURE — 86900 BLOOD TYPING SEROLOGIC ABO: CPT | Performed by: EMERGENCY MEDICINE

## 2019-05-03 PROCEDURE — P9016 RBC LEUKOCYTES REDUCED: HCPCS | Performed by: EMERGENCY MEDICINE

## 2019-05-03 PROCEDURE — 86923 COMPATIBILITY TEST ELECTRIC: CPT | Performed by: EMERGENCY MEDICINE

## 2019-05-03 PROCEDURE — 36430 TRANSFUSION BLD/BLD COMPNT: CPT

## 2019-05-03 PROCEDURE — 83615 LACTATE (LD) (LDH) ENZYME: CPT | Performed by: EMERGENCY MEDICINE

## 2019-05-03 RX ORDER — NALOXONE HYDROCHLORIDE 0.4 MG/ML
.1-.4 INJECTION, SOLUTION INTRAMUSCULAR; INTRAVENOUS; SUBCUTANEOUS
Status: DISCONTINUED | OUTPATIENT
Start: 2019-05-03 | End: 2019-05-04 | Stop reason: HOSPADM

## 2019-05-03 RX ORDER — BUMETANIDE 2 MG/1
2 TABLET ORAL ONCE
Status: COMPLETED | OUTPATIENT
Start: 2019-05-03 | End: 2019-05-03

## 2019-05-03 RX ORDER — NICOTINE POLACRILEX 4 MG
15-30 LOZENGE BUCCAL
Status: DISCONTINUED | OUTPATIENT
Start: 2019-05-03 | End: 2019-05-04 | Stop reason: HOSPADM

## 2019-05-03 RX ORDER — DEXTROSE MONOHYDRATE 25 G/50ML
25-50 INJECTION, SOLUTION INTRAVENOUS
Status: DISCONTINUED | OUTPATIENT
Start: 2019-05-03 | End: 2019-05-04 | Stop reason: HOSPADM

## 2019-05-03 RX ORDER — HYDRALAZINE HYDROCHLORIDE 20 MG/ML
10 INJECTION INTRAMUSCULAR; INTRAVENOUS EVERY 4 HOURS PRN
Status: DISCONTINUED | OUTPATIENT
Start: 2019-05-03 | End: 2019-05-04 | Stop reason: HOSPADM

## 2019-05-03 RX ORDER — HYDRALAZINE HYDROCHLORIDE 25 MG/1
25 TABLET, FILM COATED ORAL ONCE
Status: COMPLETED | OUTPATIENT
Start: 2019-05-03 | End: 2019-05-03

## 2019-05-03 RX ADMIN — HYDRALAZINE HYDROCHLORIDE 25 MG: 25 TABLET ORAL at 23:17

## 2019-05-03 RX ADMIN — BUMETANIDE 2 MG: 2 TABLET ORAL at 23:17

## 2019-05-03 ASSESSMENT — ENCOUNTER SYMPTOMS
BLOOD IN STOOL: 0
DIZZINESS: 1
SHORTNESS OF BREATH: 0
FEVER: 0
ABDOMINAL PAIN: 0
WEAKNESS: 1
FATIGUE: 1
HEADACHES: 1
VOMITING: 0

## 2019-05-03 ASSESSMENT — MIFFLIN-ST. JEOR: SCORE: 1260.22

## 2019-05-04 ENCOUNTER — APPOINTMENT (OUTPATIENT)
Dept: CT IMAGING | Facility: CLINIC | Age: 69
End: 2019-05-04
Attending: INTERNAL MEDICINE
Payer: COMMERCIAL

## 2019-05-04 VITALS
SYSTOLIC BLOOD PRESSURE: 162 MMHG | TEMPERATURE: 98.2 F | HEART RATE: 60 BPM | OXYGEN SATURATION: 99 % | WEIGHT: 129.9 LBS | BODY MASS INDEX: 22.18 KG/M2 | DIASTOLIC BLOOD PRESSURE: 56 MMHG | RESPIRATION RATE: 18 BRPM | HEIGHT: 64 IN

## 2019-05-04 LAB
ALBUMIN SERPL-MCNC: 1.4 G/DL (ref 3.4–5)
ALP SERPL-CCNC: 380 U/L (ref 40–150)
ALT SERPL W P-5'-P-CCNC: 38 U/L (ref 0–70)
ANION GAP SERPL CALCULATED.3IONS-SCNC: 5 MMOL/L (ref 3–14)
AST SERPL W P-5'-P-CCNC: 28 U/L (ref 0–45)
BILIRUB DIRECT SERPL-MCNC: <0.1 MG/DL (ref 0–0.2)
BILIRUB SERPL-MCNC: 0.1 MG/DL (ref 0.2–1.3)
BUN SERPL-MCNC: 51 MG/DL (ref 7–30)
CALCIUM SERPL-MCNC: 8.2 MG/DL (ref 8.5–10.1)
CHLORIDE SERPL-SCNC: 105 MMOL/L (ref 94–109)
CO2 SERPL-SCNC: 23 MMOL/L (ref 20–32)
CREAT SERPL-MCNC: 1.42 MG/DL (ref 0.66–1.25)
FERRITIN SERPL-MCNC: 213 NG/ML (ref 26–388)
FOLATE SERPL-MCNC: 6.3 NG/ML
GFR SERPL CREATININE-BSD FRML MDRD: 50 ML/MIN/{1.73_M2}
GLUCOSE BLDC GLUCOMTR-MCNC: 139 MG/DL (ref 70–99)
GLUCOSE BLDC GLUCOMTR-MCNC: 145 MG/DL (ref 70–99)
GLUCOSE BLDC GLUCOMTR-MCNC: 203 MG/DL (ref 70–99)
GLUCOSE BLDC GLUCOMTR-MCNC: 256 MG/DL (ref 70–99)
GLUCOSE SERPL-MCNC: 244 MG/DL (ref 70–99)
HGB BLD-MCNC: 7.3 G/DL (ref 13.3–17.7)
HGB BLD-MCNC: 7.6 G/DL (ref 13.3–17.7)
IRON SATN MFR SERPL: 20 % (ref 15–46)
IRON SERPL-MCNC: 34 UG/DL (ref 35–180)
PHOSPHATE SERPL-MCNC: 4.5 MG/DL (ref 2.5–4.5)
POTASSIUM SERPL-SCNC: 4.5 MMOL/L (ref 3.4–5.3)
PROT SERPL-MCNC: 5.9 G/DL (ref 6.8–8.8)
SODIUM SERPL-SCNC: 133 MMOL/L (ref 133–144)
TIBC SERPL-MCNC: 173 UG/DL (ref 240–430)
VIT B12 SERPL-MCNC: 394 PG/ML (ref 193–986)

## 2019-05-04 PROCEDURE — 00000146 ZZHCL STATISTIC GLUCOSE BY METER IP

## 2019-05-04 PROCEDURE — 82746 ASSAY OF FOLIC ACID SERUM: CPT | Performed by: INTERNAL MEDICINE

## 2019-05-04 PROCEDURE — 83550 IRON BINDING TEST: CPT | Performed by: INTERNAL MEDICINE

## 2019-05-04 PROCEDURE — 84450 TRANSFERASE (AST) (SGOT): CPT | Performed by: INTERNAL MEDICINE

## 2019-05-04 PROCEDURE — 85018 HEMOGLOBIN: CPT | Performed by: INTERNAL MEDICINE

## 2019-05-04 PROCEDURE — 82728 ASSAY OF FERRITIN: CPT | Performed by: INTERNAL MEDICINE

## 2019-05-04 PROCEDURE — 25000128 H RX IP 250 OP 636: Performed by: INTERNAL MEDICINE

## 2019-05-04 PROCEDURE — 99239 HOSP IP/OBS DSCHRG MGMT >30: CPT | Performed by: INTERNAL MEDICINE

## 2019-05-04 PROCEDURE — 84075 ASSAY ALKALINE PHOSPHATASE: CPT | Performed by: INTERNAL MEDICINE

## 2019-05-04 PROCEDURE — 25000132 ZZH RX MED GY IP 250 OP 250 PS 637: Performed by: INTERNAL MEDICINE

## 2019-05-04 PROCEDURE — 82247 BILIRUBIN TOTAL: CPT | Performed by: INTERNAL MEDICINE

## 2019-05-04 PROCEDURE — 80069 RENAL FUNCTION PANEL: CPT | Performed by: INTERNAL MEDICINE

## 2019-05-04 PROCEDURE — 82607 VITAMIN B-12: CPT | Performed by: INTERNAL MEDICINE

## 2019-05-04 PROCEDURE — 36415 COLL VENOUS BLD VENIPUNCTURE: CPT | Performed by: INTERNAL MEDICINE

## 2019-05-04 PROCEDURE — 82248 BILIRUBIN DIRECT: CPT | Performed by: INTERNAL MEDICINE

## 2019-05-04 PROCEDURE — 25000131 ZZH RX MED GY IP 250 OP 636 PS 637: Performed by: INTERNAL MEDICINE

## 2019-05-04 PROCEDURE — 70450 CT HEAD/BRAIN W/O DYE: CPT

## 2019-05-04 PROCEDURE — 83540 ASSAY OF IRON: CPT | Performed by: INTERNAL MEDICINE

## 2019-05-04 PROCEDURE — 84460 ALANINE AMINO (ALT) (SGPT): CPT | Performed by: INTERNAL MEDICINE

## 2019-05-04 PROCEDURE — 84155 ASSAY OF PROTEIN SERUM: CPT | Performed by: INTERNAL MEDICINE

## 2019-05-04 RX ORDER — ALBUTEROL SULFATE 90 UG/1
2 AEROSOL, METERED RESPIRATORY (INHALATION) EVERY 6 HOURS PRN
COMMUNITY

## 2019-05-04 RX ORDER — HYDRALAZINE HYDROCHLORIDE 50 MG/1
50 TABLET, FILM COATED ORAL EVERY MORNING
Status: DISCONTINUED | OUTPATIENT
Start: 2019-05-05 | End: 2019-05-04 | Stop reason: HOSPADM

## 2019-05-04 RX ORDER — HYDRALAZINE HYDROCHLORIDE 100 MG/1
100 TABLET, FILM COATED ORAL 3 TIMES DAILY
Status: DISCONTINUED | OUTPATIENT
Start: 2019-05-04 | End: 2019-05-04

## 2019-05-04 RX ORDER — HYDRALAZINE HYDROCHLORIDE 25 MG/1
50 TABLET, FILM COATED ORAL
Status: ON HOLD | COMMUNITY
End: 2019-06-13

## 2019-05-04 RX ORDER — HYDRALAZINE HYDROCHLORIDE 25 MG/1
75 TABLET, FILM COATED ORAL 3 TIMES DAILY
Status: ON HOLD | COMMUNITY
End: 2019-08-31

## 2019-05-04 RX ORDER — LOSARTAN POTASSIUM 100 MG/1
100 TABLET ORAL DAILY
Status: DISCONTINUED | OUTPATIENT
Start: 2019-05-04 | End: 2019-05-04 | Stop reason: HOSPADM

## 2019-05-04 RX ORDER — ATORVASTATIN CALCIUM 40 MG/1
80 TABLET, FILM COATED ORAL DAILY
Status: DISCONTINUED | OUTPATIENT
Start: 2019-05-04 | End: 2019-05-04

## 2019-05-04 RX ORDER — ISONIAZID 300 MG/1
300 TABLET ORAL DAILY
COMMUNITY

## 2019-05-04 RX ORDER — GLIPIZIDE 10 MG/1
10 TABLET, FILM COATED, EXTENDED RELEASE ORAL DAILY
COMMUNITY

## 2019-05-04 RX ORDER — HYDRALAZINE HYDROCHLORIDE 25 MG/1
25 TABLET, FILM COATED ORAL EVERY EVENING
Status: DISCONTINUED | OUTPATIENT
Start: 2019-05-04 | End: 2019-05-04 | Stop reason: HOSPADM

## 2019-05-04 RX ORDER — LOSARTAN POTASSIUM 100 MG/1
50 TABLET ORAL DAILY
Status: ON HOLD | COMMUNITY
End: 2019-08-31

## 2019-05-04 RX ORDER — ISONIAZID 300 MG/1
300 TABLET ORAL DAILY
Status: DISCONTINUED | OUTPATIENT
Start: 2019-05-04 | End: 2019-05-04 | Stop reason: HOSPADM

## 2019-05-04 RX ORDER — BUMETANIDE 2 MG/1
2 TABLET ORAL DAILY
Status: ON HOLD | COMMUNITY
End: 2019-10-14

## 2019-05-04 RX ORDER — HYDRALAZINE HYDROCHLORIDE 50 MG/1
50 TABLET, FILM COATED ORAL
Status: DISCONTINUED | OUTPATIENT
Start: 2019-05-04 | End: 2019-05-04 | Stop reason: HOSPADM

## 2019-05-04 RX ORDER — PYRIDOXINE HCL (VITAMIN B6) 50 MG
50 TABLET ORAL DAILY
Status: DISCONTINUED | OUTPATIENT
Start: 2019-05-04 | End: 2019-05-04 | Stop reason: HOSPADM

## 2019-05-04 RX ORDER — HYDRALAZINE HYDROCHLORIDE 25 MG/1
25 TABLET, FILM COATED ORAL EVERY EVENING
COMMUNITY
End: 2019-08-30

## 2019-05-04 RX ORDER — ALBUTEROL SULFATE 90 UG/1
2 AEROSOL, METERED RESPIRATORY (INHALATION) EVERY 6 HOURS PRN
Status: DISCONTINUED | OUTPATIENT
Start: 2019-05-04 | End: 2019-05-04 | Stop reason: HOSPADM

## 2019-05-04 RX ORDER — BUMETANIDE 2 MG/1
2 TABLET ORAL DAILY
Status: DISCONTINUED | OUTPATIENT
Start: 2019-05-04 | End: 2019-05-04 | Stop reason: HOSPADM

## 2019-05-04 RX ORDER — PYRIDOXINE HCL (VITAMIN B6) 50 MG
50 TABLET ORAL DAILY
COMMUNITY

## 2019-05-04 RX ADMIN — AMOXICILLIN AND CLAVULANATE POTASSIUM 1 TABLET: 875; 125 TABLET, FILM COATED ORAL at 13:03

## 2019-05-04 RX ADMIN — INSULIN ASPART 2 UNITS: 100 INJECTION, SOLUTION INTRAVENOUS; SUBCUTANEOUS at 04:35

## 2019-05-04 RX ADMIN — PYRIDOXINE HCL TAB 50 MG 50 MG: 50 TAB at 13:03

## 2019-05-04 RX ADMIN — BUMETANIDE 2 MG: 2 TABLET ORAL at 16:34

## 2019-05-04 RX ADMIN — ISONIAZID 300 MG: 300 TABLET ORAL at 13:03

## 2019-05-04 RX ADMIN — LOSARTAN POTASSIUM 100 MG: 100 TABLET ORAL at 12:09

## 2019-05-04 RX ADMIN — HYDRALAZINE HYDROCHLORIDE 10 MG: 20 INJECTION INTRAMUSCULAR; INTRAVENOUS at 13:48

## 2019-05-04 RX ADMIN — HYDRALAZINE HYDROCHLORIDE 50 MG: 50 TABLET, FILM COATED ORAL at 12:09

## 2019-05-04 ASSESSMENT — ACTIVITIES OF DAILY LIVING (ADL)
TRANSFERRING: 0-->INDEPENDENT
BATHING: 0-->INDEPENDENT
ADLS_ACUITY_SCORE: 18
ADLS_ACUITY_SCORE: 18
FALL_HISTORY_WITHIN_LAST_SIX_MONTHS: NO
RETIRED_EATING: 0-->INDEPENDENT
SWALLOWING: 0-->SWALLOWS FOODS/LIQUIDS WITHOUT DIFFICULTY
COGNITION: 0 - NO COGNITION ISSUES REPORTED
ADLS_ACUITY_SCORE: 11
AMBULATION: 0-->INDEPENDENT
ADLS_ACUITY_SCORE: 14
DRESS: 0-->INDEPENDENT
TOILETING: 0-->INDEPENDENT
ADLS_ACUITY_SCORE: 18
RETIRED_COMMUNICATION: 0-->UNDERSTANDS/COMMUNICATES WITHOUT DIFFICULTY

## 2019-05-04 ASSESSMENT — MIFFLIN-ST. JEOR: SCORE: 1260.22

## 2019-05-04 NOTE — PROGRESS NOTES
[facility]    Hospitalist Progress Note  Provider : Alice Pandey MD  Date of Service (when I saw the patient): 05/04/2019    Assessment & Plan      Shell Leon is a 69 year old male with past medical history significant for MALT lymphoma, dm, history of renal failure, on treatment for latent TB was asked to come to the emergency room for low hemoglobin..  Routine lab work in clinic showed that hemoglobin was down to 6.4 and creatinine trending up to 1.4.  He was asked to come to the emergency room.  He gives history of feeling dizzy weak sleepy and lethargic for the last 1 week.       1. Symptomatic Anemia.  --He has history of MALT lymphoma, in past has required transfusions  -- No signs of acute bleeding  --Has worsening renal functions  --Type and cross completed in the emergency room. transfusion started  --We will transfuse 2 units of PRBCs  --We will consult oncology followed by Dr. Page.  --Monitor vitals on telemetry  --Serial hemoglobin        2. Acute on Ch Renal Failure:  --Patient's creatinine at baseline was 0.99 it is up to 1.4 today  --He is on treatment with INH  --Also recently his Bumex dose was increased to a total of 125 mg daily  --Patient has history of CHF will reduce dose of Bumex to 20 mg with transfusion  --We will trend creatinine  --Daily weights  --I's and O's  --We will hold metformin, losartan, Bumex        3. On treatment for latent TB  --Patient on INH and pyridoxine ( will need to reconcile and order)  --We will check liver panel  --Creatinine is trending up  --He has been on treatment for last 2 months     4. CHF no signs of exacerbation  --Has bilateral lower extremity edema for which his Bumex dose was increased  --Given worsening renal functions and clinical evidence of dehydration will reduce dose of diuresis.    --Bumex 20 mg given in the emergency room with transfusion  --Monitor daily weights  --I's and O's  --Bumex can be resumed based on  weight electrolytes and hydration status     5. Diabetes mellitus  --Prior to admission on metformin   --We will hold metformin with worsening renal functions  -- sliding scale insulin     6. Hypertension  --PRN hydralazine  --Resume prior to admission hydralazine     7. Hyperlipidemia  --Resume statins     8. Headache  --If not the worst headache of his life  --He however has history of lymphoma and lower hemoglobin today  --CT head ordered       Code Status: Full Code    Disposition: Expected discharge in 1-2 days    Alice Pandey MD    Interval History   Patient seen and examined. Denies new symptoms    -Data reviewed today: I reviewed all new labs and imaging results over the last 24 hours. I personally reviewed     Physical Exam   Temp: 98.2  F (36.8  C) Temp src: Oral BP: 195/75 Pulse: 60 Heart Rate: 59 Resp: 20 SpO2: 100 % O2 Device: None (Room air)    Vitals:    05/03/19 2349 05/04/19 0513   Weight: 58.9 kg (129 lb 14.4 oz) 58.9 kg (129 lb 14.4 oz)     Vital Signs with Ranges  Temp:  [97.3  F (36.3  C)-98.2  F (36.8  C)] 98.2  F (36.8  C)  Pulse:  [58-69] 60  Heart Rate:  [57-63] 59  Resp:  [14-20] 20  BP: (161-195)/(46-76) 195/75  SpO2:  [96 %-100 %] 100 %  I/O last 3 completed shifts:  In: 519.17 [P.O.:240]  Out: 975 [Urine:975]    GEN:  Alert, oriented x 3, appears comfortable, NAD.  HEENT:  Normocephalic/atraumatic, no scleral icterus, no nasal discharge, mouth moist.  CV:  Regular rate and rhythm, no murmur or JVD.  S1 + S2 noted, no S3 or S4.  LUNGS:  Clear to auscultation bilaterally without rales/rhonchi/wheezing/retractions.  Symmetric chest rise on inhalation noted.  ABD:  Active bowel sounds, soft, non-tender/non-distended.  No rebound/guarding/rigidity.  EXT:  No edema or cyanosis.  Hands/feet warm to touch with good signs of peripheral perfusion.  No joint synovitis noted.  SKIN:  Dry to touch, no exanthems noted in the visualized areas.  NEURO:  Symmetric muscle strength, sensation to touch  grossly intact.  No new focal deficits appreciated.    Medications       amoxicillin-clavulanate  1 tablet Oral BID     hydrALAZINE  25 mg Oral QPM     [START ON 5/5/2019] hydrALAZINE  50 mg Oral QAM     hydrALAZINE  50 mg Oral Daily with lunch     insulin aspart  1-7 Units Subcutaneous TID AC     insulin aspart  1-5 Units Subcutaneous At Bedtime     isoniazid  300 mg Oral Daily     losartan  100 mg Oral Daily     pyridOXINE  50 mg Oral Daily       Data   Recent Labs   Lab 05/04/19  0355 05/03/19  1945   WBC  --  8.1   HGB 7.3* 6.4*   MCV  --  89   PLT  --  632*    130*   POTASSIUM 4.5 4.8   CHLORIDE 105 102   CO2 23 22   BUN 51* 52*   CR 1.42* 1.41*   ANIONGAP 5 6   STEVE 8.2* 8.3*   * 284*   ALBUMIN 1.4* 1.5*   PROTTOTAL 5.9* 6.5*   BILITOTAL 0.1* 0.2   ALKPHOS 380* 429*   ALT 38 45   AST 28 32       Recent Results (from the past 24 hour(s))   CT Head w/o Contrast    Narrative    CT OF THE HEAD WITHOUT CONTRAST  5/4/2019 10:29 AM     COMPARISON: Head CT 12/31/2018    HISTORY:  Headache, positional.     TECHNIQUE:  Axial CT images of the head from the skull base to the  vertex were acquired without IV contrast.    FINDINGS:   INTRACRANIAL CONTENTS: No intracranial hemorrhage, extraaxial  collection, or mass effect.  No CT evidence of acute infarct. Mild  presumed chronic small vessel ischemic change and generalized volume  loss.    VISUALIZED ORBITS/SINUSES/MASTOIDS: No significant orbital  abnormality. Mild paranasal sinus mucosal thickening. No significant  middle ear or mastoid effusion.    OSSEOUS STRUCTURES/SOFT TISSUES: No significant abnormality.      Impression    IMPRESSION:  1.  No change.  2.  No mass, hemorrhage or stroke.  3.  Mild presumed chronic small vessel ischemic change and generalized  volume loss.  4.  Mild paranasal sinus mucosal thickening.    Radiation dose for this scan was reduced using automated exposure  control, adjustment of the mA and/or kV according to patient size,  or  iterative reconstruction technique.    MATT PAGE MD

## 2019-05-04 NOTE — DISCHARGE SUMMARY
[facility]    Discharge Summary  Hospitalist    Date of Admission:  5/3/2019  Date of Discharge:  5/4/2019  Discharging Provider: Alice Pandey MD  Date of Service (when I saw the patient): 05/04/19    Discharge Diagnoses   1. Symptomatic Anemia.  2. Acute on Ch Renal Failure  3. On treatment for latent TB  4. CHF no signs of exacerbation  5. Diabetes mellitus  6. Hypertension  7. Hyperlipidemia  8. Headache     History of Present Illness   Shell Leon is an 69 year old male who was sent from the clinic for anemia. He was transferred with PRBC.     Hospital Course   Shell Leon is a 69 year old male with past medical history significant for MALT lymphoma, dm, history of renal failure, on treatment for latent TB was asked to come to the emergency room for low hemoglobin. Routine lab work in clinic showed that hemoglobin was down to 6.4 and creatinine trending up to 1.4. He was asked to come to the emergency room.  He gives history of feeling dizzy weak sleepy and lethargic for the last 1 week.       1. Symptomatic Anemia.  --He has history of MALT lymphoma, in past has required transfusions  -- No signs of acute bleeding  --Has worsening renal functions  --Type and cross completed in the emergency room. transfusion started  --Transfused with PRBCs. He was seen by oncology and recommended to follow up as outpatient     2. Acute on Ch Renal Failure:  --Patient's creatinine at baseline was 0.99 it is up to 1.4 today  --He was recently started on Bumex dose was increased to a total of 125 mg daily  --Patient has history of CHF will reduce dose of Bumex to 20 mg with transfusion  --Initially held metformin, losartan, Bumex.     3. On treatment for latent TB  --Patient on INH and pyridoxine ( will need to reconcile and order)  --He has been on treatment for last 2 months. He was advised to follow up as outpatient     4. CHF no signs of exacerbation  --Has bilateral lower extremity  edema for which his Bumex dose was increased  --Given worsening renal functions and clinical evidence of dehydration will reduce dose of diuresis.    --Bumex 20 mg given in the emergency room with transfusion.  --Discussed with nephrology and restarted on Bumex per recommendations     5. Diabetes mellitus  --Prior to admission on metformin   --Metformin initially held. He was resumed on metformin.     6. Hypertension  --PRN hydralazine  --Resume prior to admission hydralazine     7. Hyperlipidemia  --Resume statins     8. Headache  --Treated with pain medications  --He however has history of lymphoma and lower hemoglobin today  --CT head ordered and negative for acute abnormality.   Patient remained stable he was discharged home in a stable condition.      Significant Results and Procedures   Results for orders placed or performed during the hospital encounter of 05/03/19   CT Head w/o Contrast    Narrative    CT OF THE HEAD WITHOUT CONTRAST  5/4/2019 10:29 AM     COMPARISON: Head CT 12/31/2018    HISTORY:  Headache, positional.     TECHNIQUE:  Axial CT images of the head from the skull base to the  vertex were acquired without IV contrast.    FINDINGS:   INTRACRANIAL CONTENTS: No intracranial hemorrhage, extraaxial  collection, or mass effect.  No CT evidence of acute infarct. Mild  presumed chronic small vessel ischemic change and generalized volume  loss.    VISUALIZED ORBITS/SINUSES/MASTOIDS: No significant orbital  abnormality. Mild paranasal sinus mucosal thickening. No significant  middle ear or mastoid effusion.    OSSEOUS STRUCTURES/SOFT TISSUES: No significant abnormality.      Impression    IMPRESSION:  1.  No change.  2.  No mass, hemorrhage or stroke.  3.  Mild presumed chronic small vessel ischemic change and generalized  volume loss.  4.  Mild paranasal sinus mucosal thickening.    Radiation dose for this scan was reduced using automated exposure  control, adjustment of the mA and/or kV according to  patient size, or  iterative reconstruction technique.    MATT PAGE MD         Pending Results   None  Code Status   Full Code       Primary Care Physician   [facility]    0    Discharge Disposition   Discharged to home  Condition at discharge: Stable    Consultations This Hospital Stay   HEMATOLOGY & ONCOLOGY IP CONSULT  ADVANCE DIRECTIVE IP CONSULT    Time Spent on this Encounter   IAlice MD, personally saw the patient today and spent greater than 30 minutes discharging this patient.    Discharge Orders      Reason for your hospital stay    anemia     Activity    Your activity upon discharge: activity as tolerated     Follow-up and recommended labs and tests     Follow up with primary care provider, Jeremy Mendoza, within 7 days  BMP and CBC with differentia on Monday or Tuesday  Follow up with oncology in 2 weeks  Follow up with nephrology as scheduled     Diet    Follow this diet upon discharge: Orders Placed This Encounter      Moderate Consistent CHO Diet     Discharge Medications   Current Discharge Medication List      CONTINUE these medications which have NOT CHANGED    Details   albuterol (PROAIR HFA/PROVENTIL HFA/VENTOLIN HFA) 108 (90 Base) MCG/ACT inhaler Inhale 2 puffs into the lungs every 6 hours as needed for shortness of breath / dyspnea or wheezing      amoxicillin-clavulanate (AUGMENTIN) 875-125 MG tablet Take 1 tablet by mouth 2 times daily      bumetanide (BUMEX) 2 MG tablet Take 2 mg by mouth daily      glipiZIDE (GLUCOTROL XL) 10 MG 24 hr tablet Take 10 mg by mouth daily      !! hydrALAZINE (APRESOLINE) 25 MG tablet Take 50 mg by mouth every morning      !! hydrALAZINE (APRESOLINE) 25 MG tablet Take 50 mg by mouth daily (with lunch)      !! hydrALAZINE (APRESOLINE) 25 MG tablet Take 25 mg by mouth every evening      insulin aspart (NOVOLOG FLEXPEN) 100 UNIT/ML pen Inject Subcutaneous 3 times daily (with meals) Sliding scale prn with meals for BG>200.  2  units per 50 over 200      isoniazid (NYDRAZID) 300 MG tablet Take 300 mg by mouth daily      losartan (COZAAR) 100 MG tablet Take 100 mg by mouth daily      pyridOXINE (VITAMIN B-6) 50 MG tablet Take 50 mg by mouth daily      blood glucose (NO BRAND SPECIFIED) lancets standard Use to test blood sugar 3 times daily or as directed.  Qty: 100 each, Refills: 11    Associated Diagnoses: Type 2 diabetes mellitus with hyperglycemia, without long-term current use of insulin (H)      blood glucose monitoring (NO BRAND SPECIFIED) meter device kit Use to test blood sugar 3 times daily or as directed.  Qty: 1 kit, Refills: 0    Comments: One touch verio flex meter  Associated Diagnoses: Type 2 diabetes mellitus without complication, without long-term current use of insulin (H)      blood glucose monitoring (NO BRAND SPECIFIED) test strip Use to test blood sugars 3 times daily or as directed  Qty: 100 strip, Refills: 11    Associated Diagnoses: Type 2 diabetes mellitus with hyperglycemia, without long-term current use of insulin (H)      !! order for DME Equipment being ordered: thigh high compression stockings 20mmg Hg  Qty: 1 Units, Refills: 0    Associated Diagnoses: Localized edema      !! order for DME Equipment being ordered: blood pressure cuff  Qty: 1 Units, Refills: 0    Associated Diagnoses: Resistant hypertension       !! - Potential duplicate medications found. Please discuss with provider.          Allergies   No Known Allergies  Data   Most Recent 3 CBC's:  Recent Labs   Lab Test 05/04/19  1529 05/04/19  0355 05/03/19 1945 01/03/19  0636 08/24/18  1553   WBC  --   --  8.1 9.2 6.7   HGB 7.6* 7.3* 6.4* 10.7* 9.3*   MCV  --   --  89 92 92   PLT  --   --  632* 270 185      Most Recent 3 BMP's:  Recent Labs   Lab Test 05/04/19  0355 05/03/19 1945 01/03/19  0636    130* 139   POTASSIUM 4.5 4.8 2.9*   CHLORIDE 105 102 106   CO2 23 22 25   BUN 51* 52* 23   CR 1.42* 1.41* 1.16   ANIONGAP 5 6 8   STEVE 8.2* 8.3* 8.3*    * 284* 171*     Most Recent 2 LFT's:  Recent Labs   Lab Test 05/04/19  0355 05/03/19  1945   AST 28 32   ALT 38 45   ALKPHOS 380* 429*   BILITOTAL 0.1* 0.2     Most Recent INR's and Anticoagulation Dosing History:  Anticoagulation Dose History     There is no flowsheet data to display.        Most Recent 3 Troponin's:  Recent Labs   Lab Test 01/03/19  0949 01/03/19  0636 05/20/18  1718   TROPI 0.064* 0.056* 0.025     Most Recent Cholesterol Panel:  Recent Labs   Lab Test 04/16/18  1436   CHOL 95   LDL 55   HDL 26*   TRIG 69     Most Recent 6 Bacteria Isolates From Any Culture (See EPIC Reports for Culture Details):No lab results found.  Most Recent TSH, T4 and A1c Labs:  Recent Labs   Lab Test 11/23/18  1544  04/26/17  1450   TSH  --   --  1.42   A1C 5.8*   < > 6.0    < > = values in this interval not displayed.

## 2019-05-04 NOTE — H&P
[facility]    Hospitalist History and Physical    Name: Shell Leon    MRN: [id number]  YOB: 1950    Age: 69 year old  Date of Admission:  5/3/2019  Date of Service (when I saw the patient): 05/03/19    Assessment & Plan   Shell Leon is a 69 year old male with past medical history significant for MALT lymphoma, dm, history of renal failure, on treatment for latent TB was asked to come to the emergency room for low hemoglobin..  Routine lab work in clinic showed that hemoglobin was down to 6.4 and creatinine trending up to 1.4.  He was asked to come to the emergency room.  He gives history of feeling dizzy weak sleepy and lethargic for the last 1 week.      Symptomatic Anemia.  --He has history of MALT lymphoma, in past has required transfusions  -- No signs of acute bleeding  --Has worsening renal functions  --Type and cross completed in the emergency room. transfusion started  --We will transfuse 2 units of PRBCs  --We will consult oncology followed by Dr. Page.  --Monitor vitals on telemetry  --Serial hemoglobin      Acute on Ch Renal Failure:  --Patient's creatinine at baseline was 0.99 it is up to 1.4 today  --He is on treatment with INH  --Also recently his Bumex dose was increased to a total of 125 mg daily  --Patient has history of CHF will reduce dose of Bumex to 20 mg with transfusion  --We will trend creatinine  --Daily weights  --I's and O's  --We will hold metformin, losartan, Bumex      On treatment for latent TB  --Patient on INH and pyridoxine ( will need to reconcile and order)  --We will check liver panel  --Creatinine is trending up  --He has been on treatment for last 2 months    CHF no signs of exacerbation  --Has bilateral lower extremity edema for which his Bumex dose was increased  --Given worsening renal functions and clinical evidence of dehydration will reduce dose of diuresis.    --Bumex 20 mg given in the emergency room with  transfusion  --Monitor daily weights  --I's and O's  --Bumex can be resumed based on weight electrolytes and hydration status     Diabetes mellitus  --Prior to admission on metformin   --We will hold metformin with worsening renal functions  -- sliding scale insulin    Hypertension  --PRN hydralazine  --Resume prior to admission hydralazine    Hyperlipidemia  --Resume statins    Headache  --If not the worst headache of his life  --He however has history of lymphoma and lower hemoglobin today  --CT head ordered    Med list will need to be reconciled in am      DVT Prophylaxis: Pneumatic Compression Devices  Code Status: Full Code    Disposition: Admitted as inpatient for more than 2 midnight stay    Primary Care Physician   [facility]    Chief Complaint   Weakness dizziness and headache 1 week  Sent to  ED for abnormal labs    History is obtained from the patient    History of Present Illness   Shell Leon is a 69 year old male with past medical history significant for MALT lymphoma, diabetes mellitus, hypertension, hyperlipidemia, history of renal failure requiring hemodialysis at some point with complete recovery, on treatment for latent tuberculosis, CHF was asked to come to the emergency room after his routine lab work showed low hemoglobin.  Patient went for his routine follow-up and was found to have low hemoglobin.  Although for the last 1 week he was complaining of intermittent headache not the worst headache of his life.  Complained of dizziness and weakness.  He felt more sleepy and lethargic.  No focal neurologic symptoms no photophobia no facial pain.  Denies any chest pain shortness of breath lightheadedness or dizziness.  No abdominal pain.  Denies any loose stools black-colored stools no hemoptysis no hematemesis no rashes.  No joint swelling.  Review of all the symptoms are negative.  He was sent to the emergency room for transfusion.    Past Medical History    Past Medical History:    Diagnosis Date     Congestive heart failure (H)      Diabetes (H)      Gastroesophageal reflux disease      High cholesterol      Hypertension          Past Surgical History   Past Surgical History:   Procedure Laterality Date     COLONOSCOPY N/A 5/22/2018    Procedure: COMBINED COLONOSCOPY, SINGLE OR MULTIPLE BIOPSY/POLYPECTOMY BY BIOPSY;  COLONOSCOPY  Room 521, with polypectomy x1 using cold biopsy forceps;  Surgeon: Charlie Rabago MD;  Location:  GI     ESOPHAGOSCOPY, GASTROSCOPY, DUODENOSCOPY (EGD), COMBINED N/A 5/21/2018    Procedure: COMBINED ESOPHAGOSCOPY, GASTROSCOPY, DUODENOSCOPY (EGD), BIOPSY SINGLE OR MULTIPLE;  ESOPHAGOSCOPY, GASTROSCOPY, DUODENOSCOPY (EGD)  Room 521 and cold biopsies;  Surgeon: Charlie Rabago MD;  Location:  GI     LAPAROSCOPIC CHOLECYSTECTOMY N/A 1/6/2017    Procedure: LAPAROSCOPIC CHOLECYSTECTOMY;  Surgeon: Michael Caba MD;  Location:  OR       Prior to Admission Medications   Prior to Admission Medications   Prescriptions Last Dose Informant Patient Reported? Taking?   acetaminophen (TYLENOL) 325 MG tablet   No No   Sig: Take 2 tablets (650 mg) by mouth every 6 hours as needed for mild pain   atorvastatin (LIPITOR) 80 MG tablet   No No   Sig: Take 1 tablet (80 mg) by mouth daily   blood glucose (NO BRAND SPECIFIED) lancets standard   No No   Sig: Use to test blood sugar 3 times daily or as directed.   blood glucose monitoring (NO BRAND SPECIFIED) meter device kit   No No   Sig: Use to test blood sugar 3 times daily or as directed.   blood glucose monitoring (NO BRAND SPECIFIED) test strip   No No   Sig: Use to test blood sugars 3 times daily or as directed   furosemide (LASIX) 20 MG tablet   No No   Sig: Take 1 tablet (20 mg) by mouth daily   glipiZIDE (GLUCOTROL) 5 MG tablet   No No   Sig: Take 1 tablet (5 mg) by mouth every morning (before breakfast)   hydrALAZINE (APRESOLINE) 100 MG tablet   No No   Sig: Take 1 tablet (100 mg) by mouth 3 times daily    hydrochlorothiazide (HYDRODIURIL) 25 MG tablet   No No   Sig: Take 1 tablet (25 mg) by mouth daily   lisinopril (PRINIVIL/ZESTRIL) 40 MG tablet   No No   Sig: Take 1 tablet (40 mg) by mouth daily   metFORMIN (GLUCOPHAGE) 1000 MG tablet   No No   Sig: TAKE 1 TABLET BY MOUTH TWICE DAILY WITH MEALS   omeprazole (PRILOSEC) 20 MG CR capsule   No No   Sig: Take 1 capsule (20 mg) by mouth every morning (before breakfast)   order for DME   No No   Sig: Equipment being ordered: blood pressure cuff   order for DME   No No   Sig: Equipment being ordered: thigh high compression stockings 20mmg Hg      Facility-Administered Medications: None     Allergies   No Known Allergies    Social History   Social History     Tobacco Use     Smoking status: Never Smoker     Smokeless tobacco: Never Used   Substance Use Topics     Alcohol use: No     Alcohol/week: 0.0 oz     Social History     Social History Narrative     Not on file     Lives with his wife.  Denies smoking no use of alcohol.    Family History   His sister also has some issues with anemia    Review of Systems   A Comprehensive greater than 10 system review of systems was carried out.  Pertinent positives and negatives are noted above.  Otherwise negative for contributory information.    Physical Exam   Temp: 97.9  F (36.6  C) Temp src: Oral BP: 184/66 Pulse: 63 Heart Rate: 61 Resp: 14 SpO2: 98 % O2 Device: None (Room air)    Vital Signs with Ranges  Temp:  [97.9  F (36.6  C)-98.1  F (36.7  C)] 97.9  F (36.6  C)  Pulse:  [59-69] 63  Heart Rate:  [61] 61  Resp:  [14-20] 14  BP: (161-194)/(56-76) 184/66  SpO2:  [97 %-100 %] 98 %  0 lbs 0 oz    GEN:  Alert, oriented x 3, appears comfortable, no overt distress, thin  HEENT:  Normocephalic/atraumatic, no scleral icterus, no nasal discharge, mouth dry  CV:  Regular rate and rhythm, no murmur or JVD.  S1 + S2 noted, no S3 or S4.  LUNGS:  Clear to auscultation bilaterally without rales/rhonchi/wheezing/retractions.  Symmetric chest  rise on inhalation noted.  ABD:  Active bowel sounds, soft, non-tender/non-distended.  No rebound/guarding/rigidity.  EXT:  +2  edema.  No cyanosis.    SKIN:  Dry to touch, no exanthems noted in the visualized areas.  NEURO:  Symmetric muscle strength. No new focal deficits appreciated.    Data   Data reviewed today:  I personally reviewed the EKG tracing showing Normal sinus rhythm.    No results for input(s): PH, PHV, PO2, PO2V, SAT, PCO2, PCO2V, HCO3, HCO3V in the last 168 hours.  Recent Labs   Lab 05/03/19 1945   WBC 8.1   HGB 6.4*   HCT 20.2*   MCV 89   *     Recent Labs   Lab 05/03/19 1945   *   POTASSIUM 4.8   CHLORIDE 102   CO2 22   ANIONGAP 6   *   BUN 52*   CR 1.41*   GFRESTIMATED 50*   GFRESTBLACK 58*   STEVE 8.3*     No results for input(s): CULT in the last 168 hours.  Recent Labs   Lab 05/03/19 1945   AST 32   ALT 45   ALKPHOS 429*   BILITOTAL 0.2     No results for input(s): INR in the last 168 hours.  No results for input(s): LACT in the last 168 hours.  No results for input(s): LIPASE in the last 168 hours.  No results for input(s): TROPONIN, TROPI, TROPR in the last 168 hours.    Invalid input(s): TROP, TROPONINIES  No results for input(s): COLOR, APPEARANCE, URINEGLC, URINEBILI, URINEKETONE, SG, UBLD, URINEPH, PROTEIN, UROBILINOGEN, NITRITE, LEUKEST, RBCU, WBCU in the last 168 hours.    No results found for this or any previous visit (from the past 24 hour(s)).

## 2019-05-04 NOTE — PLAN OF CARE
Pt weak, skin slightly ashen, dry.   Reports feeling weak, alarms on. Up w/SBA.  BP's elevated. Denies pain. On tele: SB/SR w/prolonged QT's (50-60's).  Neuros intact.  Blood transfusion completed, Hgb recheck: 7.3.  Adequate UOP, light/pale.  Diabetic diet. B/256  PIV SL.  Consults: Hem/Onc   @ 0900.

## 2019-05-04 NOTE — ED TRIAGE NOTES
Patient states he was sent from clinic for hgb of 7. Patient states he has no symptoms other than feeling more weakness than normal. Blood test was drawn 1 week ago.   ABC intact alert and no distress.

## 2019-05-04 NOTE — ED NOTES
[facility]  ED Nurse Handoff Report    Shell Leon is a 69 year old male   ED Chief complaint: Abnormal Labs  . ED Diagnosis:   Final diagnoses:   Symptomatic anemia   Acute on chronic renal insufficiency   Hypervolemia, unspecified hypervolemia type     Allergies: No Known Allergies    Code Status: Full Code  Activity level - Baseline/Home:  Independent. Activity Level - Current:   Stand with Assist. Lift room needed: No. Bariatric: No   Needed: Yes, patient understands quite a bit of english, but native language is German  Isolation: No. Infection: Not Applicable.     Vital Signs:   Vitals:    05/03/19 2215 05/03/19 2230 05/03/19 2240 05/03/19 2245   BP:  194/69 168/58 180/64   Pulse:  61 59 63   Resp:  14     Temp:  98  F (36.7  C)  97.9  F (36.6  C)   TempSrc:  Oral  Oral   SpO2: 100% 100% 99% 98%       Cardiac Rhythm:  ,      Pain level: 0-10 Pain Scale: 0  Patient confused: No. Patient Falls Risk: Yes.   Elimination Status: Has voided   Patient Report - Initial Complaint: Abnormal labs. Focused Assessment: Patient called to return to ED based on clinic labs. Hgb 6. Patient';s creatinine also elevated. Pitting edema in BLE as well. Hypertensive.   Tests Performed:   No orders to display   . Abnormal Results:   Labs Ordered and Resulted from Time of ED Arrival Up to the Time of Departure from the ED   CBC WITH PLATELETS DIFFERENTIAL - Abnormal; Notable for the following components:       Result Value    RBC Count 2.26 (*)     Hemoglobin 6.4 (*)     Hematocrit 20.2 (*)     RDW 16.3 (*)     Platelet Count 632 (*)     Absolute Eosinophils 0.8 (*)     All other components within normal limits   BASIC METABOLIC PANEL - Abnormal; Notable for the following components:    Sodium 130 (*)     Glucose 284 (*)     Urea Nitrogen 52 (*)     Creatinine 1.41 (*)     GFR Estimate 50 (*)     GFR Estimate If Black 58 (*)     Calcium 8.3 (*)     All other components within normal limits   HEPATIC  PANEL - Abnormal; Notable for the following components:    Albumin 1.5 (*)     Protein Total 6.5 (*)     Alkaline Phosphatase 429 (*)     All other components within normal limits   LACTATE DEHYDROGENASE   RED BLOOD CELL PREPARE ORDER UNIT   ABO/RH TYPE AND SCREEN   BLOOD COMPONENT   .   Treatments provided: IV's, labs, blood  Family Comments: Son at the bedside, Usha  OBS brochure/video discussed/provided to patient:  N/A  ED Medications:   Medications   hydrALAZINE (APRESOLINE) tablet 25 mg (has no administration in time range)   bumetanide (BUMEX) tablet 2 mg (has no administration in time range)     Drips infusing:  Yes  For the majority of the shift, the patient's behavior Green. Interventions performed were N/A.     Severe Sepsis OR Septic Shock Diagnosis Present: No      ED Nurse Name/Phone Number: Lakshmi Prasad,   10:53 PM    RECEIVING UNIT ED HANDOFF REVIEW    Above ED Nurse Handoff Report was reviewed: Yes  Reviewed by: Bibiana Dupont on May 3, 2019 at 11:21 PM

## 2019-05-04 NOTE — PLAN OF CARE
Patient hospitalized for anemia. Cleared for discharge to home today per MD. Discharge instructions, medications, and follow-ups reviewed with patient and family in detail. No changes to medications. Patient and family verbalized understanding of discharge instructions. Belongings were returned to patient at time of discharge. Son providing transport home.

## 2019-05-04 NOTE — ED PROVIDER NOTES
History     Chief Complaint:  Abnormal Labs    HPI   Shell Leon is a 69 year old male with a history of malt lymphoma, hemolysis, chronic anemia, and recent diagnosis of pneumonia (4/29) on Augmentin who presents with abnormal labs. Per chart review, the patient was found to have a hemoglobin of 6.5 today, prompting his provider to refer him to the ED for evaluation and treatment. Here the patient reports weakness, dizziness, fatigue, a headache, and bilateral leg swelling. He notes that he utilizes Bumex each morning for management of his edema.  The patient denies any chest pain, abdominal pain, vomiting, fever, shortness of breath, blood in stool, or other known blood loss. Of note, the patient sees Dr. Page for the treatment of his lymphoma. He is not currently undergoing chemotherapy. The patient states that he last underwent a blood transfusion three months ago without difficulty.  He denies any other symptoms at this time.  Of note the patient was also recently admitted an outside hospital for acute renal failure requiring dialysis.  His renal function has recovered and he is no longer on dialysis.    Allergies:  No Known Drug Allergies     Medications:    Lipitor  Lasix  Glucotrol  Apresoline  Hydrodiuril  Lisinopril  Glucophage  Prilosec  Augmentin    Past Medical History:    Congestive heart failure  Diabetes  GERD  Hypertension  Hyperlipidemia  Internal carotid artery stenosis  Edema  Iron deficiency anemia  MALT lymphoma  Proliferative diabetic retinopathy of both eyes  Acute kidney injury  Chronic kidney disease  Nephrotic syndrome  Presbyopia    Past Surgical History:    Laparoscopic cholecystectomy    Family History:    Family history reviewed. No pertinent family history.    Social History:  Smoking Status: Never Smoker  Smokeless Tobacco: Never Used  Alcohol Use: Negative  Drug Use: Negative  PCP: Jeremy Mendoza  Marital Status:       Review of Systems   Constitutional:  "Positive for fatigue. Negative for fever.   Respiratory: Negative for shortness of breath.    Cardiovascular: Positive for leg swelling. Negative for chest pain.   Gastrointestinal: Negative for abdominal pain, blood in stool and vomiting.   Neurological: Positive for dizziness, weakness and headaches.        \"Sleepy\"   All other systems reviewed and are negative.      Physical Exam     Patient Vitals for the past 24 hrs:   BP Temp Temp src Pulse Heart Rate Resp SpO2 Height Weight   05/03/19 2349 182/60 97.3  F (36.3  C) Oral 58 -- 20 100 % 1.626 m (5' 4\") 58.9 kg (129 lb 14.4 oz)   05/03/19 2315 188/66 -- -- 63 -- -- 98 % -- --   05/03/19 2300 184/66 97.9  F (36.6  C) Oral 63 -- -- 98 % -- --   05/03/19 2245 180/64 97.9  F (36.6  C) Oral 63 -- -- 98 % -- --   05/03/19 2240 168/58 -- -- 59 -- -- 99 % -- --   05/03/19 2230 194/69 98  F (36.7  C) Oral 61 61 14 100 % -- --   05/03/19 2215 -- -- -- -- -- -- 100 % -- --   05/03/19 2200 -- -- -- -- -- -- 98 % -- --   05/03/19 2145 -- -- -- -- -- -- 97 % -- --   05/03/19 2130 -- -- -- -- -- -- 99 % -- --   05/03/19 2115 -- -- -- -- -- -- 97 % -- --   05/03/19 2100 -- -- -- -- -- -- 99 % -- --   05/03/19 2045 181/76 -- -- -- -- -- 100 % -- --   05/03/19 1916 161/56 98.1  F (36.7  C) Temporal 69 -- 20 100 % -- --     Physical Exam  General: .  Appears chronically ill but nontoxic.  Generalized pallor present.  Resting comfortably  Head:  Scalp, face, and head appear normal  Eyes:  Pupils are equal, round, and reactive to light    Conjunctivae non-injected and sclerae white  ENT:    The external nose is normal    Pinnae are normal    The oropharynx is normal, mucous membranes moist    Uvula is in the midline  Neck:  Normal range of motion    There is no rigidity noted    Trachea is in the midline  CV:  Regular rate and rhythm     Normal S1/S2, no S3/S4    No murmur or rub  Resp:  Lungs are clear and equal bilaterally    There is no tachypnea    No increased work of " breathing    No rales, wheezing, or rhonchi  GI:  Abdomen is soft, no rigidity or guarding    No distension, or mass    No tenderness or rebound tenderness   MS:  Normal muscular tone    Symmetric motor strength    3+ bilateral pitting edema to the lower extremities below the knee.  Skin:  No rash or acute skin lesions noted  Neuro:  Awake and alert    Speech is normal and fluent    Moves all extremities spontaneously  Psych: Normal affect.  Appropriate interactions.      Emergency Department Course     Laboratory:  Laboratory findings were communicated with the patient who voiced understanding of the findings.    ABO/Rh type and screen: O+, Antibody Screen negative  CBC: WBC 8.1, HGB 6.4 (LL),  (H)  BMP:  (L), glucose 284 (L), BUN 52 (H), Creatinine 1.41 (H) GFR 58 (L), calcium 8.3 (L) o/w WNL  Hepatic panel: albumin 1.5 (L), protein total 6.5 (L), alkphos 429 (H) o/w WNL  Lactate dehydrogenase: 210  NT probnp inpatient: 83057 (H)    Interventions:  2317 Bumex 2 mg Oral  2317 Apresoline 25 mg Oral    Emergency Department Course:    1945 IV was inserted and blood was drawn for laboratory testing, results above.    2026 Nursing notes and vitals reviewed.    2103 I performed an exam of the patient as documented above.     2133 Blood was drawn for laboratory testing as noted above.    2325 I personally reviewed the laboratory results with the patient and answered all related questions prior to admission.    Impression & Plan      Medical Decision Making:  Shell Leon is a 69 year old male who presents to the emergency department today for evaluation of low hemoglobin.  Outpatient laboratory study today showed hemoglobin of 6.5.  Patient had increasing fatigue and dizziness.  On my evaluation he appears chronically ill but not acutely toxic or in distress.  He is hemodynamically stable.  He denies any signs or symptoms of blood loss or hemorrhage.  No rectal bleeding or melena.  He reports he is  been evaluated by GI in the recent past for rectal bleeding and this was negative.  Patient has a history of chronic anemia likely secondary to his underlying renal disease and lymphoma.  Repeat labs in the emergency department showed hemoglobin of 6.4.  MCV is normal.  Glucose elevated to 84.  Creatinine is increasing to 1.41.  No laboratory evidence of acute hemolysis.  Patient was recently hospitalized at outside hospital for renal failure requiring dialysis.  He has since been off of dialysis and had recovery of his renal function.  Patient also has evidence of volume overload with bilateral lower extremity edema he has severe hypoalbuminemia which is likely leading to third spacing.  No definite clinical evidence of pulmonary edema at this time.  No hypoxia no increased work of breathing or respiratory distress.  Given his symptomatic anemia the patient was transfused 1 unit of PRBCs in the ED.  This was given slowly and given his volume overload and elevated creatinine patient will also receive an extra 2 mg oral dose of Bumex.  Given his increasing creatinine, volume overload patient will require close monitoring following his blood transfusion as he would be at increased risk for TACO/TRALI.  In addition his kidney function should be followed closely.  Given this the case was discussed with the hospitalist and the patient will be admitted for further monitoring evaluation and treatment.  Patient remained hemodynamically stable during his entire ED course.    Diagnosis:    ICD-10-CM    1. Symptomatic anemia D64.9 Blood component     Nt probnp inpatient     Nt probnp inpatient     Alkaline phosphatase     Alkaline phosphatase     ALT     ALT     AST     AST     Bilirubin  total     Bilirubin  total     Bilirubin direct     Bilirubin direct     Protein total     Protein total     Glucose by meter     Glucose by meter     CANCELED: Nt probnp inpatient (BNP)     CANCELED: Basic metabolic panel     CANCELED:  Hepatic panel   2. Acute on chronic renal insufficiency N28.9     N18.9    3. Hypervolemia, unspecified hypervolemia type E87.70      Disposition:   Admitted to hospitalist service.    Scribe Disclosure:  I, Pancho Mackey, am serving as a scribe at 9:18 PM on 5/3/2019 to document services personally performed by Dajuan Last MD based on my observations and the provider's statements to me.    [facility] EMERGENCY DEPARTMENT       Dajuan Last MD  05/04/19 8776

## 2019-05-04 NOTE — PLAN OF CARE
Pt remains admitted for anemia  A/O x4  No pain or nausea reported  On PO augmentin  Hemo 7.3, recheck pending  Elevated BP, IV hydralazine given x1  Bilat lower extremity edema +3 present  Head CT performed during shift  Up SBA  Mod cho diet  Discharge pending  Will continue to monitor

## 2019-05-04 NOTE — PHARMACY-ADMISSION MEDICATION HISTORY
Admission medication history interview status for this patient is complete. See [facility] admission navigator for allergy information, prior to admission medications and immunization status.     Medication history interview source(s):Patient  Medication history resources (including written lists, pill bottles, clinic record):[facility] clinic list, Care everywhere, sure scripts, pt family (daughter and wife)  [facility]    Changes made to PTA medication list:  Added: novolog, isoniazid, bumex, glipizide, hydralazine, b6, albuterol, augmentin, losartan  Deleted: old tylenol entry, lipitor 80mg daily, furosemide 20mg daily, glipizide 5mg daily, hydralazine 100mg tid, hydrochlorothiazide 25mg daily, lisinopril 40mg daily, metformin 1000mg bid, prilosec 20mg daily  Changed: -----    Actions taken by pharmacist (provider contacted, etc):None     Additional medication history information:glipizide was increased from 5 to 10mg recently.  Pt was on basaglar insulin x about 1 week per daughter, now stopped.  Only using novolog insulin if bg>200 per MD order    Medication reconciliation/reorder completed by provider prior to medication history? Yes, sticky note left for MD      For patients on insulin therapy:Y  Lantus/levemir/NPH/Mix 70/30 dose: N  Sliding scale Novolog Y-if bg>200  If yes, do you have baseline novolog pre-meal dose: NO  Patients eat three meals a day: Y  Denies hypoglycemia.  Any barriers to therapy: cost of medications/comfortable with giving injections (if applicable)/comfortable and confident with current diabetes regimen.    Prior to Admission medications    Medication Sig Last Dose Taking? Auth Provider   albuterol (PROAIR HFA/PROVENTIL HFA/VENTOLIN HFA) 108 (90 Base) MCG/ACT inhaler Inhale 2 puffs into the lungs every 6 hours as needed for shortness of breath / dyspnea or wheezing 5/3/2019 at Unknown time Yes Unknown, Entered By History   amoxicillin-clavulanate (AUGMENTIN) 875-125 MG tablet  Take 1 tablet by mouth 2 times daily 5/3/2019 at 10 day course started 4/29/19 Yes Unknown, Entered By History   bumetanide (BUMEX) 2 MG tablet Take 2 mg by mouth daily 5/3/2019 at Unknown time Yes Unknown, Entered By History   glipiZIDE (GLUCOTROL XL) 10 MG 24 hr tablet Take 10 mg by mouth daily 5/3/2019 at Unknown time Yes Unknown, Entered By History   hydrALAZINE (APRESOLINE) 25 MG tablet Take 50 mg by mouth every morning 5/3/2019 at am Yes Unknown, Entered By History   hydrALAZINE (APRESOLINE) 25 MG tablet Take 50 mg by mouth daily (with lunch) 5/3/2019 at Unknown time Yes Unknown, Entered By History   hydrALAZINE (APRESOLINE) 25 MG tablet Take 25 mg by mouth every evening 5/3/2019 at pm Yes Unknown, Entered By History   insulin aspart (NOVOLOG FLEXPEN) 100 UNIT/ML pen Inject Subcutaneous 3 times daily (with meals) Sliding scale prn with meals for BG>200.  2 units per 50 over 200 no recent usage Yes Unknown, Entered By History   isoniazid (NYDRAZID) 300 MG tablet Take 300 mg by mouth daily 5/3/2019 at Unknown time Yes Unknown, Entered By History   losartan (COZAAR) 100 MG tablet Take 100 mg by mouth daily 5/2/2019 Yes Unknown, Entered By History   pyridOXINE (VITAMIN B-6) 50 MG tablet Take 50 mg by mouth daily 5/3/2019 at am Yes Unknown, Entered By History

## 2019-05-06 LAB — INTERPRETATION ECG - MUSE: NORMAL

## 2019-05-07 ENCOUNTER — TELEPHONE (OUTPATIENT)
Dept: FAMILY MEDICINE | Facility: CLINIC | Age: 69
End: 2019-05-07

## 2019-05-07 NOTE — TELEPHONE ENCOUNTER
Please contact patient for In-patient follow up.  [phone] (home) none (work)    Visit date:  05 04 2019   Diagnosis listed: symptomatic anemia  Number of visits in past 12 months: 2/1

## 2019-05-07 NOTE — TELEPHONE ENCOUNTER
Looks like the pt is now seen at [facility].      Called son to verify.  He is now seen at [facility] and will f/u there.

## 2019-05-09 ENCOUNTER — TRANSFERRED RECORDS (OUTPATIENT)
Dept: HEALTH INFORMATION MANAGEMENT | Facility: CLINIC | Age: 69
End: 2019-05-09

## 2019-06-13 ENCOUNTER — HOSPITAL ENCOUNTER (INPATIENT)
Facility: CLINIC | Age: 69
LOS: 2 days | Discharge: HOME | End: 2019-06-15
Attending: INTERNAL MEDICINE | Admitting: INTERNAL MEDICINE
Payer: COMMERCIAL

## 2019-06-13 ENCOUNTER — TRANSFERRED RECORDS (OUTPATIENT)
Dept: HEALTH INFORMATION MANAGEMENT | Facility: CLINIC | Age: 69
End: 2019-06-13

## 2019-06-13 LAB
ABO + RH BLD: NORMAL
ABO + RH BLD: NORMAL
ALBUMIN SERPL-MCNC: 2.3 G/DL (ref 3.4–5)
ALP SERPL-CCNC: 173 U/L (ref 40–150)
ALT SERPL W P-5'-P-CCNC: 32 U/L (ref 0–70)
ANION GAP SERPL CALCULATED.3IONS-SCNC: 9 MMOL/L (ref 3–14)
AST SERPL W P-5'-P-CCNC: 32 U/L (ref 0–45)
BILIRUB SERPL-MCNC: 0.2 MG/DL (ref 0.2–1.3)
BLD GP AB SCN SERPL QL: NORMAL
BLD PROD TYP BPU: NORMAL
BLD UNIT ID BPU: 0
BLD UNIT ID BPU: 0
BLOOD BANK CMNT PATIENT-IMP: NORMAL
BLOOD PRODUCT CODE: NORMAL
BLOOD PRODUCT CODE: NORMAL
BPU ID: NORMAL
BPU ID: NORMAL
BUN SERPL-MCNC: 68 MG/DL (ref 7–30)
CALCIUM SERPL-MCNC: 7.8 MG/DL (ref 8.5–10.1)
CHLORIDE SERPL-SCNC: 92 MMOL/L (ref 94–109)
CO2 SERPL-SCNC: 19 MMOL/L (ref 20–32)
CREAT SERPL-MCNC: 1.41 MG/DL (ref 0.66–1.25)
ERYTHROCYTE [DISTWIDTH] IN BLOOD BY AUTOMATED COUNT: 16.2 % (ref 10–15)
GFR SERPL CREATININE-BSD FRML MDRD: 50 ML/MIN/{1.73_M2}
GLUCOSE SERPL-MCNC: 166 MG/DL (ref 70–99)
HCT VFR BLD AUTO: 18.7 % (ref 40–53)
HGB BLD-MCNC: 6.2 G/DL (ref 13.3–17.7)
MCH RBC QN AUTO: 29.5 PG (ref 26.5–33)
MCHC RBC AUTO-ENTMCNC: 33.2 G/DL (ref 31.5–36.5)
MCV RBC AUTO: 89 FL (ref 78–100)
NUM BPU REQUESTED: 2
OSMOLALITY UR: 263 MMOL/KG (ref 100–1200)
PLATELET # BLD AUTO: 260 10E9/L (ref 150–450)
POTASSIUM SERPL-SCNC: 5.2 MMOL/L (ref 3.4–5.3)
PROT SERPL-MCNC: 6.1 G/DL (ref 6.8–8.8)
RBC # BLD AUTO: 2.1 10E12/L (ref 4.4–5.9)
SODIUM SERPL-SCNC: 120 MMOL/L (ref 133–144)
SODIUM SERPL-SCNC: 120 MMOL/L (ref 133–144)
SODIUM UR-SCNC: 14 MMOL/L
SPECIMEN EXP DATE BLD: NORMAL
TRANSFUSION STATUS PATIENT QL: NORMAL
WBC # BLD AUTO: 8.4 10E9/L (ref 4–11)

## 2019-06-13 PROCEDURE — 12000000 ZZH R&B MED SURG/OB

## 2019-06-13 PROCEDURE — 86850 RBC ANTIBODY SCREEN: CPT | Performed by: INTERNAL MEDICINE

## 2019-06-13 PROCEDURE — 99223 1ST HOSP IP/OBS HIGH 75: CPT | Mod: AI | Performed by: INTERNAL MEDICINE

## 2019-06-13 PROCEDURE — 86900 BLOOD TYPING SEROLOGIC ABO: CPT | Performed by: INTERNAL MEDICINE

## 2019-06-13 PROCEDURE — 85027 COMPLETE CBC AUTOMATED: CPT | Performed by: INTERNAL MEDICINE

## 2019-06-13 PROCEDURE — P9016 RBC LEUKOCYTES REDUCED: HCPCS | Performed by: INTERNAL MEDICINE

## 2019-06-13 PROCEDURE — 80053 COMPREHEN METABOLIC PANEL: CPT | Performed by: INTERNAL MEDICINE

## 2019-06-13 PROCEDURE — 84300 ASSAY OF URINE SODIUM: CPT | Performed by: INTERNAL MEDICINE

## 2019-06-13 PROCEDURE — 83935 ASSAY OF URINE OSMOLALITY: CPT | Performed by: INTERNAL MEDICINE

## 2019-06-13 PROCEDURE — 84295 ASSAY OF SERUM SODIUM: CPT | Performed by: INTERNAL MEDICINE

## 2019-06-13 PROCEDURE — 86923 COMPATIBILITY TEST ELECTRIC: CPT | Performed by: INTERNAL MEDICINE

## 2019-06-13 PROCEDURE — 36415 COLL VENOUS BLD VENIPUNCTURE: CPT | Performed by: INTERNAL MEDICINE

## 2019-06-13 PROCEDURE — 25000132 ZZH RX MED GY IP 250 OP 250 PS 637: Performed by: INTERNAL MEDICINE

## 2019-06-13 PROCEDURE — 86901 BLOOD TYPING SEROLOGIC RH(D): CPT | Performed by: INTERNAL MEDICINE

## 2019-06-13 RX ORDER — GLIPIZIDE 10 MG/1
10 TABLET, FILM COATED, EXTENDED RELEASE ORAL DAILY
Status: DISCONTINUED | OUTPATIENT
Start: 2019-06-14 | End: 2019-06-15 | Stop reason: HOSPADM

## 2019-06-13 RX ORDER — METOCLOPRAMIDE 5 MG/1
5 TABLET ORAL EVERY 6 HOURS PRN
Status: DISCONTINUED | OUTPATIENT
Start: 2019-06-13 | End: 2019-06-15 | Stop reason: HOSPADM

## 2019-06-13 RX ORDER — HYDRALAZINE HYDROCHLORIDE 50 MG/1
50 TABLET, FILM COATED ORAL
Status: DISCONTINUED | OUTPATIENT
Start: 2019-06-14 | End: 2019-06-13

## 2019-06-13 RX ORDER — ONDANSETRON 2 MG/ML
4 INJECTION INTRAMUSCULAR; INTRAVENOUS EVERY 6 HOURS PRN
Status: DISCONTINUED | OUTPATIENT
Start: 2019-06-13 | End: 2019-06-15 | Stop reason: HOSPADM

## 2019-06-13 RX ORDER — PROCHLORPERAZINE MALEATE 5 MG
5 TABLET ORAL EVERY 6 HOURS PRN
Status: DISCONTINUED | OUTPATIENT
Start: 2019-06-13 | End: 2019-06-15 | Stop reason: HOSPADM

## 2019-06-13 RX ORDER — ALBUTEROL SULFATE 90 UG/1
2 AEROSOL, METERED RESPIRATORY (INHALATION) EVERY 6 HOURS PRN
Status: DISCONTINUED | OUTPATIENT
Start: 2019-06-13 | End: 2019-06-15 | Stop reason: HOSPADM

## 2019-06-13 RX ORDER — ISONIAZID 300 MG/1
300 TABLET ORAL DAILY
Status: DISCONTINUED | OUTPATIENT
Start: 2019-06-13 | End: 2019-06-15 | Stop reason: HOSPADM

## 2019-06-13 RX ORDER — HYDRALAZINE HYDROCHLORIDE 50 MG/1
50 TABLET, FILM COATED ORAL 2 TIMES DAILY
Status: DISCONTINUED | OUTPATIENT
Start: 2019-06-14 | End: 2019-06-15 | Stop reason: HOSPADM

## 2019-06-13 RX ORDER — DEXTROSE MONOHYDRATE 25 G/50ML
25-50 INJECTION, SOLUTION INTRAVENOUS
Status: DISCONTINUED | OUTPATIENT
Start: 2019-06-13 | End: 2019-06-15 | Stop reason: HOSPADM

## 2019-06-13 RX ORDER — HYDRALAZINE HYDROCHLORIDE 25 MG/1
25 TABLET, FILM COATED ORAL EVERY EVENING
Status: DISCONTINUED | OUTPATIENT
Start: 2019-06-13 | End: 2019-06-15 | Stop reason: HOSPADM

## 2019-06-13 RX ORDER — PROCHLORPERAZINE 25 MG
12.5 SUPPOSITORY, RECTAL RECTAL EVERY 12 HOURS PRN
Status: DISCONTINUED | OUTPATIENT
Start: 2019-06-13 | End: 2019-06-15 | Stop reason: HOSPADM

## 2019-06-13 RX ORDER — ONDANSETRON 4 MG/1
4 TABLET, ORALLY DISINTEGRATING ORAL EVERY 6 HOURS PRN
Status: DISCONTINUED | OUTPATIENT
Start: 2019-06-13 | End: 2019-06-15 | Stop reason: HOSPADM

## 2019-06-13 RX ORDER — METOCLOPRAMIDE HYDROCHLORIDE 5 MG/ML
5 INJECTION INTRAMUSCULAR; INTRAVENOUS EVERY 6 HOURS PRN
Status: DISCONTINUED | OUTPATIENT
Start: 2019-06-13 | End: 2019-06-15 | Stop reason: HOSPADM

## 2019-06-13 RX ORDER — NALOXONE HYDROCHLORIDE 0.4 MG/ML
.1-.4 INJECTION, SOLUTION INTRAMUSCULAR; INTRAVENOUS; SUBCUTANEOUS
Status: DISCONTINUED | OUTPATIENT
Start: 2019-06-13 | End: 2019-06-15 | Stop reason: HOSPADM

## 2019-06-13 RX ORDER — NICOTINE POLACRILEX 4 MG
15-30 LOZENGE BUCCAL
Status: DISCONTINUED | OUTPATIENT
Start: 2019-06-13 | End: 2019-06-15 | Stop reason: HOSPADM

## 2019-06-13 RX ORDER — PYRIDOXINE HCL (VITAMIN B6) 50 MG
50 TABLET ORAL DAILY
Status: DISCONTINUED | OUTPATIENT
Start: 2019-06-14 | End: 2019-06-15 | Stop reason: HOSPADM

## 2019-06-13 RX ADMIN — HYDRALAZINE HYDROCHLORIDE 25 MG: 25 TABLET ORAL at 20:12

## 2019-06-13 ASSESSMENT — ACTIVITIES OF DAILY LIVING (ADL)
ADLS_ACUITY_SCORE: 18
ADLS_ACUITY_SCORE: 18

## 2019-06-13 NOTE — H&P
Hospitalist Admission Note([facility])    Name: Shell Leon    MRN: [id number]          YOB: 1950    Age: 69 year old  Date of admission: 6/13/2019  Primary care provider: Jeremy Mendoza              Assessment:       Brief summary of admission assessment:Shell Leon is a 69 year old black male with a significant past medical history of hypercholesterolemia, diabetes, congestive heart failure, diagnosis of hemolytic anemia and lymphoma who presents from Dr. Page's office after he was found to have severe anemia and hyponatremia.  Patient is currently on rituximab for diagnosis of low-grade B cell lymphoproliferative disorder and he received 2 rounds of Rituxan.    Blood count in the clinic showed hemoglobin was 6.8, reticulocyte count of 3.4% and platelet count of 289.  Serum sodium was determined at 117.    Patient was admitted to our service for blood transfusion, hyponatremia evaluation and treatment.     Care plan was discussed with Dr. Page     Admission diagnoses:      #1.  Acute symptomatic anemia with hemoglobin 6.8, suspect hemolysis related.  Patient has diagnosis of microcytic iron deficiency anemia.  Patient has generalized weakness.  Hyponatremia    #2.  Hyponatremia: Sodium level was 117 at the clinic.  His baseline sodium was 133 on May 4, 2019.  Etiology of his hyponatremia is unclear.  Patient denies any  nausea vomiting or diarrhea or decreased intake.  Patient has peripheral edema from his baseline congestive heart failure which is diastolic.  Patient has been on Bumex for his edema. Further evaluation for SIADH is needed.  Patient has no symptoms of encephalopathy or seizure at this time.      #3.  Chronic kidney disease with serum creatinine around 1.4.  Appears to be at baseline    #4.  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  Chronic comorbidities include latent TB on  treatment, congestive heart failure without exacerbation, headache, diabetes mellitus on oral agent as well as sliding scale insulin, hypertension on losartan and hydralazine, edema on Bumex.     Plan/MDM:       > Admission Status: Will admit patient to hospitalist service as inpatient as patient likely need over two mid night stays in the hospital.     >Care plan:    --Admit to medical bed, transfuse a unit of blood and check hemoglobin after an hour transfusion.  May need additional transfusion in the morning per hematology.  Monitor hemoglobin closely.  There is no signs of external bleeding and the suspicion is that he has hemolysis.  Hematology oncology is following  --Hyponatremia etiology is unclear, will get serum osmolality, urine osmolality, urine sodium for further determination of his hyponatremia.  Diuretic therapy could contribute to his hyponatremia.  SIADH is a differential diagnosis well.  Will check sodium level every 6 hour, repeat sodium after arrival is 120 and may not need 3% saline for now.  Intake and output monitoring and follow pending lab results  --Hold Bumex, losartan for now, monitor kidney function  --Since patient had previous hemodialysis requiring acute kidney injury, will involve nephrology service.  >Supportive care:Nausea and vomiting control measures    >Diet:Diet advanced    >Activity:Advance activity as tolerated    >Education/Counseling :Discussed treatment plan with the patient    >Consults:Inpatient consult with hematology    >VTE prophylactic measures:prophylaxis against venous thromboembolism    >Therapies: None for now      >Additional orders:  --Care plan discussed with the patient/family and agreed to care plan   --Patient will be transferred to care of hospitalist attending for further evaluation and management as appropriate   --Old medical orders reviewed   --imaging result independently reviewed by me     (See orders placed for this visit by me )     - Home  medication reviewed and will be continued as appropriate once pharmacy reconciliation is completed         Code Status/Disposition:     >Code Status:Full Code      >Disposition:anticipate discharge to home and Anticipate discharge in 2-3 days        Disclaimer: This note consists of symbols derived from keyboarding, dictation and/or voice recognition software. As a result, there may be errors in the script that have gone undetected. Please consider this when interpreting information found in this chart.             Chief Complaint:     Direct admission from clinic     History is obtained from the patient          History of Present Illness:      This patient is a 69 year old  male with a significant past medical history of diagnosis of low-grade B-cell lymphoma on rituximab who presents with the following condition requiring a hospital admission:      Hyponatremia, severe anemia  Patient is 69-year-old Lebanese male with history of iron deficiency anemia, hemolysis chronic kidney disease, low-grade B-cell lymphoma who was in the clinic at hematology/oncology when he was found to have hemoglobin of 6.8 and sodium of 117.  Patient was admitted as a direct admission from the clinic for blood transfusion and further evaluation of hyponatremia.  Patient denies any symptoms except for generalized weakness.  He has no chest pain or shortness of breath.  He has chronic lower extremity edema for which she takes Bumex.  No fever or chills.  No nausea or vomiting.  No diarrhea.  Patient received third dose of rituximab in the clinic today.         Past Medical History:     Past Medical History:   Diagnosis Date     Congestive heart failure (H)      Diabetes (H)      Gastroesophageal reflux disease      High cholesterol      Hypertension             Past Surgical History:     Past Surgical History:   Procedure Laterality Date     COLONOSCOPY N/A 5/22/2018    Procedure: COMBINED COLONOSCOPY, SINGLE OR MULTIPLE  BIOPSY/POLYPECTOMY BY BIOPSY;  COLONOSCOPY  Room 521, with polypectomy x1 using cold biopsy forceps;  Surgeon: Charlie Rabago MD;  Location:  GI     ESOPHAGOSCOPY, GASTROSCOPY, DUODENOSCOPY (EGD), COMBINED N/A 5/21/2018    Procedure: COMBINED ESOPHAGOSCOPY, GASTROSCOPY, DUODENOSCOPY (EGD), BIOPSY SINGLE OR MULTIPLE;  ESOPHAGOSCOPY, GASTROSCOPY, DUODENOSCOPY (EGD)  Room 521 and cold biopsies;  Surgeon: Charlie Rabago MD;  Location:  GI     LAPAROSCOPIC CHOLECYSTECTOMY N/A 1/6/2017    Procedure: LAPAROSCOPIC CHOLECYSTECTOMY;  Surgeon: Michael Caba MD;  Location:  OR             Social History:     Social History     Tobacco Use     Smoking status: Never Smoker     Smokeless tobacco: Never Used   Substance Use Topics     Alcohol use: No     Alcohol/week: 0.0 oz             Family History:     Family History   Problem Relation Age of Onset     Unknown/Adopted No family hx of             Allergies:   No Known Allergies          Medications:        Prior to Admission medications    Medication Sig Last Dose Taking? Auth Provider   bumetanide (BUMEX) 2 MG tablet Take 2 mg by mouth daily 6/12/2019 at Unknown time Yes Unknown, Entered By History   glipiZIDE (GLUCOTROL XL) 10 MG 24 hr tablet Take 10 mg by mouth daily 6/13/2019 at Unknown time Yes Unknown, Entered By History   hydrALAZINE (APRESOLINE) 25 MG tablet Take 50 mg by mouth 2 times daily Morning and with lunch 6/13/2019 at x1 Yes Unknown, Entered By History   hydrALAZINE (APRESOLINE) 25 MG tablet Take 25 mg by mouth every evening 6/12/2019 at Unknown time Yes Unknown, Entered By History   insulin aspart (NOVOLOG FLEXPEN) 100 UNIT/ML pen Inject Subcutaneous 3 times daily (with meals) Sliding scale prn with meals for BG>200.  2 units per 50 over 200 Past Month at 2 weeks ago Yes Unknown, Entered By History   isoniazid (NYDRAZID) 300 MG tablet Take 300 mg by mouth daily 6/12/2019 at Unknown time Yes Unknown, Entered By History   losartan  (COZAAR) 100 MG tablet Take 100 mg by mouth daily 6/12/2019 at Unknown time Yes Unknown, Entered By History   pyridOXINE (VITAMIN B-6) 50 MG tablet Take 50 mg by mouth daily 6/13/2019 at Unknown time Yes Unknown, Entered By History   tenofovir alafenamide fumarate (VEMLIDY) 25 MG tablet Take 25 mg by mouth daily with food (dispense only in the original container). 6/12/2019 at Unknown time Yes Unknown, Entered By History   albuterol (PROAIR HFA/PROVENTIL HFA/VENTOLIN HFA) 108 (90 Base) MCG/ACT inhaler Inhale 2 puffs into the lungs every 6 hours as needed for shortness of breath / dyspnea or wheezing Unknown at Unknown time  Unknown, Entered By History   blood glucose (NO BRAND SPECIFIED) lancets standard Use to test blood sugar 3 times daily or as directed.   Kathi Mondragon PA-C   blood glucose monitoring (NO BRAND SPECIFIED) meter device kit Use to test blood sugar 3 times daily or as directed.   Kathi Mondragon PA-C   blood glucose monitoring (NO BRAND SPECIFIED) test strip Use to test blood sugars 3 times daily or as directed   Kathi Mondragon PA-C   order for DME Equipment being ordered: thigh high compression stockings 20mmg Hg   Kathi Mondragon PA-C   order for DME Equipment being ordered: blood pressure cuff   Kathi Mondargon PA-C          Review of Systems:     A Comprehensive greater than 10 system review of systems was carried out.  Pertinent positives and negatives are noted above in HPI.  Otherwise negative for contributory information.           Physical Exam:     Vital signs were reviewed    Temp:  [95.9  F (35.5  C)-98.1  F (36.7  C)] 98.1  F (36.7  C)  Pulse:  [55-62] 62  Heart Rate:  [60] 60  Resp:  [16-20] 16  BP: (138-167)/(28-48) 167/45  SpO2:  [100 %] 100 %        GEN: awake, alert, cooperative, no apparent distress, oriented x 3    NECK:Supple ,no mass or thyromegaly     HEENT:  Normocephalic/atraumatic, no scleral icterus, no nasal discharge,  mouth moist.  Pale conjunctiva.     CV:  Regular rate and rhythm, no murmur or JVD.  S1 + S2 noted, no S3 or S4.    LUNGS:  Clear to auscultation bilaterally without rales/rhonchi/wheezing/retractions.  Symmetric chest rise on inhalation noted.    ABD:  Active bowel sounds, soft, non-tender/non-distended.  No rebound/guarding/rigidity.    EXT: Pitting edema bilaterally 2+ pretibial and pedal.  LGS: No cervical or axillary lymphadenopathy     SKIN:  Dry to touch, warm ,no exanthems noted in the visualized areas.    Neurologic:Grossly intact,non focal . No acute focal neurologic deficit     Psychaitric exam: Mood and affect normal     Additional significant  Findings:               Data:       All laboratory and imaging data in the past 24 hours reviewed     Results for orders placed or performed during the hospital encounter of 06/13/19   Sodium random urine   Result Value Ref Range    Sodium Urine mmol/L 14 mmol/L   Comprehensive metabolic panel   Result Value Ref Range    Sodium 120 (L) 133 - 144 mmol/L    Potassium 5.2 3.4 - 5.3 mmol/L    Chloride 92 (L) 94 - 109 mmol/L    Carbon Dioxide 19 (L) 20 - 32 mmol/L    Anion Gap 9 3 - 14 mmol/L    Glucose 166 (H) 70 - 99 mg/dL    Urea Nitrogen 68 (H) 7 - 30 mg/dL    Creatinine 1.41 (H) 0.66 - 1.25 mg/dL    GFR Estimate 50 (L) >60 mL/min/[1.73_m2]    GFR Estimate If Black 58 (L) >60 mL/min/[1.73_m2]    Calcium 7.8 (L) 8.5 - 10.1 mg/dL    Bilirubin Total 0.2 0.2 - 1.3 mg/dL    Albumin 2.3 (L) 3.4 - 5.0 g/dL    Protein Total 6.1 (L) 6.8 - 8.8 g/dL    Alkaline Phosphatase 173 (H) 40 - 150 U/L    ALT 32 0 - 70 U/L    AST 32 0 - 45 U/L   CBC with platelets   Result Value Ref Range    WBC 8.4 4.0 - 11.0 10e9/L    RBC Count 2.10 (L) 4.4 - 5.9 10e12/L    Hemoglobin 6.2 (LL) 13.3 - 17.7 g/dL    Hematocrit 18.7 (L) 40.0 - 53.0 %    MCV 89 78 - 100 fl    MCH 29.5 26.5 - 33.0 pg    MCHC 33.2 31.5 - 36.5 g/dL    RDW 16.2 (H) 10.0 - 15.0 %    Platelet Count 260 150 - 450 10e9/L    ABO/Rh type and screen   Result Value Ref Range    Units Ordered 1     ABO O     RH(D) Pos     Antibody Screen Neg     Test Valid Only At [facility]        Specimen Expires 06/16/2019     Crossmatch Red Blood Cells    Blood component   Result Value Ref Range    Unit Number [id number]     Blood Component Type Red Blood Cells Leukocyte Reduced     Division Number 00     Status of Unit Released to care unit 06/13/2019 1726     Blood Product Code [id number]     Unit Status ISS       Patient`s old medical records reviewed and case discussed with the ED physician.

## 2019-06-13 NOTE — LETTER
Key Recommendations:   Patient admitted with anemia. Patient was just here 5/3-5/4 for anemia. PMhx: CRF, CHF proBNP 5/3/19 10,140 , DM hgbA1c 5.4 , HTN and Hyperlipidemia. Patient identified as elevated JUNE. Met with patient and family. Patient lives at home with his wife and  family (son, DIL, daughter, grandkids). He walks independently. Patient reports managing his own medications. His son and family is helpful and supportive. His wife prepares his meals. Information on managing CHF reviewed with patient and family. patient has a scale for daily weigh checks. Discussed ongoing diet management with CHF and DM.    Recommend****      Flash Aguilar    AVS/Discharge Summary is the source of truth; this is a helpful guide for improved communication of patient story

## 2019-06-13 NOTE — PROGRESS NOTES
HemeOnc  Please see recent clinic progress note from earlier today on 6/13/2019 on paper chart.      Patient is kind 69-year-old [place] male with history of iron deficiency, prior kidney injury requiring temporary hemodialysis, and now ongoing problematic anemia due to recently diagnosed low-grade lymphoproliferative condition (MALT lymphoma of stomach and or splenic marginal zone lymphoma).      Despite more recent IV iron and short course of corticosteroids, Mr. Leon has had ongoing anemia.  Through extensive peripheral blood marrow and other analyses, his marrow was found to be involved by 5 to 10% by low-grade B-cell lymphoma.  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      Accordingly, he has been offered weekly Rituxan (rituximab) anti-CD20 antibody therapy and has received 3 doses as of today.    However, his hemoglobin today in the clinic was only 6.8 and his sodium level 117.    Accordingly, he is admitted for both PRBC transfusion and further evaluation of his hyponatremia.    I have spoken with his hospitalist Dr. Lavelle Stephen who is ordering additional peripheral blood and urine studies regarding the hyponatremia.    1 unit PRBC transfusion tonight and possibly second unit in a.m.    We will follow long-term hospital sedation for additional hematologic and oncologic management recommendations.      Jamal Page MD, MS  Medical Oncologist-Hematologist  [facility]

## 2019-06-13 NOTE — PHARMACY-ADMISSION MEDICATION HISTORY
Admission medication history interview status for this patient is complete. See [facility] admission navigator for allergy information, prior to admission medications and immunization status.     Medication history interview source(s):Patient and family   Medication history resources (including written lists, pill bottles, clinic record):None  [facility]     Changes made to PTA medication list:  Added: vemlidy  Deleted: augmentin   Changed: hydralazine (combined AM and lunch orders)     Actions taken by pharmacist (provider contacted, etc):None     Additional medication history information: Per patient daughter - pt started Hep B med (Vemlidy) 2 weeks ago. Rpts will bring bottle from home.     Medication reconciliation/reorder completed by provider prior to medication history? Yes    Do you take OTC medications (eg tylenol, ibuprofen, fish oil, eye/ear drops, etc)? Y (Y/N)    For patients on insulin therapy: Y (Y/N)  Lantus/levemir/NPH/Mix 70/30 dose:  N (Y/N) (see Med list for doses)   Sliding scale Novolog Y  If Yes, do you have a baseline novolog pre-meal dose:  units with meals  Patients eat three meals a day:   n/a    How many episodes of hypoglycemia do you have per week: 0  How many missed doses do you have per week: n/a  How many times do you check your blood glucose per day: 1  Do you have a Continuous glucose monitor (CGM)   N (remind pt that not approved for hospital use)   Any Barriers to therapy - Be specific :  cost of medications, comfortable with giving injections (if applicable), comfortable and confident with current diabetes regimen: n/a       Prior to Admission medications    Medication Sig Last Dose Taking? Auth Provider   bumetanide (BUMEX) 2 MG tablet Take 2 mg by mouth daily 6/12/2019 at Unknown time Yes Unknown, Entered By History   glipiZIDE (GLUCOTROL XL) 10 MG 24 hr tablet Take 10 mg by mouth daily 6/13/2019 at Unknown time Yes Unknown, Entered By History   hydrALAZINE  (APRESOLINE) 25 MG tablet Take 50 mg by mouth 2 times daily Morning and with lunch 6/13/2019 at x1 Yes Unknown, Entered By History   hydrALAZINE (APRESOLINE) 25 MG tablet Take 25 mg by mouth every evening 6/12/2019 at Unknown time Yes Unknown, Entered By History   insulin aspart (NOVOLOG FLEXPEN) 100 UNIT/ML pen Inject Subcutaneous 3 times daily (with meals) Sliding scale prn with meals for BG>200.  2 units per 50 over 200 Past Month at 2 weeks ago Yes Unknown, Entered By History   isoniazid (NYDRAZID) 300 MG tablet Take 300 mg by mouth daily 6/12/2019 at Unknown time Yes Unknown, Entered By History   losartan (COZAAR) 100 MG tablet Take 100 mg by mouth daily 6/12/2019 at Unknown time Yes Unknown, Entered By History   pyridOXINE (VITAMIN B-6) 50 MG tablet Take 50 mg by mouth daily 6/13/2019 at Unknown time Yes Unknown, Entered By History   tenofovir alafenamide fumarate (VEMLIDY) 25 MG tablet Take 25 mg by mouth daily with food (dispense only in the original container). 6/12/2019 at Unknown time Yes Unknown, Entered By History   albuterol (PROAIR HFA/PROVENTIL HFA/VENTOLIN HFA) 108 (90 Base) MCG/ACT inhaler Inhale 2 puffs into the lungs every 6 hours as needed for shortness of breath / dyspnea or wheezing Unknown at Unknown time  Unknown, Entered By History   blood glucose (NO BRAND SPECIFIED) lancets standard Use to test blood sugar 3 times daily or as directed.   Kathi Mondragon PA-C   blood glucose monitoring (NO BRAND SPECIFIED) meter device kit Use to test blood sugar 3 times daily or as directed.   Kathi Mondragon PA-C   blood glucose monitoring (NO BRAND SPECIFIED) test strip Use to test blood sugars 3 times daily or as directed   Kathi Mondragon PA-C   order for DME Equipment being ordered: thigh high compression stockings 20mmg Hg   Kathi Mondragon PA-C   order for DME Equipment being ordered: blood pressure cuff   Kathi Mondragon PA-C

## 2019-06-13 NOTE — PLAN OF CARE
Pt was a direct admit from the cancer clinic for a blood transfusion.   Ambulatory Status: Pt up independently. Alarms off.  VSS.  Pain: denies  Resp: LS clear, on RA  GI: Denies nausea. BS hypoactive. Passing gas.  : Voiding spontaneously.  Skin: intact.  Disposition: TBD.

## 2019-06-14 LAB
HBA1C MFR BLD: 5.4 % (ref 0–5.6)
HGB BLD-MCNC: 8.1 G/DL (ref 13.3–17.7)
OSMOLALITY SERPL: 279 MMOL/KG (ref 280–301)
SODIUM SERPL-SCNC: 121 MMOL/L (ref 133–144)
SODIUM SERPL-SCNC: 126 MMOL/L (ref 133–144)
SODIUM SERPL-SCNC: 126 MMOL/L (ref 133–144)
SODIUM SERPL-SCNC: 127 MMOL/L (ref 133–144)
SODIUM SERPL-SCNC: 127 MMOL/L (ref 133–144)
SODIUM SERPL-SCNC: 128 MMOL/L (ref 133–144)

## 2019-06-14 PROCEDURE — 12000000 ZZH R&B MED SURG/OB

## 2019-06-14 PROCEDURE — G0463 HOSPITAL OUTPT CLINIC VISIT: HCPCS | Performed by: NURSE PRACTITIONER

## 2019-06-14 PROCEDURE — 84295 ASSAY OF SERUM SODIUM: CPT | Performed by: INTERNAL MEDICINE

## 2019-06-14 PROCEDURE — 25000132 ZZH RX MED GY IP 250 OP 250 PS 637: Performed by: INTERNAL MEDICINE

## 2019-06-14 PROCEDURE — 36415 COLL VENOUS BLD VENIPUNCTURE: CPT | Performed by: INTERNAL MEDICINE

## 2019-06-14 PROCEDURE — 99232 SBSQ HOSP IP/OBS MODERATE 35: CPT | Performed by: INTERNAL MEDICINE

## 2019-06-14 PROCEDURE — 25800030 ZZH RX IP 258 OP 636: Performed by: INTERNAL MEDICINE

## 2019-06-14 PROCEDURE — 83036 HEMOGLOBIN GLYCOSYLATED A1C: CPT | Performed by: INTERNAL MEDICINE

## 2019-06-14 PROCEDURE — 80048 BASIC METABOLIC PNL TOTAL CA: CPT | Performed by: INTERNAL MEDICINE

## 2019-06-14 PROCEDURE — 85018 HEMOGLOBIN: CPT | Performed by: INTERNAL MEDICINE

## 2019-06-14 PROCEDURE — 83930 ASSAY OF BLOOD OSMOLALITY: CPT | Performed by: INTERNAL MEDICINE

## 2019-06-14 RX ORDER — SODIUM CHLORIDE 9 MG/ML
1000 INJECTION, SOLUTION INTRAVENOUS CONTINUOUS
Status: DISCONTINUED | OUTPATIENT
Start: 2019-06-14 | End: 2019-06-14

## 2019-06-14 RX ORDER — DEXTROSE MONOHYDRATE 50 MG/ML
INJECTION, SOLUTION INTRAVENOUS CONTINUOUS
Status: DISCONTINUED | OUTPATIENT
Start: 2019-06-14 | End: 2019-06-15

## 2019-06-14 RX ADMIN — PYRIDOXINE HCL TAB 50 MG 50 MG: 50 TAB at 08:09

## 2019-06-14 RX ADMIN — GLIPIZIDE 10 MG: 10 TABLET, FILM COATED, EXTENDED RELEASE ORAL at 08:09

## 2019-06-14 RX ADMIN — HYDRALAZINE HYDROCHLORIDE 50 MG: 50 TABLET, FILM COATED ORAL at 13:00

## 2019-06-14 RX ADMIN — ISONIAZID 300 MG: 300 TABLET ORAL at 08:09

## 2019-06-14 RX ADMIN — HYDRALAZINE HYDROCHLORIDE 25 MG: 25 TABLET ORAL at 20:31

## 2019-06-14 RX ADMIN — SODIUM CHLORIDE 1000 ML: 9 INJECTION, SOLUTION INTRAVENOUS at 01:28

## 2019-06-14 RX ADMIN — DEXTROSE MONOHYDRATE: 50 INJECTION, SOLUTION INTRAVENOUS at 10:43

## 2019-06-14 ASSESSMENT — ACTIVITIES OF DAILY LIVING (ADL)
ADLS_ACUITY_SCORE: 18
ADLS_ACUITY_SCORE: 18
ADLS_ACUITY_SCORE: 16
ADLS_ACUITY_SCORE: 18

## 2019-06-14 NOTE — DISCHARGE INSTRUCTIONS
Your hospital follow up appointment has been schedule for you [facility] [address], [place] [address] for 06/26/2019 at 1:00pm. Please bring your hospital discharge instructions and any new medications with you to your appointment. Please call the clinic at [phone] if you need to reschedule.

## 2019-06-14 NOTE — PROVIDER NOTIFICATION
"\"Pt has hydralazine 50 mg ordered qid, BPs have had low diastolic and HR has been around 60. Pt received 25 mg dose last night, starting 50 mg today. Last BP was 146/32. Do we need parameters for diastolic? Thanks.\"- MD paged.  "

## 2019-06-14 NOTE — PLAN OF CARE
VSS. Afebrile. LS clear. BS hypoactive. 2+ BLE edema. Pt denies pain and nausea. Regular diet. A1 to ambulate, can be unsteady at times. PIV- NS @ 75 ml/hr. Finished second unit of blood during shift, no issues, recheck HBG was 8.1. Slept well. Discharge TBD.

## 2019-06-14 NOTE — PLAN OF CARE
Pt up SBA. Alert & oriented x4. VSS. Denies pain. C/o dizziness, improving. Hgb 6.2, currently getting 2nd unit RBC. Reg diet, good appetite. BLE edema, legs elevated. Oncology following & nephrology consulted. Family at bedside.

## 2019-06-14 NOTE — PROGRESS NOTES
[facility] Nurse Inpatient Skin Assessment     Assessment of:   Coccyx skin      Data:   Patient History:      Per provider notes, Shell Leon is a 69 year old male with a significant past medical history of hypercholesterolemia, diabetes mellitus, congestive heart failure,  hemolytic anemia and lymphoma. He was admitted from Dr. Page's office after he was found to have severe anemia and hyponatremia.    Moisture Management:  Adult brief    Current Diet / Nutrition:       Orders Placed This Encounter        Combination Diet Regular Diet Adult                Edwin Assessment and sub scores:   No data recorded       Labs:   Recent Labs   Lab Test 06/14/19  0638 06/14/19  0310 06/13/19  1614   ALBUMIN  --   --  2.3*   HGB  --  8.1* 6.2*   RBC  --   --  2.10*   WBC  --   --  8.4   PLT  --   --  260   A1C 5.4  --   --           Skin Assessment (location):   Coccyx  History:  none    Skin: intact, no redness,      Color: normal and consistent with surrounding tissue    Temperature  normal     Pain:  none          Intervention:     Patient's chart evaluated.      Discussed plan of care with patient and son          Assessment:      Assessed area with nurse Thor. Skin intact with no redness, warmth. Pt speaks minimal English, son is present and translates. Neither were aware of any open areas now or in the past.        Plan:     [facility] will sign off.

## 2019-06-14 NOTE — CONSULTS
Consult Date:  06/14/2019      IDENTIFICATION:  A 69-year-old male admitted via the Hematology/Oncology Clinic in the setting of hyponatremia.      REASON FOR CONSULTATION:  Evaluation and assessment with respect to above.      IMPRESSION:   1.  Baseline chronic kidney disease on the basis of historical review with creatinine in the range of 1.3 to 1.4 mg/dL.  Estimated GFR on this basis 50 mL per minute, representing stage III chronic kidney disease.   2.  Dipstick positive proteinuria.   3.  Previous episode of acute kidney injury necessitating renal replacement therapy in 01/2019.  Suspect residual chronic kidney disease on that basis.   4.  Historic borderline sodiums on data review.  Sodiums during 01/2019 were in the mid-125 range.   5.  Hypo-osmolar hyponatremia which has responded to saline infusion.  Initial urine sodium 14, osmolality 263.  He was noted to be 117 as an outpatient.  Initial sodium here 120, corrected overnight to a value of 127 with saline infusion.   6.  Anemia.   7.  Lymphoproliferative disorder with previous treatment including Rituxan.   8.  History of latent TB.   9.  History of congestive heart failure.   10.  Diabetes.   11.  Hypertension.      DISCUSSION:  Male with apparent history of some degree of hyponatremia who presents with acute hyponatremia.  Response to saline suggests perhaps in part some decreased intravascular volume component.  He was on bumetanide as an outpatient.  Typically, however, this medication does not result in hyponatremia.      PLAN:   1.  Discontinue saline infusion with a goal rate of correction 6 to 8 mEq in hours in hours.   2.  Begin D5W at 40 mL an hour with serial sodium checks to maintain in the range of 127 to 128 over the next 12 to 24 hours.   3.  Document TSH, which historically has been fine.   4.  Document a.m. cortisol.   5.  We will continue to follow with you.      HISTORY:  This is a 69-year-old male with a long hospitalization at [facility] in January which required renal replacement therapy.  In the aftermath his baseline creatinine appears to be in the range of 1.3 to 1.4 mg/dL.  He has seen a kidney physician as an outpatient, Dr. Gonzalo Ness at [facility].  He had an appointment there approximately a month ago.  Creatinine at that time 1.41 mg/dL.  Acute hospitalization demonstrated sodiums in the range of 125 mEq per liter.  He was seen in Dr. Quintero's office yesterday, at which time a sodium returned at 117, admitted here noted to be 120, corrected from 120 to 127 overnight, at which time I discontinued his saline infusion, ordered a recheck, and initiated a D5 infusion.  Recheck was noted to be 128.  D5 infusion was started at the approximate time that that value of 128 was obtained.  I anticipate his next sodium to be lower based on D5 infusion.      He is awake, alert, appropriate, interactive, answering questions.  Upright at the bedside.  Limited urine output recorded.  Denies headache or chest pain.  Had been taking Bumex up until admission.  Other meds are under his own direction.      PAST MEDICAL HISTORY:   1.  Lymphoproliferative disorder.   2.  Diabetes.   3.  Hypertension.   4.  Chronic kidney disease.   5.  Acute kidney injury requiring renal replacement therapy with some recovery.   6.  Congestive heart failure.      ADMISSION MEDICATIONS:  Bumex, Glucotrol, hydralazine, insulin, isoniazid, losartan, vitamin B6, albuterol.      ALLERGIES:  NONE.      SOCIAL HISTORY:  Born in [place] [place].  Has been in this country about 3 years.  Alcohol no.  Tobacco no.      FAMILY HISTORY:  REVIEW OF SYSTEMS:  Complete 10-point review of systems was undertaken.  Pertinent positives and negatives are as I noted above.      PHYSICAL EXAMINATION:   VITAL SIGNS:  Afebrile, rhythm sinus, room air sats 100%, blood pressure in the range of 140/40.   GENERAL:  Pleasant and appropriate.   HEENT:  Normocephalic, atraumatic.  Pupils  equal, round, reactive to light and accommodation.  Extraocular muscles intact.  Sclerae are nonicteric, conjunctivae not injected.  Pharynx without lesion or exudate.   NECK:  Supple.   CHEST:  Symmetric and clear with good air movement.   CARDIOVASCULAR:  Regular S1, S2.   ABDOMEN:  Soft.  No bruit noted.   EXTREMITIES:  Trace edema.   NEUROLOGIC:  Grossly nonfocal.   PSYCHIATRIC:  Normal affect, pleasant mood.      LABORATORY DATA:  Current and historic reviewed in detail.         ROBY ISAAC MD             D: 2019   T: 2019   MT: SUSANA      Name:     NARESH GARCIA   MRN:      [id number]        Account:       [id number]   :      1950           Consult Date:  2019      Document: [id number]

## 2019-06-14 NOTE — PROGRESS NOTES
Patient's lab results reviewed.  His serum sodium level is 120 and urine studies showed urine sodium of 14 and urine osmolality of 263.  This is unlikely to be SIADH.  Suspect he has hypovolemic hyponatremia.  Normal saline at 75 cc an hour ordered and continue to monitor serum sodium.  Care was signed out to overnight hospitalist.

## 2019-06-14 NOTE — CONSULTS
Care Transition Initial Assessment - RN        Met with: Patient and family  DATA   Active Problems:    Anemia       Cognitive Status: awake, alert and oriented.  Primary Care Clinic Name: [facility]   Primary Care MD Name: Cristobal Torres  Contact information and PCP information verified: Yes  Lives With: child(meño), adult             Who is your support system?: Children, Wife       Insurance concerns: No Insurance issues identified  ASSESSMENT  Patient currently receives the following services:  none        Identified issues/concerns regarding health management:  Patient admitted with anemia. Patient was just here 5/3-5/4 for anemia. PMhx: CRF, CHF proBNP 5/3/19 10,140 , DM hgbA1c 5.4 , HTN and Hyperlipidemia. Patient identified as elevated JUNE. Met with patient and family. Patient lives at home with his wife and  family (son, DIL, daughter, grandkids). He walks independently. Patient reports managing his own medications. His son and family is helpful and supportive. His wife prepares his meals. Information on managing CHF reviewed with patient and family. patient has a scale for daily weigh checks. Discussed ongoing diet management with CHF and DM. Follow up appointment made with his PCP at [facility]. CTS will send a handoff to his clinic CC when discharged.     PLAN  Financial costs for the patient include none .    Transportation/person available to transport on day of discharge  is daughter.    Plan/Disposition: TBD when medically ready.      Appointments:  Post hospitalization follow up with [facility].     Care  (CTS) will continue to follow as needed.    Flash Aguilar RN BSN CTS  Care Management Team  [facility]

## 2019-06-14 NOTE — PLAN OF CARE
Pt alert and oriented x 4. VSS except BP considerations- diastolic has been low, parameters put in place for hydralazine. BP became elevated at 1256- 171/48. Went down to 155/39 at 1403. Pt up SBA in room. Ambulated in gastelum x 1. Voiding adequately. Tolerating diet. IVF changed from NS to 0.5% dextrose. Serial sodium checks in place- last check at 1417 was 127 HgB to be rechecked tomorrow AM. Discharge plan pending.

## 2019-06-14 NOTE — PROGRESS NOTES
"[facility]  Hospitalist Progress Note  Clint Morgan MD 06/14/2019    Reason for Stay (Diagnosis): hyponatremia and anemia         Assessment and Plan:      Summary of Stay: Shell Leon is a 69 year old male who was directly admitted to [facility] on 6/13/2019 after he presented to his oncology clinic on the date of admission.     He is managed on Rituximab for recently diagnosed \"low-grade lymphoproliferative disorder\".  Otherwise, he has hx HFpEF and diabetic glomerulosclerosis for which he was briefly on HD in 1/19 after an episode of severe ARF.  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    Problem List:   1. Acute on chronic anemia.   2. Hyponatremia, acute.  Likely due to volume contraction.  3. MALT Lymphoma followed by [facility]. Currently on Rituxan with the hope that this would reduce meed for transfusion.   4. Latent TB for which he is on Isoniazid with pyridoxine.   5. Chronic HBV infection for which he recently started Tenofovir.     PLAN:  1.  Hyponatremia as per Nephrology. IVF changed to D% at 40 ml/hr in order to slow correction of hyponatremia. Plan is to permit Na to remain 125-128 overnight before permitting further correction.   2.  Monitor Hgb, consider transfusing a third unit PRBC prior to discharge.   3.  Note that on my brief research, neither hyponatremia nor anemia are caused by Tenofovir.  But Isoniazid can cause aplastic anemia.     DVT Prophylaxis: Ambulate every shift  Code Status: Full Code  Discharge Dispo: home  Estimated Disch Date / # of Days until Disch: likely tomorrow        Interval History (Subjective):      Chart reviewed, pt interviewed.    As above. Pt is feeling significantly improved at this time, wants to go home.                  Physical Exam:      Last Vital Signs:  BP (!) 152/34 (BP Location: Left arm)   Pulse (!) 18   " Temp 97.8  F (36.6  C) (Oral)   Resp 20   Wt 59.6 kg (131 lb 4.8 oz)   SpO2 100%   BMI 22.54 kg/m      I/O last 3 completed shifts:  In: 2675 [P.O.:1215; I.V.:847]  Out: 200 [Urine:200]    Constitutional: Awake, alert, cooperative, no apparent distress   Respiratory: Clear to auscultation bilaterally, no crackles or wheezing   Cardiovascular: Regular rate and rhythm, normal S1 and S2, and no murmur noted   Abdomen: Normal bowel sounds, soft, non-distended, non-tender   Skin: No rashes, no cyanosis, dry to touch   Neuro: Alert and appropriate to the situation.   Extremities: No edema, normal range of motion   Other(s):        All other systems: Negative          Medications:      All current medications were reviewed with changes reflected in problem list.         Data:      All new lab and imaging data was reviewed.   Labs/Imaging:  Results for orders placed or performed during the hospital encounter of 06/13/19 (from the past 24 hour(s))   Sodium   Result Value Ref Range    Sodium 120 (L) 133 - 144 mmol/L   Sodium   Result Value Ref Range    Sodium 121 (L) 133 - 144 mmol/L   Hemoglobin   Result Value Ref Range    Hemoglobin 8.1 (L) 13.3 - 17.7 g/dL   Osmolality   Result Value Ref Range    Osmolality 279 (L) 280 - 301 mmol/kg   Sodium   Result Value Ref Range    Sodium 127 (L) 133 - 144 mmol/L   Hemoglobin A1c   Result Value Ref Range    Hemoglobin A1C 5.4 0 - 5.6 %   Sodium   Result Value Ref Range    Sodium 128 (L) 133 - 144 mmol/L   Sodium   Result Value Ref Range    Sodium 127 (L) 133 - 144 mmol/L   Care Coordinator IP Consult    Narrative    Flash Aguilar RN     6/14/2019  4:22 PM  Care Transition Initial Assessment - RN        Met with: Patient and family  DATA   Active Problems:    Anemia       Cognitive Status: awake, alert and oriented.  Primary Care Clinic Name: [facility]   Primary Care MD Name: Cristobal Torres  Contact information and PCP information verified: Yes  Lives With:  child(meño), adult             Who is your support system?: Children, Wife       Insurance concerns: No Insurance issues identified  ASSESSMENT  Patient currently receives the following services:  none        Identified issues/concerns regarding health management:  Patient   admitted with anemia. Patient was just here 5/3-5/4 for anemia.   PMhx: CRF, CHF proBNP 5/3/19 10,140 , DM hgbA1c 5.4 , HTN and   Hyperlipidemia. Patient identified as elevated JUNE. Met with   patient and family. Patient lives at home with his wife and    family (son, DIL, daughter, grandkids). He walks independently.   Patient reports managing his own medications. His son and family   is helpful and supportive. His wife prepares his meals.   Information on managing CHF reviewed with patient and family.   patient has a scale for daily weigh checks. Discussed ongoing   diet management with CHF and DM. Follow up appointment made with   his PCP at [facility]. CTS will send a handoff to his   clinic CC when discharged.     PLAN  Financial costs for the patient include none .    Transportation/person available to transport on day of discharge    is daughter.    Plan/Disposition: TBD when medically ready.      Appointments:  Post hospitalization follow up with [facility].     Care  (CTS) will continue to follow as   needed.    Flash Aguilra RN BSN CTS  Care Management Team  [facility]

## 2019-06-14 NOTE — PROGRESS NOTES
HEME-ONC PROGRESS NOTE    History: Patient doing okay and feeling a bit better after PRBC transfusion.  No new bleeding fevers chills night sweats or other signs of infection.    Physical Exam:  VS: Blood pressure (!) 152/34, pulse (!) 18, temperature 97.8  F (36.6  C), temperature source Oral, resp. rate 20, weight 59.6 kg (131 lb 4.8 oz), SpO2 100 %.  GEN- Alert and orientedx3 in NAD.  HEENT- MMM. No thrush or mucositis  NECK-no bulky LAD, JVD, bruit  LYMPH- no bulky LAD in any luther region  CV- RRR no murmurs or rubs  LUNGS-Clear to auscultation bilaterally  ABD-soft, NTND, normoactive bowels sounds, no hepatosplenomegaly, masses, or ascites.  EXTREM- no clubbing, cyanosis, or edema  SKIN- no rashes, petechiae, or purpura    LABS:   CBC Results:   Recent Labs   Lab Test 06/14/19  0310 06/13/19  1614   WBC  --  8.4   RBC  --  2.10*   HGB 8.1* 6.2*   HCT  --  18.7*   MCV  --  89   MCH  --  29.5   MCHC  --  33.2   RDW  --  16.2*   PLT  --  260       Last Basic Metabolic Panel:  Lab Results   Component Value Date     06/14/2019      Lab Results   Component Value Date    POTASSIUM 5.2 06/13/2019     Lab Results   Component Value Date    CHLORIDE 92 06/13/2019     Lab Results   Component Value Date    STEVE 7.8 06/13/2019     Lab Results   Component Value Date    CO2 19 06/13/2019     Lab Results   Component Value Date    BUN 68 06/13/2019     Lab Results   Component Value Date    CR 1.41 06/13/2019     Lab Results   Component Value Date     06/13/2019       Liver Function Studies:   Recent Labs   Lab Test 06/13/19  1614   PROTTOTAL 6.1*   ALBUMIN 2.3*   BILITOTAL 0.2   ALKPHOS 173*   AST 32   ALT 32       Imaging: None new.    Assessment & Plan:69-year-old Portuguese male with chronic  anemia thought due to low-grade lymphoproliferative disorder, splenic marginal zone lymphoma now presenting to hospital with further anemia and hyponatremia-    1)ONCOLOGY-for low-grade lymphoma he has seen 3 of his plan for  weekly doses of Rituxan.  No progressive lymphadenopathy on exam, but he has seen ongoing problematic transfusion dependent anemia.  It was my hope that Rituxan would improve marrow function and due to clearing of the 5 to 10% involvement of his marrow by low-grade B-cell lymphoma.  Also, recent imaging was without splenomegaly so doubt a consumptive cause    2)HEME-hemoglobin better at 8.1 after 2 unit PRBC transfusion.  Etiology of anemia remains somewhat unexplained.  Recent iron studies normal, and patient not felt to be hemolyzing.  Likely some element of anemia of chronic disease.  Question medication effect as patient recently started tenofovir?.  -Follow CBC.    3)FEN/renal-nephrology now following,.  The hyponatremia etiology is a bit unclear.  Apparently acute on chronic.  May be medication related as he was on bumetanide as an outpatient, but also due to decreased intravascular volume.  Hyponatremia not a commonly seen side effect of Rituxan therapy.  -Defer to renal/internal medicine service.  Await TSH, cortisol level, and response to fluid management with D5W    Jamal Page MD, MS  Hematologist-Oncologist  [facility]              Outpatient hematology/oncology clinic note from June 13, 2019:  Mr. Shell Leon is a kind 69-year-old [place]cu[place] male with history of iron deficiency anemia (microcytic) now with improved, but still moderate normocytic anemia after prior acute kidney injury (ODILIA) requiring temporary hemodialysis thought to be due to hemolysis.       To recall, he has a history of chronic microcytic anemia. He was seen in hematologic consultation in the past per outside records at which point he was diagnosed as severely iron deficient and received oral iron replacement therapy.     Additionally, he has been diagnosed with MALT lymphoma (H. Pylori) of the stomach which may have also been contributing to his anemia. An upper endoscopy and colonoscopy both ruled out sites of GI  bleeding.     CBC revealed his HGB to be 7.1. Of note, he had been on dialysis for an acute kidney injury since early 2019 likely caused by dehydration due to overuse of Lasix which were prescribed for bilateral lower extremity edema.     At our initial consultation, on 2/8/19, I spent extended time in hematologic consultation with Mr. Shell Leon reviewing his history, recent medical events, and laboratory trends. In May of 2018, he had microcytic anemia with clear evidence of iron deficiency for which he was provided PRBC transfusion and iron supplementation (orally).  He apparently responded well to that but unfortunately then sustained acute kidney injury/ODILIA due to dehydration caused by nausea, vomiting and concomitant diuretic use. Accordingly, I ordered laboratory studies to further evaluate his anemia including iron studies and a peripheral smear.     Peripheral smear revealed schistocytes and spherocytes as well as positive anti-RBC antibodies suggesting hemolysis as a potential etiology for his anemia, however, he did not response to corticosteroids.  These studies also revealed an elevated erythropoietin and reticulocyte count and no severe iron deficiency.      Bone marrow biopsy was performed for further evaluation.  Although his marrow itself was normocellular, and there was no clear evidence of myelodysplasia or leukemia, personal communication with his interpreting pathologist suggested that there may be a low underlying low-grade lymphoproliferative disorder present. Considering all of this, his anemia remained unexplained. Therefore, we decided to await his bone marrow cytogenetics, as well as order a serum tryptase level (to further exclude mastocytosis), as well as serum protein electrophoresis and repeat CBC.     At our last visit on 5/9/19 Mr. Shell Leon and I again extended time in hematologic consultation, in the presence of his son, who provided fairly accurate medical translation.  Although he had a history of iron deficiency, he has not thought to be iron deficient at that time nor did I believe that he was actively hemolyzing.  His kidney function had thankfully recovered and therefore I believed there must be another cause for his anemia.  His tryptase levels were normal and therefore I doubted systemic mastocytosis, but, additional studies performed on his bone marrow biopsy specimens revealed not only significant eosinophilia, but also 5 to 10% involvement by low-grade B-cell lymphoma.  His CT scans also suggested an element of reactive lung disease which may be eosinophilic in nature (much like his bone marrow biopsy findings). Therefore, given that he required transfusion, and responded poorly to corticosteroids and IV iron provided to him previously, I offered him Rituxan (rituximab) anti-CD20 antibody on a weekly basis x4 doses in hopes that we would see hematopoietic improvement.       Mr. Leon presents today for continued medical oncologic and hematologic evaluation after having initiated weekly Rituxan therapy.  He has received 2 weekly doses thus far.     Review of systems is notable for frequent headaches over the past week or so and drowsiness related to his anemia. He denies any abdominal pain. He denies any recent fevers, chills, night sweats, or other signs of infection. A 14-point ROS has been performed and is otherwise negative. Remarkable symptoms and concerns were discussed with the patient, any additional follow-up is indicated in the plan below.     Past medical, social, and family histories remain unchanged.     PHYSICAL EXAMINATION:  VITALS SIGNS: Afebrile. Pulse 53. Blood pressure 134/60. Weight 132 pounds (+6 pounds since 5/9/19).    GENERAL: He is alert and oriented x3 and in no apparent distress. He is not pale and does not appear short of breath.  HEENT: PERRLA. EOMI. Sclerae are noicteric. Mucous membranes are moist with not thrush.  NECK/LYMPH: No bulky  lymphadenopathy, JVD, bruits, or thyromegaly.  LYMPH: No cervical, supraclavicular, infraclavicular, axillary, or inguinal lymphadenopathy.  CARDIOVASCULAR: Regular rate and rhythm without murmurs or rubs.  LUNGS: Clear to auscultation bilaterally with no wheezes, rhonchi, or rales.  ABDOMEN: Soft and non-tender with no tense ascites or peritoneal signs.  EXTREMITIES: Without any clubbing, cyanosis, or unequal edema.  SKIN: No rashes, petechia, or areas of cellulitis.     LABS:  WBC: 8.3 (ANC: 4.4, EO#: 1.9), Hgb: 6.8, Hct: 19.9, RDW: 15.9, Plt: 289k     Na+: 117     PATHOLGY:   BONE MARROW, 4/10/19  1. Normocellular bone marrow for patient's age (30% cellular) with the following features:  a. Marked eosinophilic hyperplasia  * JAK2 V617F mutation not detected, * FISH Eosinophilia Panel: Negative for rearrangement of 4q12 ([id number]), PDGFRB (5q32), FGFR1 (8p11.2), or BCR/ABL1 [t(9;22)(q34;q11.2]., * Negative for KIT Yym339Fon mutation.     Interpretation:  FISH on interphase nuclei showed that the FISH Eosinophilia Panel results are within the normal limits established by [facility].                                           IMAGING:  No new imaging.     ASSESSMENT/PLAN: 69-year-old Italian male with chronic  anemia thought due to low-grade lymphoproliferative disorder, splenic marginal zone lymphoma- Mr. Shell Leon has now initiated his weekly Rituxan therapy (2 doses provided) for his condition but unfortunately has seen increasing amounts of anemia (hemoglobin 6.8) and now has also developed hyponatremia (sodium 117).     Recall, his diagnosis was one of exclusion and his lymphadenopathy without splenomegaly was explained by his peripheral blood findings and extensive pathology interpretation of his multiple blood imaging and other studies.     It may be a bit premature to abandoned Rituxan monotherapy for splenic marginal zone lymphoma, but he is clearly in need of  additional pRBC transfusion and now internal medicine/nephrology evaluation for his hyponatremia.    Therefore, after his third dose of Rituxan today, I have asked him to be admitted to hospital for both 2 unit pRBC transfusion and further evaluation of the hyponatremia.  To my knowledge, I have not seen significant hyponatremia due to Rituxan itself, although his underlying renal dysfunction, multiple medication history is appreciated.     In addition, next week we will check CBC/hemoglobin levels and repeat his peripheral blood flow cytogenetics and genomic testing to see if improvement in the splenic marginal zone lymphoma/lymphoproliferative condition is occurring.      Lastly, given his prior lung findings on CT scan of the chest , if his breathing becomes worse, we will seek pulmonary consultation for possible hypereosinophilic lung disease.     Should his condition deteriorate, and/or he become increasingly anemic despite additional Rituxan, we may ask for second hematologic opinion at a tertiary referral center such as the [facility] or [facility].           Jamal Page M.D., M.S.  Medical Oncologist-Hematologist  [facility]     This document serves as a record of services personally performed by Jamal Page MD. It was created in part (with the exception of the assessment and plan) on his behalf by Dillan Vaughn, a trained medical scribe. The creation of this record is based on the scribe s personal observations and the provider s statements to him. This document has been checked and approved by the attending provider.

## 2019-06-15 VITALS
BODY MASS INDEX: 22.11 KG/M2 | RESPIRATION RATE: 16 BRPM | DIASTOLIC BLOOD PRESSURE: 47 MMHG | HEART RATE: 18 BPM | TEMPERATURE: 97 F | OXYGEN SATURATION: 100 % | SYSTOLIC BLOOD PRESSURE: 170 MMHG | WEIGHT: 128.8 LBS

## 2019-06-15 PROBLEM — E87.1 HYPO-OSMOLAR HYPONATREMIA: Status: ACTIVE | Noted: 2019-06-15

## 2019-06-15 PROBLEM — C88.40 MALT LYMPHOMA: Status: ACTIVE | Noted: 2018-10-15

## 2019-06-15 LAB
ANION GAP SERPL CALCULATED.3IONS-SCNC: 8 MMOL/L (ref 3–14)
BUN SERPL-MCNC: 59 MG/DL (ref 7–30)
CALCIUM SERPL-MCNC: 8 MG/DL (ref 8.5–10.1)
CHLORIDE SERPL-SCNC: 106 MMOL/L (ref 94–109)
CO2 SERPL-SCNC: 17 MMOL/L (ref 20–32)
CREAT SERPL-MCNC: 1.32 MG/DL (ref 0.66–1.25)
GFR SERPL CREATININE-BSD FRML MDRD: 54 ML/MIN/{1.73_M2}
GLUCOSE SERPL-MCNC: 75 MG/DL (ref 70–99)
HGB BLD-MCNC: 8.2 G/DL (ref 13.3–17.7)
POTASSIUM SERPL-SCNC: 4.4 MMOL/L (ref 3.4–5.3)
SODIUM SERPL-SCNC: 131 MMOL/L (ref 133–144)
TSH SERPL DL<=0.005 MIU/L-ACNC: 2.06 MU/L (ref 0.4–4)

## 2019-06-15 PROCEDURE — 25000132 ZZH RX MED GY IP 250 OP 250 PS 637: Performed by: INTERNAL MEDICINE

## 2019-06-15 PROCEDURE — 84443 ASSAY THYROID STIM HORMONE: CPT | Performed by: INTERNAL MEDICINE

## 2019-06-15 PROCEDURE — 99238 HOSP IP/OBS DSCHRG MGMT 30/<: CPT | Performed by: INTERNAL MEDICINE

## 2019-06-15 PROCEDURE — 25800030 ZZH RX IP 258 OP 636: Performed by: INTERNAL MEDICINE

## 2019-06-15 PROCEDURE — 36415 COLL VENOUS BLD VENIPUNCTURE: CPT | Performed by: INTERNAL MEDICINE

## 2019-06-15 PROCEDURE — 85018 HEMOGLOBIN: CPT | Performed by: INTERNAL MEDICINE

## 2019-06-15 PROCEDURE — 80048 BASIC METABOLIC PNL TOTAL CA: CPT | Performed by: INTERNAL MEDICINE

## 2019-06-15 RX ADMIN — HYDRALAZINE HYDROCHLORIDE 50 MG: 50 TABLET, FILM COATED ORAL at 12:07

## 2019-06-15 RX ADMIN — GLIPIZIDE 10 MG: 10 TABLET, FILM COATED, EXTENDED RELEASE ORAL at 07:48

## 2019-06-15 RX ADMIN — PYRIDOXINE HCL TAB 50 MG 50 MG: 50 TAB at 09:25

## 2019-06-15 RX ADMIN — ISONIAZID 300 MG: 300 TABLET ORAL at 09:25

## 2019-06-15 RX ADMIN — DEXTROSE MONOHYDRATE: 50 INJECTION, SOLUTION INTRAVENOUS at 06:35

## 2019-06-15 ASSESSMENT — ACTIVITIES OF DAILY LIVING (ADL)
ADLS_ACUITY_SCORE: 16

## 2019-06-15 NOTE — DISCHARGE SUMMARY
Pt alert and oriented x 4. BP elevated in afternoon, slightly  decreased after scheduled hydralazine. MD aware, still ok to discharge. Pt voiding adequately. Tolerating diet. Independent in room and gastelum. Ambulated in gastelum x 2. Pt cleared for discharge. AVS discussed, questions promoted and answered.

## 2019-06-15 NOTE — PLAN OF CARE
VSS. Pt states he is feeling stronger after receiving blood transfusion yesterday. 2+ BLE edema. Tolerating lowfat diet, ambulating independently/SBA. Plan to recheck hgb and sodium this AM; pt hopes to discharge later today.

## 2019-06-15 NOTE — PLAN OF CARE
Patient hospitalized for anemia on 6/13. IV D5 running at 50 ml/hr.   Pertinent assessments: Alert and oriented. Denies pain. Lung sounds clear. Bowel sounds active and audible, passing gas, denies nausea, LBM this evening. Tolerating low fat diet. Up independently, needs help unplugging IV pole from wall. Ambulated in hallway x2. Denies difficulty urinating. /32 and 177/43. HR 60. All other VSS.   Major Shift Events:   Plan (Upcoming Events): Monitor labs, encourage self care.   Discharge/Transfer Needs: TBD.

## 2019-06-15 NOTE — DISCHARGE SUMMARY
[facility] Discharge Summary    Shell Leon MRN# [id number]   Age: 69 year old YOB: 1950     Date of Admission:  6/13/2019  Date of Discharge::  6/15/2019  Admitting Physician:  Alvaro Stephen MD  Discharge Physician:  Clint Morgan MD     Home clinic: [facility], [place]          Admission Diagnoses:   Anemic, hyponatremic  Anemia          Discharge Diagnosis:   Principal Problem:    Anemia  Active Problems:    Essential hypertension with goal blood pressure less than 140/90    Type 2 diabetes mellitus with both eyes affected by proliferative retinopathy and macular edema, without long-term current use of insulin (H)    MALT lymphoma (H)    Hypo-osmolar hyponatremia            Procedures:   None          Medications Prior to Admission:     Medications Prior to Admission   Medication Sig Dispense Refill Last Dose     bumetanide (BUMEX) 2 MG tablet Take 2 mg by mouth daily   6/12/2019 at Unknown time     glipiZIDE (GLUCOTROL XL) 10 MG 24 hr tablet Take 10 mg by mouth daily   6/13/2019 at Unknown time     hydrALAZINE (APRESOLINE) 25 MG tablet Take 50 mg by mouth 2 times daily Morning and with lunch   6/13/2019 at x1     hydrALAZINE (APRESOLINE) 25 MG tablet Take 25 mg by mouth every evening   6/12/2019 at Unknown time     insulin aspart (NOVOLOG FLEXPEN) 100 UNIT/ML pen Inject Subcutaneous 3 times daily (with meals) Sliding scale prn with meals for BG>200.  2 units per 50 over 200   Past Month at 2 weeks ago     isoniazid (NYDRAZID) 300 MG tablet Take 300 mg by mouth daily   6/12/2019 at Unknown time     losartan (COZAAR) 100 MG tablet Take 100 mg by mouth daily   6/12/2019 at Unknown time     pyridOXINE (VITAMIN B-6) 50 MG tablet Take 50 mg by mouth daily   6/13/2019 at Unknown time     tenofovir alafenamide fumarate (VEMLIDY) 25 MG tablet Take 25 mg by mouth daily with food (dispense only in the original container).   6/12/2019 at Unknown time     albuterol (PROAIR HFA/PROVENTIL  HFA/VENTOLIN HFA) 108 (90 Base) MCG/ACT inhaler Inhale 2 puffs into the lungs every 6 hours as needed for shortness of breath / dyspnea or wheezing   Unknown at Unknown time     blood glucose (NO BRAND SPECIFIED) lancets standard Use to test blood sugar 3 times daily or as directed. 100 each 11 Taking     blood glucose monitoring (NO BRAND SPECIFIED) meter device kit Use to test blood sugar 3 times daily or as directed. 1 kit 0 Taking     blood glucose monitoring (NO BRAND SPECIFIED) test strip Use to test blood sugars 3 times daily or as directed 100 strip 11 Taking     order for DME Equipment being ordered: thigh high compression stockings 20mmg Hg 1 Units 0 Taking     order for DME Equipment being ordered: blood pressure cuff 1 Units 0 Taking             Discharge Medications:     Current Discharge Medication List      CONTINUE these medications which have NOT CHANGED    Details   bumetanide (BUMEX) 2 MG tablet Take 2 mg by mouth daily      glipiZIDE (GLUCOTROL XL) 10 MG 24 hr tablet Take 10 mg by mouth daily      !! hydrALAZINE (APRESOLINE) 25 MG tablet Take 50 mg by mouth 2 times daily Morning and with lunch      !! hydrALAZINE (APRESOLINE) 25 MG tablet Take 25 mg by mouth every evening      insulin aspart (NOVOLOG FLEXPEN) 100 UNIT/ML pen Inject Subcutaneous 3 times daily (with meals) Sliding scale prn with meals for BG>200.  2 units per 50 over 200      isoniazid (NYDRAZID) 300 MG tablet Take 300 mg by mouth daily      losartan (COZAAR) 100 MG tablet Take 100 mg by mouth daily      pyridOXINE (VITAMIN B-6) 50 MG tablet Take 50 mg by mouth daily      tenofovir alafenamide fumarate (VEMLIDY) 25 MG tablet Take 25 mg by mouth daily with food (dispense only in the original container).      albuterol (PROAIR HFA/PROVENTIL HFA/VENTOLIN HFA) 108 (90 Base) MCG/ACT inhaler Inhale 2 puffs into the lungs every 6 hours as needed for shortness of breath / dyspnea or wheezing      blood glucose (NO BRAND SPECIFIED) lancets  "standard Use to test blood sugar 3 times daily or as directed.  Qty: 100 each, Refills: 11    Associated Diagnoses: Type 2 diabetes mellitus with hyperglycemia, without long-term current use of insulin (H)      blood glucose monitoring (NO BRAND SPECIFIED) meter device kit Use to test blood sugar 3 times daily or as directed.  Qty: 1 kit, Refills: 0    Comments: One touch verio flex meter  Associated Diagnoses: Type 2 diabetes mellitus without complication, without long-term current use of insulin (H)      blood glucose monitoring (NO BRAND SPECIFIED) test strip Use to test blood sugars 3 times daily or as directed  Qty: 100 strip, Refills: 11    Associated Diagnoses: Type 2 diabetes mellitus with hyperglycemia, without long-term current use of insulin (H)      !! order for DME Equipment being ordered: thigh high compression stockings 20mmg Hg  Qty: 1 Units, Refills: 0    Associated Diagnoses: Localized edema      !! order for DME Equipment being ordered: blood pressure cuff  Qty: 1 Units, Refills: 0    Associated Diagnoses: Resistant hypertension       !! - Potential duplicate medications found. Please discuss with provider.                Consultations:   Consultation during this admission received from nephrology and oncology         Hospital Course:   Shlel Leon is a 69 year old male who was directly admitted to [facility] on 6/13/2019 after he presented to his oncology clinic on the date of admission.  I refer the reader to admission note by Dr. Alvaro Stephen for details.     He is managed on Rituximab for recently diagnosed \"low-grade lymphoproliferative disorder\".  Otherwise, he has hx HFpEF and diabetic glomerulosclerosis for which he was briefly on HD in 1/19 after an episode of severe ARF.  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    Mr. Leon was admitted to a medical bed and " transfused with 2 units of packed red blood cells.  He did very well with that.  He also was supported with gentle IV fluids.  Serial monitoring of his sodium indicated that the correction was slightly too fast for which reason D5W was administered.  I note that Dr. Alberto Romero from nephrology had seen the patient and was primarily managing the hyponatremia.    /47 (BP Location: Left arm)   Pulse (!) 18   Temp 98  F (36.7  C) (Oral)   Resp 16   Wt 58.4 kg (128 lb 12.8 oz)   SpO2 100%   BMI 22.11 kg/m    With correction of the sodium and transfusion, Mr. Leon did very well.  On the date of discharge she was comfortable and alert.  His son and wife were present and they were all eager for the patient to be discharged.  Chest: No increased work of breathing.  Clear to auscultation.  Heart: Regular rate and rhythm without rubs murmurs gallops.,  The patient was up and ambulating without evident dizziness or weakness.  Abdomen: Soft without obvious tenderness or mass.          Discharge Instructions and Follow-Up:   Discharge diet: Regular   Discharge activity: Activity as tolerated   Discharge follow-up: Follow up with  As planned later this week.           Discharge Disposition:   Discharged to home      Attestation:  I have reviewed today's vital signs, notes, medications, labs and imaging.  Total time: 30 minutes    Clint Morgan MD

## 2019-06-15 NOTE — PROGRESS NOTES
HEME-ONC PROGRESS NOTE    History: Patient doing okay today.  No acute drop in hemoglobin.  Denies any bleeding.    Physical Exam:  VS: Blood pressure (!) 152/36, pulse (!) 18, temperature 98  F (36.7  C), temperature source Oral, resp. rate 16, weight 58.4 kg (128 lb 12.8 oz), SpO2 100 %.  GEN- Alert and orientedx3 in NAD.  HEENT- MMM. No thrush or mucositis  CV- RRR no murmurs or rubs  LUNGS-Clear to auscultation bilaterally  ABD-soft, NTND, normoactive bowels sounds, no hepatosplenomegaly, masses, or ascites.  EXTREM- no clubbing, cyanosis, or edema  SKIN- no rashes, petechiae, or purpura    LABS:   CBC Results:   Recent Labs   Lab Test 06/15/19  0659  06/13/19  1614   WBC  --   --  8.4   RBC  --   --  2.10*   HGB 8.2*   < > 6.2*   HCT  --   --  18.7*   MCV  --   --  89   MCH  --   --  29.5   MCHC  --   --  33.2   RDW  --   --  16.2*   PLT  --   --  260    < > = values in this interval not displayed.       Last Basic Metabolic Panel:  Lab Results   Component Value Date     06/15/2019      Lab Results   Component Value Date    POTASSIUM 4.4 06/15/2019     Lab Results   Component Value Date    CHLORIDE 106 06/15/2019     Lab Results   Component Value Date    STEVE 8.0 06/15/2019     Lab Results   Component Value Date    CO2 17 06/15/2019     Lab Results   Component Value Date    BUN 59 06/15/2019     Lab Results   Component Value Date    CR 1.32 06/15/2019     Lab Results   Component Value Date    GLC 75 06/15/2019       Imaging: none new    Assessment & Plan: 69-year-old [place] male with chronic  anemia thought due to low-grade lymphoproliferative disorder, splenic marginal zone lymphoma now presenting to hospital with further anemia and hyponatremia-     1)ONCOLOGY-for low-grade lymphoma he has seen 3 of his plan for weekly doses of Rituxan.  No progressive lymphadenopathy on exam, but he has seen ongoing problematic transfusion dependent anemia.  It was my hope that Rituxan would improve marrow function  and due to clearing of the 5 to 10% involvement of his marrow by low-grade B-cell lymphoma.  Also, recent imaging was without splenomegaly so doubt a consumptive cause.  -He is due for his final weekly dose of Rituxan next week; I will discuss his case with original interpreting pathologist and my colleagues.  -If his hemoglobin continues to decline despite Rituxan, May need sooner restaging marrow/imaging and second opinion and/or treatment of the eosinophilic component of his condition- LUNGS/MARROW.     2)HEME-hemoglobin stable today at 8.2 after recent 2 unit PRBC transfusion.  Etiology of anemia remains somewhat unexplained.  Recent iron studies normal, and patient not felt to be hemolyzing.  Likely some element of anemia of chronic disease.  Question medication effect as patient recently started tenofovir?.  -Follow CBC.     3)FEN/renal- nephrology now following,. The hyponatremia etiology was a bit unclear.  Apparently acute on chronic.  May be medication related as he was on bumetanide as an outpatient, but also due to decreased intravascular volume.  Hyponatremia not a commonly seen side effect of Rituxan therapy. TSH was normal  -Defer to renal/internal medicine service.    Appears to be responding to fluid management with D5W-sodium 131 today.     4)DISP-possible discharge given stable hemoglobin and improving hyponatremia?          Jamal Page MD, MS  Hematologist-Oncologist  [facility]

## 2019-06-17 ENCOUNTER — TELEPHONE (OUTPATIENT)
Dept: FAMILY MEDICINE | Facility: CLINIC | Age: 69
End: 2019-06-17

## 2019-06-17 NOTE — PROGRESS NOTES
Transition Communication Hand-off for Care Transitions to Next Level of Care Provider    Name: Shell Leon  : 1950  MRN #: [id number]  Primary Care Provider: Jeremy Mendoza  Primary Care MD Name: Cristobal Torres  Primary Clinic: [address]  [facility] [address]  Primary Care Clinic Name: [facility] [[email or web address]]   Reason for Hospitalization:  Anemic, hyponatremic  Anemia  Admit Date/Time: 2019  1:41 PM  Discharge Date: 6/15/19  Payor Source: Payor: [facility] / Plan: UCARE MNCARE / Product Type: HMO /     Readmission Assessment Measure (JUNE) Risk Score/category: ELEVATED           Reason for Communication Hand-off Referral: Fragility  Multiple providers/specialties    Discharge Plan:       Concern for non-adherence with plan of care:   NO  Discharge Needs Assessment:      Already enrolled in Tele-monitoring program and name of program:  na  Follow-up specialty is recommended: Yes    Follow-up plan:  No future appointments.    Any outstanding tests or procedures:          Reason for your hospital stay    Low salt and low hemoglobin          Chaudhari Recommendations:   Patient admitted with anemia. Patient was just here 5/3- for anemia. PMhx: CRF, CHF proBNP 5/3/19 10,140 , DM hgbA1c 5.4 , HTN and Hyperlipidemia. Patient identified as elevated JUNE. Met with patient and family. Patient lives at home with his wife and  family (son, DIL, daughter, grandkids). He walks independently. Patient reports managing his own medications. His son and family is helpful and supportive. His wife prepares his meals. Information on managing CHF reviewed with patient and family. patient has a scale for daily weigh checks. Discussed ongoing diet management with CHF and DM.    Recommend to f/u with Dr Page as scheduled on Thursday.        Flash Aguilar    AVS/Discharge Summary is the source of truth; this is a helpful guide for improved communication of patient story

## 2019-06-17 NOTE — TELEPHONE ENCOUNTER
Please contact patient for In-patient follow up.  There are no phone numbers on file.    Visit date: 06/15/19  Diagnosis listed:Anemia, Anemic, Hyponatremic  Number of visits in past 12 months:2/2

## 2019-06-18 NOTE — TELEPHONE ENCOUNTER
"ED / Discharge Outreach Protocol    Patient Contact    Attempt # 1    Was call answered?  Yes.  \"May I please speak with <patient name>\"  Is patient available?   YesTalked to son. Pt has a different primary call doctor.   Hanna MARTINEZ RN              "

## 2019-08-30 ENCOUNTER — HOSPITAL ENCOUNTER (OUTPATIENT)
Facility: CLINIC | Age: 69
Setting detail: OBSERVATION
Discharge: HOME | End: 2019-08-31
Attending: EMERGENCY MEDICINE | Admitting: HOSPITALIST
Payer: COMMERCIAL

## 2019-08-30 DIAGNOSIS — N17.9 ACUTE KIDNEY INJURY (H): ICD-10-CM

## 2019-08-30 DIAGNOSIS — G89.29 CHRONIC NONINTRACTABLE HEADACHE, UNSPECIFIED HEADACHE TYPE: ICD-10-CM

## 2019-08-30 DIAGNOSIS — E87.1 HYPONATREMIA: ICD-10-CM

## 2019-08-30 DIAGNOSIS — I10 ESSENTIAL HYPERTENSION WITH GOAL BLOOD PRESSURE LESS THAN 140/90: Primary | ICD-10-CM

## 2019-08-30 DIAGNOSIS — R51.9 CHRONIC NONINTRACTABLE HEADACHE, UNSPECIFIED HEADACHE TYPE: ICD-10-CM

## 2019-08-30 DIAGNOSIS — D64.9 SYMPTOMATIC ANEMIA: ICD-10-CM

## 2019-08-30 LAB
ABO + RH BLD: NORMAL
ABO + RH BLD: NORMAL
ACANTHOCYTES BLD QL SMEAR: SLIGHT
ALBUMIN SERPL-MCNC: 3.1 G/DL (ref 3.4–5)
ALP SERPL-CCNC: 101 U/L (ref 40–150)
ALT SERPL W P-5'-P-CCNC: 28 U/L (ref 0–70)
ANION GAP SERPL CALCULATED.3IONS-SCNC: 7 MMOL/L (ref 3–14)
AST SERPL W P-5'-P-CCNC: 35 U/L (ref 0–45)
BASOPHILS # BLD AUTO: 0 10E9/L (ref 0–0.2)
BASOPHILS NFR BLD AUTO: 0 %
BILIRUB SERPL-MCNC: 0.1 MG/DL (ref 0.2–1.3)
BLD GP AB SCN SERPL QL: NORMAL
BLD PROD TYP BPU: NORMAL
BLD UNIT ID BPU: 0
BLD UNIT ID BPU: 0
BLOOD BANK CMNT PATIENT-IMP: NORMAL
BLOOD PRODUCT CODE: NORMAL
BLOOD PRODUCT CODE: NORMAL
BPU ID: NORMAL
BPU ID: NORMAL
BUN SERPL-MCNC: 100 MG/DL (ref 7–30)
CALCIUM SERPL-MCNC: 8.5 MG/DL (ref 8.5–10.1)
CHLORIDE SERPL-SCNC: 97 MMOL/L (ref 94–109)
CO2 SERPL-SCNC: 22 MMOL/L (ref 20–32)
CREAT SERPL-MCNC: 1.98 MG/DL (ref 0.66–1.25)
DIFFERENTIAL METHOD BLD: ABNORMAL
EOSINOPHIL # BLD AUTO: 1.6 10E9/L (ref 0–0.7)
EOSINOPHIL NFR BLD AUTO: 22 %
ERYTHROCYTE [DISTWIDTH] IN BLOOD BY AUTOMATED COUNT: 13.9 % (ref 10–15)
GFR SERPL CREATININE-BSD FRML MDRD: 33 ML/MIN/{1.73_M2}
GLUCOSE SERPL-MCNC: 149 MG/DL (ref 70–99)
HBA1C MFR BLD: 5.3 % (ref 0–5.6)
HCT VFR BLD AUTO: 18.1 % (ref 40–53)
HGB BLD-MCNC: 5.8 G/DL (ref 13.3–17.7)
HYPOCHROMIA BLD QL: PRESENT
LYMPHOCYTES # BLD AUTO: 1.3 10E9/L (ref 0.8–5.3)
LYMPHOCYTES NFR BLD AUTO: 19 %
MCH RBC QN AUTO: 30.1 PG (ref 26.5–33)
MCHC RBC AUTO-ENTMCNC: 32 G/DL (ref 31.5–36.5)
MCV RBC AUTO: 94 FL (ref 78–100)
MONOCYTES # BLD AUTO: 0.6 10E9/L (ref 0–1.3)
MONOCYTES NFR BLD AUTO: 8 %
NEUTROPHILS # BLD AUTO: 3.6 10E9/L (ref 1.6–8.3)
NEUTROPHILS NFR BLD AUTO: 51 %
NUM BPU REQUESTED: 2
PLATELET # BLD AUTO: 233 10E9/L (ref 150–450)
PLATELET # BLD EST: ABNORMAL 10*3/UL
POTASSIUM SERPL-SCNC: 5.2 MMOL/L (ref 3.4–5.3)
PROT SERPL-MCNC: 6.6 G/DL (ref 6.8–8.8)
RBC # BLD AUTO: 1.93 10E12/L (ref 4.4–5.9)
RETICS # AUTO: 55.2 10E9/L (ref 25–95)
RETICS/RBC NFR AUTO: 2.9 % (ref 0.5–2)
SODIUM SERPL-SCNC: 126 MMOL/L (ref 133–144)
SPECIMEN EXP DATE BLD: NORMAL
TRANSFUSION STATUS PATIENT QL: NORMAL
WBC # BLD AUTO: 7.1 10E9/L (ref 4–11)

## 2019-08-30 PROCEDURE — 80053 COMPREHEN METABOLIC PANEL: CPT | Performed by: EMERGENCY MEDICINE

## 2019-08-30 PROCEDURE — 83036 HEMOGLOBIN GLYCOSYLATED A1C: CPT | Performed by: EMERGENCY MEDICINE

## 2019-08-30 PROCEDURE — 86850 RBC ANTIBODY SCREEN: CPT | Performed by: EMERGENCY MEDICINE

## 2019-08-30 PROCEDURE — 25000132 ZZH RX MED GY IP 250 OP 250 PS 637: Performed by: HOSPITALIST

## 2019-08-30 PROCEDURE — 36430 TRANSFUSION BLD/BLD COMPNT: CPT

## 2019-08-30 PROCEDURE — G0378 HOSPITAL OBSERVATION PER HR: HCPCS

## 2019-08-30 PROCEDURE — P9016 RBC LEUKOCYTES REDUCED: HCPCS | Performed by: EMERGENCY MEDICINE

## 2019-08-30 PROCEDURE — 86900 BLOOD TYPING SEROLOGIC ABO: CPT | Performed by: EMERGENCY MEDICINE

## 2019-08-30 PROCEDURE — 99285 EMERGENCY DEPT VISIT HI MDM: CPT | Mod: 25

## 2019-08-30 PROCEDURE — 86901 BLOOD TYPING SEROLOGIC RH(D): CPT | Performed by: EMERGENCY MEDICINE

## 2019-08-30 PROCEDURE — 99207 ZZC CDG-CODE CATEGORY CHANGED: CPT | Performed by: HOSPITALIST

## 2019-08-30 PROCEDURE — 85045 AUTOMATED RETICULOCYTE COUNT: CPT | Performed by: EMERGENCY MEDICINE

## 2019-08-30 PROCEDURE — 85025 COMPLETE CBC W/AUTO DIFF WBC: CPT | Performed by: EMERGENCY MEDICINE

## 2019-08-30 PROCEDURE — 99219 ZZC INITIAL OBSERVATION CARE,LEVL II: CPT | Performed by: HOSPITALIST

## 2019-08-30 PROCEDURE — 86923 COMPATIBILITY TEST ELECTRIC: CPT | Performed by: EMERGENCY MEDICINE

## 2019-08-30 RX ORDER — SODIUM CHLORIDE 9 MG/ML
INJECTION, SOLUTION INTRAVENOUS CONTINUOUS
Status: ACTIVE | OUTPATIENT
Start: 2019-08-30 | End: 2019-08-31

## 2019-08-30 RX ORDER — ISONIAZID 300 MG/1
300 TABLET ORAL
Status: DISCONTINUED | OUTPATIENT
Start: 2019-08-31 | End: 2019-08-31 | Stop reason: HOSPADM

## 2019-08-30 RX ORDER — NALOXONE HYDROCHLORIDE 0.4 MG/ML
.1-.4 INJECTION, SOLUTION INTRAMUSCULAR; INTRAVENOUS; SUBCUTANEOUS
Status: DISCONTINUED | OUTPATIENT
Start: 2019-08-30 | End: 2019-08-31 | Stop reason: HOSPADM

## 2019-08-30 RX ORDER — NICOTINE POLACRILEX 4 MG
15-30 LOZENGE BUCCAL
Status: DISCONTINUED | OUTPATIENT
Start: 2019-08-30 | End: 2019-08-31 | Stop reason: HOSPADM

## 2019-08-30 RX ORDER — ONDANSETRON 2 MG/ML
4 INJECTION INTRAMUSCULAR; INTRAVENOUS EVERY 6 HOURS PRN
Status: DISCONTINUED | OUTPATIENT
Start: 2019-08-30 | End: 2019-08-31 | Stop reason: HOSPADM

## 2019-08-30 RX ORDER — ACETAMINOPHEN 325 MG/1
650 TABLET ORAL EVERY 4 HOURS PRN
Status: DISCONTINUED | OUTPATIENT
Start: 2019-08-30 | End: 2019-08-31 | Stop reason: HOSPADM

## 2019-08-30 RX ORDER — DEXTROSE MONOHYDRATE 25 G/50ML
25-50 INJECTION, SOLUTION INTRAVENOUS
Status: DISCONTINUED | OUTPATIENT
Start: 2019-08-30 | End: 2019-08-31 | Stop reason: HOSPADM

## 2019-08-30 RX ORDER — ACETAMINOPHEN 650 MG/1
650 SUPPOSITORY RECTAL EVERY 4 HOURS PRN
Status: DISCONTINUED | OUTPATIENT
Start: 2019-08-30 | End: 2019-08-31 | Stop reason: HOSPADM

## 2019-08-30 RX ORDER — GLIPIZIDE 10 MG/1
10 TABLET, FILM COATED, EXTENDED RELEASE ORAL DAILY
Status: DISCONTINUED | OUTPATIENT
Start: 2019-08-31 | End: 2019-08-31 | Stop reason: HOSPADM

## 2019-08-30 RX ORDER — PYRIDOXINE HCL (VITAMIN B6) 25 MG
50 TABLET ORAL
Status: DISCONTINUED | OUTPATIENT
Start: 2019-08-31 | End: 2019-08-31 | Stop reason: HOSPADM

## 2019-08-30 RX ORDER — ALBUTEROL SULFATE 90 UG/1
2 AEROSOL, METERED RESPIRATORY (INHALATION) EVERY 6 HOURS PRN
Status: DISCONTINUED | OUTPATIENT
Start: 2019-08-30 | End: 2019-08-31 | Stop reason: HOSPADM

## 2019-08-30 RX ORDER — BUMETANIDE 0.5 MG/1
2 TABLET ORAL DAILY
Status: DISCONTINUED | OUTPATIENT
Start: 2019-08-31 | End: 2019-08-31 | Stop reason: HOSPADM

## 2019-08-30 RX ORDER — ONDANSETRON 4 MG/1
4 TABLET, ORALLY DISINTEGRATING ORAL EVERY 6 HOURS PRN
Status: DISCONTINUED | OUTPATIENT
Start: 2019-08-30 | End: 2019-08-31 | Stop reason: HOSPADM

## 2019-08-30 RX ADMIN — HYDRALAZINE HYDROCHLORIDE 75 MG: 25 TABLET ORAL at 22:23

## 2019-08-30 ASSESSMENT — ENCOUNTER SYMPTOMS
WEAKNESS: 0
SPEECH DIFFICULTY: 0
ABDOMINAL PAIN: 0
HEADACHES: 1
PHOTOPHOBIA: 0
NUMBNESS: 0
NECK PAIN: 0

## 2019-08-30 ASSESSMENT — MIFFLIN-ST. JEOR: SCORE: 1251.6

## 2019-08-30 NOTE — ED TRIAGE NOTES
Pt c/o headache and some dizziness. Denies fevers, speech difficulty or vision problems.  Pt does have h/o low hemoglobin.

## 2019-08-30 NOTE — ED PROVIDER NOTES
History     Chief Complaint:  Headache    The history is provided by the patient and a relative. A  was used (Daughter).      Shell Leon is a 69 year old male with a history of CKD, diabetes, hypertension, and lymphoproliferative disorder, who presents to the emergency department with his daughter for evaluation of a headache. For the last two days, the patient has been experiencing a headache. He states that the pain level has not worsened or gotten better since it first started. He denies having any explosions of intense pain. He denies taking any medication for his pain. To note, the patient has had similar pain to this in the past and was found to have a low hemoglobin level. His persisting headache prompted the patient to seek evaluation in the emergency room today.     He denies any numbness, weakness, vision changes, speech issues, neck pain, photophobia, or abdominal pain.     Results of Brain MRI from 05/20/18:  1. Focal nonenhancing signal abnormalities in bilateral thalamic nuclei and bilateral basal ganglia regions with some scattered nonspecific white matter changes. These are nonspecific but probably related to chronic small vessel ischemic disease.  2. No evidence for intracranial hemorrhage, acute infarct, or any focal mass lesions. As per radiology.    CT Head without contrast:   No acute intracranial abnormality. As per radiology.    Allergies:  No Known Drug Allergies    Medications:    Albuterol  Bumex  Glipizide  Apresoline  Insulin aspart  Nydrazid  Cozaar  Validly    Past Medical History:    CKD  Nephrotic syndrome  Hypo-osmolar hyponatremia  Diabetes  Hypertension  Anemia  CHF  Lymphoproliferative disorder  GERD    Past Surgical History:    Colonoscopy  EGD, combined  Cholecystectomy     Family History:    No past pertinent family history.    Social History:  Negative for tobacco use.  Negative for alcohol use.  Negative for drug use.  Presents with his  daughter.  Marital Status:        Review of Systems   Eyes: Negative for photophobia and visual disturbance.   Gastrointestinal: Negative for abdominal pain.   Musculoskeletal: Negative for neck pain.   Neurological: Positive for headaches. Negative for speech difficulty, weakness and numbness.   All other systems reviewed and are negative.      Physical Exam     Patient Vitals for the past 24 hrs:   BP Temp Temp src Pulse Resp SpO2 Weight   08/30/19 1835 -- -- -- -- -- 100 % --   08/30/19 1834 -- -- -- -- -- 100 % --   08/30/19 1833 -- -- -- -- -- 100 % --   08/30/19 1832 -- -- -- -- -- 100 % --   08/30/19 1802 -- -- -- -- -- 100 % --   08/30/19 1801 -- -- -- -- -- 100 % --   08/30/19 1800 -- -- -- -- -- 100 % --   08/30/19 1726 (!) 155/47 97.7  F (36.5  C) Oral 60 16 100 % 58.1 kg (128 lb 1.4 oz)     Physical Exam  General: Well-nourished, appears to be resting comfortably when I enter the room  Eyes: PERRL, conjunctivae pink no scleral icterus or conjunctival injection  ENT:  Moist mucus membranes, posterior oropharynx clear without erythema or exudates  Respiratory:  Lungs clear to auscultation bilaterally, no crackles/rubs/wheezes.  Good air movement  CV: Normal rate and rhythm, no murmurs/rubs/gallops  GI:  Abdomen soft and non-distended.  Normoactive BS.  No tenderness, guarding or rebound  Skin: Warm, dry.  No rashes or petechiae  Musculoskeletal: No peripheral edema or calf tenderness  Neuro: Alert and oriented to person/place/time. Neck supple. PERRL, EOMI no nystagmus, no aphasia/facial droop/dysarthria, tongue midline, symmetric palatal elevation, normal strength at SCM/trapezius/BUE/BLE, normal coordination to FNF at BUE, normal casual gait, negative romberg, sensation intact to LT over face/BUE/BLE  Psychiatric: Normal affect      Emergency Department Course   Laboratory:  CBC: WBC: 7.1, HGB: 5.8 (LL), PLT: 233  CMP: Glucose 149 (H), Sodium: 126 (L), Urea Nitrogen: 100 (H), Creatinine: 1.98 (H),  GFR Estimate: 33 (L), Bilirubin Total: 0.1 (L), Albumin: 3.1 (L), Protein Total: 6.6 (L), o/w WNL     ABO/Rh Type and Screen: O Positive    Reticulocyte Count: % Retic: 2.9 (H)    Interventions:   1849 1u PRBCs    Emergency Department Course:  Nursing notes and vitals reviewed. 1725 I performed an exam of the patient as documented above.     IV inserted. Medicine administered as documented above. Blood drawn. This was sent to the lab for further testing, results above.    1834 I rechecked the patient and discussed the results of his workup thus far.     1918  I consulted with Dr. Romero of the hospitalist services. They are in agreement to accept the patient for admission.    Findings and plan explained to the Patient and daughter who consents to admission. Discussed the patient with Dr. Romero, who will admit the patient to an observation bed for further monitoring, evaluation, and treatment.    Impression & Plan    Medical Decision Making:  Shell Leon is a 69 year old male with a history of MALT lymphoma who has had issues with anemia in the past 2 comes today with headache that is chronic but worse prompting concern that he may be anemic again.  His neurologic exam is unremarkable.  His headache was gradual in onset.  He has no fever or neck stiffness to suggest meningitis.  He has had multiple imaging studies for similar headaches and headaches appear to be chronic in the records.  I do not believe he needs additional imaging tonight.  His hemoglobin was indeed low at 5.8.  Blood transfusion was started but will need to run slowly as he has a history of congestive heart failure.  The patient has hyponatremia as well.  He has some renal insufficiency as well though per his daughter's report this is improved from previously.  At this time given the need for likely multiple units of blood and potential correction of his hyponatremia, we will admit to the observation unit for further monitoring.    Floyd graciously agreed to admit the patient.  The patient and his daughter were in agreement with the plan as well.    Critical Care time:  none    Diagnosis:    ICD-10-CM    1. Symptomatic anemia D64.9 CBC with platelets differential     Comprehensive metabolic panel     ABO/Rh type and screen     Reticulocyte count     Reticulocyte count     Blood component     Blood component     CANCELED: Reticulocyte count   2. Hyponatremia E87.1    3. Chronic nonintractable headache, unspecified headache type R51    4. Acute kidney injury (H) N17.9        Disposition:  Admitted to Dr. Nick Cevallos Disclosure:  Debra PRUITT, am serving as a scribe on 8/30/2019 at 5:31 PM to personally document services performed by Geni Kent MD based on my observations and the provider's statements to me.     Debra Alfred  8/30/2019   [facility] EMERGENCY DEPARTMENT       Geni Kent MD  08/31/19 0031

## 2019-08-30 NOTE — ED NOTES
[facility]  ED Nurse Handoff Report    Shell Leon is a 69 year old male   ED Chief complaint: Headache  . ED Diagnosis:   Final diagnoses:   Symptomatic anemia   Hyponatremia   Chronic nonintractable headache, unspecified headache type   Acute kidney injury (H)     Allergies: No Known Allergies    Code Status: Full Code  Activity level - Baseline/Home: Independent. Activity Level - Current: Stand by Assist. Lift room needed: No. Bariatric: No   Needed: No   Isolation: No. Infection: Not Applicable.     Vital Signs:   Vitals:    08/30/19 1726   BP: (!) 155/47   Pulse: 60   Resp: 16   Temp: 97.7  F (36.5  C)   TempSrc: Oral   SpO2: 100%   Weight: 58.1 kg (128 lb 1.4 oz)       Cardiac Rhythm:  ,      Pain level: 0-10 Pain Scale: 8  Patient confused: No. Patient Falls Risk: Yes.   Elimination Status: did not void   Patient Report - Initial Complaint: headache, dizzy.   Focused Assessment: Shell Leon is a 69 year old male with a history of CKD, diabetes, hypertension, and lymphoproliferative disorder, who presents to the emergency department with his daughter for evaluation of a headache. For the last two days, the patient has been experiencing a headache. He states that the pain level has not worsened or gotten better since it first started. He denies having any explosions of intense pain. He denies taking any medication for his pain. To note, the patient has had similar pain to this in the past and was found to have a low hemoglobin level. His persisting headache prompted the patient to seek evaluation in the emergency room today.      He denies any numbness, weakness, vision changes, speech issues, neck pain, photophobia, or abdominal pain.      Tests Performed: labs   Abnormal Results:   Labs Ordered and Resulted from Time of ED Arrival Up to the Time of Departure from the ED   CBC WITH PLATELETS DIFFERENTIAL - Abnormal; Notable for the following components:       Result  Value    RBC Count 1.93 (*)     Hemoglobin 5.8 (*)     Hematocrit 18.1 (*)     All other components within normal limits   COMPREHENSIVE METABOLIC PANEL - Abnormal; Notable for the following components:    Sodium 126 (*)     Glucose 149 (*)     Urea Nitrogen 100 (*)     Creatinine 1.98 (*)     GFR Estimate 33 (*)     GFR Estimate If Black 38 (*)     Bilirubin Total 0.1 (*)     Albumin 3.1 (*)     Protein Total 6.6 (*)     All other components within normal limits   RETICULOCYTE COUNT   VITAL SIGNS   CARDIAC CONTINUOUS MONITORING   PULSE OXIMETRY NURSING   PERIPHERAL IV CATHETER   ABO/RH TYPE AND SCREEN   RED BLOOD CELL PREPARE ORDER UNIT   BLOOD COMPONENT     Treatments provided:  Family Comments:visitor at bedside.  OBS brochure/video discussed/provided to patient:  Yes  ED Medications: Medications - No data to display  Drips infusing:  yes  For the majority of the shift, the patient's behavior Green. Interventions performed were na.     Severe Sepsis OR Septic Shock Diagnosis Present: No      ED Nurse Name/Phone Number: Latosha Vizcarra RN,   6:34 PM    RECEIVING UNIT ED HANDOFF REVIEW    Above ED Nurse Handoff Report was reviewed: Yes  Reviewed by: Debra Watson RN on August 30, 2019 at 8:24 PM

## 2019-08-31 VITALS
DIASTOLIC BLOOD PRESSURE: 42 MMHG | HEART RATE: 54 BPM | WEIGHT: 128 LBS | OXYGEN SATURATION: 100 % | TEMPERATURE: 98.3 F | RESPIRATION RATE: 16 BRPM | BODY MASS INDEX: 21.85 KG/M2 | HEIGHT: 64 IN | SYSTOLIC BLOOD PRESSURE: 141 MMHG

## 2019-08-31 LAB
ANION GAP SERPL CALCULATED.3IONS-SCNC: 7 MMOL/L (ref 3–14)
BUN SERPL-MCNC: 90 MG/DL (ref 7–30)
CALCIUM SERPL-MCNC: 8.3 MG/DL (ref 8.5–10.1)
CHLORIDE SERPL-SCNC: 105 MMOL/L (ref 94–109)
CO2 SERPL-SCNC: 19 MMOL/L (ref 20–32)
CREAT SERPL-MCNC: 1.88 MG/DL (ref 0.66–1.25)
GFR SERPL CREATININE-BSD FRML MDRD: 35 ML/MIN/{1.73_M2}
GLUCOSE BLDC GLUCOMTR-MCNC: 138 MG/DL (ref 70–99)
GLUCOSE BLDC GLUCOMTR-MCNC: 62 MG/DL (ref 70–99)
GLUCOSE BLDC GLUCOMTR-MCNC: 89 MG/DL (ref 70–99)
GLUCOSE SERPL-MCNC: 65 MG/DL (ref 70–99)
HGB BLD-MCNC: 7.6 G/DL (ref 13.3–17.7)
POTASSIUM SERPL-SCNC: 5.3 MMOL/L (ref 3.4–5.3)
SODIUM SERPL-SCNC: 131 MMOL/L (ref 133–144)

## 2019-08-31 PROCEDURE — 96361 HYDRATE IV INFUSION ADD-ON: CPT

## 2019-08-31 PROCEDURE — 80048 BASIC METABOLIC PNL TOTAL CA: CPT | Performed by: HOSPITALIST

## 2019-08-31 PROCEDURE — 99217 ZZC OBSERVATION CARE DISCHARGE: CPT | Performed by: HOSPITALIST

## 2019-08-31 PROCEDURE — 25000132 ZZH RX MED GY IP 250 OP 250 PS 637: Performed by: HOSPITALIST

## 2019-08-31 PROCEDURE — 96360 HYDRATION IV INFUSION INIT: CPT

## 2019-08-31 PROCEDURE — 85018 HEMOGLOBIN: CPT | Performed by: HOSPITALIST

## 2019-08-31 PROCEDURE — 36415 COLL VENOUS BLD VENIPUNCTURE: CPT | Performed by: HOSPITALIST

## 2019-08-31 PROCEDURE — G0378 HOSPITAL OBSERVATION PER HR: HCPCS

## 2019-08-31 PROCEDURE — 25800030 ZZH RX IP 258 OP 636: Performed by: HOSPITALIST

## 2019-08-31 PROCEDURE — 00000146 ZZHCL STATISTIC GLUCOSE BY METER IP

## 2019-08-31 RX ORDER — HYDRALAZINE HYDROCHLORIDE 10 MG/1
10 TABLET, FILM COATED ORAL 4 TIMES DAILY
Status: DISCONTINUED | OUTPATIENT
Start: 2019-08-31 | End: 2019-08-31

## 2019-08-31 RX ORDER — AMLODIPINE BESYLATE 5 MG/1
5 TABLET ORAL DAILY
Status: DISCONTINUED | OUTPATIENT
Start: 2019-08-31 | End: 2019-08-31 | Stop reason: HOSPADM

## 2019-08-31 RX ORDER — AMLODIPINE BESYLATE 5 MG/1
5 TABLET ORAL DAILY
Qty: 30 TABLET | Refills: 1 | Status: SHIPPED | OUTPATIENT
Start: 2019-08-31

## 2019-08-31 RX ORDER — HYDRALAZINE HYDROCHLORIDE 100 MG/1
100 TABLET, FILM COATED ORAL 3 TIMES DAILY
Qty: 30 TABLET | Refills: 1 | Status: SHIPPED | OUTPATIENT
Start: 2019-08-31

## 2019-08-31 RX ADMIN — HYDRALAZINE HYDROCHLORIDE 75 MG: 25 TABLET ORAL at 08:12

## 2019-08-31 RX ADMIN — AMLODIPINE BESYLATE 5 MG: 5 TABLET ORAL at 11:28

## 2019-08-31 RX ADMIN — BUMETANIDE 2 MG: 0.5 TABLET ORAL at 08:12

## 2019-08-31 RX ADMIN — SODIUM CHLORIDE: 9 INJECTION, SOLUTION INTRAVENOUS at 01:15

## 2019-08-31 RX ADMIN — GLIPIZIDE 10 MG: 10 TABLET, FILM COATED, EXTENDED RELEASE ORAL at 08:12

## 2019-08-31 NOTE — DISCHARGE SUMMARY
[facility]  Hospitalist Discharge Summary       Date of Admission:  8/30/2019  Date of Discharge:  8/31/2019  Discharging Provider: Cosmo Claudio MD      Discharge Diagnoses   Acute on chronic anemia    Follow-ups Needed After Discharge   Follow-up Appointments     Follow-up and recommended labs and tests       Follow up with primary care provider, Jeremy Mendoza, within 7 days   for hospital follow- up.  The following labs/tests are recommended: CBC to   follow-up on hemoglobin. Follow-up with [facility] (Dr. Page's   office).             Unresulted Labs Ordered in the Past 30 Days of this Admission     No orders found for last 31 day(s).      These results will be followed up by NA    Discharge Disposition   Discharged to home  Condition at discharge: Stable    Hospital Course   Shell Leon is a 69 year old male who has a past medical history of type 2 diabetes mellitus, diabetic retinopathy and nephropathy, essential hypertension, diastolic dysfunction, MALT lymphoma and hypoosmolar hyponatremia who was recently admitted to the hospital because of severe anemia.  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  He presents with his daughter because of recent onset of headache that has been worsening in the last few days.  He denies chest pain, shortness of breath, dizziness or lightheadedness.  No active bleeding seen.  No report of black tarry stool.  No fever or any indication of acute intercurrent infection.  In the preliminary assessment in the emergency department his hemoglobin is 5.8 however the patient is not tachycardic, he is breathing at room air. He has an elevated eosinphil leukocyte count, his BUN doubles the last recorded in the EMR with an increase of creatinine to 1.98 (prior 1.32).     Other significant laboratory work-up findings are:  Normal white count.   Eosinophilia with AEC   1.6  Glycemia 149    Patient was transfused 2 units. Today he feels well with no complaints. Family in room. Would like to discharge home. He gets his care with Dr. Page at [facility]. He needs to find a new Oncologist as Dr. Page is gone. He will call the office and make an appt. No need for oncology to see him here. His BP is elevated. Losartan has been stopped due to rising Cr (I see his Cr has been high in the [facility] system as well). I will increase his hydralazine to 100mg TID (ffrom 75mg TID) and add Norvasc 5mg. I indicated he may need further titration of his BP meds and to follow-up with his PCP.     Consultations This Hospital Stay   SOCIAL WORK IP CONSULT  CARE COORDINATOR IP CONSULT  PHYSICAL THERAPY ADULT IP CONSULT  OCCUPATIONAL THERAPY ADULT IP CONSULT  HEMATOLOGY & ONCOLOGY IP CONSULT    Code Status   Prior    Time Spent on this Encounter   I, Cosmo Claudio MD, personally saw the patient today and spent greater than 30 minutes discharging this patient.       Cosmo Claudio MD  [facility]  ______________________________________________________________________    Physical Exam   Vital Signs: Temp: 97.8  F (36.6  C) Temp src: Oral BP: (!) 162/52 Pulse: 52   Resp: 16 SpO2: 100 % O2 Device: None (Room air)    Weight: 128 lbs 0 oz  Constitutional: awake, alert, cooperative, no apparent distress, and appears stated age  Eyes: Lids and lashes normal, pupils equal, round and reactive to light, extra ocular muscles intact, sclera clear, conjunctiva normal  ENT: Normocephalic, without obvious abnormality, atraumatic, sinuses nontender on palpation, external ears without lesions, oral pharynx with moist mucous membranes, tonsils without erythema or exudates, gums normal and good dentition.  Respiratory: No increased work of breathing, good air exchange, clear to auscultation bilaterally, no crackles or wheezing  Cardiovascular: Normal apical impulse, regular rate and  rhythm, normal S1 and S2, no S3 or S4, and no murmur noted  GI: No scars, normal bowel sounds, soft, non-distended, non-tender, no masses palpated, no hepatosplenomegally  Skin: no bruising or bleeding       Primary Care Physician   Jeremy Mendoza    Discharge Orders      Reason for your hospital stay    Acute on chronic anemia requiring transfusion     Follow-up and recommended labs and tests     Follow up with primary care provider, Jeremy Mendoza, within 7 days for hospital follow- up.  The following labs/tests are recommended: CBC to follow-up on hemoglobin. Follow-up with [facility] (Dr. Page's office).     Activity    Your activity upon discharge: activity as tolerated     Diet    Follow this diet upon discharge: Orders Placed This Encounter      Moderate Consistent CHO Diet       Significant Results and Procedures   Most Recent 3 CBC's:  Recent Labs   Lab Test 08/31/19  0642 08/30/19  1740 06/15/19  0659  06/13/19  1614  05/03/19  1945   WBC  --  7.1  --   --  8.4  --  8.1   HGB 7.6* 5.8* 8.2*   < > 6.2*   < > 6.4*   MCV  --  94  --   --  89  --  89   PLT  --  233  --   --  260  --  632*    < > = values in this interval not displayed.     Most Recent 3 BMP's:  Recent Labs   Lab Test 08/31/19  0642 08/30/19  1740 06/15/19  0659   * 126* 131*   POTASSIUM 5.3 5.2 4.4   CHLORIDE 105 97 106   CO2 19* 22 17*   BUN 90* 100* 59*   CR 1.88* 1.98* 1.32*   ANIONGAP 7 7 8   STEVE 8.3* 8.5 8.0*   GLC 65* 149* 75     Most Recent 2 LFT's:  Recent Labs   Lab Test 08/30/19  1740 06/13/19  1614   AST 35 32   ALT 28 32   ALKPHOS 101 173*   BILITOTAL 0.1* 0.2   ,   Results for orders placed or performed during the hospital encounter of 05/03/19   CT Head w/o Contrast    Narrative    CT OF THE HEAD WITHOUT CONTRAST  5/4/2019 10:29 AM     COMPARISON: Head CT 12/31/2018    HISTORY:  Headache, positional.     TECHNIQUE:  Axial CT images of the head from the skull base to the  vertex were acquired without IV  contrast.    FINDINGS:   INTRACRANIAL CONTENTS: No intracranial hemorrhage, extraaxial  collection, or mass effect.  No CT evidence of acute infarct. Mild  presumed chronic small vessel ischemic change and generalized volume  loss.    VISUALIZED ORBITS/SINUSES/MASTOIDS: No significant orbital  abnormality. Mild paranasal sinus mucosal thickening. No significant  middle ear or mastoid effusion.    OSSEOUS STRUCTURES/SOFT TISSUES: No significant abnormality.      Impression    IMPRESSION:  1.  No change.  2.  No mass, hemorrhage or stroke.  3.  Mild presumed chronic small vessel ischemic change and generalized  volume loss.  4.  Mild paranasal sinus mucosal thickening.    Radiation dose for this scan was reduced using automated exposure  control, adjustment of the mA and/or kV according to patient size, or  iterative reconstruction technique.    MATT PAGE MD       Discharge Medications   Current Discharge Medication List      START taking these medications    Details   amLODIPine (NORVASC) 5 MG tablet Take 1 tablet (5 mg) by mouth daily  Qty: 30 tablet, Refills: 1    Associated Diagnoses: Essential hypertension with goal blood pressure less than 140/90         CONTINUE these medications which have CHANGED    Details   hydrALAZINE (APRESOLINE) 100 MG tablet Take 1 tablet (100 mg) by mouth 3 times daily  Qty: 30 tablet, Refills: 1    Associated Diagnoses: Essential hypertension with goal blood pressure less than 140/90         CONTINUE these medications which have NOT CHANGED    Details   bumetanide (BUMEX) 2 MG tablet Take 2 mg by mouth daily      glipiZIDE (GLUCOTROL XL) 10 MG 24 hr tablet Take 10 mg by mouth daily      insulin aspart (NOVOLOG FLEXPEN) 100 UNIT/ML pen Inject Subcutaneous 3 times daily (with meals) Sliding scale prn with meals for BG>200.  2 units per 50 over 200      isoniazid (NYDRAZID) 300 MG tablet Take 300 mg by mouth daily      pyridOXINE (VITAMIN B-6) 50 MG tablet Take 50 mg by mouth daily       albuterol (PROAIR HFA/PROVENTIL HFA/VENTOLIN HFA) 108 (90 Base) MCG/ACT inhaler Inhale 2 puffs into the lungs every 6 hours as needed for shortness of breath / dyspnea or wheezing      blood glucose (NO BRAND SPECIFIED) lancets standard Use to test blood sugar 3 times daily or as directed.  Qty: 100 each, Refills: 11    Associated Diagnoses: Type 2 diabetes mellitus with hyperglycemia, without long-term current use of insulin (H)      blood glucose monitoring (NO BRAND SPECIFIED) meter device kit Use to test blood sugar 3 times daily or as directed.  Qty: 1 kit, Refills: 0    Comments: One touch verio flex meter  Associated Diagnoses: Type 2 diabetes mellitus without complication, without long-term current use of insulin (H)      blood glucose monitoring (NO BRAND SPECIFIED) test strip Use to test blood sugars 3 times daily or as directed  Qty: 100 strip, Refills: 11    Associated Diagnoses: Type 2 diabetes mellitus with hyperglycemia, without long-term current use of insulin (H)      !! order for DME Equipment being ordered: thigh high compression stockings 20mmg Hg  Qty: 1 Units, Refills: 0    Associated Diagnoses: Localized edema      !! order for DME Equipment being ordered: blood pressure cuff  Qty: 1 Units, Refills: 0    Associated Diagnoses: Resistant hypertension       !! - Potential duplicate medications found. Please discuss with provider.      STOP taking these medications       losartan (COZAAR) 100 MG tablet Comments:   Reason for Stopping:             Allergies   No Known Allergies

## 2019-08-31 NOTE — PLAN OF CARE
PRIMARY DIAGNOSIS: SYMPTOMATIC ANEMIA, HEADACHE    OUTPATIENT/OBSERVATION GOALS TO BE MET BEFORE DISCHARGE  1. Orthostatic performed: No    2. Hgb:   Recent Labs   Lab Test 08/30/19  1740 06/15/19  0659 06/14/19  0310   HGB 5.8* 8.2* 8.1*     3. Cleared for discharge by consultants (if involved): No    4. Safe discharge environment identified: Yes    A&Ox4, VSS ex hypertensive. Family at bedside. 2nd unit PRBC infusing. Recheck hgb in AM. Up with SBA to restroom for assistance with IV pole. Denies any pain, LH, SOB. Will continue to monitor.    Discharge Planner Nurse   Safe discharge environment identified: Yes  Barriers to discharge: Yes       Entered by: Debra Watson 08/31/2019      Please review provider order for any additional goals.   Nurse to notify provider when observation goals have been met and patient is ready for discharge.

## 2019-08-31 NOTE — PLAN OF CARE
PRIMARY DIAGNOSIS: SYMPTOMATIC ANEMIA, HEADACHE    OUTPATIENT/OBSERVATION GOALS TO BE MET BEFORE DISCHARGE  1. Orthostatic performed: No    2. Hgb:   Recent Labs   Lab Test 08/30/19  1740 06/15/19  0659 06/14/19  0310   HGB 5.8* 8.2* 8.1*     3. Cleared for discharge by consultants (if involved): No    4. Safe discharge environment identified: Yes    A&Ox4, VSS. Denies any pain, LH, SOB. 2 units PRBC transfused. IVF infusing. Recheck hgb in AM. Up with SBA to restroom for assistance with IV pole. Will continue to monitor.    Discharge Planner Nurse   Safe discharge environment identified: Yes  Barriers to discharge: Yes       Entered by: Debra Watson 08/31/2019      Please review provider order for any additional goals.   Nurse to notify provider when observation goals have been met and patient is ready for discharge.

## 2019-08-31 NOTE — PROGRESS NOTES
ROOM # 232    Living Situation (if not independent, order SW consult): home with family  Facility name: n/a  : Eric (son)    Activity level at baseline: ind  Activity level on admit: SBA      Patient registered to observation; given Patient Bill of Rights; given the opportunity to ask questions about observation status and their plan of care.  Patient has been oriented to the observation room, bathroom and call light is in place.    Discussed discharge goals and expectations with patient/family.

## 2019-08-31 NOTE — H&P
[facility]    History and Physical  Hospitalist     Date of Admission:  8/30/2019  Date of Service (when I saw the patient): 08/30/19  Provider: Cuba Romero MD      Chief Complaint   Headache    History is obtained from the patient, electronic health record, emergency department physician and patient's daughter    History of Present Illness   Shell Leon is a 69 year old male who has a past medical history of type 2 diabetes mellitus, diabetic retinopathy and nephropathy, essential hypertension, diastolic dysfunction, MALT lymphoma and hypoosmolar hyponatremia who was recently admitted to the hospital because of severe anemia.  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  He presents with his daughter because of recent onset of headache that has been worsening in the last few days.  He denies chest pain, shortness of breath, dizziness or lightheadedness.  No active bleeding seen.  No report of black tarry stool.  No fever or any indication of acute intercurrent infection.  In the preliminary assessment in the emergency department his hemoglobin is 5.8 however the patient is not tachycardic, he is breathing at room air. He has an elevated eosinphil leukocyte count, his BUN doubles the last recorded in the EMR with an increase of creatinine to 1.98 (prior 1.32).    Other significant laboratory work-up findings are:  Normal white count.  Eosinophilia with AEC   1.6  Glycemia 149    Past Medical History           I have reviewed this patient's medical history and updated it with pertinent information if needed.   Past Medical History:   Diagnosis Date     Congestive heart failure (H)      Diabetes (H)      Gastroesophageal reflux disease      High cholesterol      Hypertension        Assessment & Plan   Shell Leon is a 69 year old male who presents with headache and severe anemia,  hemoglobin on admission 5.8.  Significant history of MALT lymphoma of the stomach, severe anemia that required transfusion in the last admission, type 2 diabetes mellitus with retinopathy and nephropathy and essential hypertension among others.    1.  Severe acute on chronic anemia.  Not very symptomatic taking into account low hemoglobin on arrival.  Predominant symptom of headache without compensatory tachycardia, shortness of breath or lightheadedness.  It is compatible with progressive worsening and adjustment, probably over several days or weeks.  No recent bleeding has been documented.  It is notable that the patient renal function is worsening with markedelevation of BUN and reticulocytosis opening question for upper GI bleeding.  At this point it is just a clinical speculation that needs follow-up.   He received 1 unit in the emergency department, I have ordered a second PRBC unit to be completed tonight.  We will recheck hemoglobin in the morning.  With presence of CKD and bone marrow infiltration there are many interplaying factors that can explain the chronic anemia.  2.  Type 2 diabetes mellitus.  Moderate CHO diet.  Continue glipizide as prior to admission.  I will add correctional scale.  3.  Essential hypertension.  He is on hydralazine, Bumex and losartan.  Given the current renal function I am holding on losartan.  4.  MALT lymphoma per stomach.  Apparently he has been managed with rituximab.  Patient and daughter told me that they are no longer seeing oncology.  I will ask oncology consultation.  5.  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  Patient is taking isoniazid and pyridoxine.  I do not have any other explanation for these 2 medications.      Code Status   Full Code    Primary Care Physician   [facility]      Past Surgical History   I have reviewed this patient's surgical history and updated it with pertinent information if needed.  Past Surgical History:   Procedure Laterality Date     COLONOSCOPY N/A  5/22/2018    Procedure: COMBINED COLONOSCOPY, SINGLE OR MULTIPLE BIOPSY/POLYPECTOMY BY BIOPSY;  COLONOSCOPY  Room 521, with polypectomy x1 using cold biopsy forceps;  Surgeon: Charlie Rabago MD;  Location:  GI     ESOPHAGOSCOPY, GASTROSCOPY, DUODENOSCOPY (EGD), COMBINED N/A 5/21/2018    Procedure: COMBINED ESOPHAGOSCOPY, GASTROSCOPY, DUODENOSCOPY (EGD), BIOPSY SINGLE OR MULTIPLE;  ESOPHAGOSCOPY, GASTROSCOPY, DUODENOSCOPY (EGD)  Room 521 and cold biopsies;  Surgeon: Charlie Rabago MD;  Location:  GI     LAPAROSCOPIC CHOLECYSTECTOMY N/A 1/6/2017    Procedure: LAPAROSCOPIC CHOLECYSTECTOMY;  Surgeon: Michael Caba MD;  Location:  OR       Prior to Admission Medications   Prior to Admission Medications   Prescriptions Last Dose Informant Patient Reported? Taking?   albuterol (PROAIR HFA/PROVENTIL HFA/VENTOLIN HFA) 108 (90 Base) MCG/ACT inhaler   Yes No   Sig: Inhale 2 puffs into the lungs every 6 hours as needed for shortness of breath / dyspnea or wheezing   blood glucose (NO BRAND SPECIFIED) lancets standard   No No   Sig: Use to test blood sugar 3 times daily or as directed.   blood glucose monitoring (NO BRAND SPECIFIED) meter device kit   No No   Sig: Use to test blood sugar 3 times daily or as directed.   blood glucose monitoring (NO BRAND SPECIFIED) test strip   No No   Sig: Use to test blood sugars 3 times daily or as directed   bumetanide (BUMEX) 2 MG tablet 8/30/2019 at Unknown time  Yes Yes   Sig: Take 2 mg by mouth daily   glipiZIDE (GLUCOTROL XL) 10 MG 24 hr tablet 8/30/2019 at Unknown time  Yes Yes   Sig: Take 10 mg by mouth daily   hydrALAZINE (APRESOLINE) 25 MG tablet 8/30/2019 at x2  Yes Yes   Sig: Take 75 mg by mouth 3 times daily    insulin aspart (NOVOLOG FLEXPEN) 100 UNIT/ML pen   Yes Yes   Sig: Inject Subcutaneous 3 times daily (with meals) Sliding scale prn with meals for BG>200.  2 units per 50 over 200   isoniazid (NYDRAZID) 300 MG tablet 8/30/2019 at lunch  Yes Yes    Sig: Take 300 mg by mouth daily   losartan (COZAAR) 100 MG tablet 8/29/2019 at pm  Yes Yes   Sig: Take 50 mg by mouth daily    order for DME   No No   Sig: Equipment being ordered: blood pressure cuff   order for DME   No No   Sig: Equipment being ordered: thigh high compression stockings 20mmg Hg   pyridOXINE (VITAMIN B-6) 50 MG tablet 8/30/2019 at lunch  Yes Yes   Sig: Take 50 mg by mouth daily      Facility-Administered Medications: None     Allergies   No Known Allergies    Social History   I have personally reviewed the social history with the patient showing.  Social History     Tobacco Use     Smoking status: Never Smoker     Smokeless tobacco: Never Used   Substance Use Topics     Alcohol use: No     Alcohol/week: 0.0 oz     Family History   I have reviewed this patient's family history and it is not contributory to the admission .       Review of Systems   Except as noted in the HPI, a 12-system Review of Systems was found to be negative.      Physical Exam   Vital Signs with Ranges  Temp:  [97.4  F (36.3  C)-98.4  F (36.9  C)] 98.4  F (36.9  C)  Pulse:  [54-60] 55  Resp:  [16] 16  BP: (147-178)/(46-63) 161/50  SpO2:  [99 %-100 %] 100 %  128 lbs 0 oz    GEN:  Alert, oriented x 3, appears comfortable, NAD.  HEENT:  Normocephalic/atraumatic, no scleral icterus, no nasal discharge, mouth moist.  CV:  Regular rate and rhythm, no murmur or JVD.  S1 + S2 noted, no S3 or S4.  LUNGS:  Clear to auscultation bilaterally without rales/rhonchi/wheezing/retractions.  Symmetric chest rise on inhalation noted.  ABD:  Active bowel sounds, soft, non-tender/non-distended.  No rebound/guarding/rigidity.  EXT:  No edema or cyanosis.  No joint synovitis noted.  SKIN:  Dry to touch, no exanthems noted in the visualized areas.       Data   I personally reviewed the EKG tracing showing Sinus rhythm iin the monitor .  Results for orders placed or performed during the hospital encounter of 08/30/19 (from the past 24 hour(s))   CBC  with platelets differential   Result Value Ref Range    WBC 7.1 4.0 - 11.0 10e9/L    RBC Count 1.93 (L) 4.4 - 5.9 10e12/L    Hemoglobin 5.8 (LL) 13.3 - 17.7 g/dL    Hematocrit 18.1 (L) 40.0 - 53.0 %    MCV 94 78 - 100 fl    MCH 30.1 26.5 - 33.0 pg    MCHC 32.0 31.5 - 36.5 g/dL    RDW 13.9 10.0 - 15.0 %    Platelet Count 233 150 - 450 10e9/L    Diff Method Manual Differential     % Neutrophils 51.0 %    % Lymphocytes 19.0 %    % Monocytes 8.0 %    % Eosinophils 22.0 %    % Basophils 0.0 %    Absolute Neutrophil 3.6 1.6 - 8.3 10e9/L    Absolute Lymphocytes 1.3 0.8 - 5.3 10e9/L    Absolute Monocytes 0.6 0.0 - 1.3 10e9/L    Absolute Eosinophils 1.6 (H) 0.0 - 0.7 10e9/L    Absolute Basophils 0.0 0.0 - 0.2 10e9/L    Acanthocytes Slight     Hypochromasia Present     Platelet Estimate       Automated count confirmed.  Platelet morphology is normal.   Comprehensive metabolic panel   Result Value Ref Range    Sodium 126 (L) 133 - 144 mmol/L    Potassium 5.2 3.4 - 5.3 mmol/L    Chloride 97 94 - 109 mmol/L    Carbon Dioxide 22 20 - 32 mmol/L    Anion Gap 7 3 - 14 mmol/L    Glucose 149 (H) 70 - 99 mg/dL    Urea Nitrogen 100 (H) 7 - 30 mg/dL    Creatinine 1.98 (H) 0.66 - 1.25 mg/dL    GFR Estimate 33 (L) >60 mL/min/[1.73_m2]    GFR Estimate If Black 38 (L) >60 mL/min/[1.73_m2]    Calcium 8.5 8.5 - 10.1 mg/dL    Bilirubin Total 0.1 (L) 0.2 - 1.3 mg/dL    Albumin 3.1 (L) 3.4 - 5.0 g/dL    Protein Total 6.6 (L) 6.8 - 8.8 g/dL    Alkaline Phosphatase 101 40 - 150 U/L    ALT 28 0 - 70 U/L    AST 35 0 - 45 U/L   ABO/Rh type and screen   Result Value Ref Range    Units Ordered 2     ABO O     RH(D) Pos     Antibody Screen Neg     Test Valid Only At [facility]        Specimen Expires 09/02/2019     Crossmatch Red Blood Cells    Reticulocyte count   Result Value Ref Range    % Retic 2.9 (H) 0.5 - 2.0 %    Absolute Retic 55.2 25 - 95 10e9/L   Blood component   Result Value Ref Range    Unit Number [id number]     Blood  Component Type Red Blood Cells Leukocyte Reduced     Division Number 00     Status of Unit Released to care unit 08/30/2019 1842     Blood Product Code [id number]     Unit Status ISS    Blood component   Result Value Ref Range    Unit Number [id number]     Blood Component Type Red Blood Cells Leukocyte Reduced     Division Number 00     Status of Unit Ready for patient 08/30/2019 2043     Blood Product Code [id number]     Unit Status DREA        Disclaimer: This note consists of symbols derived from keyboarding, dictation and/or voice recognition software. As a result, there may be errors in the script that have gone undetected. Please consider this when interpreting information found in this chart.

## 2019-08-31 NOTE — PLAN OF CARE
PRIMARY DIAGNOSIS: SYMPTOMATIC ANEMIA    OUTPATIENT/OBSERVATION GOALS TO BE MET BEFORE DISCHARGE  1. Orthostatic performed: N/A    2. Stable Hgb Yes.   Recent Labs   Lab Test 08/31/19  0642 08/30/19  1740 06/15/19  0659   HGB 7.6* 5.8* 8.2*         3. Appropriate testing complete: Yes    4. Cleared for discharge by consultants (if involved): No    5. Safe discharge environment identified: Yes    Discharge Planner Nurse   Safe discharge environment identified: Yes  Barriers to discharge: No       Entered by: Amelia Lewis 08/31/2019 9:48 AM     Please review provider order for any additional goals.   Nurse to notify provider when observation goals have been met and patient is ready for discharge.    VSS. Denies pain, denies headache. Up SBA. LS-clear, denies SOB. Tolerates diet, denies nausea.   waiver signed.

## 2019-08-31 NOTE — PHARMACY-ADMISSION MEDICATION HISTORY
Admission medication history interview status for this patient is complete. See [facility] admission navigator for allergy information, prior to admission medications and immunization status.     Medication history interview source(s):Patient and Family  Medication history resources (including written lists, pill bottles, clinic record):med list  [facility]    Changes made to PTA medication list:  Added: -  Deleted: tenofovir  Changed: losartan to 50 mg daily, hydralazine to 75 mg TID    Actions taken by pharmacist (provider contacted, etc):None     Additional medication history information:None    Medication reconciliation/reorder completed by provider prior to medication history? No    Do you take OTC medications (eg tylenol, ibuprofen, fish oil, eye/ear drops, etc)? yes(Y/N)    For patients on insulin therapy: yes (Y/N)  Lantus/levemir/NPH/Mix 70/30 dose:   (Y/N) (see Med list for doses) no  Sliding scale Novolog Y/N yes  If Yes, do you have a baseline novolog pre-meal dose:  units with meals no  Patients eat three meals a day:   Y/N    How many episodes of hypoglycemia do you have per week: _______  How many missed doses do you have per week: ______  How many times do you check your blood glucose per day: _______  Do you have a Continuous glucose monitor (CGM)   Y/N (remind pt that not approved for hospital use)   Any Barriers to therapy - Be specific :  cost of medications, comfortable with giving injections (if applicable), comfortable and confident with current diabetes regimen: Y/N ______________no      Prior to Admission medications    Medication Sig Last Dose Taking? Auth Provider   bumetanide (BUMEX) 2 MG tablet Take 2 mg by mouth daily 8/30/2019 at Unknown time Yes Unknown, Entered By History   glipiZIDE (GLUCOTROL XL) 10 MG 24 hr tablet Take 10 mg by mouth daily 8/30/2019 at Unknown time Yes Unknown, Entered By History   hydrALAZINE (APRESOLINE) 25 MG tablet Take 75 mg by mouth 3 times  daily  8/30/2019 at x2 Yes Unknown, Entered By History   insulin aspart (NOVOLOG FLEXPEN) 100 UNIT/ML pen Inject Subcutaneous 3 times daily (with meals) Sliding scale prn with meals for BG>200.  2 units per 50 over 200  Yes Unknown, Entered By History   isoniazid (NYDRAZID) 300 MG tablet Take 300 mg by mouth daily 8/30/2019 at lunch Yes Unknown, Entered By History   losartan (COZAAR) 100 MG tablet Take 50 mg by mouth daily  8/29/2019 at pm Yes Unknown, Entered By History   pyridOXINE (VITAMIN B-6) 50 MG tablet Take 50 mg by mouth daily 8/30/2019 at lunch Yes Unknown, Entered By History   albuterol (PROAIR HFA/PROVENTIL HFA/VENTOLIN HFA) 108 (90 Base) MCG/ACT inhaler Inhale 2 puffs into the lungs every 6 hours as needed for shortness of breath / dyspnea or wheezing   Unknown, Entered By History   blood glucose (NO BRAND SPECIFIED) lancets standard Use to test blood sugar 3 times daily or as directed.   Kathi Mondragon PA-C   blood glucose monitoring (NO BRAND SPECIFIED) meter device kit Use to test blood sugar 3 times daily or as directed.   Kathi Mondragon PA-C   blood glucose monitoring (NO BRAND SPECIFIED) test strip Use to test blood sugars 3 times daily or as directed   Kathi Mondragon PA-C   order for DME Equipment being ordered: thigh high compression stockings 20mmg Hg   Kathi Mondragon PA-C   order for DME Equipment being ordered: blood pressure cuff   Kathi Mondragon PA-C

## 2019-09-03 ENCOUNTER — TELEPHONE (OUTPATIENT)
Dept: FAMILY MEDICINE | Facility: CLINIC | Age: 69
End: 2019-09-03

## 2019-09-03 NOTE — TELEPHONE ENCOUNTER
PATIENT HAS NOT SEEN SOO FRIED SINCE 11/2018, ESTABLISHED CARE WITH DR. SWEENEY ([facility])    Please contact patient for In-patient follow up.  [phone] (home)     Visit date: 8/31/2019  Diagnosis listed:Symptomatic Anemia, Essential Hypertension with goal blood pressure less than 140/90  Number of visits in past 12 months:0/2

## 2019-09-25 NOTE — MR AVS SNAPSHOT
After Visit Summary   10/4/2017    Shell Leon    MRN: [id number]           Patient Information     Date Of Birth          1950        Visit Information        Provider Department      10/4/2017 8:00 AM Cresencio Landry MD [facility]        Today's Diagnoses     Resistant hypertension        Essential hypertension           Follow-ups after your visit        Additional Services     ENDOCRINOLOGY ADULT REFERRAL       Your provider has referred you to: Assess possible pheochromocytoma. Pt has positive metenephrine in serum        Please bring the following to your appointment:    >>   Any x-rays, CTs or MRIs which have been performed.  Contact the facility where they were done to arrange for  prior to your scheduled appointment.    >>   List of current medications   >>   This referral request   >>   Any documents/labs given to you for this referral                  Future tests that were ordered for you today     Open Future Orders        Priority Expected Expires Ordered    ENDOCRINOLOGY ADULT REFERRAL Routine 10/4/2017 10/4/2019 10/4/2017            Who to contact     If you have questions or need follow up information about today's clinic visit or your schedule please contact [facility] directly at [phone].  Normal or non-critical lab and imaging results will be communicated to you by MyChart, letter or phone within 4 business days after the clinic has received the results. If you do not hear from us within 7 days, please contact the clinic through MyChart or phone. If you have a critical or abnormal lab result, we will notify you by phone as soon as possible.  Submit refill requests through [email or web address] or call your pharmacy and they will forward the refill request to us. Please allow 3 business days for your refill to be completed.          Additional Information About Your Visit        MyChart  What Is The Reason For Today's Visit?: Full Body Skin Examination "Information     [employer] lets you send messages to your doctor, view your test results, renew your prescriptions, schedule appointments and more. To sign up, go to [email or web address] . Click on \"Log in\" on the left side of the screen, which will take you to the Welcome page. Then click on \"Sign up Now\" on the right side of the page.     You will be asked to enter the access code listed below, as well as some personal information. Please follow the directions to create your username and password.     Your access code is: [email or web address]  Expires: 10/16/2017 10:36 AM     Your access code will  in 90 days. If you need help or a new code, please call your [facility] clinic or [phone].        Care EveryWhere ID     This is your Care EveryWhere ID. This could be used by other organizations to access your [facility] medical records  [id number]        Your Vitals Were     Pulse Height BMI (Body Mass Index)             60 1.626 m (5' 4\") 23.86 kg/m2          Blood Pressure from Last 3 Encounters:   10/04/17 168/60   17 168/52   17 160/60    Weight from Last 3 Encounters:   10/04/17 63 kg (139 lb)   17 63 kg (139 lb)   17 59 kg (130 lb)              We Performed the Following     Follow-Up with Cardiologist          Today's Medication Changes          These changes are accurate as of: 10/4/17  8:43 AM.  If you have any questions, ask your nurse or doctor.               Start taking these medicines.        Dose/Directions    hydrALAZINE 50 MG tablet   Commonly known as:  APRESOLINE   Used for:  Resistant hypertension   Started by:  Cresencio Landry MD        Dose:  50 mg   Take 1 tablet (50 mg) by mouth 3 times daily   Quantity:  180 tablet   Refills:  3         These medicines have changed or have updated prescriptions.        Dose/Directions    valsartan 320 MG tablet   Commonly known as:  DIOVAN   This may have changed:  medication strength   Used for:  Resistant hypertension   Changed by: " What Is The Reason For Today's Visit? (Being Monitored For X): concerning skin lesions on an annual basis  Cresencio Landry MD        Dose:  320 mg   Take 1 tablet (320 mg) by mouth daily   Quantity:  18 tablet   Refills:  3         Stop taking these medicines if you haven't already. Please contact your care team if you have questions.     amLODIPine 5 MG tablet   Commonly known as:  NORVASC   Stopped by:  Cresencio Landry MD           spironolactone 25 MG tablet   Commonly known as:  ALDACTONE   Stopped by:  Cresencio Landry MD                Where to get your medicines      These medications were sent to [id number] - [place], [place] - [facility] - [address]  [address], [facility] [address]     Phone:  [phone]     hydrALAZINE 50 MG tablet    valsartan 320 MG tablet                Primary Care Provider Office Phone # Fax #    Kathi Lin Mondragon PA-C [phone] [address]  [facility] [address]        Equal Access to Services     BRYAN [facility] System[facility] AH: Hadii aad ku hadasho Soomaali, waaxda luqadaha, qaybta kaalmada adeegyada, waxay devendrain haychristellen didi power . So [facility] [phone].    ATENCIÓN: Si wallace peters, tiene a sullivan disposición servicios gratuitos de asistencia lingüística. Llame al [phone].    We comply with applicable federal civil rights laws and [place] laws. We do not discriminate on the basis of race, color, national origin, age, disability, sex, sexual orientation, or gender identity.            Thank you!     Thank you for choosing [facility] [place]  for your care. Our goal is always to provide you with excellent care. Hearing back from our patients is one way we can continue to improve our services. Please take a few minutes to complete the written survey that you may receive in the mail after your visit with us. Thank you!             Your Updated Medication List - Protect others around you: Learn how to safely use, store and throw away your medicines at www.disposemymeds.org.          This list is accurate as  of: 10/4/17  8:43 AM.  Always use your most recent med list.                   Brand Name Dispense Instructions for use Diagnosis    atorvastatin 80 MG tablet    LIPITOR    90 tablet    Take 1 tablet (80 mg) by mouth daily    Type 2 diabetes mellitus without complication, unspecified long term insulin use status (H)       blood glucose lancets standard    no brand specified    1 Box    Use to test blood sugar 3 times daily or as directed.    Type 2 diabetes mellitus with hyperglycemia, without long-term current use of insulin (H)       blood glucose monitoring test strip    no brand specified    100 strip    Use to test blood sugars 3 times daily or as directed    Type 2 diabetes mellitus with hyperglycemia, without long-term current use of insulin (H)       glipiZIDE 5 MG tablet    GLUCOTROL    90 tablet    Take 1 tablet (5 mg) by mouth every morning (before breakfast)    Type 2 diabetes mellitus with hyperglycemia, without long-term current use of insulin (H)       hydrALAZINE 50 MG tablet    APRESOLINE    180 tablet    Take 1 tablet (50 mg) by mouth 3 times daily    Resistant hypertension       hydrochlorothiazide 25 MG tablet    HYDRODIURIL    30 tablet    Take 1 tablet (25 mg) by mouth daily    Benign essential hypertension       metFORMIN 1000 MG tablet    GLUCOPHAGE    60 tablet    TAKE 1 TABLET BY MOUTH TWICE DAILY WITH MEALS    Type 2 diabetes mellitus with hyperglycemia, without long-term current use of insulin (H)        CHILDRENS ASPIRIN 81 MG chewable tablet   Generic drug:  aspirin     90 tablet    CHEW AND SWALLOW ONE TABLET BY MOUTH ONE TIME DAILY    Type 2 diabetes mellitus without complication (H)       valsartan 320 MG tablet    DIOVAN    18 tablet    Take 1 tablet (320 mg) by mouth daily    Resistant hypertension

## 2019-10-13 ENCOUNTER — HOSPITAL ENCOUNTER (OUTPATIENT)
Facility: CLINIC | Age: 69
Setting detail: OBSERVATION
Discharge: HOME | End: 2019-10-14
Attending: EMERGENCY MEDICINE | Admitting: INTERNAL MEDICINE
Payer: COMMERCIAL

## 2019-10-13 DIAGNOSIS — D64.9 ACUTE ON CHRONIC ANEMIA: Primary | ICD-10-CM

## 2019-10-13 DIAGNOSIS — D64.9 SYMPTOMATIC ANEMIA: ICD-10-CM

## 2019-10-13 DIAGNOSIS — E87.1 HYPONATREMIA: ICD-10-CM

## 2019-10-13 DIAGNOSIS — N18.30 CKD (CHRONIC KIDNEY DISEASE) STAGE 3, GFR 30-59 ML/MIN (H): ICD-10-CM

## 2019-10-13 PROBLEM — R51.9 HEADACHE: Status: ACTIVE | Noted: 2019-10-13

## 2019-10-13 LAB
ALBUMIN UR-MCNC: 70 MG/DL
ANION GAP SERPL CALCULATED.3IONS-SCNC: 9 MMOL/L (ref 6–17)
APPEARANCE UR: CLEAR
BASOPHILS # BLD AUTO: 0.1 10E9/L (ref 0–0.2)
BASOPHILS NFR BLD AUTO: 1 %
BILIRUB UR QL STRIP: NEGATIVE
BUN SERPL-MCNC: 80 MG/DL (ref 7–30)
BURR CELLS BLD QL SMEAR: SLIGHT
CA-I BLD-SCNC: 4.6 MG/DL (ref 4.4–5.2)
CHLORIDE BLD-SCNC: 96 MMOL/L (ref 94–109)
CO2 BLD-SCNC: 21 MMOL/L (ref 20–32)
COLOR UR AUTO: ABNORMAL
CREAT BLD-MCNC: 2.1 MG/DL (ref 0.66–1.25)
DIFFERENTIAL METHOD BLD: ABNORMAL
ELLIPTOCYTES BLD QL SMEAR: SLIGHT
EOSINOPHIL # BLD AUTO: 1.7 10E9/L (ref 0–0.7)
EOSINOPHIL NFR BLD AUTO: 24 %
ERYTHROCYTE [DISTWIDTH] IN BLOOD BY AUTOMATED COUNT: 13.2 % (ref 10–15)
GFR SERPL CREATININE-BSD FRML MDRD: 31 ML/MIN/{1.73_M2}
GLUCOSE BLD-MCNC: 256 MG/DL (ref 70–99)
GLUCOSE BLDC GLUCOMTR-MCNC: 121 MG/DL (ref 70–99)
GLUCOSE UR STRIP-MCNC: NEGATIVE MG/DL
HCT VFR BLD AUTO: 21.3 % (ref 40–53)
HCT VFR BLD CALC: 22 %PCV (ref 40–53)
HGB BLD CALC-MCNC: 7.5 G/DL (ref 13.3–17.7)
HGB BLD-MCNC: 6.9 G/DL (ref 13.3–17.7)
HGB UR QL STRIP: NEGATIVE
HYALINE CASTS #/AREA URNS LPF: 1 /LPF (ref 0–2)
KETONES UR STRIP-MCNC: NEGATIVE MG/DL
LEUKOCYTE ESTERASE UR QL STRIP: NEGATIVE
LYMPHOCYTES # BLD AUTO: 1.2 10E9/L (ref 0.8–5.3)
LYMPHOCYTES NFR BLD AUTO: 17 %
MCH RBC QN AUTO: 28.5 PG (ref 26.5–33)
MCHC RBC AUTO-ENTMCNC: 32.4 G/DL (ref 31.5–36.5)
MCV RBC AUTO: 88 FL (ref 78–100)
MONOCYTES # BLD AUTO: 0.1 10E9/L (ref 0–1.3)
MONOCYTES NFR BLD AUTO: 2 %
MUCOUS THREADS #/AREA URNS LPF: PRESENT /LPF
NEUTROPHILS # BLD AUTO: 4 10E9/L (ref 1.6–8.3)
NEUTROPHILS NFR BLD AUTO: 56 %
NITRATE UR QL: NEGATIVE
PH UR STRIP: 5.5 PH (ref 5–7)
PLATELET # BLD AUTO: 313 10E9/L (ref 150–450)
PLATELET # BLD EST: ABNORMAL 10*3/UL
POIKILOCYTOSIS BLD QL SMEAR: ABNORMAL
POTASSIUM BLD-SCNC: 4.9 MMOL/L (ref 3.4–5.3)
RBC # BLD AUTO: 2.42 10E12/L (ref 4.4–5.9)
RBC #/AREA URNS AUTO: 1 /HPF (ref 0–2)
RBC INCLUSIONS BLD: SLIGHT
SODIUM BLD-SCNC: 126 MMOL/L (ref 133–144)
SOURCE: ABNORMAL
SP GR UR STRIP: 1.01 (ref 1–1.03)
SQUAMOUS #/AREA URNS AUTO: <1 /HPF (ref 0–1)
TROPONIN I SERPL-MCNC: 0.03 UG/L (ref 0–0.04)
UROBILINOGEN UR STRIP-MCNC: NORMAL MG/DL (ref 0–2)
WBC # BLD AUTO: 7.1 10E9/L (ref 4–11)
WBC #/AREA URNS AUTO: 1 /HPF (ref 0–5)

## 2019-10-13 PROCEDURE — 86901 BLOOD TYPING SEROLOGIC RH(D): CPT | Performed by: EMERGENCY MEDICINE

## 2019-10-13 PROCEDURE — 86850 RBC ANTIBODY SCREEN: CPT | Performed by: EMERGENCY MEDICINE

## 2019-10-13 PROCEDURE — 99220 ZZC INITIAL OBSERVATION CARE,LEVL III: CPT | Performed by: INTERNAL MEDICINE

## 2019-10-13 PROCEDURE — 85025 COMPLETE CBC W/AUTO DIFF WBC: CPT | Performed by: EMERGENCY MEDICINE

## 2019-10-13 PROCEDURE — 81001 URINALYSIS AUTO W/SCOPE: CPT | Performed by: EMERGENCY MEDICINE

## 2019-10-13 PROCEDURE — 84484 ASSAY OF TROPONIN QUANT: CPT | Performed by: EMERGENCY MEDICINE

## 2019-10-13 PROCEDURE — 86923 COMPATIBILITY TEST ELECTRIC: CPT | Performed by: EMERGENCY MEDICINE

## 2019-10-13 PROCEDURE — 80047 BASIC METABLC PNL IONIZED CA: CPT

## 2019-10-13 PROCEDURE — 86900 BLOOD TYPING SEROLOGIC ABO: CPT | Performed by: EMERGENCY MEDICINE

## 2019-10-13 PROCEDURE — 99285 EMERGENCY DEPT VISIT HI MDM: CPT

## 2019-10-13 PROCEDURE — G0378 HOSPITAL OBSERVATION PER HR: HCPCS

## 2019-10-13 PROCEDURE — 93005 ELECTROCARDIOGRAM TRACING: CPT

## 2019-10-13 PROCEDURE — 00000146 ZZHCL STATISTIC GLUCOSE BY METER IP

## 2019-10-13 PROCEDURE — 25000132 ZZH RX MED GY IP 250 OP 250 PS 637: Performed by: EMERGENCY MEDICINE

## 2019-10-13 PROCEDURE — 85014 HEMATOCRIT: CPT

## 2019-10-13 RX ORDER — NALOXONE HYDROCHLORIDE 0.4 MG/ML
.1-.4 INJECTION, SOLUTION INTRAMUSCULAR; INTRAVENOUS; SUBCUTANEOUS
Status: DISCONTINUED | OUTPATIENT
Start: 2019-10-13 | End: 2019-10-14 | Stop reason: HOSPADM

## 2019-10-13 RX ORDER — AMOXICILLIN 250 MG
2 CAPSULE ORAL 2 TIMES DAILY PRN
Status: DISCONTINUED | OUTPATIENT
Start: 2019-10-13 | End: 2019-10-14 | Stop reason: HOSPADM

## 2019-10-13 RX ORDER — AMOXICILLIN 250 MG
1 CAPSULE ORAL 2 TIMES DAILY PRN
Status: DISCONTINUED | OUTPATIENT
Start: 2019-10-13 | End: 2019-10-14 | Stop reason: HOSPADM

## 2019-10-13 RX ORDER — POLYETHYLENE GLYCOL 3350 17 G/17G
17 POWDER, FOR SOLUTION ORAL DAILY PRN
Status: DISCONTINUED | OUTPATIENT
Start: 2019-10-13 | End: 2019-10-14 | Stop reason: HOSPADM

## 2019-10-13 RX ORDER — ACETAMINOPHEN 325 MG/1
650 TABLET ORAL EVERY 4 HOURS PRN
Status: DISCONTINUED | OUTPATIENT
Start: 2019-10-13 | End: 2019-10-14 | Stop reason: HOSPADM

## 2019-10-13 RX ORDER — BISACODYL 10 MG
10 SUPPOSITORY, RECTAL RECTAL DAILY PRN
Status: DISCONTINUED | OUTPATIENT
Start: 2019-10-13 | End: 2019-10-14 | Stop reason: HOSPADM

## 2019-10-13 RX ORDER — ACETAMINOPHEN 650 MG/1
650 SUPPOSITORY RECTAL EVERY 4 HOURS PRN
Status: DISCONTINUED | OUTPATIENT
Start: 2019-10-13 | End: 2019-10-14 | Stop reason: HOSPADM

## 2019-10-13 RX ORDER — ACETAMINOPHEN 325 MG/1
975 TABLET ORAL ONCE
Status: COMPLETED | OUTPATIENT
Start: 2019-10-13 | End: 2019-10-13

## 2019-10-13 RX ORDER — HYDRALAZINE HYDROCHLORIDE 20 MG/ML
10 INJECTION INTRAMUSCULAR; INTRAVENOUS EVERY 4 HOURS PRN
Status: DISCONTINUED | OUTPATIENT
Start: 2019-10-13 | End: 2019-10-14 | Stop reason: HOSPADM

## 2019-10-13 RX ORDER — LIDOCAINE 40 MG/G
CREAM TOPICAL
Status: DISCONTINUED | OUTPATIENT
Start: 2019-10-13 | End: 2019-10-13

## 2019-10-13 RX ADMIN — ACETAMINOPHEN 975 MG: 325 TABLET, FILM COATED ORAL at 19:32

## 2019-10-13 ASSESSMENT — MIFFLIN-ST. JEOR
SCORE: 1347.31
SCORE: 1287.89

## 2019-10-13 ASSESSMENT — ENCOUNTER SYMPTOMS
VOMITING: 0
NUMBNESS: 0
NAUSEA: 0
DIZZINESS: 1
SHORTNESS OF BREATH: 0
ANAL BLEEDING: 0
HEADACHES: 1
WEAKNESS: 1

## 2019-10-14 VITALS
BODY MASS INDEX: 22.84 KG/M2 | OXYGEN SATURATION: 95 % | WEIGHT: 142.1 LBS | HEART RATE: 64 BPM | HEIGHT: 66 IN | TEMPERATURE: 97.6 F | DIASTOLIC BLOOD PRESSURE: 48 MMHG | RESPIRATION RATE: 16 BRPM | SYSTOLIC BLOOD PRESSURE: 163 MMHG

## 2019-10-14 LAB
ABO + RH BLD: NORMAL
ABO + RH BLD: NORMAL
ANION GAP SERPL CALCULATED.3IONS-SCNC: 11 MMOL/L (ref 3–14)
BLD GP AB SCN SERPL QL: NORMAL
BLD PROD TYP BPU: NORMAL
BLD UNIT ID BPU: 0
BLD UNIT ID BPU: 0
BLOOD BANK CMNT PATIENT-IMP: NORMAL
BLOOD PRODUCT CODE: NORMAL
BLOOD PRODUCT CODE: NORMAL
BPU ID: NORMAL
BPU ID: NORMAL
BUN SERPL-MCNC: 77 MG/DL (ref 7–30)
CALCIUM SERPL-MCNC: 8.1 MG/DL (ref 8.5–10.1)
CHLORIDE SERPL-SCNC: 102 MMOL/L (ref 94–109)
CO2 SERPL-SCNC: 16 MMOL/L (ref 20–32)
CREAT SERPL-MCNC: 1.8 MG/DL (ref 0.66–1.25)
ERYTHROCYTE [DISTWIDTH] IN BLOOD BY AUTOMATED COUNT: 13.3 % (ref 10–15)
GFR SERPL CREATININE-BSD FRML MDRD: 37 ML/MIN/{1.73_M2}
GLUCOSE BLDC GLUCOMTR-MCNC: 138 MG/DL (ref 70–99)
GLUCOSE BLDC GLUCOMTR-MCNC: 73 MG/DL (ref 70–99)
GLUCOSE SERPL-MCNC: 128 MG/DL (ref 70–99)
HCT VFR BLD AUTO: 20.6 % (ref 40–53)
HGB BLD-MCNC: 6.7 G/DL (ref 13.3–17.7)
HGB BLD-MCNC: 9.1 G/DL (ref 13.3–17.7)
INTERPRETATION ECG - MUSE: NORMAL
MCH RBC QN AUTO: 28.3 PG (ref 26.5–33)
MCHC RBC AUTO-ENTMCNC: 32.5 G/DL (ref 31.5–36.5)
MCV RBC AUTO: 87 FL (ref 78–100)
NUM BPU REQUESTED: 3
PLATELET # BLD AUTO: 227 10E9/L (ref 150–450)
POTASSIUM SERPL-SCNC: 4.8 MMOL/L (ref 3.4–5.3)
RBC # BLD AUTO: 2.37 10E12/L (ref 4.4–5.9)
SODIUM SERPL-SCNC: 129 MMOL/L (ref 133–144)
SPECIMEN EXP DATE BLD: NORMAL
TRANSFUSION STATUS PATIENT QL: NORMAL
WBC # BLD AUTO: 5.6 10E9/L (ref 4–11)

## 2019-10-14 PROCEDURE — 85018 HEMOGLOBIN: CPT | Mod: 91 | Performed by: PHYSICIAN ASSISTANT

## 2019-10-14 PROCEDURE — 36415 COLL VENOUS BLD VENIPUNCTURE: CPT | Performed by: INTERNAL MEDICINE

## 2019-10-14 PROCEDURE — 85027 COMPLETE CBC AUTOMATED: CPT | Performed by: INTERNAL MEDICINE

## 2019-10-14 PROCEDURE — 99217 ZZC OBSERVATION CARE DISCHARGE: CPT | Performed by: PHYSICIAN ASSISTANT

## 2019-10-14 PROCEDURE — P9016 RBC LEUKOCYTES REDUCED: HCPCS | Performed by: EMERGENCY MEDICINE

## 2019-10-14 PROCEDURE — G0378 HOSPITAL OBSERVATION PER HR: HCPCS

## 2019-10-14 PROCEDURE — 00000146 ZZHCL STATISTIC GLUCOSE BY METER IP

## 2019-10-14 PROCEDURE — 36430 TRANSFUSION BLD/BLD COMPNT: CPT

## 2019-10-14 PROCEDURE — 36415 COLL VENOUS BLD VENIPUNCTURE: CPT | Mod: 91 | Performed by: PHYSICIAN ASSISTANT

## 2019-10-14 PROCEDURE — 80048 BASIC METABOLIC PNL TOTAL CA: CPT | Performed by: INTERNAL MEDICINE

## 2019-10-14 NOTE — PHARMACY-ADMISSION MEDICATION HISTORY
Admission medication history interview status for this patient is complete. See [facility] admission navigator for allergy information, prior to admission medications and immunization status.     Medication history interview source(s):Patient and Family  Medication history resources (including written lists, pill bottles, clinic record):None  [facility]    Changes made to PTA medication list:  Added: -  Deleted: -  Changed: -    Actions taken by pharmacist (provider contacted, etc):Called daughter to look up doses on pill bottles     Additional medication history information:None    Medication reconciliation/reorder completed by provider prior to medication history?  No     Do you take OTC medications (eg tylenol, ibuprofen, fish oil, eye/ear drops, etc)? No     For patients on insulin therapy: No  Lantus/levemir/NPH/toujeo/tresiba/Mix 70/30 dose:  No  Sliding scale Novolog: Yes  If Yes, do you have a baseline novolog pre-meal dose:  no  units with meals  Patients eat three meals a day: Yes  How many episodes of hypoglycemia do you have per week: unknown  How many missed doses do you have per week: unknown  How many times do you check your blood glucose per day:  3  Do you have a Continuous glucose monitor (CGM) : No (remind pt that not approved for hospital use)  Any Barriers to therapy - Be specific :  No  (cost of medications, comfortable with giving injections (if applicable), comfortable and confident with current diabetes regimen)      Prior to Admission medications    Medication Sig Last Dose Taking? Auth Provider   amLODIPine (NORVASC) 5 MG tablet Take 1 tablet (5 mg) by mouth daily 10/12/2019 at hs Yes Cosmo Claudio MD   bumetanide (BUMEX) 2 MG tablet Take 2 mg by mouth daily 10/13/2019 at Unknown time Yes Unknown, Entered By History   glipiZIDE (GLUCOTROL XL) 10 MG 24 hr tablet Take 10 mg by mouth daily 10/13/2019 at Unknown time Yes Unknown, Entered By History   hydrALAZINE (APRESOLINE)  100 MG tablet Take 1 tablet (100 mg) by mouth 3 times daily 10/13/2019 at x2 Yes Cosmo Claudio MD   isoniazid (NYDRAZID) 300 MG tablet Take 300 mg by mouth daily 10/13/2019 at Unknown time Yes Unknown, Entered By History   pyridOXINE (VITAMIN B-6) 50 MG tablet Take 50 mg by mouth daily 10/13/2019 at Unknown time Yes Unknown, Entered By History   albuterol (PROAIR HFA/PROVENTIL HFA/VENTOLIN HFA) 108 (90 Base) MCG/ACT inhaler Inhale 2 puffs into the lungs every 6 hours as needed for shortness of breath / dyspnea or wheezing   Unknown, Entered By History   blood glucose (NO BRAND SPECIFIED) lancets standard Use to test blood sugar 3 times daily or as directed.   Kathi Mondragon PA-C   blood glucose monitoring (NO BRAND SPECIFIED) meter device kit Use to test blood sugar 3 times daily or as directed.   Kathi Mondragon PA-C   blood glucose monitoring (NO BRAND SPECIFIED) test strip Use to test blood sugars 3 times daily or as directed   Kathi Mondragon PA-C   insulin aspart (NOVOLOG FLEXPEN) 100 UNIT/ML pen Inject Subcutaneous 3 times daily (with meals) Sliding scale prn with meals for BG>200.  2 units per 50 over 200   Unknown, Entered By History   order for DME Equipment being ordered: thigh high compression stockings 20mmg Hg   Kathi Mondragon PA-C   order for DME Equipment being ordered: blood pressure cuff   Kathi Mondragon PA-C

## 2019-10-14 NOTE — PLAN OF CARE
"PRIMARY DIAGNOSIS: Anemia     OUTPATIENT/OBSERVATION GOALS TO BE MET BEFORE DISCHARGE  Orthostatic performed: No     Stable Hgb No.         Recent Labs   Lab Test 10/14/19  1234 10/14/19  0617 10/13/19  1924   HGB 9.1* 6.7* 7.5*            Appropriate testing complete: Yes     Cleared for discharge by consultants (if involved): No     Safe discharge environment identified: Yes     Discharge Planner Nurse   Safe discharge environment identified: Yes  Barriers to discharge: Yes       Entered by: Aravind Smith 10/14/2019      Pt alert and oriented x4. He denies pain. Macedonian speaking. Given 2 units of blood total. Hgb now 9.1. Eating mod carb diet. Up independent. Will go home this afternoon.  BP (!) 163/48 (BP Location: Left arm)   Pulse 64   Temp 97.6  F (36.4  C) (Oral)   Resp 16   Ht 1.676 m (5' 6\")   Wt 64.5 kg (142 lb 1.6 oz)   SpO2 95%   BMI 22.94 kg/m      Please review provider order for any additional goals.   Nurse to notify provider when observation goals have been met and patient is ready for discharge.              "

## 2019-10-14 NOTE — PLAN OF CARE
PRIMARY DIAGNOSIS: HEADACHE    OUTPATIENT/OBSERVATION GOALS TO BE MET BEFORE DISCHARGE  1. Orthostatic performed: No    2. Stable Hgb Currently receiving 1 unit of blood.   Recent Labs   Lab Test 10/13/19  1924 10/13/19  1916 08/31/19  0642   HGB 7.5* 6.9* 7.6*       3. Appropriate testing complete: Yes    4. Cleared for discharge by consultants (if involved): No    5. Safe discharge environment identified: Yes    Discharge Planner Nurse   Safe discharge environment identified: No  Barriers to discharge: Yes       Entered by: Alberto Sandra 10/14/2019 6:09 AM     Please review provider order for any additional goals.   Nurse to notify provider when observation goals have been met and patient is ready for discharge.  Temp: 98.1  F (36.7  C) Temp src: Oral BP: (!) 155/52 Pulse: 62 Heart Rate: 58 Resp: 16 SpO2: 95 % O2 Device: None (Room air)    Pt is A&Ox4 with some confusion r/t language barrier. Primary language is Swedish. A Pt denies headache, pain, N/V/D, numbness, or tingling. Pt receiving one unit of blood for low hemoglobin.  @ 0000. Plan: discharge in AM pending labs

## 2019-10-14 NOTE — ED TRIAGE NOTES
Dizziness, headache and fatigue for several days.  Had blood transfusion 1 month ago, current symptoms feel similar to then.

## 2019-10-14 NOTE — ED PROVIDER NOTES
History     Chief Complaint:  Dizziness    HPI   HPI limited by language barrier, supplemented by the patient's son and wife. Son reports that usually unable to find interpretor. Prefers family member.     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 Patient presents with his wife and son for symptoms of headache, dizziness, and weak. Son states that he had an instance where he got weak around 1400 and needed help sitting back down. Prior to 1400, his only symptom was a headache. The family states that this is his normal headache that is associated with his episodes of low hemoglobin in the past. When he has presented for this issue in the past, he has needed blood transfusions for the low hemoglobin. He has had extensive tests to see if he was bleeding somewhere, and nothing was found. He gets his primary care at [facility] in [place]. No rectal bleeding or vomiting up blood that the patient has noticed. Patient has a history of kidney issues, and has needed dialysis in the past. Patient does have swelling in his legs, and is taking a water pill to lessen this. His current swelling is not abnormal. Patient also states that he has some abdominal pain wrapping around his sides that is unusual for him. This started about 5 days ago. He does not think anything makes his abdominal pain better or worse. He denies urinary symptoms. In regards to his headache, it is localized to the front, forehead region. This headache started gradually. He denies vision changes, numbness and tingling. He notes that he has had a swollen face in the morning around his eyes, that goes away throughout the day. He denies focal weakness to his arms or legs, fevers, chest pain, shortness of breath. He is not on  "any blood thinning medications. He has not taken any medications for his headache today. Son adds that last time his hemoglobin was this low, he had an associated headache as well, and would like to hold off on a CT scan until we receive the hemoglobin results. He and his father feel that this is exactly the same as his symptoms with previous anemic episodes.     Allergies:  No known drug allergies     Medications:    Albuterol   Norvasc  Blood glucose monitoring  Bumex  Glucotrol  Apresoline  Novolog  Nydrazid    Past Medical History:    CHF  Diabetes   GERD  Hyperlipidemia   Hypertension     Past Surgical History:    Colonoscopy  Esophagoscopy, gastroscopy, duodenoscopy, combined   Cholecystectomy     Family History:    History reviewed. No pertinent family history.      Social History:  The patient was accompanied to the ED by his wife and son.  Smoking Status: no  Smokeless Tobacco: no  Alcohol Use: no   Marital Status:   [2]     Review of Systems   Eyes: Negative for visual disturbance.   Respiratory: Negative for shortness of breath.    Cardiovascular: Negative for chest pain and leg swelling.   Gastrointestinal: Negative for anal bleeding, nausea and vomiting.   Neurological: Positive for dizziness, weakness and headaches. Negative for numbness.   All other systems reviewed and are negative.      Physical Exam     Patient Vitals for the past 24 hrs:   BP Temp Temp src Pulse Heart Rate Resp SpO2 Height Weight   10/13/19 2115 (!) 154/59 -- -- 60 -- -- 98 % -- --   10/13/19 2100 (!) 159/70 -- -- 64 -- -- 99 % -- --   10/13/19 2000 (!) 156/61 -- -- 64 -- -- 99 % -- --   10/13/19 1903 (!) 146/50 98.2  F (36.8  C) Oral 72 72 20 100 % 1.676 m (5' 6\") 58.5 kg (129 lb)        Physical Exam    Nursing note and vitals reviewed.    Constitutional: Pleasant and well groomed.          HENT:    Mouth/Throat: Oropharynx is without swelling or erythema. Oral mucosa moist.    Eyes: Conjunctivae are normal. No scleral " icterus.    Neck: Neck supple. No meningismus.   Cardiovascular: Normal rate, regular rhythm and intact distal pulses.    Pulmonary/Chest: Effort normal and breath sounds normal.   Abdominal: Soft.  No distension. There is no tenderness.   Musculoskeletal:  Bilateral symmetric lower extremity edema. No calf tenderness  Neurological:Alert and oriented. Coordination normal. Upper and lower extremity strength intact throughout. PERRL. NO facial asymmetry. Light touch sensation intact throughout.   Skin: Skin is warm and dry.   Psychiatric: Normal mood and affect.       Emergency Department Course   ECG (19:25:35):  Rate 68 bpm. OR interval 126. QRS duration 84. QT/QTc 406/431. P-R-T axes 48 17 7. Normal sinus rhythm, nonspecific ST abnormality. Abnormal ECG. No significant change from ECG dated 5/3/19. Interpreted at 1935 by Rowan Sharma MD.     Laboratory:  Laboratory findings were communicated with the patient who voiced understanding of the findings.   ISTAT BMP:  (L) Glucose 256 (H) BUN 80 (H) GFR 38 (L) HGB 7.5 (L) Hematocrit 22 (L)  (Creatinine 2.1 (H))   CBC: WBC 7.1, HGB 6.9 (LL) ,    Troponin (Collected 1916): 0.028     Interventions:  1932: Acetaminophen, 1000 mg, PO  1 U PRBC transfusion initiated    Emergency Department Course:  Past medical records, nursing notes, and vitals reviewed.    1911: I performed an exam of the patient and obtained history, as documented above.     IV inserted and blood drawn.     2050: Patient recheck, he was up using the urinal, so I will go recheck again in a bit.     2106: Patient recheck. Talked with patient and family about findings, and the patient decided he does want to be transfused.     2123: I spoke with Dr. Vick Hospitalist regarding this patient.     Findings and plan explained to the Patient who consents to admission.     2125: Discussed the patient with Dr. Vick, who will admit the patient to a observation bed for further  monitoring, evaluation, and treatment.         Impression & Plan      Medical Decision Making:  Shell Leon is a 69 year old male with a complicated past medical history including CKD, which he has intermittently required dialysis, lymphoma, and CHF, with recurrent episodes of symptomatic anemia presents for symptoms which he describes as typical for his anemia, so much so that I had initially recommended obtaining a CT of the head and the family said that they would prefer to do this only if he did not have anemia as this was a very classic presentation. ED evaluation as noted above. He does have a hemoglobin of 6.9. Discussed risks and benefits of transfusions with family, and they would rather have the transfusion. Due to his complicated past medical history, he will be admitted for slow transfusion and monitoring of his fluid status and potassium. Dr. Vick have accepted the patient to the observation unit for ongoing evaluation, monitoring, and managing.     Diagnosis:    ICD-10-CM    1. Symptomatic anemia D64.9 ABO/Rh type and screen     CANCELED: ABO/Rh type and screen   2. Hyponatremia E87.1    3. CKD (chronic kidney disease) stage 3, GFR 30-59 ml/min (H) N18.3        Disposition:  Admitted to Dr. Nicanor Duval  10/13/2019   [facility] EMERGENCY DEPARTMENT  Scribe Disclosure:  I, Marline Duval, am serving as a scribe at 7:11 PM on 10/13/2019 to document services personally performed by Rowan Sharma MD based on my observations and the provider's statements to me.       Rowan Sharma MD  10/14/19 2939

## 2019-10-14 NOTE — DISCHARGE SUMMARY
[facility]  Discharge Summary        Shell Leon MRN# [id number]   YOB: 1950 Age: 69 year old     Date of Admission:  10/13/2019  Date of Discharge:  10/14/2019  1:59 PM  Admitting Physician:  Alex Vick DO  Discharge Physician: Rowan Gr PA-C  Discharging Service: Hospitalist     Primary Provider: Jeremy Mendoza  Primary Care Physician Phone Number: [phone]         Discharge Diagnoses/Problem Oriented Hospital Course (Providers):    Shell Leon was admitted on 10/13/2019 by Alex Vick DO and I would refer you to their history and physical.  The following problems were addressed during his hospitalization:    1. Acute on chronic anemia--s/p 2 unit(s) PRBC. Complicated hx of anemia work up with Dr. Page. See note from 6/19/2019.  Recommend future outpt Hemo/Onc follow up at a tertiary center such as [facility] or [facility] as per Dr. Page's last note from [facility] given complexity.     2. Headache and weakness--all resolved    3. Hx of splenic marginal zone Lymphoma, low grade lymphoproliferative disorder.     4. Hyponatremia--improved         Code Status:      Full Code        Brief Hospital Stay Summary Sent Home With Patient in AVS:       Reason for your hospital stay     You were admitted for headache and low hemoglobin. Your headache resolved   after Tylenol. Your hemoglobin was low at 6.7 so you received 2u PRBC.   Your repeat hgb is 9.1 You will need to follow up with the   Oncology/Hematology at either the [facility] or at [facility] to   further discuss the cause of your anemia. Follow up with you PCP within 1   week for repeat BMP and CBC. Hold your Bumex for today and resume   tomorrow.   Rowan Gr PA-C          Shell Leon is a 69 year old male who has a past medical history significant for chronic kidney disease, hypertension, chronic anemia, lymphoma, and diabetes mellitus.  All information is obtained through  .  He presented today the emergency room with a headache.  Headache started yesterday.  He is also feeling occasionally dizzy with this.  Headache is described as a pain throughout his head.  He had not tried taking any pain medications at home for this.  Pain seem to be getting worse today.  He presented to emergency room for further evaluation.  He was given a dose of Tylenol in the emergency room.  He does state that Tylenol took his pain away.  He does note that he was only really feeling dizzy when he stood up for the past 2 days.  Not feeling dizzy at this time.  Has not noted any fevers or chills.  Was found to have mild worsening of his chronic anemia in the emergency room.  He has not noticed any blood in his stools or urine.  He has not been coughing.  He does note that he has had multiple headaches like this in the past.  Usually gets headaches like this when his hemoglobin is low.  Headache for the past 2 days feels exactly like previous headaches.  No other acute complaints.    Patient was admitted to the observation unit.  His symptoms are actually resolved by the time he was admitted.  However his hemoglobin was low at 6.9 (baseline 8ish) so he was transfused 2u PRBC. He was asymptomatic at the time of discharge. Of note, he has a complicated history of microcytic anemia as documented by Dr. Page's note on June 19.  He has a history of anemia thought related to low-grade lympho proliferative disorder and has been receiving trial Rituxan.  Response has been equivocal and at his last visit Dr. Page mentioned that if the patient should have ongoing issues with anemia then he will need to be followed up at a tertiary center such as the [facility] or [facility].  I discussed this with the patient's son, Eric who expresses understanding and is in agreement with the discharge plan.           Important Results:         Recent Labs   Lab 10/14/19  1234 10/14/19  0617 10/13/19  1924  10/13/19  1916   WBC  --  5.6  --  7.1   HGB 9.1* 6.7* 7.5* 6.9*   HCT  --  20.6*  --  21.3*   MCV  --  87  --  88   PLT  --  227  --  313       Recent Labs   Lab 10/14/19  1234 10/14/19  0617 10/13/19  1924 10/13/19  1916   WBC  --  5.6  --  7.1   HGB 9.1* 6.7* 7.5* 6.9*   HCT  --  20.6*  --  21.3*   MCV  --  87  --  88   PLT  --  227  --  313     Recent Labs   Lab 10/14/19  0617 10/13/19  1924   * 126*   POTASSIUM 4.8 4.9   CHLORIDE 102 96   CO2 16*  --    ANIONGAP 11 9   * 256*   BUN 77* 80*   CR 1.80*  --    GFRESTIMATED 37* 31*   GFRESTBLACK 43* 38*   STEVE 8.1*  --      No results for input(s): CULT in the last 168 hours.           Pending Results:        Unresulted Labs Ordered in the Past 30 Days of this Admission     No orders found for last 31 day(s).            Discharge Instructions and Follow-Up:      Follow-up Appointments     Follow-up and recommended labs and tests       Follow up with primary care provider, Jeremy Mendoza, within 7 days   for hospital follow- up.  The following labs/tests are recommended:   CBC/BMP.               Discharge Disposition:      Discharged to home         Discharge Medications:        Current Discharge Medication List      CONTINUE these medications which have NOT CHANGED    Details   amLODIPine (NORVASC) 5 MG tablet Take 1 tablet (5 mg) by mouth daily  Qty: 30 tablet, Refills: 1    Associated Diagnoses: Essential hypertension with goal blood pressure less than 140/90      glipiZIDE (GLUCOTROL XL) 10 MG 24 hr tablet Take 10 mg by mouth daily      hydrALAZINE (APRESOLINE) 100 MG tablet Take 1 tablet (100 mg) by mouth 3 times daily  Qty: 30 tablet, Refills: 1    Associated Diagnoses: Essential hypertension with goal blood pressure less than 140/90      isoniazid (NYDRAZID) 300 MG tablet Take 300 mg by mouth daily      pyridOXINE (VITAMIN B-6) 50 MG tablet Take 50 mg by mouth daily      albuterol (PROAIR HFA/PROVENTIL HFA/VENTOLIN HFA) 108 (90 Base) MCG/ACT  "inhaler Inhale 2 puffs into the lungs every 6 hours as needed for shortness of breath / dyspnea or wheezing      blood glucose (NO BRAND SPECIFIED) lancets standard Use to test blood sugar 3 times daily or as directed.  Qty: 100 each, Refills: 11    Associated Diagnoses: Type 2 diabetes mellitus with hyperglycemia, without long-term current use of insulin (H)      blood glucose monitoring (NO BRAND SPECIFIED) meter device kit Use to test blood sugar 3 times daily or as directed.  Qty: 1 kit, Refills: 0    Comments: One touch verio flex meter  Associated Diagnoses: Type 2 diabetes mellitus without complication, without long-term current use of insulin (H)      blood glucose monitoring (NO BRAND SPECIFIED) test strip Use to test blood sugars 3 times daily or as directed  Qty: 100 strip, Refills: 11    Associated Diagnoses: Type 2 diabetes mellitus with hyperglycemia, without long-term current use of insulin (H)      insulin aspart (NOVOLOG FLEXPEN) 100 UNIT/ML pen Inject Subcutaneous 3 times daily (with meals) Sliding scale prn with meals for BG>200.  2 units per 50 over 200      !! order for DME Equipment being ordered: thigh high compression stockings 20mmg Hg  Qty: 1 Units, Refills: 0    Associated Diagnoses: Localized edema      !! order for DME Equipment being ordered: blood pressure cuff  Qty: 1 Units, Refills: 0    Associated Diagnoses: Resistant hypertension       !! - Potential duplicate medications found. Please discuss with provider.      STOP taking these medications       bumetanide (BUMEX) 2 MG tablet Comments:   Reason for Stopping:                 Allergies:       No Known Allergies        Consultations This Hospital Stay:      No consultations were requested during this admission         Condition and Physical on Discharge:      Discharge condition: Stable   Vitals: Blood pressure (!) 160/52, pulse 58, temperature 98  F (36.7  C), temperature source Oral, resp. rate 16, height 1.676 m (5' 6\"), weight " 64.5 kg (142 lb 1.6 oz), SpO2 92 %.  142 lbs 1.6 oz      GENERAL:  Comfortable.  PSYCH: pleasant, oriented, No acute distress.  HEENT:  PERRLA. Normal conjunctiva, normal hearing, nasal mucosa and Oropharynx are normal.  NECK:  Supple, no neck vein distention, adenopathy or bruits, normal thyroid.  HEART:  Normal S1, S2 with no murmur, no pericardial rub, gallops or S3 or S4.  LUNGS:  Clear to auscultation, normal Respiratory effort. No wheezing, rales or ronchi.  ABDOMEN:  Soft, no hepatosplenomegaly, normal bowel sounds. Non-tender, non distended.   EXTREMITIES:  No pedal edema, +2 pulses bilateral and equal.  SKIN:  Dry to touch, No rash, wound or ulcerations.  NEUROLOGIC:  CN 2-12 intact, BL 5/5 symmetric upper and lower extremity strength, sensation is intact with no focal deficits.         Discharge Time:      <30 mins        Image Results From This Hospital Stay (For Non-EPIC Providers):        Results for orders placed or performed during the hospital encounter of 05/03/19   CT Head w/o Contrast    Narrative    CT OF THE HEAD WITHOUT CONTRAST  5/4/2019 10:29 AM     COMPARISON: Head CT 12/31/2018    HISTORY:  Headache, positional.     TECHNIQUE:  Axial CT images of the head from the skull base to the  vertex were acquired without IV contrast.    FINDINGS:   INTRACRANIAL CONTENTS: No intracranial hemorrhage, extraaxial  collection, or mass effect.  No CT evidence of acute infarct. Mild  presumed chronic small vessel ischemic change and generalized volume  loss.    VISUALIZED ORBITS/SINUSES/MASTOIDS: No significant orbital  abnormality. Mild paranasal sinus mucosal thickening. No significant  middle ear or mastoid effusion.    OSSEOUS STRUCTURES/SOFT TISSUES: No significant abnormality.      Impression    IMPRESSION:  1.  No change.  2.  No mass, hemorrhage or stroke.  3.  Mild presumed chronic small vessel ischemic change and generalized  volume loss.  4.  Mild paranasal sinus mucosal thickening.    Radiation  dose for this scan was reduced using automated exposure  control, adjustment of the mA and/or kV according to patient size, or  iterative reconstruction technique.    MATT PAGE MD

## 2019-10-14 NOTE — PLAN OF CARE
"PRIMARY DIAGNOSIS: Anemia    OUTPATIENT/OBSERVATION GOALS TO BE MET BEFORE DISCHARGE  Orthostatic performed: No    Stable Hgb No.   Recent Labs   Lab Test 10/14/19  1234 10/14/19  0617 10/13/19  1924   HGB 9.1* 6.7* 7.5*         Appropriate testing complete: Yes    Cleared for discharge by consultants (if involved): No    Safe discharge environment identified: Yes    Discharge Planner Nurse   Safe discharge environment identified: Yes  Barriers to discharge: Yes       Entered by: Aravind Smith 10/14/2019     Pt alert and oriented x4. He denies pain. Faroese speaking. 1 unit of blood running now. Will recheck hgb @ 1pm. Mod carb diet. Up independent. VSS.    BP (!) 160/52 (BP Location: Left arm)   Pulse 58   Temp 98  F (36.7  C) (Oral)   Resp 16   Ht 1.676 m (5' 6\")   Wt 64.5 kg (142 lb 1.6 oz)   SpO2 92%   BMI 22.94 kg/m         Please review provider order for any additional goals.   Nurse to notify provider when observation goals have been met and patient is ready for discharge.  "

## 2019-10-14 NOTE — H&P
[facility]    History and Physical - Hospitalist Service       Date of Admission:  10/13/2019    Assessment & Plan   Shell Leon is a 69 year old male admitted on 10/13/2019. He has a past medical history significant for chronic kidney disease, hypertension, chronic anemia, lymphoma, and diabetes mellitus.  He presented today the emergency room with a headache.  Headache resolved with a dose of Tylenol in the emergency room.  Was found to have mild worsening of his chronic anemia.    1.  Chronic anemia.  Hemoglobin is slightly lower than recent baseline.  Did have an order for 1 unit of packed red blood cells from the emergency room.  Recheck hemoglobin tomorrow.    2.  Diabetes mellitus.  Most recent hemoglobin A1c only 5.3.  Restart home diabetic medications once verified by pharmacy.    3.  Hypertension.  Restart home medications once verified by pharmacy.  Have IV hydralazine available if needed.    4.  Chronic kidney disease.  Creatinine near recent levels at 2.1.  Does appear mildly intravascularly depleted.  Hold diuresis at this point.  Is getting a unit of packed red blood cells as noted above from emergency room.  Recheck metabolic panel tomorrow.  Avoid nephrotoxins as able.    5.  Hyponatremia.  Suspect he is mildly over diuresed at this point.  Hold diuresis for now.  He is getting a unit of packed red blood cells as noted above.    6.  Lymphoma.  Should follow-up with oncology in the outpatient setting as previously scheduled.     Diet: Moderate Consistent CHO Diet    DVT Prophylaxis: Ambulate every shift  Guerrero Catheter: in place, indication:    Code Status: Full Code      Disposition Plan   Expected discharge: Tomorrow, recommended to prior living arrangement     Alex Vick, DO  [facility]    ______________________________________________________________________    Chief Complaint   Headache    History is obtained from the patient    History of Present  Illness   Shell Leon is a 69 year old male who has a past medical history significant for chronic kidney disease, hypertension, chronic anemia, lymphoma, and diabetes mellitus.  All information is obtained through .  He presented today the emergency room with a headache.  Headache started yesterday.  He is also feeling occasionally dizzy with this.  Headache is described as a pain throughout his head.  He had not tried taking any pain medications at home for this.  Pain seem to be getting worse today.  He presented to emergency room for further evaluation.  He was given a dose of Tylenol in the emergency room.  He does state that Tylenol took his pain away.  He does note that he was only really feeling dizzy when he stood up for the past 2 days.  Not feeling dizzy at this time.  Has not noted any fevers or chills.  Was found to have mild worsening of his chronic anemia in the emergency room.  He has not noticed any blood in his stools or urine.  He has not been coughing.  He does note that he has had multiple headaches like this in the past.  Usually gets headaches like this when his hemoglobin is low.  Headache for the past 2 days feels exactly like previous headaches.  No other acute complaints.    Review of Systems    The 10 point Review of Systems is negative other than noted in the HPI     Past Medical History    I have reviewed this patient's medical history and updated it with pertinent information if needed.   Past Medical History:   Diagnosis Date     Congestive heart failure (H)      Diabetes (H)      Gastroesophageal reflux disease      High cholesterol      Hypertension        Past Surgical History   I have reviewed this patient's surgical history and updated it with pertinent information if needed.  Past Surgical History:   Procedure Laterality Date     COLONOSCOPY N/A 5/22/2018    Procedure: COMBINED COLONOSCOPY, SINGLE OR MULTIPLE BIOPSY/POLYPECTOMY BY BIOPSY;  COLONOSCOPY  Room  521, with polypectomy x1 using cold biopsy forceps;  Surgeon: Charlie Rabago MD;  Location:  GI     ESOPHAGOSCOPY, GASTROSCOPY, DUODENOSCOPY (EGD), COMBINED N/A 5/21/2018    Procedure: COMBINED ESOPHAGOSCOPY, GASTROSCOPY, DUODENOSCOPY (EGD), BIOPSY SINGLE OR MULTIPLE;  ESOPHAGOSCOPY, GASTROSCOPY, DUODENOSCOPY (EGD)  Room 521 and cold biopsies;  Surgeon: Charlie Rabago MD;  Location:  GI     LAPAROSCOPIC CHOLECYSTECTOMY N/A 1/6/2017    Procedure: LAPAROSCOPIC CHOLECYSTECTOMY;  Surgeon: Michael Caba MD;  Location:  OR       Social History   I have reviewed this patient's social history and updated it with pertinent information if needed.  Social History     Tobacco Use     Smoking status: Never Smoker     Smokeless tobacco: Never Used   Substance Use Topics     Alcohol use: No     Alcohol/week: 0.0 standard drinks     Drug use: No       Family History   I have reviewed this patient's family history and updated it with pertinent information if needed.   Family History   Problem Relation Age of Onset     Unknown/Adopted No family hx of        Prior to Admission Medications   Prior to Admission Medications   Prescriptions Last Dose Informant Patient Reported? Taking?   albuterol (PROAIR HFA/PROVENTIL HFA/VENTOLIN HFA) 108 (90 Base) MCG/ACT inhaler   Yes No   Sig: Inhale 2 puffs into the lungs every 6 hours as needed for shortness of breath / dyspnea or wheezing   amLODIPine (NORVASC) 5 MG tablet   No No   Sig: Take 1 tablet (5 mg) by mouth daily   blood glucose (NO BRAND SPECIFIED) lancets standard   No No   Sig: Use to test blood sugar 3 times daily or as directed.   blood glucose monitoring (NO BRAND SPECIFIED) meter device kit   No No   Sig: Use to test blood sugar 3 times daily or as directed.   blood glucose monitoring (NO BRAND SPECIFIED) test strip   No No   Sig: Use to test blood sugars 3 times daily or as directed   bumetanide (BUMEX) 2 MG tablet   Yes No   Sig: Take 2 mg by mouth  daily   glipiZIDE (GLUCOTROL XL) 10 MG 24 hr tablet   Yes No   Sig: Take 10 mg by mouth daily   hydrALAZINE (APRESOLINE) 100 MG tablet   No No   Sig: Take 1 tablet (100 mg) by mouth 3 times daily   insulin aspart (NOVOLOG FLEXPEN) 100 UNIT/ML pen   Yes No   Sig: Inject Subcutaneous 3 times daily (with meals) Sliding scale prn with meals for BG>200.  2 units per 50 over 200   isoniazid (NYDRAZID) 300 MG tablet   Yes No   Sig: Take 300 mg by mouth daily   order for DME   No No   Sig: Equipment being ordered: blood pressure cuff   order for DME   No No   Sig: Equipment being ordered: thigh high compression stockings 20mmg Hg   pyridOXINE (VITAMIN B-6) 50 MG tablet   Yes No   Sig: Take 50 mg by mouth daily      Facility-Administered Medications: None     Allergies   No Known Allergies    Physical Exam   Vital Signs: Temp: 98  F (36.7  C) Temp src: Oral BP: (!) 158/47 Pulse: 67 Heart Rate: 72 Resp: 20 SpO2: 98 % O2 Device: None (Room air)    Weight: 142 lbs 1.6 oz    Gen:  NAD, A&O seemingly x3.  All information obtained through .  Eyes:  PERRL, sclera anicteric.  OP:  MMM, no lesions.  Neck:  Supple.  CV:  Regular, +1/6 murmur at right upper sternal border.  Lung:  CTA b/l, normal effort.  Ab:  +BS, soft.  Skin:  Warm, dry to touch.  No rash.  Ext:  Mild non pitting edema LE b/l.      Data   Data reviewed today: I reviewed all medications, new labs and imaging results over the last 24 hours.    Recent Labs   Lab 10/13/19  1924 10/13/19  1916   WBC  --  7.1   HGB 7.5* 6.9*   MCV  --  88   PLT  --  313   *  --    POTASSIUM 4.9  --    CHLORIDE 96  --    BUN 80*  --    ANIONGAP 9  --    *  --    TROPI  --  0.028

## 2019-10-14 NOTE — PLAN OF CARE
PRIMARY DIAGNOSIS: HEADACHE    OUTPATIENT/OBSERVATION GOALS TO BE MET BEFORE DISCHARGE  1. Orthostatic performed: No    2. Stable Hgb Currently receiving 1 unit of blood.   Recent Labs   Lab Test 10/13/19  1924 10/13/19  1916 08/31/19  0642   HGB 7.5* 6.9* 7.6*       3. Appropriate testing complete: Yes    4. Cleared for discharge by consultants (if involved): No    5. Safe discharge environment identified: Yes    Discharge Planner Nurse   Safe discharge environment identified: No  Barriers to discharge: Yes       Entered by: Alberto Sandra 10/14/2019 2:04 AM     Please review provider order for any additional goals.   Nurse to notify provider when observation goals have been met and patient is ready for discharge.  Temp: 97.6  F (36.4  C) Temp src: Oral BP: (!) 142/43 Pulse: 67 Heart Rate: 59 Resp: 20 SpO2: 94 % O2 Device: None (Room air)    Pt is A&Ox4 with some confusion r/t language barrier. Primary language is Maltese. A Pt denies headache, pain, N/V/D, numbness, or tingling. Pt receiving one unit of blood for low hemoglobin.  @ 0000. Plan: discharge in AM pending labs

## 2019-10-14 NOTE — ED NOTES
This RN called up to observation due to bed being ready and blood had not been sent yet. Blood re-routed directly to obs. Obs RN verbalized understanding.

## 2019-10-14 NOTE — ED NOTES
"[facility]  ED Nurse Handoff Report    Shell Leon is a 69 year old male   ED Chief complaint: Dizziness  . ED Diagnosis:   Final diagnoses:   Symptomatic anemia   Hyponatremia   CKD (chronic kidney disease) stage 3, GFR 30-59 ml/min (H)     Allergies: No Known Allergies    Code Status: Full Code  Activity level - Baseline/Home:  Independent. Activity Level - Current:   Assist X 1. Lift room needed: No. Bariatric: No   Needed: Yes - Albanian  Isolation: No. Infection: Not Applicable.     Vital Signs:   Vitals:    10/13/19 1903 10/13/19 2000   BP: (!) 146/50 (!) 156/61   Pulse: 72 64   Resp: 20    Temp: 98.2  F (36.8  C)    TempSrc: Oral    SpO2: 100% 99%   Weight: 58.5 kg (129 lb)    Height: 1.676 m (5' 6\")        Cardiac Rhythm:  ,      Pain level: 0-10 Pain Scale: 6  Patient confused: No. Patient Falls Risk: Yes.   Elimination Status: Has voided   Patient Report - Initial Complaint: Dizziness, Generalized Weakness. Focused Assessment: Patient arrives with headache, intermittent dizziness, and generalized weakness. Reports exact same symptoms prior to needing last transfusion. No neuro deficits.   Tests Performed: EKG, Lab work. Abnormal Results:   Labs Ordered and Resulted from Time of ED Arrival Up to the Time of Departure from the ED   CBC WITH PLATELETS DIFFERENTIAL - Abnormal; Notable for the following components:       Result Value    RBC Count 2.42 (*)     Hemoglobin 6.9 (*)     Hematocrit 21.3 (*)     Absolute Eosinophils 1.7 (*)     All other components within normal limits   ISTAT BASIC MET ICA HCT POCT - Abnormal; Notable for the following components:    Sodium 126 (*)     Glucose 256 (*)     Urea Nitrogen 80 (*)     Creatinine 2.1 (*)     GFR Estimate 31 (*)     GFR Estimate If Black 38 (*)     Hemoglobin 7.5 (*)     Hematocrit - POCT 22 (*)     All other components within normal limits   TROPONIN I   ROUTINE UA WITH MICROSCOPIC   PULSE OXIMETRY NURSING   CARDIAC " CONTINUOUS MONITORING   PERIPHERAL IV CATHETER   ISTAT GAS OR ELECTROLYTE NURSING POCT   RED BLOOD CELL PREPARE ORDER UNIT   ABO/RH TYPE AND SCREEN       Treatments provided: Tylenol, Transfusion (to be completed)  Family Comments: Family at bedside  OBS brochure/video discussed/provided to patient:  N/A  ED Medications:   Medications   lidocaine 1 % 0.1-1 mL (has no administration in time range)   lidocaine (LMX4) cream (has no administration in time range)   sodium chloride (PF) 0.9% PF flush 3 mL (has no administration in time range)   sodium chloride (PF) 0.9% PF flush 3 mL (has no administration in time range)   acetaminophen (TYLENOL) tablet 975 mg (975 mg Oral Given 10/13/19 1932)     Drips infusing:  Yes  For the majority of the shift, the patient's behavior Green. Interventions performed were N/A.     Severe Sepsis OR Septic Shock Diagnosis Present: No      ED Nurse Name/Phone Number: Wendy Fan RN,   9:17 PM    RECEIVING UNIT ED HANDOFF REVIEW    Above ED Nurse Handoff Report was reviewed: Yes  Reviewed by: Karen M. Lesch, RN on October 13, 2019 at 9:47 PM

## 2019-10-14 NOTE — PLAN OF CARE
Patient's After Visit Summary was reviewed with patient and/or family.   Patient verbalized understanding of After Visit Summary, recommended follow up and was given an opportunity to ask questions.   Discharge medications sent home with patient/family: Not applicable   Discharged with spouse and son.     Pt walked off unit, left via private transportation.       OBSERVATION patient END time: 2 pm

## 2019-10-15 ENCOUNTER — TELEPHONE (OUTPATIENT)
Dept: FAMILY MEDICINE | Facility: CLINIC | Age: 69
End: 2019-10-15

## 2019-10-15 NOTE — TELEPHONE ENCOUNTER
Please contact patient for In-patient follow up.  There are no phone numbers on file.    Visit date: 10/14/2019  Diagnosis listed:Symptomatic Anemia, Acute on Chronic Anemia    Number of visits in past 12 months:0/2      Patient has Ketan listed as PCP

## 2019-10-17 LAB
BLD PROD TYP BPU: NORMAL
BLD UNIT ID BPU: 0
BLOOD PRODUCT CODE: NORMAL
BPU ID: NORMAL
TRANSFUSION STATUS PATIENT QL: NORMAL
TRANSFUSION STATUS PATIENT QL: NORMAL

## 2020-01-29 ENCOUNTER — TELEPHONE (OUTPATIENT)
Dept: FAMILY MEDICINE | Facility: CLINIC | Age: 70
End: 2020-01-29

## 2020-01-29 NOTE — TELEPHONE ENCOUNTER
Type of outreach:  Chart review  Health Maintenance Due   Topic Date Due     ADVANCE CARE PLANNING  1950     ZOSTER IMMUNIZATION (1 of 2) 02/07/1999     MEDICARE ANNUAL WELLNESS VISIT  02/07/2014     LIPID  04/16/2019     DIABETIC FOOT EXAM  04/16/2019     FALL RISK ASSESSMENT  04/16/2019     MICROALBUMIN  07/25/2019     INFLUENZA VACCINE (1) 09/01/2019     EYE EXAM  10/29/2019     PHQ-2  01/01/2020     Patient goes to [facility] for care now, no longer a [facility] patient.  No outreach needed.  Gayatri Whitehead CMA (AAMA)

## 2020-10-04 NOTE — TELEPHONE ENCOUNTER
Daughter called c/o patient vomiting since yesterday and gas pains. Denies fever and diarrhea. Patient not able to hold down food. Advised to have patient drink fluids to stay hydrated. Recommended half strength gatorade, pedialyte, and water. May advance diet as tolerated. Advised on signs of dehydration and when to go to the emergency room.    Daughter agreed with plan.    Had taken am medications and held down for 2 hours. Should patient hold metformin this kannan if not eating?    Margarita Oleary RN     
Informed patient's son of provider message.    Margarita Oleary RN    
Yes have them hold Metformin this evening if not eating. Kathi Mondragon PA-C    
Hugo Mcmanus  [facility]  [address] [place] [id number]  Phone: [phone]  Fax: [phone]  Follow Up Time:

## 2021-01-01 NOTE — TELEPHONE ENCOUNTER
Patient has office visit with  5/16/18 and OV w/  on 5/30/18. Calling pt to review home BPs, medication list, and if symptomatic. Will review if pt needs to be seen sooner than 5/30/18 w/ .      ADDENDUM:  Spoke with son as per demographics. Pt is not able to make appt 5/16/18 and had rescheduled it for 5/30/18. Canceled appt w/ . BPs and medication not reviewed d/t pt not being home. TERRA Thomas    
Pt's son called to review medications and home BPs. Running 140-149 systolic/45-47 diastolic. Denies lightheadedness or dizziness.   Multiple medications on med list that pt is not taking. Will update medication list. Recommended pt's son to bring in medication bottles and home BP log to 's office to review. TERRA Thomas    
ACP

## 2022-08-09 NOTE — TELEPHONE ENCOUNTER
Prescription approved per [facility].  Susan Vasquez RN     Detail Level: Detailed Body Location Override (Optional - Billing Will Still Be Based On Selected Body Map Location If Applicable): left earlobe junction Size Of Lesion: 2.2 X Size Of Lesion In Cm (Optional): 1.5 Anatomic Location From Referring Provider: left mid preauricular cheek Name Of The Referring Provider For Procedure: SHERICE Cabello Incorporate Mauc In Note: Yes

## 2024-04-09 NOTE — PROGRESS NOTES
Patient Education       Well Child Exam 12 Months   About this topic   Your child's 12-month well child exam is a visit with the doctor to check your child's health. The doctor measures your child's weight, height, and head size. The doctor plots these numbers on a growth curve. The growth curve gives a picture of your child's growth at each visit. The doctor may listen to your child's heart, lungs, and belly. Your doctor will do a full exam of your child from the head to the toes.  Your child may also need shots or blood tests during this visit.  General   Growth and Development   Your doctor will ask you how your child is developing. The doctor will focus on the skills that most children your child's age are expected to do. During this time of your child's life, here are some things you can expect.  Movement - Your child may:  Stand and walk holding on to something  Begin to walk without help  Use finger and thumb to  small objects  Point to objects  Wave bye-bye  Hearing, seeing, and talking - Your child will likely:  Say Mama or Luis Fernando  Have 1 or 2 other words  Begin to understand no. Try to distract or redirect to correct your child.  Be able to follow simple commands  Imitate your gestures  Be more comfortable with familiar people and toys. Be prepared for tears when saying good bye. Say I love you and then leave. Your child may be upset, but will calm down in a little bit.  Feeding - Your child:  Can start to drink whole milk instead of formula or breastmilk. Limit milk to 24 ounces per day and juice to 4 ounces per day.  Is ready to give up the bottle and drink from a cup or sippy cup  Will be eating 3 meals and 2 to 3 snacks a day. However, your child may eat less than before, and this is normal.  May be ready to start eating table foods that are soft, mashed, or pureed.  Don't force your child to eat foods. You may have to offer a food more than 10 times before your child will like it.  Give your  REASON FOR VISIT:  Resistant hypertension.      HISTORY OF PRESENT ILLNESS:  I had the pleasure of seeing Shell Leon at the [facility] in [place] this morning.  He is a very pleasant 67-year-old gentleman with peripheral arterial disease, hypertension and diabetes who is here for followup regarding persistent hypertension.  He is on multiple antihypertensives and despite this, his blood pressure has been difficult to control lately.      I saw him earlier this month and recommended a renal artery ultrasound to rule out significant renovascular hypertension.  The ultrasound demonstrated patent renal arteries bilaterally.  He is here to discuss additional investigations given the resistant hypertension.      IMPRESSION, REPORT AND PLAN:   1.  Persistent hypertension.   2.  Peripheral arterial disease.   3.  Diabetes.   4.  Hyperlipidemia.        The patient's blood pressure still remains high despite multiple antihypertensives.  Recent renal artery ultrasound was reviewed.  Fortunately, he does not have significant renal artery stenosis.  In reviewing his laboratory studies, the patient's sodium is on the lower side and his potassium is on the higher side.  This may indicate underlying primary hyperaldosteronism.  Of note, the patient had recent CT of the abdomen which showed normal-sized adrenal glands.      I would recommend that we perform laboratory studies to assess possible primary hyperaldosteronism.  We will obtain aldosterone and renin levels.  We will also obtain serum metanephrine levels, although my suspicion for pheochromocytoma is low.       Would like to refer him to Endocrinology if the aforementioned studies become abnormal.  Additionally, I will add spironolactone to his medical regimen. I have instructed him to not start this medication until we have the laboratory studies.      I will see him next week with repeat labs to make sure his potassium does not  child small bites of soft finger foods like bananas or well cooked vegetables.  Watch for signs your child is full, like turning the head or leaning back.  Should be allowed to eat without help. Mealtime will be messy.  Should have small pieces of fruit instead fruit juice.  Will need you to clean the teeth after a feeding with a wet washcloth or a wet child's toothbrush. You may use a smear of toothpaste with fluoride in it 2 times each day.  Sleep - Your child:  Should still sleep in a safe crib, on the back, alone for naps and at night. Keep soft bedding, bumpers, and toys out of your child's bed. It is OK if your child rolls over without help at night.  Is likely sleeping about 10 to 12 hours in a row at night  Needs 1 to 2 naps each day  Sleeps about a total of 14 hours each day  Should be able to fall asleep without help. If your child wakes up at night, check on your child. Do not pick your child up, offer a bottle, or play with your child. Doing these things will not help your child fall asleep without help.  Should not have a bottle in bed. This can cause tooth decay or ear infections. Give a bottle before putting your child in the crib for the night.  Vaccines - It is important for your child to get shots on time. This protects from very serious illnesses like lung infections, meningitis, or infections that harm the nervous system. Your baby may also need a flu shot. Check with your doctor to make sure your baby's shots are up to date. Your child may need:  DTaP or diphtheria, tetanus, and pertussis vaccine  Hib or Haemophilus influenzae type b vaccine  PCV or pneumococcal conjugate vaccine  MMR or measles, mumps, and rubella vaccine  Varicella or chickenpox vaccine  Hep A or hepatitis A vaccine  Flu or Influenza vaccine  Your child may get some of these combined into one shot. This lowers the number of shots your child may get and yet keeps them protected.  Help for Parents   Play with your child.  Give  increase with the Aldactone.  I appreciate the opportunity to participate in this patient's care.         ALE SPICER MD             D: 2017 11:31   T: 2017 14:48   MT: GRANT      Name:     NARESH GARCIA   MRN:      [id number]        Account:      [id number]   :      1950           Service Date: 2017      Document: [id number]       your child soft balls, blocks, and containers to play with. Toys that can be stacked or nest inside of one another are also good.  Cars, trains, and toys to push, pull, or walk behind are fun. So are puzzles and animal or people figures.  Read to your child. Name the things in the pictures in the book. Talk and sing to your child. This helps your child learn language skills.  Here are some things you can do to help keep your child safe and healthy.  Do not allow anyone to smoke in your home or around your child.  Have the right size car seat for your child and use it every time your child is in the car. Your child should be rear facing until at least 2 years of age or older.  Be sure furniture, shelves, and televisions are secure and cannot tip over onto your child.  Take extra care around water. Close bathroom doors. Never leave your child in the tub alone.  Never leave your child alone. Do not leave your child in the car, in the bath, or at home alone, even for a few minutes.  Avoid long exposure to direct sunlight by keeping your child in the shade. Use sunscreen if shade is not possible.  Protect your child from gun injuries. If you have a gun, use a trigger lock. Keep the gun locked up and the bullets kept in a separate place.  Avoid screen time for children under 2 years old. This means no TV, computers, or video games. They can cause problems with brain development.  Parents need to think about:  Having emergency numbers, including poison control, in your phone or posted near the phone  How to distract your child when doing something you dont want your child to do  Using positive words to tell your child what you want, rather than saying no or what not to do  Your next well child visit will most likely be when your child is 15 months old. At this visit your doctor may:  Do a full check up on your child  Talk about making sure your home is safe for your child, how well your child is eating, and how to correct  your child  Give your child the next set of shots  When do I need to call the doctor?   Fever of 100.4°F (38°C) or higher  Sleeps all the time or has trouble sleeping  Won't stop crying  You are worried about your child's development  Where can I learn more?   Centers for Disease Control and Prevention  https://www.cdc.gov/ncbddd/actearly/milestones/milestones-1yr.html   Last Reviewed Date   2021-09-17  Consumer Information Use and Disclaimer   This information is not specific medical advice and does not replace information you receive from your health care provider. This is only a brief summary of general information. It does NOT include all information about conditions, illnesses, injuries, tests, procedures, treatments, therapies, discharge instructions or life-style choices that may apply to you. You must talk with your health care provider for complete information about your health and treatment options. This information should not be used to decide whether or not to accept your health care providers advice, instructions or recommendations. Only your health care provider has the knowledge and training to provide advice that is right for you.  Copyright   Copyright © 2021 [employer], [employer] and its affiliates and/or licensors. All rights reserved.    Children under the age of 2 years will be restrained in a rear facing child safety seat.   If you have an active [facility] account, please look for your well child questionnaire to come to your [email or web address] account before your next well child visit.

## 2024-05-21 NOTE — LETTER
Transition Communication Hand-off for Care Transitions to Next Level of Care Provider    Name: Shell Leon  : 1950  MRN #: [id number]  Primary Care Provider: Jeremy Mendoza  Primary Care MD Name: Cristobal Torres  Primary Clinic: [address]  [facility] [address]  Primary Care Clinic Name: [facility]   Reason for Hospitalization:  Anemic, hyponatremic  Anemia  Admit Date/Time: 2019  1:41 PM  Discharge Date: 6/15/19  Payor Source: Payor: [facility] / Plan: [employer] MNCARE / Product Type: HMO /     Readmission Assessment Measure (JUNE) Risk Score/category: ELEVATED           Reason for Communication Hand-off Referral: Fragility  Multiple providers/specialties    Discharge Plan:       Concern for non-adherence with plan of care:   NO  Discharge Needs Assessment:      Already enrolled in Tele-monitoring program and name of program:  na  Follow-up specialty is recommended: Yes    Follow-up plan:  No future appointments.    Any outstanding tests or procedures:          Reason for your hospital stay    Low salt and low hemoglobin          Chaudhari Recommendations:   Patient admitted with anemia. Patient was just here 5/3- for anemia. PMhx: CRF, CHF proBNP 5/3/19 10,140 , DM hgbA1c 5.4 , HTN and Hyperlipidemia. Patient identified as elevated JUNE. Met with patient and family. Patient lives at home with his wife and  family (son, DIL, daughter, grandkids). He walks independently. Patient reports managing his own medications. His son and family is helpful and supportive. His wife prepares his meals. Information on managing CHF reviewed with patient and family. patient has a scale for daily weigh checks. Discussed ongoing diet management with CHF and DM.    Recommend to f/u with Dr Page as scheduled on Thursday.        Flash Aguilar    AVS/Discharge Summary is the source of truth; this is a helpful guide for improved communication of patient story                4 = No assist / stand by assistance

## (undated) DEVICE — BAG CLEAR TRASH 1.3M 39X33" P4040C

## (undated) DEVICE — BLADE CLIPPER SGL USE 9680

## (undated) DEVICE — LINEN POUCH DBL 5427

## (undated) DEVICE — KIT ENDO TURNOVER/PROCEDURE W/CLEAN A SCOPE LINERS 103888

## (undated) DEVICE — GLOVE PROTEXIS BLUE W/NEU-THERA 7.5  2D73EB75

## (undated) DEVICE — ENDO POUCH GOLD 10MM ECATCH 173050G

## (undated) DEVICE — LINEN TOWEL PACK X5 5464

## (undated) DEVICE — GLOVE PROTEXIS W/NEU-THERA 7.5  2D73TE75

## (undated) DEVICE — ENDO FORCEP ENDOJAW BIOPSY 2.8MMX160CM FB-220K

## (undated) DEVICE — SUCTION IRR STRYKERFLOW II W/TIP 250-070-520

## (undated) DEVICE — LINEN FULL SHEET 5511

## (undated) DEVICE — DRSG BANDAID 1X3" FABRIC CURITY LATEX FREE KC44101

## (undated) DEVICE — SUCTION CANISTER STRAW 65652-008

## (undated) DEVICE — GOWN XLG DISP 9545

## (undated) DEVICE — ESU ELEC BLADE 2.75" COATED/INSULATED E1455

## (undated) DEVICE — SU PDS II 0 ENDOLOOP EZ10G

## (undated) DEVICE — ESU PENCIL W/HOLSTER E2350H

## (undated) DEVICE — ENDO FORCEP ENDOJAW BIOPSY 2.8MMX230CM FB-220U

## (undated) DEVICE — ENDO CANNULA 05MM VERSAONE UNIVERSAL UNVCA5STF

## (undated) DEVICE — BLADE KNIFE SURG 11 371111

## (undated) DEVICE — ESU GROUND PAD ADULT W/CORD E7507

## (undated) DEVICE — SOL NACL 0.9% IRRIG 3000ML BAG 2B7477

## (undated) DEVICE — LINEN HALF SHEET 5512

## (undated) DEVICE — SOL ADH LIQUID BENZOIN SWAB 0.6ML C1544

## (undated) DEVICE — SU VICRYL 4-0 PS-2 18" UND J496H

## (undated) DEVICE — ENDO TROCAR 05MM VERSAONE BLADELESS W/STD FIX CAN NONB5STF

## (undated) DEVICE — ENDO TROCAR BLUNT 100MM W/THRD ANCHOR BLUNTPORT BPT12STS

## (undated) DEVICE — PREP CHLORAPREP 26ML TINTED ORANGE  260815

## (undated) DEVICE — Device

## (undated) DEVICE — SUCTION CANISTER MEDIVAC LINER 3000ML W/LID 65651-530

## (undated) DEVICE — CLIP APPLIER ENDO 05MM MED/LG 176630

## (undated) DEVICE — ESU CORD MONOPOLAR 10'  E0510

## (undated) DEVICE — SU VICRYL 0 UR-6 27" J603H

## (undated) RX ORDER — ONDANSETRON 4 MG/1
TABLET, ORALLY DISINTEGRATING ORAL
Status: DISPENSED
Start: 2017-01-03

## (undated) RX ORDER — OXYCODONE AND ACETAMINOPHEN 5; 325 MG/1; MG/1
TABLET ORAL
Status: DISPENSED
Start: 2017-01-06

## (undated) RX ORDER — CEFAZOLIN SODIUM 2 G/100ML
INJECTION, SOLUTION INTRAVENOUS
Status: DISPENSED
Start: 2017-01-06

## (undated) RX ORDER — FENTANYL CITRATE 50 UG/ML
INJECTION, SOLUTION INTRAMUSCULAR; INTRAVENOUS
Status: DISPENSED
Start: 2018-05-22

## (undated) RX ORDER — BUPIVACAINE HYDROCHLORIDE AND EPINEPHRINE 5; 5 MG/ML; UG/ML
INJECTION, SOLUTION EPIDURAL; INTRACAUDAL; PERINEURAL
Status: DISPENSED
Start: 2017-01-06

## (undated) RX ORDER — FENTANYL CITRATE 50 UG/ML
INJECTION, SOLUTION INTRAMUSCULAR; INTRAVENOUS
Status: DISPENSED
Start: 2018-05-21